# Patient Record
Sex: FEMALE | Race: BLACK OR AFRICAN AMERICAN | NOT HISPANIC OR LATINO | Employment: OTHER | RURAL
[De-identification: names, ages, dates, MRNs, and addresses within clinical notes are randomized per-mention and may not be internally consistent; named-entity substitution may affect disease eponyms.]

---

## 2017-01-04 ENCOUNTER — TELEPHONE (OUTPATIENT)
Dept: FAMILY MEDICINE CLINIC | Facility: CLINIC | Age: 62
End: 2017-01-04

## 2017-01-04 NOTE — TELEPHONE ENCOUNTER
----- Message from Stacey Stallworth LPN sent at 1/4/2017  8:20 AM CST -----      ----- Message -----     From: Danial Lemon MD     Sent: 1/3/2017   7:33 PM       To: Stacey Stallworth LPN    Blood sugars are not controlled, make sure she has meds and taking them, she should keep a record of FSBS and bring the Log in should have a visit in 2-4 wks to go over FSBS and meds.

## 2017-01-04 NOTE — TELEPHONE ENCOUNTER
Attempted to call pt. No answer.  LM for pt to return call.    Spoke with pt 1/5/17. States she does have her meds and has been taking them.  Will keep a log of her FSBS and have an appt in 2-4 weeks to go over.  Will get appt scheduled and mailed to her.

## 2017-01-30 ENCOUNTER — OFFICE VISIT (OUTPATIENT)
Dept: FAMILY MEDICINE CLINIC | Facility: CLINIC | Age: 62
End: 2017-01-30

## 2017-01-30 VITALS
TEMPERATURE: 98.4 F | HEIGHT: 62 IN | WEIGHT: 197.3 LBS | BODY MASS INDEX: 36.31 KG/M2 | SYSTOLIC BLOOD PRESSURE: 150 MMHG | HEART RATE: 88 BPM | DIASTOLIC BLOOD PRESSURE: 88 MMHG | OXYGEN SATURATION: 99 %

## 2017-01-30 DIAGNOSIS — Z12.31 ENCOUNTER FOR SCREENING MAMMOGRAM FOR MALIGNANT NEOPLASM OF BREAST: Primary | ICD-10-CM

## 2017-01-30 DIAGNOSIS — E66.09 OBESITY DUE TO EXCESS CALORIES, UNSPECIFIED OBESITY SEVERITY: ICD-10-CM

## 2017-01-30 DIAGNOSIS — I10 ESSENTIAL HYPERTENSION: ICD-10-CM

## 2017-01-30 DIAGNOSIS — M19.90 CHRONIC OSTEOARTHRITIS: ICD-10-CM

## 2017-01-30 DIAGNOSIS — E11.69 TYPE 2 DIABETES MELLITUS WITH OTHER SPECIFIED COMPLICATION (HCC): ICD-10-CM

## 2017-01-30 PROCEDURE — 99214 OFFICE O/P EST MOD 30 MIN: CPT | Performed by: FAMILY MEDICINE

## 2017-01-30 RX ORDER — ALOGLIPTIN 25 MG/1
1 TABLET, FILM COATED ORAL DAILY
Qty: 30 TABLET | Refills: 5 | Status: SHIPPED | OUTPATIENT
Start: 2017-01-30 | End: 2017-09-09 | Stop reason: SDUPTHER

## 2017-01-30 NOTE — MR AVS SNAPSHOT
Meeta Carlos Marmolejo   1/30/2017 8:00 AM   Office Visit    Dept Phone:  [phone]   Encounter #:  [id number]    Provider:  Danial Lemon MD   Department:  [facility]                Your Full Care Plan              Today's Medication Changes          These changes are accurate as of: 1/30/17  8:39 AM.  If you have any questions, ask your nurse or doctor.               New Medication(s)Ordered:     Alogliptin Benzoate 25 MG tablet   Commonly known as:  NESINA   Take 1 tablet by mouth Daily.   Started by:  Danial Lemon MD            Where to Get Your Medications      These medications were sent to [address] - [address] [facility] [address] - [phone]  - [phone]  [address] [facility], [place] [address]     Phone:  [phone]     Alogliptin Benzoate 25 MG tablet                  Your Updated Medication List          This list is accurate as of: 1/30/17  8:39 AM.  Always use your most recent med list.                Alogliptin Benzoate 25 MG tablet   Commonly known as:  NESINA   Take 1 tablet by mouth Daily.       glucose blood test strip   1 each by Other route 2 (Two) Times a Day. Use as instructed       Insulin Glargine 100 UNIT/ML injection pen   Commonly known as:  LANTUS SOLOSTAR   Inject 16 Units under the skin Every Night.       Insulin Pen Needle 31G X 4 MM misc   1 each Daily.       losartan 50 MG tablet   Commonly known as:  COZAAR   Take 1 tablet by mouth Daily.       metFORMIN 1000 MG tablet   Commonly known as:  GLUCOPHAGE   Take 1 tablet by mouth 2 (Two) Times a Day.       simvastatin 40 MG tablet   Commonly known as:  ZOCOR   Take 1 tablet by mouth Every Night.               Instructions     None    Patient Instructions History      Goals     Other     Diabetes controlled  Weight loss  Hypertension controlled  Cholesterol controlled        Upcoming Appointments     "Visit Type Date Time Department    OFFICE VISIT 1/30/2017  8:00 AM [facility]    OFFICE VISIT 5/3/2017  8:00 AM [facility]      MyChart Signup     Our records indicate that you have declined [facility]. If you would like to sign up for [facility], please email [email or web address] or call [phone] to obtain an activation code.             Other Info from Your Visit           Your Appointments     May 03, 2017  8:00 AM CDT   Office Visit with Danial Lemon MD   [facility] (--)    [place] [address] [place] [address]  [facility] [address] [phone]           Arrive 15 minutes prior to appointment.              Allergies     Penicillins  Rash    Rocephin [Ceftriaxone]  Rash      Reason for Visit     Diabetes     Facial Pain sinus pain, all through head    Weight Loss would like to discuss losing wt.      Vital Signs     Blood Pressure Pulse Temperature Height Weight Oxygen Saturation    150/88 (BP Location: Right arm, Patient Position: Sitting, Cuff Size: Adult) 88 98.4 °F (36.9 °C) (Oral) 62\" (157.5 cm) 197 lb 4.8 oz (89.5 kg) 99%    Body Mass Index Smoking Status                36.09 kg/m2 Former Smoker            "

## 2017-01-30 NOTE — PROGRESS NOTES
Subjective    Meeta Marmolejo is a 61 y.o. obese AA female former smoker with DMII, HTN, High cholesterol, GERD, Abd bloating, Diarrhea, Allergies, Dermatitis, Osteoarthritis of multiple sites, Nino TKA, Nino Carpal tunnel surgery, Plantar fasciitis, chronic hip pain, low back pain, and other health problems see PMH/PSH. Pt presents today for exam, to discuss current acue health problem.  ' Working on getting DM back under control after Holidays. Brings Log FSBS which have improved. Abd Bloating has improved. Elbow pain has improved. Has New Treadmill,  walking treadmill every day'.    Upper Extremity Issue   This is a chronic problem. The current episode started more than 1 month ago. The problem occurs intermittently. The problem has been gradually improving. Associated symptoms include arthralgias. Pertinent negatives include no abdominal pain, chest pain, congestion, coughing, fatigue, fever, headaches, joint swelling, nausea, rash, sore throat or weakness. The symptoms are aggravated by exertion and bending. She has tried acetaminophen, position changes, relaxation and sleep for the symptoms. The treatment provided moderate relief.   Hypertension   This is a chronic problem. The current episode started more than 1 year ago. The problem is controlled. Pertinent negatives include no chest pain, headaches, palpitations or shortness of breath. There are no associated agents to hypertension. Risk factors for coronary artery disease include diabetes mellitus, obesity, smoking/tobacco exposure, stress and post-menopausal state. Past treatments include angiotensin blockers. The current treatment provides significant improvement. There are no compliance problems.  Hypertensive end-organ damage includes left ventricular hypertrophy. Obesity, DM.   Diabetes   She presents for her follow-up diabetic visit. She has type 2 diabetes mellitus. No MedicAlert identification noted. Her disease course has been improving. There  are no hypoglycemic associated symptoms. Pertinent negatives for hypoglycemia include no dizziness or headaches. Pertinent negatives for diabetes include no chest pain, no fatigue and no weakness. There are no hypoglycemic complications. Symptoms are improving. Diabetic complications include peripheral neuropathy. Risk factors for coronary artery disease include diabetes mellitus, dyslipidemia, hypertension, obesity and post-menopausal. Current diabetic treatment includes oral agent (monotherapy) and insulin injections. She is compliant with treatment most of the time. Her weight is stable. She is following a generally healthy diet. Meal planning includes avoidance of concentrated sweets and calorie counting. She has not had a previous visit with a dietitian. She participates in exercise daily. Her overall blood glucose range is 140-180 mg/dl. An ACE inhibitor/angiotensin II receptor blocker is being taken. She sees a podiatrist.Eye exam is current.        The following portions of the patient's history were reviewed and updated as appropriate: allergies, current medications, past family history, past medical history, past social history, past surgical history and problem list.    Review of Systems   Constitutional: Negative for fatigue and fever.   HENT: Negative for congestion, ear pain, rhinorrhea, sneezing and sore throat.    Eyes: Negative.  Negative for pain and visual disturbance.   Respiratory: Negative.  Negative for cough and shortness of breath.    Cardiovascular: Negative.  Negative for chest pain and palpitations.   Gastrointestinal: Negative for abdominal pain and nausea.   Endocrine: Negative.    Genitourinary: Negative for dysuria.   Musculoskeletal: Positive for arthralgias. Negative for joint swelling.        R outer elbow pain improved   Skin: Negative for rash.   Allergic/Immunologic: Negative.    Neurological: Negative for dizziness, weakness and headaches.   Hematological: Negative for  adenopathy. Does not bruise/bleed easily.   Psychiatric/Behavioral: Negative.  Negative for sleep disturbance.       Objective   Physical Exam   Constitutional: She is oriented to person, place, and time. She appears well-developed and well-nourished.   HENT:   Head: Normocephalic and atraumatic.   Right Ear: External ear normal.   Left Ear: External ear normal.   Nose: Nose normal.   Mouth/Throat: Oropharynx is clear and moist. No oropharyngeal exudate.   Eyes: Conjunctivae and EOM are normal. Pupils are equal, round, and reactive to light. Right eye exhibits no discharge. Left eye exhibits no discharge. No scleral icterus.   Neck: Normal range of motion. No JVD present. No thyromegaly present.   Cardiovascular: Normal rate, regular rhythm, normal heart sounds and intact distal pulses.  Exam reveals no gallop and no friction rub.    No murmur heard.  Pulmonary/Chest: Effort normal and breath sounds normal. No respiratory distress. She has no wheezes. She has no rales. She exhibits no tenderness.   Abdominal: Soft. Bowel sounds are normal. She exhibits no distension and no mass. There is no tenderness. No hernia.   Musculoskeletal: She exhibits no edema, tenderness or deformity.   Neurological: She is alert and oriented to person, place, and time. She has normal reflexes. She displays normal reflexes. No cranial nerve deficit. She exhibits normal muscle tone. Coordination normal.   Skin: Skin is warm and dry. No rash noted. No erythema. No pallor.   Psychiatric: She has a normal mood and affect. Her behavior is normal. Judgment and thought content normal.   Nursing note and vitals reviewed.      Assessment/Plan      Meeta was seen today for diabetes, facial pain and weight loss.    Diagnoses and all orders for this visit:    Encounter for screening mammogram for malignant neoplasm of breast  -     Mammo Screening Bilateral With CAD; Future    Essential hypertension    Obesity due to excess calories, unspecified  obesity severity    Type 2 diabetes mellitus with other specified complication    Chronic osteoarthritis    Other orders  -     Alogliptin Benzoate (NESINA) 25 MG tablet; Take 1 tablet by mouth Daily.      Discussed current health problem list, meds, indications, tx plan, F/U plan. Discussed BMI, BEE, 3-4 small meals, 1600 archie/day ADA diet. Discussed USPSTF recommendations.

## 2017-01-31 NOTE — PATIENT INSTRUCTIONS
Preventive Care for Adults, Female  A healthy lifestyle and preventive care can promote health and wellness. Preventive health guidelines for women include the following key practices.  · A routine yearly physical is a good way to check with your health care provider about your health and preventive screening. It is a chance to share any concerns and updates on your health and to receive a thorough exam.  · Visit your dentist for a routine exam and preventive care every 6 months. Brush your teeth twice a day and floss once a day. Good oral hygiene prevents tooth decay and gum disease.  · The frequency of eye exams is based on your age, health, family medical history, use of contact lenses, and other factors. Follow your health care provider's recommendations for frequency of eye exams.  · Eat a healthy diet. Foods like vegetables, fruits, whole grains, low-fat dairy products, and lean protein foods contain the nutrients you need without too many calories. Decrease your intake of foods high in solid fats, added sugars, and salt. Eat the right amount of calories for you. Get information about a proper diet from your health care provider, if necessary.  · Regular physical exercise is one of the most important things you can do for your health. Most adults should get at least 150 minutes of moderate-intensity exercise (any activity that increases your heart rate and causes you to sweat) each week. In addition, most adults need muscle-strengthening exercises on 2 or more days a week.  · Maintain a healthy weight. The body mass index (BMI) is a screening tool to identify possible weight problems. It provides an estimate of body fat based on height and weight. Your health care provider can find your BMI and can help you achieve or maintain a healthy weight. For adults 20 years and older:    A BMI below 18.5 is considered underweight.    A BMI of 18.5 to 24.9 is normal.    A BMI of 25 to 29.9 is considered overweight.    A  BMI of 30 and above is considered obese.  · Maintain normal blood lipids and cholesterol levels by exercising and minimizing your intake of saturated fat. Eat a balanced diet with plenty of fruit and vegetables. Blood tests for lipids and cholesterol should begin at age 20 and be repeated every 5 years. If your lipid or cholesterol levels are high, you are over 50, or you are at high risk for heart disease, you may need your cholesterol levels checked more frequently. Ongoing high lipid and cholesterol levels should be treated with medicines if diet and exercise are not working.  · If you smoke, find out from your health care provider how to quit. If you do not use tobacco, do not start.  · Lung cancer screening is recommended for adults aged 55-80 years who are at high risk for developing lung cancer because of a history of smoking. A yearly low-dose CT scan of the lungs is recommended for people who have at least a 30-pack-year history of smoking and are a current smoker or have quit within the past 15 years. A pack year of smoking is smoking an average of 1 pack of cigarettes a day for 1 year (for example: 1 pack a day for 30 years or 2 packs a day for 15 years). Yearly screening should continue until the smoker has stopped smoking for at least 15 years. Yearly screening should be stopped for people who develop a health problem that would prevent them from having lung cancer treatment.  · If you are pregnant, do not drink alcohol. If you are breastfeeding, be very cautious about drinking alcohol. If you are not pregnant and choose to drink alcohol, do not have more than 1 drink per day. One drink is considered to be 12 ounces (355 mL) of beer, 5 ounces (148 mL) of wine, or 1.5 ounces (44 mL) of liquor.  · Avoid use of street drugs. Do not share needles with anyone. Ask for help if you need support or instructions about stopping the use of drugs.  · High blood pressure causes heart disease and increases the risk  "of stroke. Your blood pressure should be checked at least every 1 to 2 years. Ongoing high blood pressure should be treated with medicines if weight loss and exercise do not work.  · If you are 55-79 years old, ask your health care provider if you should take aspirin to prevent strokes.  · Diabetes screening is done by taking a blood sample to check your blood glucose level after you have not eaten for a certain period of time (fasting). If you are not overweight and you do not have risk factors for diabetes, you should be screened once every 3 years starting at age 45. If you are overweight or obese and you are 40-70 years of age, you should be screened for diabetes every year as part of your cardiovascular risk assessment.  · Breast cancer screening is essential preventive care for women. You should practice \"breast self-awareness.\" This means understanding the normal appearance and feel of your breasts and may include breast self-examination. Any changes detected, no matter how small, should be reported to a health care provider. Women in their 20s and 30s should have a clinical breast exam (CBE) by a health care provider as part of a regular health exam every 1 to 3 years. After age 40, women should have a CBE every year. Starting at age 40, women should consider having a mammogram (breast X-ray test) every year. Women who have a family history of breast cancer should talk to their health care provider about genetic screening. Women at a high risk of breast cancer should talk to their health care providers about having an MRI and a mammogram every year.  · Breast cancer gene (BRCA)-related cancer risk assessment is recommended for women who have family members with BRCA-related cancers. BRCA-related cancers include breast, ovarian, tubal, and peritoneal cancers. Having family members with these cancers may be associated with an increased risk for harmful changes (mutations) in the breast cancer genes BRCA1 and " BRCA2. Results of the assessment will determine the need for genetic counseling and BRCA1 and BRCA2 testing.  · Your health care provider may recommend that you be screened regularly for cancer of the pelvic organs (ovaries, uterus, and vagina). This screening involves a pelvic examination, including checking for microscopic changes to the surface of your cervix (Pap test). You may be encouraged to have this screening done every 3 years, beginning at age 21.    For women ages 30-65, health care providers may recommend pelvic exams and Pap testing every 3 years, or they may recommend the Pap and pelvic exam, combined with testing for human papilloma virus (HPV), every 5 years. Some types of HPV increase your risk of cervical cancer. Testing for HPV may also be done on women of any age with unclear Pap test results.    Other health care providers may not recommend any screening for nonpregnant women who are considered low risk for pelvic cancer and who do not have symptoms. Ask your health care provider if a screening pelvic exam is right for you.  · If you have had past treatment for cervical cancer or a condition that could lead to cancer, you need Pap tests and screening for cancer for at least 20 years after your treatment. If Pap tests have been discontinued, your risk factors (such as having a new sexual partner) need to be reassessed to determine if screening should resume. Some women have medical problems that increase the chance of getting cervical cancer. In these cases, your health care provider may recommend more frequent screening and Pap tests.  · Colorectal cancer can be detected and often prevented. Most routine colorectal cancer screening begins at the age of 50 years and continues through age 75 years. However, your health care provider may recommend screening at an earlier age if you have risk factors for colon cancer. On a yearly basis, your health care provider may provide home test kits to check  for hidden blood in the stool. Use of a small camera at the end of a tube, to directly examine the colon (sigmoidoscopy or colonoscopy), can detect the earliest forms of colorectal cancer. Talk to your health care provider about this at age 50, when routine screening begins.  Direct exam of the colon should be repeated every 5-10 years through age 75 years, unless early forms of precancerous polyps or small growths are found.  · People who are at an increased risk for hepatitis B should be screened for this virus. You are considered at high risk for hepatitis B if:    You were born in a country where hepatitis B occurs often. Talk with your health care provider about which countries are considered high risk.    Your parents were born in a high-risk country and you have not received a shot to protect against hepatitis B (hepatitis B vaccine).    You have HIV or AIDS.    You use needles to inject street drugs.    You live with, or have sex with, someone who has hepatitis B.    You get hemodialysis treatment.    You take certain medicines for conditions like cancer, organ transplantation, and autoimmune conditions.  · Hepatitis C blood testing is recommended for all people born from 1945 through 1965 and any individual with known risks for hepatitis C.  · Practice safe sex. Use condoms and avoid high-risk sexual practices to reduce the spread of sexually transmitted infections (STIs). STIs include gonorrhea, chlamydia, syphilis, trichomonas, herpes, HPV, and human immunodeficiency virus (HIV). Herpes, HIV, and HPV are viral illnesses that have no cure. They can result in disability, cancer, and death.  · You should be screened for sexually transmitted illnesses (STIs) including gonorrhea and chlamydia if:    You are sexually active and are younger than 24 years.    You are older than 24 years and your health care provider tells you that you are at risk for this type of infection.    Your sexual activity has changed  since you were last screened and you are at an increased risk for chlamydia or gonorrhea. Ask your health care provider if you are at risk.  · If you are at risk of being infected with HIV, it is recommended that you take a prescription medicine daily to prevent HIV infection. This is called preexposure prophylaxis (PrEP). You are considered at risk if:    You are sexually active and do not regularly use condoms or know the HIV status of your partner(s).    You take drugs by injection.    You are sexually active with a partner who has HIV.    Talk with your health care provider about whether you are at high risk of being infected with HIV. If you choose to begin PrEP, you should first be tested for HIV. You should then be tested every 3 months for as long as you are taking PrEP.  · Osteoporosis is a disease in which the bones lose minerals and strength with aging. This can result in serious bone fractures or breaks. The risk of osteoporosis can be identified using a bone density scan. Women ages 65 years and over and women at risk for fractures or osteoporosis should discuss screening with their health care providers. Ask your health care provider whether you should take a calcium supplement or vitamin D to reduce the rate of osteoporosis.  · Menopause can be associated with physical symptoms and risks. Hormone replacement therapy is available to decrease symptoms and risks. You should talk to your health care provider about whether hormone replacement therapy is right for you.  · Use sunscreen. Apply sunscreen liberally and repeatedly throughout the day. You should seek shade when your shadow is shorter than you. Protect yourself by wearing long sleeves, pants, a wide-brimmed hat, and sunglasses year round, whenever you are outdoors.  · Once a month, do a whole body skin exam, using a mirror to look at the skin on your back. Tell your health care provider of new moles, moles that have irregular borders, moles that  are larger than a pencil eraser, or moles that have changed in shape or color.  · Stay current with required vaccines (immunizations).    Influenza vaccine. All adults should be immunized every year.    Tetanus, diphtheria, and acellular pertussis (Td, Tdap) vaccine. Pregnant women should receive 1 dose of Tdap vaccine during each pregnancy. The dose should be obtained regardless of the length of time since the last dose. Immunization is preferred during the 27th-36th week of gestation. An adult who has not previously received Tdap or who does not know her vaccine status should receive 1 dose of Tdap. This initial dose should be followed by tetanus and diphtheria toxoids (Td) booster doses every 10 years. Adults with an unknown or incomplete history of completing a 3-dose immunization series with Td-containing vaccines should begin or complete a primary immunization series including a Tdap dose. Adults should receive a Td booster every 10 years.    Varicella vaccine. An adult without evidence of immunity to varicella should receive 2 doses or a second dose if she has previously received 1 dose. Pregnant females who do not have evidence of immunity should receive the first dose after pregnancy. This first dose should be obtained before leaving the health care facility. The second dose should be obtained 4-8 weeks after the first dose.    Human papillomavirus (HPV) vaccine. Females aged 13-26 years who have not received the vaccine previously should obtain the 3-dose series. The vaccine is not recommended for use in pregnant females. However, pregnancy testing is not needed before receiving a dose. If a female is found to be pregnant after receiving a dose, no treatment is needed. In that case, the remaining doses should be delayed until after the pregnancy. Immunization is recommended for any person with an immunocompromised condition through the age of 26 years if she did not get any or all doses earlier. During the  3-dose series, the second dose should be obtained 4-8 weeks after the first dose. The third dose should be obtained 24 weeks after the first dose and 16 weeks after the second dose.    Zoster vaccine. One dose is recommended for adults aged 60 years or older unless certain conditions are present.    Measles, mumps, and rubella (MMR) vaccine. Adults born before 1957 generally are considered immune to measles and mumps. Adults born in 1957 or later should have 1 or more doses of MMR vaccine unless there is a contraindication to the vaccine or there is laboratory evidence of immunity to each of the three diseases. A routine second dose of MMR vaccine should be obtained at least 28 days after the first dose for students attending postsecondary schools, health care workers, or international travelers. People who received inactivated measles vaccine or an unknown type of measles vaccine during 7621-2884 should receive 2 doses of MMR vaccine. People who received inactivated mumps vaccine or an unknown type of mumps vaccine before 1979 and are at high risk for mumps infection should consider immunization with 2 doses of MMR vaccine. For females of childbearing age, rubella immunity should be determined. If there is no evidence of immunity, females who are not pregnant should be vaccinated. If there is no evidence of immunity, females who are pregnant should delay immunization until after pregnancy. Unvaccinated health care workers born before 1957 who lack laboratory evidence of measles, mumps, or rubella immunity or laboratory confirmation of disease should consider measles and mumps immunization with 2 doses of MMR vaccine or rubella immunization with 1 dose of MMR vaccine.    Pneumococcal 13-valent conjugate (PCV13) vaccine. When indicated, a person who is uncertain of his immunization history and has no record of immunization should receive the PCV13 vaccine. All adults 65 years of age and older should receive this  vaccine. An adult aged 19 years or older who has certain medical conditions and has not been previously immunized should receive 1 dose of PCV13 vaccine. This PCV13 should be followed with a dose of pneumococcal polysaccharide (PPSV23) vaccine. Adults who are at high risk for pneumococcal disease should obtain the PPSV23 vaccine at least 8 weeks after the dose of PCV13 vaccine. Adults older than 65 years of age who have normal immune system function should obtain the PPSV23 vaccine dose at least 1 year after the dose of PCV13 vaccine.    Pneumococcal polysaccharide (PPSV23) vaccine. When PCV13 is also indicated, PCV13 should be obtained first. All adults aged 65 years and older should be immunized. An adult younger than age 65 years who has certain medical conditions should be immunized. Any person who resides in a nursing home or long-term care facility should be immunized. An adult smoker should be immunized. People with an immunocompromised condition and certain other conditions should receive both PCV13 and PPSV23 vaccines. People with human immunodeficiency virus (HIV) infection should be immunized as soon as possible after diagnosis. Immunization during chemotherapy or radiation therapy should be avoided. Routine use of PPSV23 vaccine is not recommended for American Indians, [place] Natives, or people younger than 65 years unless there are medical conditions that require PPSV23 vaccine. When indicated, people who have unknown immunization and have no record of immunization should receive PPSV23 vaccine. One-time revaccination 5 years after the first dose of PPSV23 is recommended for people aged 19-64 years who have chronic kidney failure, nephrotic syndrome, asplenia, or immunocompromised conditions. People who received 1-2 doses of PPSV23 before age 65 years should receive another dose of PPSV23 vaccine at age 65 years or later if at least 5 years have passed since the previous dose. Doses of PPSV23 are not  needed for people immunized with PPSV23 at or after age 65 years.    Meningococcal vaccine. Adults with asplenia or persistent complement component deficiencies should receive 2 doses of quadrivalent meningococcal conjugate (MenACWY-D) vaccine. The doses should be obtained at least 2 months apart. Microbiologists working with certain meningococcal bacteria,  recruits, people at risk during an outbreak, and people who travel to or live in countries with a high rate of meningitis should be immunized. A first-year college student up through age 21 years who is living in a residence ramos should receive a dose if she did not receive a dose on or after her 16th birthday. Adults who have certain high-risk conditions should receive one or more doses of vaccine.    Hepatitis A vaccine. Adults who wish to be protected from this disease, have certain high-risk conditions, work with hepatitis A-infected animals, work in hepatitis A research labs, or travel to or work in countries with a high rate of hepatitis A should be immunized. Adults who were previously unvaccinated and who anticipate close contact with an international adoptee during the first 60 days after arrival in the [place] from a country with a high rate of hepatitis A should be immunized.    Hepatitis B vaccine. Adults who wish to be protected from this disease, have certain high-risk conditions, may be exposed to blood or other infectious body fluids, are household contacts or sex partners of hepatitis B positive people, are clients or workers in certain care facilities, or travel to or work in countries with a high rate of hepatitis B should be immunized.    Haemophilus influenzae type b (Hib) vaccine. A previously unvaccinated person with asplenia or sickle cell disease or having a scheduled splenectomy should receive 1 dose of Hib vaccine. Regardless of previous immunization, a recipient of a hematopoietic stem cell transplant should receive a  3-dose series 6-12 months after her successful transplant. Hib vaccine is not recommended for adults with HIV infection.  Preventive Services / Frequency  Ages 19 to 39 years  · Blood pressure check.** / Every 3-5 years.  · Lipid and cholesterol check.** / Every 5 years beginning at age 20.  · Clinical breast exam.** / Every 3 years for women in their 20s and 30s.  · BRCA-related cancer risk assessment.** / For women who have family members with a BRCA-related cancer (breast, ovarian, tubal, or peritoneal cancers).  · Pap test.** / Every 2 years from ages 21 through 29. Every 3 years starting at age 30 through age 65 or 70 with a history of 3 consecutive normal Pap tests.  · HPV screening.** / Every 3 years from ages 30 through ages 65 to 70 with a history of 3 consecutive normal Pap tests.  · Hepatitis C blood test.** / For any individual with known risks for hepatitis C.  · Skin self-exam. / Monthly.  · Influenza vaccine. / Every year.  · Tetanus, diphtheria, and acellular pertussis (Tdap, Td) vaccine.** / Consult your health care provider. Pregnant women should receive 1 dose of Tdap vaccine during each pregnancy. 1 dose of Td every 10 years.  · Varicella vaccine.** / Consult your health care provider. Pregnant females who do not have evidence of immunity should receive the first dose after pregnancy.  · HPV vaccine. / 3 doses over 6 months, if 26 and younger. The vaccine is not recommended for use in pregnant females. However, pregnancy testing is not needed before receiving a dose.  · Measles, mumps, rubella (MMR) vaccine.** / You need at least 1 dose of MMR if you were born in 1957 or later. You may also need a 2nd dose. For females of childbearing age, rubella immunity should be determined. If there is no evidence of immunity, females who are not pregnant should be vaccinated. If there is no evidence of immunity, females who are pregnant should delay immunization until after pregnancy.  · Pneumococcal  13-valent conjugate (PCV13) vaccine.** / Consult your health care provider.  · Pneumococcal polysaccharide (PPSV23) vaccine.** / 1 to 2 doses if you smoke cigarettes or if you have certain conditions.  · Meningococcal vaccine.** / 1 dose if you are age 19 to 21 years and a first-year college student living in a residence ramos, or have one of several medical conditions, you need to get vaccinated against meningococcal disease. You may also need additional booster doses.  · Hepatitis A vaccine.** / Consult your health care provider.  · Hepatitis B vaccine.** / Consult your health care provider.  · Haemophilus influenzae type b (Hib) vaccine.** / Consult your health care provider.  Ages 40 to 64 years  · Blood pressure check.** / Every year.  · Lipid and cholesterol check.** / Every 5 years beginning at age 20 years.  · Lung cancer screening. / Every year if you are aged 55-80 years and have a 30-pack-year history of smoking and currently smoke or have quit within the past 15 years. Yearly screening is stopped once you have quit smoking for at least 15 years or develop a health problem that would prevent you from having lung cancer treatment.  · Clinical breast exam.** / Every year after age 40 years.  ·  BRCA-related cancer risk assessment.** / For women who have family members with a BRCA-related cancer (breast, ovarian, tubal, or peritoneal cancers).  · Mammogram.** / Every year beginning at age 40 years and continuing for as long as you are in good health. Consult with your health care provider.  · Pap test.** / Every 3 years starting at age 30 years through age 65 or 70 years with a history of 3 consecutive normal Pap tests.  · HPV screening.** / Every 3 years from ages 30 years through ages 65 to 70 years with a history of 3 consecutive normal Pap tests.  · Fecal occult blood test (FOBT) of stool. / Every year beginning at age 50 years and continuing until age 75 years. You may not need to do this test if you get  a colonoscopy every 10 years.  · Flexible sigmoidoscopy or colonoscopy.** / Every 5 years for a flexible sigmoidoscopy or every 10 years for a colonoscopy beginning at age 50 years and continuing until age 75 years.  · Hepatitis C blood test.** / For all people born from 1945 through 1965 and any individual with known risks for hepatitis C.  · Skin self-exam. / Monthly.  · Influenza vaccine. / Every year.  · Tetanus, diphtheria, and acellular pertussis (Tdap/Td) vaccine.** / Consult your health care provider. Pregnant women should receive 1 dose of Tdap vaccine during each pregnancy. 1 dose of Td every 10 years.  · Varicella vaccine.** / Consult your health care provider. Pregnant females who do not have evidence of immunity should receive the first dose after pregnancy.  · Zoster vaccine.** / 1 dose for adults aged 60 years or older.  · Measles, mumps, rubella (MMR) vaccine.** / You need at least 1 dose of MMR if you were born in 1957 or later. You may also need a second dose. For females of childbearing age, rubella immunity should be determined. If there is no evidence of immunity, females who are not pregnant should be vaccinated. If there is no evidence of immunity, females who are pregnant should delay immunization until after pregnancy.  · Pneumococcal 13-valent conjugate (PCV13) vaccine.** / Consult your health care provider.  · Pneumococcal polysaccharide (PPSV23) vaccine.** / 1 to 2 doses if you smoke cigarettes or if you have certain conditions.  · Meningococcal vaccine.** / Consult your health care provider.  · Hepatitis A vaccine.** / Consult your health care provider.  · Hepatitis B vaccine.** / Consult your health care provider.  · Haemophilus influenzae type b (Hib) vaccine.** / Consult your health care provider.  Ages 65 years and over  · Blood pressure check.** / Every year.  · Lipid and cholesterol check.** / Every 5 years beginning at age 20 years.  · Lung cancer screening. / Every year if you  are aged 55-80 years and have a 30-pack-year history of smoking and currently smoke or have quit within the past 15 years. Yearly screening is stopped once you have quit smoking for at least 15 years or develop a health problem that would prevent you from having lung cancer treatment.  · Clinical breast exam.** / Every year after age 40 years.  ·  BRCA-related cancer risk assessment.** / For women who have family members with a BRCA-related cancer (breast, ovarian, tubal, or peritoneal cancers).  · Mammogram.** / Every year beginning at age 40 years and continuing for as long as you are in good health. Consult with your health care provider.  · Pap test.** / Every 3 years starting at age 30 years through age 65 or 70 years with 3 consecutive normal Pap tests. Testing can be stopped between 65 and 70 years with 3 consecutive normal Pap tests and no abnormal Pap or HPV tests in the past 10 years.  · HPV screening.** / Every 3 years from ages 30 years through ages 65 or 70 years with a history of 3 consecutive normal Pap tests. Testing can be stopped between 65 and 70 years with 3 consecutive normal Pap tests and no abnormal Pap or HPV tests in the past 10 years.  · Fecal occult blood test (FOBT) of stool. / Every year beginning at age 50 years and continuing until age 75 years. You may not need to do this test if you get a colonoscopy every 10 years.  · Flexible sigmoidoscopy or colonoscopy.** / Every 5 years for a flexible sigmoidoscopy or every 10 years for a colonoscopy beginning at age 50 years and continuing until age 75 years.  · Hepatitis C blood test.** / For all people born from 1945 through 1965 and any individual with known risks for hepatitis C.  · Osteoporosis screening.** / A one-time screening for women ages 65 years and over and women at risk for fractures or osteoporosis.  · Skin self-exam. / Monthly.  · Influenza vaccine. / Every year.  · Tetanus, diphtheria, and acellular pertussis (Tdap/Td)  vaccine.** / 1 dose of Td every 10 years.  · Varicella vaccine.** / Consult your health care provider.  · Zoster vaccine.** / 1 dose for adults aged 60 years or older.  · Pneumococcal 13-valent conjugate (PCV13) vaccine.** / Consult your health care provider.  · Pneumococcal polysaccharide (PPSV23) vaccine.** / 1 dose for all adults aged 65 years and older.  · Meningococcal vaccine.** / Consult your health care provider.  · Hepatitis A vaccine.** / Consult your health care provider.  · Hepatitis B vaccine.** / Consult your health care provider.  · Haemophilus influenzae type b (Hib) vaccine.** / Consult your health care provider.  ** Family history and personal history of risk and conditions may change your health care provider's recommendations.     This information is not intended to replace advice given to you by your health care provider. Make sure you discuss any questions you have with your health care provider.     Document Released: 02/13/2003 Document Revised: 01/08/2016 Document Reviewed: 05/14/2012  [employer] Interactive Patient Education ©2016 [employer]

## 2017-03-08 ENCOUNTER — OFFICE VISIT (OUTPATIENT)
Dept: FAMILY MEDICINE CLINIC | Facility: CLINIC | Age: 62
End: 2017-03-08

## 2017-03-08 VITALS
SYSTOLIC BLOOD PRESSURE: 130 MMHG | HEART RATE: 82 BPM | TEMPERATURE: 98.6 F | WEIGHT: 194 LBS | DIASTOLIC BLOOD PRESSURE: 64 MMHG | BODY MASS INDEX: 35.7 KG/M2 | OXYGEN SATURATION: 97 % | HEIGHT: 62 IN

## 2017-03-08 DIAGNOSIS — Z12.11 ENCOUNTER FOR SCREENING COLONOSCOPY: ICD-10-CM

## 2017-03-08 DIAGNOSIS — E66.09 OBESITY DUE TO EXCESS CALORIES, UNSPECIFIED OBESITY SEVERITY: ICD-10-CM

## 2017-03-08 DIAGNOSIS — Z12.31 ENCOUNTER FOR SCREENING MAMMOGRAM FOR MALIGNANT NEOPLASM OF BREAST: ICD-10-CM
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
## 2017-03-08 DIAGNOSIS — Z23 NEED FOR PROPHYLACTIC VACCINATION WITH COMBINED DIPHTHERIA-TETANUS-PERTUSSIS (DTP) VACCINE: ICD-10-CM

## 2017-03-08 DIAGNOSIS — E11.69 TYPE 2 DIABETES MELLITUS WITH OTHER SPECIFIED COMPLICATION (HCC): Primary | ICD-10-CM

## 2017-03-08 DIAGNOSIS — I10 ESSENTIAL HYPERTENSION: ICD-10-CM

## 2017-03-08 DIAGNOSIS — S39.012D REPETITIVE STRAIN INJURY OF LOWER BACK, SUBSEQUENT ENCOUNTER: ICD-10-CM

## 2017-03-08 DIAGNOSIS — R80.9 MICROALBUMINURIA: ICD-10-CM
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
## 2017-03-08 PROBLEM — S39.012A REPETITIVE STRAIN INJURY OF LOWER BACK: Status: ACTIVE | Noted: 2017-03-08

## 2017-03-08 PROCEDURE — 90715 TDAP VACCINE 7 YRS/> IM: CPT | Performed by: FAMILY MEDICINE

## 2017-03-08 PROCEDURE — 99213 OFFICE O/P EST LOW 20 MIN: CPT | Performed by: FAMILY MEDICINE

## 2017-03-08 PROCEDURE — 90471 IMMUNIZATION ADMIN: CPT | Performed by: FAMILY MEDICINE

## 2017-03-08 RX ORDER — MELOXICAM 7.5 MG/1
7.5 TABLET ORAL DAILY
Qty: 14 TABLET | Refills: 0 | Status: SHIPPED | OUTPATIENT
Start: 2017-03-08 | End: 2018-08-22

## 2017-03-08 RX ORDER — CYCLOBENZAPRINE HCL 10 MG
10 TABLET ORAL 2 TIMES DAILY PRN
Qty: 28 TABLET | Refills: 0 | Status: SHIPPED | OUTPATIENT
Start: 2017-03-08 | End: 2018-08-22

## 2017-03-08 NOTE — PATIENT INSTRUCTIONS
Diabetes and Standards of Medical Care  Diabetes is complicated. You may find that your diabetes team includes a dietitian, nurse, diabetes educator, eye doctor, and more. To help everyone know what is going on and to help you get the care you deserve, the following schedule of care was developed to help keep you on track. Below are the tests, exams, vaccines, medicines, education, and plans you will need.  HbA1c test  This test shows how well you have controlled your glucose over the past 2-3 months. It is used to see if your diabetes management plan needs to be adjusted.   · It is performed at least 2 times a year if you are meeting treatment goals.  · It is performed 4 times a year if therapy has changed or if you are not meeting treatment goals.  Blood pressure test  · This test is performed at every routine medical visit. The goal is less than 140/90 mm Hg for most people, but 130/80 mm Hg in some cases. Ask your health care provider about your goal.  Dental exam  · Follow up with the dentist regularly.  Eye exam  · If you are diagnosed with type 1 diabetes as a child, get an exam upon reaching the age of 10 years or older and having had diabetes for 3-5 years. Yearly eye exams are recommended after that initial eye exam.  · If you are diagnosed with type 1 diabetes as an adult, get an exam within 5 years of diagnosis and then yearly.  · If you are diagnosed with type 2 diabetes, get an exam as soon as possible after the diagnosis and then yearly.  Foot care exam  · Visual foot exams are performed at every routine medical visit. The exams check for cuts, injuries, or other problems with the feet.  · You should have a complete foot exam performed every year. This exam includes an inspection of the structure and skin of your feet, a check of the pulses in your feet, and a check of the sensation in your feet.    Type 1 diabetes: The first exam is performed 5 years after diagnosis.    Type 2 diabetes: The first  exam is performed at the time of diagnosis.  · Check your feet nightly for cuts, injuries, or other problems with your feet. Tell your health care provider if anything is not healing.  Kidney function test (urine microalbumin)  · This test is performed once a year.  ¨ Type 1 diabetes: The first test is performed 5 years after diagnosis.  ¨ Type 2 diabetes: The first test is performed at the time of diagnosis.  · A serum creatinine and estimated glomerular filtration rate (eGFR) test is done once a year to assess the level of chronic kidney disease (CKD), if present.  Lipid profile (cholesterol, HDL, LDL, triglycerides)  · Performed every 5 years for most people.    The goal for LDL is less than 100 mg/dL. If you are at high risk, the goal is less than 70 mg/dL.    The goal for HDL is 40 mg/dL-50 mg/dL for men and 50 mg/dL-60 mg/dL for women. An HDL cholesterol of 60 mg/dL or higher gives some protection against heart disease.    The goal for triglycerides is less than 150 mg/dL.  Immunizations  · The flu (influenza) vaccine is recommended yearly for every person 6 months of age or older who has diabetes.  · The pneumonia (pneumococcal) vaccine is recommended for every person 2 years of age or older who has diabetes. Adults 65 years of age or older may receive the pneumonia vaccine as a series of two separate shots.  · The hepatitis B vaccine is recommended for adults shortly after they have been diagnosed with diabetes.  · The Tdap (tetanus, diphtheria, and pertussis) vaccine should be given:    According to normal childhood vaccination schedules, for children.    Every 10 years, for adults who have diabetes.  Diabetes self-management education  · Education is recommended at diagnosis and ongoing as needed.  Treatment plan  · Your treatment plan is reviewed at every medical visit.     This information is not intended to replace advice given to you by your health care provider. Make sure you discuss any questions you  have with your health care provider.     Document Released: 10/15/2010 Document Revised: 01/08/2016 Document Reviewed: 05/20/2014  [employer] Interactive Patient Education ©2016 [employer]    Back Pain, Adult  Back pain is very common in adults. The cause of back pain is rarely dangerous and the pain often gets better over time. The cause of your back pain may not be known. Some common causes of back pain include:  · Strain of the muscles or ligaments supporting the spine.  · Wear and tear (degeneration) of the spinal disks.  · Arthritis.  · Direct injury to the back.  For many people, back pain may return. Since back pain is rarely dangerous, most people can learn to manage this condition on their own.  HOME CARE INSTRUCTIONS  Watch your back pain for any changes. The following actions may help to lessen any discomfort you are feeling:  · Remain active. It is stressful on your back to sit or  one place for long periods of time. Do not sit, drive, or  one place for more than 30 minutes at a time. Take short walks on even surfaces as soon as you are able. Try to increase the length of time you walk each day.  · Exercise regularly as directed by your health care provider. Exercise helps your back heal faster. It also helps avoid future injury by keeping your muscles strong and flexible.  · Do not stay in bed. Resting more than 1-2 days can delay your recovery.  · Pay attention to your body when you bend and lift. The most comfortable positions are those that put less stress on your recovering back. Always use proper lifting techniques, including:    Bending your knees.    Keeping the load close to your body.    Avoiding twisting.  · Find a comfortable position to sleep. Use a firm mattress and lie on your side with your knees slightly bent. If you lie on your back, put a pillow under your knees.  · Avoid feeling anxious or stressed. Stress increases muscle tension and can worsen back pain. It is  important to recognize when you are anxious or stressed and learn ways to manage it, such as with exercise.  · Take medicines only as directed by your health care provider. Over-the-counter medicines to reduce pain and inflammation are often the most helpful. Your health care provider may prescribe muscle relaxant drugs. These medicines help dull your pain so you can more quickly return to your normal activities and healthy exercise.  · Apply ice to the injured area:    Put ice in a plastic bag.    Place a towel between your skin and the bag.    Leave the ice on for 20 minutes, 2-3 times a day for the first 2-3 days. After that, ice and heat may be alternated to reduce pain and spasms.  · Maintain a healthy weight. Excess weight puts extra stress on your back and makes it difficult to maintain good posture.  SEEK MEDICAL CARE IF:  · You have pain that is not relieved with rest or medicine.  · You have increasing pain going down into the legs or buttocks.  · You have pain that does not improve in one week.  · You have night pain.  · You lose weight.  · You have a fever or chills.  SEEK IMMEDIATE MEDICAL CARE IF:   · You develop new bowel or bladder control problems.  · You have unusual weakness or numbness in your arms or legs.  · You develop nausea or vomiting.  · You develop abdominal pain.  · You feel faint.     This information is not intended to replace advice given to you by your health care provider. Make sure you discuss any questions you have with your health care provider.     Document Released: 12/18/2006 Document Revised: 01/08/2016 Document Reviewed: 04/21/2015  [employer] Interactive Patient Education ©2016 [employer]

## 2017-03-08 NOTE — PROGRESS NOTES
Subjective    Meeta Marmolejo is a 61 y.o. obese AA female former smoker with DMII, HTN, High cholesterol, GERD, Abd bloating, Diarrhea, Allergies, Dermatitis, Osteoarthritis of multiple sites, Nino TKA, Nino Carpal tunnel surgery, Plantar fasciitis, chronic hip pain, low back pain, and other health problems see PMH/PSH. Pt presents today for exam, to discuss current acue health problem.  ' Has been moving, lifting boxes , low back has been sore. Tolerating [place], FSBS  A.m.  120's   2 hr PP  After bkfast, After lunch around 130-140's.  B/P has been good'.    Hypertension   This is a chronic problem. The current episode started more than 1 year ago. The problem is controlled. Pertinent negatives include no chest pain, headaches, palpitations or shortness of breath. There are no associated agents to hypertension. Risk factors for coronary artery disease include diabetes mellitus, obesity, smoking/tobacco exposure, stress and post-menopausal state. Past treatments include angiotensin blockers. The current treatment provides significant improvement. There are no compliance problems.  Hypertensive end-organ damage includes left ventricular hypertrophy. Obesity, DM.   Diabetes   She presents for her follow-up diabetic visit. She has type 2 diabetes mellitus. No MedicAlert identification noted. Her disease course has been improving. There are no hypoglycemic associated symptoms. Pertinent negatives for hypoglycemia include no dizziness or headaches. Pertinent negatives for diabetes include no chest pain, no fatigue and no weakness. There are no hypoglycemic complications. Symptoms are improving. Diabetic complications include peripheral neuropathy. Risk factors for coronary artery disease include diabetes mellitus, dyslipidemia, hypertension, obesity and post-menopausal. Current diabetic treatment includes oral agent (monotherapy) and insulin injections. She is compliant with treatment most of the time. Her weight is  stable. She is following a generally healthy diet. Meal planning includes avoidance of concentrated sweets and calorie counting. She has not had a previous visit with a dietitian. She participates in exercise daily. Her overall blood glucose range is 140-180 mg/dl. An ACE inhibitor/angiotensin II receptor blocker is being taken. She sees a podiatrist.Eye exam is current.   Back Pain   This is a recurrent problem. The current episode started 1 to 4 weeks ago. The problem occurs daily. The problem is unchanged. The pain is present in the lumbar spine. The quality of the pain is described as burning and aching. The pain is at a severity of 4/10. The pain is mild. The symptoms are aggravated by standing. Pertinent negatives include no abdominal pain, chest pain, dysuria, fever, headaches or weakness. She has tried bed rest and walking for the symptoms. The treatment provided mild relief.        The following portions of the patient's history were reviewed and updated as appropriate: allergies, current medications, past family history, past medical history, past social history, past surgical history and problem list.    Review of Systems   Constitutional: Negative for fatigue and fever.   HENT: Negative for congestion, ear pain, rhinorrhea, sneezing and sore throat.    Eyes: Negative.  Negative for pain and visual disturbance.   Respiratory: Negative.  Negative for cough and shortness of breath.    Cardiovascular: Negative.  Negative for chest pain and palpitations.   Gastrointestinal: Negative for abdominal pain and nausea.   Endocrine: Negative.    Genitourinary: Negative for dysuria.   Musculoskeletal: Positive for arthralgias and back pain. Negative for joint swelling.        R outer elbow pain improved   Skin: Negative for rash.   Allergic/Immunologic: Negative.    Neurological: Negative for dizziness, weakness and headaches.   Hematological: Negative for adenopathy. Does not bruise/bleed easily.    Psychiatric/Behavioral: Negative.  Negative for sleep disturbance.       Objective   Physical Exam   Constitutional: She is oriented to person, place, and time. She appears well-developed and well-nourished.   HENT:   Head: Normocephalic and atraumatic.   Right Ear: External ear normal.   Left Ear: External ear normal.   Nose: Nose normal.   Mouth/Throat: Oropharynx is clear and moist. No oropharyngeal exudate.   Eyes: Conjunctivae and EOM are normal. Pupils are equal, round, and reactive to light. Right eye exhibits no discharge. Left eye exhibits no discharge. No scleral icterus.   Neck: Normal range of motion. No JVD present. No thyromegaly present.   Cardiovascular: Normal rate, regular rhythm, normal heart sounds and intact distal pulses.  Exam reveals no gallop and no friction rub.    No murmur heard.  Pulmonary/Chest: Effort normal and breath sounds normal. No respiratory distress. She has no wheezes. She has no rales. She exhibits no tenderness.   Abdominal: Soft. Bowel sounds are normal. She exhibits no distension and no mass. There is no tenderness. No hernia.   Musculoskeletal: She exhibits no edema, tenderness or deformity.   Has WB 4 with ROM low back.   Neurological: She is alert and oriented to person, place, and time. She has normal reflexes. She displays normal reflexes. No cranial nerve deficit. She exhibits normal muscle tone. Coordination normal.   Skin: Skin is warm and dry. No rash noted. No erythema. No pallor.   Psychiatric: She has a normal mood and affect. Her behavior is normal. Judgment and thought content normal.   Nursing note and vitals reviewed.      Assessment/Plan      Meeta was seen today for back pain, hypertension and diabetes.    Diagnoses and all orders for this visit:    Type 2 diabetes mellitus with other specified complication  -     Hemoglobin A1c; Future    Need for prophylactic vaccination with combined diphtheria-tetanus-pertussis (DTP) vaccine  -     Tdap Vaccine  Greater Than or Equal To 6yo IM    Encounter for screening mammogram for malignant neoplasm of breast  -     Mammo Screening Bilateral With CAD    Essential hypertension    Obesity due to excess calories, unspecified obesity severity    Microalbuminuria    Repetitive strain injury of lower back, subsequent encounter  -     cyclobenzaprine (FLEXERIL) 10 MG tablet; Take 1 tablet by mouth 2 (Two) Times a Day As Needed for muscle spasms.    Other orders  -     meloxicam (MOBIC) 7.5 MG tablet; Take 1 tablet by mouth Daily.        Discussed current health problems, meds, indications, rechecking labs, goals for controlled DM. Discussed safe lifting techniques. Discussed f/U plan here.

## 2017-06-26 RX ORDER — LOSARTAN POTASSIUM 50 MG/1
TABLET ORAL
Qty: 30 TABLET | Refills: 1 | Status: SHIPPED | OUTPATIENT
Start: 2017-06-26 | End: 2018-08-22

## 2017-07-28 RX ORDER — INSULIN GLARGINE 100 [IU]/ML
INJECTION, SOLUTION SUBCUTANEOUS
Qty: 45 ML | Refills: 2 | Status: SHIPPED | OUTPATIENT
Start: 2017-07-28 | End: 2017-08-14 | Stop reason: CLARIF

## 2017-08-14 RX ORDER — INSULIN GLARGINE 100 [IU]/ML
INJECTION, SOLUTION SUBCUTANEOUS
Qty: 2 PEN | Refills: 5 | Status: SHIPPED | OUTPATIENT
Start: 2017-08-14 | End: 2019-12-31 | Stop reason: ALTCHOICE

## 2017-09-11 RX ORDER — ALOGLIPTIN 25 MG/1
TABLET, FILM COATED ORAL
Qty: 30 TABLET | Refills: 1 | Status: SHIPPED | OUTPATIENT
Start: 2017-09-11 | End: 2018-08-22

## 2017-11-06 ENCOUNTER — OFFICE VISIT (OUTPATIENT)
Dept: FAMILY MEDICINE CLINIC | Facility: CLINIC | Age: 62
End: 2017-11-06

## 2017-11-06 VITALS
OXYGEN SATURATION: 98 % | TEMPERATURE: 97.6 F | DIASTOLIC BLOOD PRESSURE: 83 MMHG | BODY MASS INDEX: 33.66 KG/M2 | HEIGHT: 63 IN | WEIGHT: 190 LBS | SYSTOLIC BLOOD PRESSURE: 152 MMHG | HEART RATE: 84 BPM

## 2017-11-06 DIAGNOSIS — H00.025 HORDEOLUM INTERNUM OF LEFT LOWER EYELID: Primary | ICD-10-CM

## 2017-11-06 PROCEDURE — 99212 OFFICE O/P EST SF 10 MIN: CPT | Performed by: NURSE PRACTITIONER

## 2017-11-06 RX ORDER — MONTELUKAST SODIUM 10 MG/1
TABLET ORAL
COMMUNITY
Start: 2017-10-30 | End: 2017-11-06

## 2017-11-06 RX ORDER — ATORVASTATIN CALCIUM 20 MG/1
TABLET, FILM COATED ORAL
COMMUNITY
Start: 2017-10-30 | End: 2018-08-22

## 2017-11-06 RX ORDER — HYDROCODONE BITARTRATE AND ACETAMINOPHEN 5; 325 MG/1; MG/1
TABLET ORAL
COMMUNITY
Start: 2017-08-30 | End: 2017-11-06

## 2017-11-06 RX ORDER — ERYTHROMYCIN 5 MG/G
OINTMENT OPHTHALMIC EVERY 6 HOURS
Qty: 3.5 G | Refills: 0 | Status: SHIPPED | OUTPATIENT
Start: 2017-11-06 | End: 2017-11-13

## 2017-11-06 RX ORDER — MELOXICAM 15 MG/1
15 TABLET ORAL DAILY
COMMUNITY
Start: 2017-10-05 | End: 2018-08-31 | Stop reason: HOSPADM

## 2017-11-06 RX ORDER — ALENDRONATE SODIUM 70 MG/1
70 TABLET ORAL
COMMUNITY
Start: 2017-10-20 | End: 2020-07-21 | Stop reason: SDUPTHER

## 2017-11-06 RX ORDER — INSULIN DETEMIR 100 [IU]/ML
INJECTION, SOLUTION SUBCUTANEOUS
COMMUNITY
Start: 2017-10-29 | End: 2018-08-22

## 2017-11-06 RX ORDER — LOSARTAN POTASSIUM 100 MG/1
TABLET ORAL
COMMUNITY
Start: 2017-10-30 | End: 2017-11-06 | Stop reason: SDUPTHER

## 2017-11-06 NOTE — PROGRESS NOTES
"Subjective   Meeta Marmolejo is a 62 y.o. female.     HPI Comments: C/O \"bumps\" to left eye lid x 24 hours.  Started after she had been doing some cleaning of air conditioning vents and thought she may have gotten some dust in her eye and rubbed her eyes.  Has been using warm compresses which have helped somewhat. Denies any significant pain or visual changes.         The following portions of the patient's history were reviewed and updated as appropriate: allergies, current medications, past medical history, past social history, past surgical history and problem list.    Review of Systems   Constitutional: Negative for activity change, appetite change, chills and fever.   HENT: Negative for congestion, ear pain, sinus pressure and sore throat.    Eyes: Negative for photophobia, discharge, redness and itching. Eye pain:  minimal on left.   Respiratory: Negative for cough.    Cardiovascular: Negative for chest pain.   Gastrointestinal: Negative for nausea and vomiting.   Musculoskeletal: Negative for myalgias, neck pain and neck stiffness.   Neurological: Negative for dizziness, weakness and headaches.   Hematological: Negative for adenopathy.       Objective    /83 (BP Location: Left arm, Patient Position: Sitting, Cuff Size: Adult)  Pulse 84  Temp 97.6 °F (36.4 °C) (Tympanic)   Ht 63\" (160 cm)  Wt 190 lb (86.2 kg)  SpO2 98%  BMI 33.66 kg/m2    Physical Exam   Constitutional: She appears well-developed and well-nourished. No distress.   Eyes: Conjunctivae and EOM are normal. Pupils are equal, round, and reactive to light. Left eye exhibits hordeolum ( one small, external stye noted to left lower lid near nasal border.  Two, large internal styes noted to mid, left lower lid).   Lymphadenopathy:     She has no cervical adenopathy.   Nursing note and vitals reviewed.      Assessment/Plan   Meeta was seen today for stye.    Diagnoses and all orders for this visit:    Hordeolum internum of left lower " eyelid  -     erythromycin (ROMYCIN) 5 MG/GM ophthalmic ointment; Administer  into the left eye Every 6 (Six) Hours for 7 days.      Continue with warm compresses as needed  Rx for Erythromycin opthalmic ointment with instructions on instillation given    See eye doctor if worsening of symptoms or immediately if visual changes or you develop any eye pain

## 2017-11-06 NOTE — PATIENT INSTRUCTIONS
Stye  A stye is a bump on your eyelid caused by a bacterial infection. A stye can form inside the eyelid (internal stye) or outside the eyelid (external stye). An internal stye may be caused by an infected oil-producing gland inside your eyelid. An external stye may be caused by an infection at the base of your eyelash (hair follicle).  Styes are very common. Anyone can get them at any age. They usually occur in just one eye, but you may have more than one in either eye.   CAUSES   The infection is almost always caused by bacteria called Staphylococcus aureus. This is a common type of bacteria that lives on your skin.  RISK FACTORS  You may be at higher risk for a stye if you have had one before. You may also be at higher risk if you have:  · Diabetes.  · Long-term illness.  · Long-term eye redness.  · A skin condition called seborrhea.  · High fat levels in your blood (lipids).  SIGNS AND SYMPTOMS   Eyelid pain is the most common symptom of a stye. Internal styes are more painful than external styes. Other signs and symptoms may include:  · Painful swelling of your eyelid.  · A scratchy feeling in your eye.  · Tearing and redness of your eye.  · Pus draining from the stye.  DIAGNOSIS   Your health care provider may be able to diagnose a stye just by examining your eye. The health care provider may also check to make sure:  · You do not have a fever or other signs of a more serious infection.  · The infection has not spread to other parts of your eye or areas around your eye.  TREATMENT   Most styes will clear up in a few days without treatment. In some cases, you may need to use antibiotic drops or ointment to prevent infection. Your health care provider may have to drain the stye surgically if your stye is:  · Large.  · Causing a lot of pain.  · Interfering with your vision.  This can be done using a thin blade or a needle.   HOME CARE INSTRUCTIONS   · Take medicines only as directed by your health care  provider.  · Apply a clean, warm compress to your eye for 10 minutes, 4 times a day.  · Do not wear contact lenses or eye makeup until your stye has healed.  · Do not try to pop or drain the stye.  SEEK MEDICAL CARE IF:  · You have chills or a fever.  · Your stye does not go away after several days.  · Your stye affects your vision.  · Your eyeball becomes swollen, red, or painful.  MAKE SURE YOU:  · Understand these instructions.  · Will watch your condition.  · Will get help right away if you are not doing well or get worse.     This information is not intended to replace advice given to you by your health care provider. Make sure you discuss any questions you have with your health care provider.     Document Released: 09/27/2006 Document Revised: 01/08/2016 Document Reviewed: 04/03/2015  [employer] Interactive Patient Education ©2017 [employer]

## 2018-06-26 DIAGNOSIS — M25.562 LEFT KNEE PAIN, UNSPECIFIED CHRONICITY: Primary | ICD-10-CM

## 2018-06-27 ENCOUNTER — OFFICE VISIT (OUTPATIENT)
Dept: ORTHOPEDIC SURGERY | Facility: CLINIC | Age: 63
End: 2018-06-27

## 2018-06-27 VITALS — BODY MASS INDEX: 34.38 KG/M2 | HEIGHT: 63 IN | WEIGHT: 194 LBS

## 2018-06-27 DIAGNOSIS — T84.84XA PAINFUL TOTAL KNEE REPLACEMENT, INITIAL ENCOUNTER (HCC): ICD-10-CM

## 2018-06-27 DIAGNOSIS — M25.551 BILATERAL HIP PAIN: Primary | ICD-10-CM

## 2018-06-27 DIAGNOSIS — M25.552 BILATERAL HIP PAIN: Primary | ICD-10-CM

## 2018-06-27 DIAGNOSIS — Z96.659 HISTORY OF TOTAL KNEE REPLACEMENT, UNSPECIFIED LATERALITY: ICD-10-CM

## 2018-06-27 DIAGNOSIS — Z96.659 PAINFUL TOTAL KNEE REPLACEMENT, INITIAL ENCOUNTER (HCC): ICD-10-CM

## 2018-06-27 PROBLEM — Z96.652 HISTORY OF KNEE REPLACEMENT PROCEDURE OF LEFT KNEE: Status: ACTIVE | Noted: 2018-06-27

## 2018-06-27 PROCEDURE — 99203 OFFICE O/P NEW LOW 30 MIN: CPT | Performed by: ORTHOPAEDIC SURGERY

## 2018-06-27 NOTE — PROGRESS NOTES
Meeta Marmolejo is a 62 y.o. female   Primary provider:  No Known Provider       Chief Complaint   Patient presents with   • Left Knee - Pain       HISTORY OF PRESENT ILLNESS: Patient is here today for left knee pain. Patient had her left knee replaced by Dr. Darby 5 years ago. That was revision neither was done for stiffness and is still stiff and so is been stiff but she's been able to walk between 2-4 miles a day chronically without significant pain.  For the past 3 weeks is been painful walking is limited activity.  It is not painful 100% time but she feels like something is shifting inside her knee when she weightbears.  Cement was fairly healthy and active has had no fever or chills in its the midportion of tibial her pain has.    History of Present Illness     CONCURRENT MEDICAL HISTORY:    Past Medical History:   Diagnosis Date   • Acute vaginitis     resolved   • Carpal tunnel syndrome    • Chronic osteoarthritis     Nino TKA   • Chronic pain    • Chronic urticaria    • Dietary counseling and surveillance    • Dysuria    • External hordeolum    • Hyperlipidemia    • Low back pain     strain   • Microalbuminuria     on ARB   • Noncompliance with treatment    • Obesity      Dieting , exercise, appetite suppressant      • Osteoarthritis of left knee    • Plantar fasciitis     resolved   • Type 2 diabetes mellitus    • Urinary tract infectious disease    • Vaginal discharge    • Vitamin deficiency        Allergies   Allergen Reactions   • Penicillins Rash   • Rocephin [Ceftriaxone] Rash         Current Outpatient Prescriptions:   •  alendronate (FOSAMAX) 70 MG tablet, , Disp: , Rfl:   •  Alogliptin Benzoate 25 MG tablet, TAKE ONE TABLET BY MOUTH DAILY, Disp: 30 tablet, Rfl: 1  •  atorvastatin (LIPITOR) 20 MG tablet, , Disp: , Rfl:   •  cyclobenzaprine (FLEXERIL) 10 MG tablet, Take 1 tablet by mouth 2 (Two) Times a Day As Needed for muscle spasms., Disp: 28 tablet, Rfl: 0  •  glucose blood test strip, 1 each  by Other route 2 (Two) Times a Day. Use as instructed, Disp: 50 each, Rfl: 11  •  Insulin Glargine (BASAGLAR KWIKPEN) 100 UNIT/ML injection pen, Inject 16 units under the skin nightly (REPLACES LANTUS DUE TO INSURANCE FORMULARY), Disp: 2 pen, Rfl: 5  •  Insulin Pen Needle 31G X 4 MM misc, 1 each Daily., Disp: 100 each, Rfl: 1  •  LEVEMIR FLEXTOUCH 100 UNIT/ML injection, , Disp: , Rfl:   •  losartan (COZAAR) 50 MG tablet, TAKE ONE TABLET BY MOUTH DAILY, Disp: 30 tablet, Rfl: 1  •  meloxicam (MOBIC) 15 MG tablet, , Disp: , Rfl:   •  meloxicam (MOBIC) 7.5 MG tablet, Take 1 tablet by mouth Daily., Disp: 14 tablet, Rfl: 0  •  metFORMIN (GLUCOPHAGE) 1000 MG tablet, TAKE ONE TABLET BY MOUTH TWICE A DAY, Disp: 60 tablet, Rfl: 1  •  simvastatin (ZOCOR) 40 MG tablet, Take 1 tablet by mouth Every Night., Disp: 30 tablet, Rfl: 5    Past Surgical History:   Procedure Laterality Date   • CARPAL TUNNEL RELEASE      Bilateral endoscopic release 01/20/2003    • ENDOSCOPY      normal 04/25/2008      • KNEE ARTHROSCOPY     • KNEE SURGERY      Removal of existing left total knee arthroplasty and revision left total knee arthroplasty. 12/15/2014       • REPLACEMENT TOTAL KNEE     • SHOULDER SURGERY     • TUBAL ABDOMINAL LIGATION         Family History   Problem Relation Age of Onset   • Diabetes Mother    • Hypertension Mother    • Colon cancer Father    • Diabetes Father    • Hypertension Father    • Breast cancer Sister    • Cancer Other    • Diabetes Other    • Hypertension Other        Social History     Social History   • Marital status:      Spouse name: N/A   • Number of children: N/A   • Years of education: N/A     Occupational History   • Not on file.     Social History Main Topics   • Smoking status: Former Smoker     Packs/day: 1.00   • Smokeless tobacco: Former User   • Alcohol use No   • Drug use: Unknown   • Sexual activity: Defer     Other Topics Concern   • Not on file     Social History Narrative   • No narrative  "on file        Review of Systems   Constitutional: Positive for appetite change. Negative for chills and fever.   HENT: Negative for facial swelling.    Respiratory: Negative for apnea and shortness of breath.    Cardiovascular: Negative for chest pain and leg swelling.   Gastrointestinal: Negative for abdominal pain, nausea and vomiting.   Genitourinary: Negative for dysuria.   Musculoskeletal: Positive for arthralgias, gait problem and joint swelling.   Skin: Negative for color change.   Neurological: Negative for seizures and syncope.   Hematological: Negative for adenopathy.   Psychiatric/Behavioral: Negative for dysphoric mood.       PHYSICAL EXAMINATION:       Ht 160 cm (63\")   Wt 88 kg (194 lb)   BMI 34.37 kg/m²     Physical Exam   Constitutional: She is oriented to person, place, and time. She appears well-developed and well-nourished.   HENT:   Head: Normocephalic and atraumatic.   Eyes: EOM are normal. Pupils are equal, round, and reactive to light.   Neck: Neck supple.   Pulmonary/Chest: Effort normal.   Musculoskeletal: She exhibits edema and tenderness.   Neurological: She is alert and oriented to person, place, and time.   Skin: Skin is warm and dry.   Psychiatric: She has a normal mood and affect. Thought content normal.   Vitals reviewed.      GAIT:     []  Normal  [x]  Antalgic    Assistive device: [x]  None  []  Walker     []  Crutches  []  Cane     []  Wheelchair  []  Stretcher    Ortho Exam   The knee is swollen with well-healed surgical wound.  There is slight warmth of left greater than right.  Motion is from about 0 to about 80.  Unable to flex away.  There is effusion here she is exquisitely tender midshaft of the tibia.  She is neurovascularly intact distally.        No results found.  Two-view x-rays show a total condylar knee with long stems of the femur and tibia with a sclerotic line around the stems distally overall no evidence of loosening appears to be in good position " otherwise.      ASSESSMENT:    Diagnoses and all orders for this visit:    Bilateral hip pain  -     NM Bone Scan 3 Phase; Future    History of total knee replacement, unspecified laterality  -     NM Bone Scan 3 Phase; Future    Painful total knee replacement, initial encounter          PLAN at this point there is concern of stress reaction or loosening.  I have no index x-rays for comparison.  I will send her for a bone scan to look for activity of the mid tibia.  She is very active female that has fairly consistent weightbearing pain and bleeding it's medically necessary at this point.  The meantime she is to back off her activities.    No Follow-up on file.    Elias Su MD

## 2018-07-05 ENCOUNTER — APPOINTMENT (OUTPATIENT)
Dept: NUCLEAR MEDICINE | Facility: HOSPITAL | Age: 63
End: 2018-07-05

## 2018-07-05 ENCOUNTER — TELEPHONE (OUTPATIENT)
Dept: GENERAL RADIOLOGY | Facility: HOSPITAL | Age: 63
End: 2018-07-05

## 2018-07-12 ENCOUNTER — HOSPITAL ENCOUNTER (OUTPATIENT)
Dept: NUCLEAR MEDICINE | Facility: HOSPITAL | Age: 63
Discharge: HOME | End: 2018-07-12

## 2018-07-12 DIAGNOSIS — M25.552 BILATERAL HIP PAIN: ICD-10-CM

## 2018-07-12 DIAGNOSIS — Z96.659 HISTORY OF TOTAL KNEE REPLACEMENT, UNSPECIFIED LATERALITY: ICD-10-CM

## 2018-07-12 DIAGNOSIS — M25.551 BILATERAL HIP PAIN: ICD-10-CM

## 2018-07-12 PROCEDURE — 0 TECHNETIUM MEDRONATE KIT: Performed by: ORTHOPAEDIC SURGERY

## 2018-07-12 PROCEDURE — A9503 TC99M MEDRONATE: HCPCS | Performed by: ORTHOPAEDIC SURGERY

## 2018-07-12 PROCEDURE — 78315 BONE IMAGING 3 PHASE: CPT

## 2018-07-12 RX ORDER — TC 99M MEDRONATE 20 MG/10ML
27.5 INJECTION, POWDER, LYOPHILIZED, FOR SOLUTION INTRAVENOUS
Status: COMPLETED | OUTPATIENT
Start: 2018-07-12 | End: 2018-07-12

## 2018-07-12 RX ADMIN — Medication 27.5 MILLICURIE: at 08:59

## 2018-07-18 ENCOUNTER — OFFICE VISIT (OUTPATIENT)
Dept: ORTHOPEDIC SURGERY | Facility: CLINIC | Age: 63
End: 2018-07-18

## 2018-07-18 ENCOUNTER — LAB (OUTPATIENT)
Dept: LAB | Facility: HOSPITAL | Age: 63
End: 2018-07-18

## 2018-07-18 VITALS — HEIGHT: 62 IN | WEIGHT: 194 LBS | BODY MASS INDEX: 35.7 KG/M2

## 2018-07-18 DIAGNOSIS — M25.551 BILATERAL HIP PAIN: ICD-10-CM

## 2018-07-18 DIAGNOSIS — Z96.652 HISTORY OF TOTAL LEFT KNEE REPLACEMENT: ICD-10-CM

## 2018-07-18 DIAGNOSIS — Z96.659 MECHANICAL LOOSENING OF PROSTHETIC KNEE, INITIAL ENCOUNTER (HCC): ICD-10-CM

## 2018-07-18 DIAGNOSIS — T84.038A MECHANICAL LOOSENING OF PROSTHETIC KNEE, INITIAL ENCOUNTER (HCC): ICD-10-CM

## 2018-07-18 DIAGNOSIS — Z96.652 HISTORY OF TOTAL LEFT KNEE REPLACEMENT: Primary | ICD-10-CM

## 2018-07-18 DIAGNOSIS — M25.552 BILATERAL HIP PAIN: ICD-10-CM

## 2018-07-18 LAB
APPEARANCE FLD: ABNORMAL
BASOPHILS # BLD AUTO: 0.04 10*3/MM3 (ref 0–0.2)
BASOPHILS NFR BLD AUTO: 0.7 % (ref 0–2)
COLOR FLD: YELLOW
CRP SERPL-MCNC: 0.7 MG/DL (ref 0–1)
CRYSTALS FLD MICRO: NORMAL
DEPRECATED RDW RBC AUTO: 44.8 FL (ref 36.4–46.3)
EOSINOPHIL # BLD AUTO: 0.15 10*3/MM3 (ref 0–0.7)
EOSINOPHIL NFR BLD AUTO: 2.5 % (ref 0–7)
ERYTHROCYTE [DISTWIDTH] IN BLOOD BY AUTOMATED COUNT: 14.6 % (ref 11.5–14.5)
ERYTHROCYTE [SEDIMENTATION RATE] IN BLOOD: 22 MM/HR (ref 0–20)
HCT VFR BLD AUTO: 39.4 % (ref 35–45)
HGB BLD-MCNC: 13 G/DL (ref 12–15.5)
IMM GRANULOCYTES # BLD: 0.02 10*3/MM3 (ref 0–0.02)
IMM GRANULOCYTES NFR BLD: 0.3 % (ref 0–0.5)
LYMPHOCYTES # BLD AUTO: 2.35 10*3/MM3 (ref 0.6–4.2)
LYMPHOCYTES NFR BLD AUTO: 38.4 % (ref 10–50)
MCH RBC QN AUTO: 27.8 PG (ref 26.5–34)
MCHC RBC AUTO-ENTMCNC: 33 G/DL (ref 31.4–36)
MCV RBC AUTO: 84.2 FL (ref 80–98)
MONOCYTES # BLD AUTO: 0.39 10*3/MM3 (ref 0–0.9)
MONOCYTES NFR BLD AUTO: 6.4 % (ref 0–12)
MONOS+MACROS NFR FLD: 38 %
NEUTROPHILS # BLD AUTO: 3.17 10*3/MM3 (ref 2–8.6)
NEUTROPHILS NFR BLD AUTO: 51.7 % (ref 37–80)
NEUTROPHILS NFR FLD MANUAL: 62 %
NRBC BLD MANUAL-RTO: 0 /100 WBC (ref 0–0)
PLATELET # BLD AUTO: 281 10*3/MM3 (ref 150–450)
PMV BLD AUTO: 11.2 FL (ref 8–12)
RBC # BLD AUTO: 4.68 10*6/MM3 (ref 3.77–5.16)
RBC # FLD AUTO: 2000 /MM3 (ref 0–0)
SPECIMEN VOL FLD: 5 ML
WBC # FLD: 1060.5 /MM3 (ref 0–5)
WBC NRBC COR # BLD: 6.12 10*3/MM3 (ref 3.2–9.8)

## 2018-07-18 PROCEDURE — 89051 BODY FLUID CELL COUNT: CPT

## 2018-07-18 PROCEDURE — 87205 SMEAR GRAM STAIN: CPT

## 2018-07-18 PROCEDURE — 99213 OFFICE O/P EST LOW 20 MIN: CPT | Performed by: ORTHOPAEDIC SURGERY

## 2018-07-18 PROCEDURE — 87070 CULTURE OTHR SPECIMN AEROBIC: CPT

## 2018-07-18 PROCEDURE — 36415 COLL VENOUS BLD VENIPUNCTURE: CPT

## 2018-07-18 PROCEDURE — 87015 SPECIMEN INFECT AGNT CONCNTJ: CPT

## 2018-07-18 PROCEDURE — 86140 C-REACTIVE PROTEIN: CPT

## 2018-07-18 PROCEDURE — 20610 DRAIN/INJ JOINT/BURSA W/O US: CPT | Performed by: ORTHOPAEDIC SURGERY

## 2018-07-18 PROCEDURE — 86038 ANTINUCLEAR ANTIBODIES: CPT

## 2018-07-18 PROCEDURE — 85651 RBC SED RATE NONAUTOMATED: CPT

## 2018-07-18 PROCEDURE — 89060 EXAM SYNOVIAL FLUID CRYSTALS: CPT | Performed by: ORTHOPAEDIC SURGERY

## 2018-07-18 PROCEDURE — 85025 COMPLETE CBC W/AUTO DIFF WBC: CPT

## 2018-07-18 NOTE — PROGRESS NOTES
Meeta Marmolejo is a 62 y.o. female returns for     Chief Complaint   Patient presents with   • Left Knee - Follow-up   • Left Hip - Follow-up   • Right Hip - Follow-up       HISTORY OF PRESENT ILLNESS: Patient is here to discuss findings of 3 phase bone scan. Patient denies any changes since her last office visit. She still having pain it hurts when she is up and better when she is down.  She did have her bone scan findings are discussed above it is concerning for loosening.  She feels like something is shifting down in her lower tibia as before.  She has not had any fevers or chills since we have seen her.       CONCURRENT MEDICAL HISTORY:    Past Medical History:   Diagnosis Date   • Acute vaginitis     resolved   • Carpal tunnel syndrome    • Chronic osteoarthritis     Nino TKA   • Chronic pain    • Chronic urticaria    • Dietary counseling and surveillance    • Dysuria    • External hordeolum    • Hyperlipidemia    • Low back pain     strain   • Microalbuminuria     on ARB   • Noncompliance with treatment    • Obesity      Dieting , exercise, appetite suppressant      • Osteoarthritis of left knee    • Plantar fasciitis     resolved   • Type 2 diabetes mellitus (CMS/HCC)    • Urinary tract infectious disease    • Vaginal discharge    • Vitamin deficiency        Allergies   Allergen Reactions   • Penicillins Rash   • Rocephin [Ceftriaxone] Rash         Current Outpatient Prescriptions:   •  alendronate (FOSAMAX) 70 MG tablet, , Disp: , Rfl:   •  Alogliptin Benzoate 25 MG tablet, TAKE ONE TABLET BY MOUTH DAILY, Disp: 30 tablet, Rfl: 1  •  atorvastatin (LIPITOR) 20 MG tablet, , Disp: , Rfl:   •  cyclobenzaprine (FLEXERIL) 10 MG tablet, Take 1 tablet by mouth 2 (Two) Times a Day As Needed for muscle spasms., Disp: 28 tablet, Rfl: 0  •  glucose blood test strip, 1 each by Other route 2 (Two) Times a Day. Use as instructed, Disp: 50 each, Rfl: 11  •  Insulin Glargine (BASAGLAR KWIKPEN) 100 UNIT/ML injection pen,  "Inject 16 units under the skin nightly (REPLACES LANTUS DUE TO INSURANCE FORMULARY), Disp: 2 pen, Rfl: 5  •  Insulin Pen Needle 31G X 4 MM misc, 1 each Daily., Disp: 100 each, Rfl: 1  •  LEVEMIR FLEXTOUCH 100 UNIT/ML injection, , Disp: , Rfl:   •  losartan (COZAAR) 50 MG tablet, TAKE ONE TABLET BY MOUTH DAILY, Disp: 30 tablet, Rfl: 1  •  meloxicam (MOBIC) 15 MG tablet, , Disp: , Rfl:   •  meloxicam (MOBIC) 7.5 MG tablet, Take 1 tablet by mouth Daily., Disp: 14 tablet, Rfl: 0  •  metFORMIN (GLUCOPHAGE) 1000 MG tablet, TAKE ONE TABLET BY MOUTH TWICE A DAY, Disp: 60 tablet, Rfl: 1  •  simvastatin (ZOCOR) 40 MG tablet, Take 1 tablet by mouth Every Night., Disp: 30 tablet, Rfl: 5    Past Surgical History:   Procedure Laterality Date   • CARPAL TUNNEL RELEASE      Bilateral endoscopic release 01/20/2003    • ENDOSCOPY      normal 04/25/2008      • KNEE ARTHROSCOPY     • KNEE SURGERY      Removal of existing left total knee arthroplasty and revision left total knee arthroplasty. 12/15/2014       • REPLACEMENT TOTAL KNEE     • SHOULDER SURGERY     • TUBAL ABDOMINAL LIGATION         ROS  No fevers or chills.  No chest pain or shortness of air.  No GI or  disturbances.    PHYSICAL EXAMINATION:       Ht 157.5 cm (62\")   Wt 88 kg (194 lb)   BMI 35.48 kg/m²     Physical Exam   Constitutional: She is oriented to person, place, and time. She appears well-developed and well-nourished.   HENT:   Head: Normocephalic and atraumatic.   Eyes: Pupils are equal, round, and reactive to light. EOM are normal.   Neck: Neck supple.   Pulmonary/Chest: Effort normal.   Musculoskeletal: She exhibits edema and tenderness.   Neurological: She is alert and oriented to person, place, and time.   Skin: Skin is warm and dry.   Psychiatric: She has a normal mood and affect. Thought content normal.   Vitals reviewed.      GAIT:     []  Normal  [x]  Antalgic    Assistive device: []  None  []  Walker     []  Crutches  [x]  Cane     []  Wheelchair  []  " Stretcher    Ortho Exam   Tender over the proximal tibia trace effusion of the knee.  Ligaments are stable.  Neurologically intact.  Motion is 85° of flexion at best.  Not tender about the femur.  Calf is negative.    Xr Knee 1 Or 2 View Left    Result Date: 6/29/2018  Narrative: Lateral view left knee Comparisons none Revision component lateral and overall good position appropriately sized.  Mild calcification seen.  Overall in good alignment. Impression: Status post knee revision lateral view without complicating features    Xr Knee Bilateral Ap Standing    Result Date: 6/29/2018  Narrative: AP standing bilateral knee Comparisons none Revision component left knee appears overall alignment and good position without obvious complicating features seen.  Significant osteoarthritis left knee with medial joint space narrowing moderate to severe. Impression: Status post revision left knee without, complicating features.  Severe DJD right knee.    Nm Bone Scan 3 Phase    Result Date: 7/13/2018  Narrative: Procedure: Triple phase nuclear medicine bone scan Reason for exam: Osteoarthritic changes of the hips right hip pain worse than left. Artificial left knee. FINDINGS: 25 mCi technetium technetium 99 MDP was administered and triple phase bone scan was performed over the region the knees with blood flow and delayed imaging also performed of the pelvis. Imaging in the region the pelvis reveals no abnormal focus of increased or decreased activity. Normal activity seen within the region of bladder and normal contamination artifact in the region of groin. Triple phase bone scan reveals abnormal increase activity involving the medial and lateral tibial plateau just beneath the tibial prostheses. There is also curvilinear abnormal increase activity involving the distal left femur medial condyle just superior to the femoral prosthesis in this region. These regions of abnormal increase activity would be suspicious for loosening in  these areas. Remainder triple phase bone scan of the knees are unremarkable.     Impression: 1.  Triple phase bone scan reveals abnormal increase activity involving the medial and lateral tibial plateau just beneath the tibial prostheses. There is also curvilinear abnormal increase activity involving the distal left femur medial condyle just superior to the femoral prosthesis in this region. These regions of abnormal increase activity would be suspicious for loosening in these areas. 2.  Otherwise unremarkable triple phase bone scan. Electronically signed by:  Charles Bustamante MD  7/13/2018 3:00 PM CDT Workstation: [id number]     Large Joint Arthrocentesis  Date/Time: 7/18/2018 10:53 AM  Consent given by: patient  Site marked: site marked  Timeout: Immediately prior to procedure a time out was called to verify the correct patient, procedure, equipment, support staff and site/side marked as required   Supporting Documentation  Indications: joint swelling, diagnostic evaluation and pain   Procedure Details  Location: knee - L knee  Needle size: 18 G  Approach: anterolateral  Aspirate amount: 10 mL  Aspirate: yellow  Analysis: fluid sample sent for laboratory analysis  Patient tolerance: patient tolerated the procedure well with no immediate complications              ASSESSMENT:    Diagnoses and all orders for this visit:    History of total left knee replacement  -     Body Fluid Culture - Body Fluid, Knee, Left; Future  -     Crystal Exam, Fluid - Synovial Fluid, Knee, Left; Future  -     Body Fluid Cell Count With Differential - Body Fluid, Knee, Left; Future  -     Reference Culture Sensitivity - Isolate, Knee, Left; Future  -     C-reactive Protein; Future  -     ELMER; Future  -     CBC & Differential; Future  -     Sedimentation Rate; Future  -     Crystal Exam, Fluid - Synovial Fluid, Knee, Left    Bilateral hip pain    Mechanical loosening of prosthetic knee, initial encounter (CMS/[facility])  -     Body Fluid Culture - Body  Fluid, Knee, Left; Future  -     Crystal Exam, Fluid - Synovial Fluid, Knee, Left; Future  -     Body Fluid Cell Count With Differential - Body Fluid, Knee, Left; Future  -     Reference Culture Sensitivity - Isolate, Knee, Left; Future  -     C-reactive Protein; Future  -     ELMER; Future  -     CBC & Differential; Future  -     Sedimentation Rate; Future  -     Crystal Exam, Fluid - Synovial Fluid, Knee, Left    Other orders  -     Large Joint Arthrocentesis          PLAN I've aspirated the knee 10 cc as above we will send for cell count, crystals, culture and sensitivity.  We'll that the culture grew over 2 weeks see her back at that point.  I am concerned about loosening of the tibial component    No Follow-up on file.    Elias Su MD

## 2018-07-19 LAB — ANA SER QL: NEGATIVE

## 2018-08-01 ENCOUNTER — OFFICE VISIT (OUTPATIENT)
Dept: ORTHOPEDIC SURGERY | Facility: CLINIC | Age: 63
End: 2018-08-01

## 2018-08-01 VITALS — WEIGHT: 194 LBS | BODY MASS INDEX: 35.7 KG/M2 | HEIGHT: 62 IN

## 2018-08-01 DIAGNOSIS — T84.84XA PAINFUL TOTAL KNEE REPLACEMENT, INITIAL ENCOUNTER (HCC): Primary | ICD-10-CM

## 2018-08-01 DIAGNOSIS — Z96.659 MECHANICAL LOOSENING OF PROSTHETIC KNEE, INITIAL ENCOUNTER (HCC): ICD-10-CM

## 2018-08-01 DIAGNOSIS — T84.038A MECHANICAL LOOSENING OF PROSTHETIC KNEE, INITIAL ENCOUNTER (HCC): ICD-10-CM

## 2018-08-01 DIAGNOSIS — Z96.659 PAINFUL TOTAL KNEE REPLACEMENT, INITIAL ENCOUNTER (HCC): Primary | ICD-10-CM

## 2018-08-01 LAB
BACTERIA FLD CULT: NORMAL
GRAM STN SPEC: NORMAL
GRAM STN SPEC: NORMAL

## 2018-08-01 PROCEDURE — 99214 OFFICE O/P EST MOD 30 MIN: CPT | Performed by: ORTHOPAEDIC SURGERY

## 2018-08-01 RX ORDER — BUPIVACAINE HCL/0.9 % NACL/PF 0.1 %
2 PLASTIC BAG, INJECTION (ML) EPIDURAL ONCE
Status: CANCELLED | OUTPATIENT
Start: 2018-08-28 | End: 2018-08-28

## 2018-08-01 NOTE — PROGRESS NOTES
Meeta Marmolejo is a 62 y.o. female returns for     Chief Complaint   Patient presents with   • Left Knee - Follow-up       HISTORY OF PRESENT ILLNESS: Patient is here today for recheck of left knee. Patient states that her pain is 9/10 and she has had increased pain with the knee since her last visit. She also states that the knee gives out on her and she has to drag her leg behind her when this happens. Her pain is actually increased since we saw her feels like it gives out hurts with weightbearing although not 100% of the time.       CONCURRENT MEDICAL HISTORY:    Past Medical History:   Diagnosis Date   • Acute vaginitis     resolved   • Carpal tunnel syndrome    • Chronic osteoarthritis     Nino TKA   • Chronic pain    • Chronic urticaria    • Dietary counseling and surveillance    • Dysuria    • External hordeolum    • Hyperlipidemia    • Low back pain     strain   • Microalbuminuria     on ARB   • Noncompliance with treatment    • Obesity      Dieting , exercise, appetite suppressant      • Osteoarthritis of left knee    • Plantar fasciitis     resolved   • Type 2 diabetes mellitus (CMS/HCC)    • Urinary tract infectious disease    • Vaginal discharge    • Vitamin deficiency        Allergies   Allergen Reactions   • Penicillins Rash   • Rocephin [Ceftriaxone] Rash         Current Outpatient Prescriptions:   •  alendronate (FOSAMAX) 70 MG tablet, , Disp: , Rfl:   •  Alogliptin Benzoate 25 MG tablet, TAKE ONE TABLET BY MOUTH DAILY, Disp: 30 tablet, Rfl: 1  •  atorvastatin (LIPITOR) 20 MG tablet, , Disp: , Rfl:   •  cyclobenzaprine (FLEXERIL) 10 MG tablet, Take 1 tablet by mouth 2 (Two) Times a Day As Needed for muscle spasms., Disp: 28 tablet, Rfl: 0  •  glucose blood test strip, 1 each by Other route 2 (Two) Times a Day. Use as instructed, Disp: 50 each, Rfl: 11  •  Insulin Glargine (BASAGLAR KWIKPEN) 100 UNIT/ML injection pen, Inject 16 units under the skin nightly (REPLACES LANTUS DUE TO INSURANCE  "FORMULARY), Disp: 2 pen, Rfl: 5  •  Insulin Pen Needle 31G X 4 MM misc, 1 each Daily., Disp: 100 each, Rfl: 1  •  LEVEMIR FLEXTOUCH 100 UNIT/ML injection, , Disp: , Rfl:   •  losartan (COZAAR) 50 MG tablet, TAKE ONE TABLET BY MOUTH DAILY, Disp: 30 tablet, Rfl: 1  •  meloxicam (MOBIC) 15 MG tablet, , Disp: , Rfl:   •  meloxicam (MOBIC) 7.5 MG tablet, Take 1 tablet by mouth Daily., Disp: 14 tablet, Rfl: 0  •  metFORMIN (GLUCOPHAGE) 1000 MG tablet, TAKE ONE TABLET BY MOUTH TWICE A DAY, Disp: 60 tablet, Rfl: 1  •  simvastatin (ZOCOR) 40 MG tablet, Take 1 tablet by mouth Every Night., Disp: 30 tablet, Rfl: 5    Past Surgical History:   Procedure Laterality Date   • CARPAL TUNNEL RELEASE      Bilateral endoscopic release 01/20/2003    • ENDOSCOPY      normal 04/25/2008      • KNEE ARTHROSCOPY     • KNEE SURGERY      Removal of existing left total knee arthroplasty and revision left total knee arthroplasty. 12/15/2014       • REPLACEMENT TOTAL KNEE     • SHOULDER SURGERY     • TUBAL ABDOMINAL LIGATION         ROS  No fevers or chills.  No chest pain or shortness of air.  No GI or  disturbances.    PHYSICAL EXAMINATION:       Ht 157.5 cm (62\")   Wt 88 kg (194 lb)   BMI 35.48 kg/m²     Physical Exam   Constitutional: She is oriented to person, place, and time. She appears well-developed and well-nourished.   HENT:   Head: Normocephalic and atraumatic.   Eyes: Pupils are equal, round, and reactive to light. EOM are normal.   Neck: Neck supple.   Pulmonary/Chest: Effort normal.   Musculoskeletal: She exhibits edema and tenderness.   Neurological: She is alert and oriented to person, place, and time.   Skin: Skin is warm and dry.   Psychiatric: She has a normal mood and affect. Thought content normal.   Vitals reviewed.      GAIT:     [x]  Normal  []  Antalgic    Assistive device: []  None  []  Walker     []  Crutches  [x]  Cane     []  Wheelchair  []  Stretcher    Ortho Exam   Wounds are well-healed.  Expected swelling " tender.  Skin is 0-90°.  The calf is negative.  She is neurologically intact distally.    Nm Bone Scan 3 Phase    Result Date: 7/13/2018  Narrative: Procedure: Triple phase nuclear medicine bone scan Reason for exam: Osteoarthritic changes of the hips right hip pain worse than left. Artificial left knee. FINDINGS: 25 mCi technetium technetium 99 MDP was administered and triple phase bone scan was performed over the region the knees with blood flow and delayed imaging also performed of the pelvis. Imaging in the region the pelvis reveals no abnormal focus of increased or decreased activity. Normal activity seen within the region of bladder and normal contamination artifact in the region of groin. Triple phase bone scan reveals abnormal increase activity involving the medial and lateral tibial plateau just beneath the tibial prostheses. There is also curvilinear abnormal increase activity involving the distal left femur medial condyle just superior to the femoral prosthesis in this region. These regions of abnormal increase activity would be suspicious for loosening in these areas. Remainder triple phase bone scan of the knees are unremarkable.     Impression: 1.  Triple phase bone scan reveals abnormal increase activity involving the medial and lateral tibial plateau just beneath the tibial prostheses. There is also curvilinear abnormal increase activity involving the distal left femur medial condyle just superior to the femoral prosthesis in this region. These regions of abnormal increase activity would be suspicious for loosening in these areas. 2.  Otherwise unremarkable triple phase bone scan. Electronically signed by:  Charles Bustamante MD  7/13/2018 3:00 PM CDT Workstation: [id number]            ASSESSMENT:    Diagnoses and all orders for this visit:    Painful total knee replacement, initial encounter (CMS/[facility])    Mechanical loosening of prosthetic knee, initial encounter (CMS/[facility])          PLAN she has an acute problem  that was doing well.  I suspect she has loosening of the tibia.  She has a noncemented long stem.  It looks like they wired the tibial tubercle were discussed make this very difficult to shea the kneecap to get into the tibia.  She is very tight.  We discussed revision of this unlikely that tibial component and inserted.  We'll need to do a little research with the representative to make sure our revision components are compatible with plan on cementing a stem into the tibia.  A little concerned about the skin as well as the patellar tendon.  Risks and benefits of skin slough, infection, bleeding, blood clot, mechanical failure, failure to resolve her pain, loss of motion, need for further surgery, medical anesthetic complications, etc. this is all been discussed as detailed informed consent is given she understands and would like to proceed as planned.    No Follow-up on file.    Elias Su MD

## 2018-08-07 ENCOUNTER — TELEPHONE (OUTPATIENT)
Dept: ORTHOPEDIC SURGERY | Facility: CLINIC | Age: 63
End: 2018-08-07

## 2018-08-07 NOTE — TELEPHONE ENCOUNTER
PATIENT CALLED STATING SHE NEEDS A WORK STATEMENT FOR HER  AND HER DAUGHTER THAT SHE WILL BE HAVING LEFT KNEE SURGERY AND HOW LONG THEY WILL NEED TO TAKE OFF FOR RECOVERY.

## 2018-08-22 ENCOUNTER — APPOINTMENT (OUTPATIENT)
Dept: PREADMISSION TESTING | Facility: HOSPITAL | Age: 63
End: 2018-08-22

## 2018-08-22 VITALS
DIASTOLIC BLOOD PRESSURE: 62 MMHG | HEIGHT: 62 IN | HEART RATE: 87 BPM | RESPIRATION RATE: 14 BRPM | SYSTOLIC BLOOD PRESSURE: 138 MMHG | OXYGEN SATURATION: 98 % | WEIGHT: 198 LBS | BODY MASS INDEX: 36.44 KG/M2

## 2018-08-22 LAB
ABO GROUP BLD: NORMAL
ANION GAP SERPL CALCULATED.3IONS-SCNC: 11 MMOL/L (ref 5–15)
BLD GP AB SCN SERPL QL: NEGATIVE
BUN BLD-MCNC: 12 MG/DL (ref 7–21)
BUN/CREAT SERPL: 23.1 (ref 7–25)
CALCIUM SPEC-SCNC: 9.7 MG/DL (ref 8.4–10.2)
CHLORIDE SERPL-SCNC: 101 MMOL/L (ref 95–110)
CO2 SERPL-SCNC: 28 MMOL/L (ref 22–31)
CREAT BLD-MCNC: 0.52 MG/DL (ref 0.5–1)
DEPRECATED RDW RBC AUTO: 43.4 FL (ref 36.4–46.3)
ERYTHROCYTE [DISTWIDTH] IN BLOOD BY AUTOMATED COUNT: 14.3 % (ref 11.5–14.5)
GFR SERPL CREATININE-BSD FRML MDRD: 145 ML/MIN/1.73 (ref 45–104)
GLUCOSE BLD-MCNC: 234 MG/DL (ref 60–100)
HCT VFR BLD AUTO: 36.6 % (ref 35–45)
HGB BLD-MCNC: 12.1 G/DL (ref 12–15.5)
Lab: NORMAL
MCH RBC QN AUTO: 27.3 PG (ref 26.5–34)
MCHC RBC AUTO-ENTMCNC: 33.1 G/DL (ref 31.4–36)
MCV RBC AUTO: 82.4 FL (ref 80–98)
MRSA DNA SPEC QL NAA+PROBE: NEGATIVE
PLATELET # BLD AUTO: 267 10*3/MM3 (ref 150–450)
PMV BLD AUTO: 11.1 FL (ref 8–12)
POTASSIUM BLD-SCNC: 4.1 MMOL/L (ref 3.5–5.1)
RBC # BLD AUTO: 4.44 10*6/MM3 (ref 3.77–5.16)
RH BLD: POSITIVE
SODIUM BLD-SCNC: 140 MMOL/L (ref 137–145)
T&S EXPIRATION DATE: NORMAL
WBC NRBC COR # BLD: 5.58 10*3/MM3 (ref 3.2–9.8)

## 2018-08-22 PROCEDURE — 87641 MR-STAPH DNA AMP PROBE: CPT | Performed by: ORTHOPAEDIC SURGERY

## 2018-08-22 PROCEDURE — 80048 BASIC METABOLIC PNL TOTAL CA: CPT | Performed by: ANESTHESIOLOGY

## 2018-08-22 PROCEDURE — 86900 BLOOD TYPING SEROLOGIC ABO: CPT | Performed by: ANESTHESIOLOGY

## 2018-08-22 PROCEDURE — 93010 ELECTROCARDIOGRAM REPORT: CPT | Performed by: INTERNAL MEDICINE

## 2018-08-22 PROCEDURE — 93005 ELECTROCARDIOGRAM TRACING: CPT

## 2018-08-22 PROCEDURE — 86901 BLOOD TYPING SEROLOGIC RH(D): CPT | Performed by: ANESTHESIOLOGY

## 2018-08-22 PROCEDURE — 85027 COMPLETE CBC AUTOMATED: CPT | Performed by: ANESTHESIOLOGY

## 2018-08-22 PROCEDURE — 36415 COLL VENOUS BLD VENIPUNCTURE: CPT

## 2018-08-22 PROCEDURE — 86850 RBC ANTIBODY SCREEN: CPT | Performed by: ANESTHESIOLOGY

## 2018-08-22 RX ORDER — SODIUM CHLORIDE 9 MG/ML
1000 INJECTION, SOLUTION INTRAVENOUS CONTINUOUS
Status: CANCELLED | OUTPATIENT
Start: 2018-08-28

## 2018-08-22 RX ORDER — CETIRIZINE HYDROCHLORIDE 10 MG/1
10 TABLET ORAL DAILY
COMMUNITY
End: 2019-09-23

## 2018-08-22 RX ORDER — ATORVASTATIN CALCIUM 40 MG/1
40 TABLET, FILM COATED ORAL DAILY
COMMUNITY
End: 2020-06-30 | Stop reason: ALTCHOICE

## 2018-08-22 RX ORDER — MONTELUKAST SODIUM 10 MG/1
10 TABLET ORAL DAILY
COMMUNITY
End: 2021-02-03 | Stop reason: SDUPTHER

## 2018-08-22 RX ORDER — LOSARTAN POTASSIUM 100 MG/1
50 TABLET ORAL DAILY
COMMUNITY
End: 2020-07-15 | Stop reason: SDUPTHER

## 2018-08-22 ASSESSMENT — KOOS JR
KOOS JR SCORE: 20.941
KOOS JR SCORE: 25

## 2018-08-27 ENCOUNTER — ANESTHESIA EVENT (OUTPATIENT)
Dept: PERIOP | Facility: HOSPITAL | Age: 63
End: 2018-08-27

## 2018-08-28 ENCOUNTER — HOSPITAL ENCOUNTER (INPATIENT)
Facility: HOSPITAL | Age: 63
LOS: 3 days | Discharge: HOME | End: 2018-08-31
Attending: ORTHOPAEDIC SURGERY | Admitting: ORTHOPAEDIC SURGERY

## 2018-08-28 ENCOUNTER — ANESTHESIA (OUTPATIENT)
Dept: PERIOP | Facility: HOSPITAL | Age: 63
End: 2018-08-28

## 2018-08-28 ENCOUNTER — APPOINTMENT (OUTPATIENT)
Dept: GENERAL RADIOLOGY | Facility: HOSPITAL | Age: 63
End: 2018-08-28

## 2018-08-28 DIAGNOSIS — Z74.09 IMPAIRED PHYSICAL MOBILITY: ICD-10-CM

## 2018-08-28 DIAGNOSIS — Z74.09 IMPAIRED MOBILITY AND ADLS: ICD-10-CM

## 2018-08-28 DIAGNOSIS — Z74.09 IMPAIRED FUNCTIONAL MOBILITY, BALANCE, GAIT, AND ENDURANCE: Primary | ICD-10-CM

## 2018-08-28 DIAGNOSIS — Z96.659 MECHANICAL LOOSENING OF PROSTHETIC KNEE, INITIAL ENCOUNTER (HCC): ICD-10-CM

## 2018-08-28 DIAGNOSIS — T84.038A MECHANICAL LOOSENING OF PROSTHETIC KNEE, INITIAL ENCOUNTER (HCC): ICD-10-CM

## 2018-08-28 DIAGNOSIS — Z78.9 IMPAIRED MOBILITY AND ADLS: ICD-10-CM

## 2018-08-28 LAB
ABO GROUP BLD: NORMAL
BLD GP AB SCN SERPL QL: NEGATIVE
GLUCOSE BLDC GLUCOMTR-MCNC: 184 MG/DL (ref 70–130)
GLUCOSE BLDC GLUCOMTR-MCNC: 198 MG/DL (ref 70–130)
GLUCOSE BLDC GLUCOMTR-MCNC: 248 MG/DL (ref 70–130)
Lab: NORMAL
RH BLD: POSITIVE
T&S EXPIRATION DATE: NORMAL

## 2018-08-28 PROCEDURE — C1776 JOINT DEVICE (IMPLANTABLE): HCPCS | Performed by: ORTHOPAEDIC SURGERY

## 2018-08-28 PROCEDURE — 25010000002 CEFAZOLIN PER 500 MG: Performed by: ORTHOPAEDIC SURGERY

## 2018-08-28 PROCEDURE — 88305 TISSUE EXAM BY PATHOLOGIST: CPT | Performed by: ORTHOPAEDIC SURGERY

## 2018-08-28 PROCEDURE — 27486 REVISE/REPLACE KNEE JOINT: CPT | Performed by: ORTHOPAEDIC SURGERY

## 2018-08-28 PROCEDURE — G8978 MOBILITY CURRENT STATUS: HCPCS

## 2018-08-28 PROCEDURE — 63710000001 INSULIN ASPART PER 5 UNITS: Performed by: ORTHOPAEDIC SURGERY

## 2018-08-28 PROCEDURE — 82962 GLUCOSE BLOOD TEST: CPT

## 2018-08-28 PROCEDURE — 0SPW0JZ REMOVAL OF SYNTHETIC SUBSTITUTE FROM LEFT KNEE JOINT, TIBIAL SURFACE, OPEN APPROACH: ICD-10-PCS | Performed by: ORTHOPAEDIC SURGERY

## 2018-08-28 PROCEDURE — 86901 BLOOD TYPING SEROLOGIC RH(D): CPT | Performed by: ANESTHESIOLOGY

## 2018-08-28 PROCEDURE — 86900 BLOOD TYPING SEROLOGIC ABO: CPT | Performed by: ANESTHESIOLOGY

## 2018-08-28 PROCEDURE — 88305 TISSUE EXAM BY PATHOLOGIST: CPT | Performed by: PATHOLOGY

## 2018-08-28 PROCEDURE — 97162 PT EVAL MOD COMPLEX 30 MIN: CPT

## 2018-08-28 PROCEDURE — 0SPD0JC REMOVAL OF SYNTHETIC SUBSTITUTE FROM LEFT KNEE JOINT, PATELLAR SURFACE, OPEN APPROACH: ICD-10-PCS | Performed by: ORTHOPAEDIC SURGERY

## 2018-08-28 PROCEDURE — 25010000002 FENTANYL CITRATE (PF) 100 MCG/2ML SOLUTION: Performed by: NURSE ANESTHETIST, CERTIFIED REGISTERED

## 2018-08-28 PROCEDURE — G8979 MOBILITY GOAL STATUS: HCPCS

## 2018-08-28 PROCEDURE — 25010000002 PROPOFOL 10 MG/ML EMULSION: Performed by: NURSE ANESTHETIST, CERTIFIED REGISTERED

## 2018-08-28 PROCEDURE — 94799 UNLISTED PULMONARY SVC/PX: CPT

## 2018-08-28 PROCEDURE — 25010000002 MIDAZOLAM PER 1 MG: Performed by: NURSE ANESTHETIST, CERTIFIED REGISTERED

## 2018-08-28 PROCEDURE — 63710000001 INSULIN DETEMIR PER 5 UNITS: Performed by: ORTHOPAEDIC SURGERY

## 2018-08-28 PROCEDURE — 25010000002 HYDROMORPHONE PER 4 MG: Performed by: ORTHOPAEDIC SURGERY

## 2018-08-28 PROCEDURE — C1713 ANCHOR/SCREW BN/BN,TIS/BN: HCPCS | Performed by: ORTHOPAEDIC SURGERY

## 2018-08-28 PROCEDURE — 94760 N-INVAS EAR/PLS OXIMETRY 1: CPT

## 2018-08-28 PROCEDURE — 25010000002 ONDANSETRON PER 1 MG: Performed by: ORTHOPAEDIC SURGERY

## 2018-08-28 PROCEDURE — 0SRW0J9 REPLACEMENT OF LEFT KNEE JOINT, TIBIAL SURFACE WITH SYNTHETIC SUBSTITUTE, CEMENTED, OPEN APPROACH: ICD-10-PCS | Performed by: ORTHOPAEDIC SURGERY

## 2018-08-28 PROCEDURE — 25010000002 HYDROMORPHONE PER 4 MG: Performed by: NURSE ANESTHETIST, CERTIFIED REGISTERED

## 2018-08-28 PROCEDURE — 87205 SMEAR GRAM STAIN: CPT | Performed by: ORTHOPAEDIC SURGERY

## 2018-08-28 PROCEDURE — 87070 CULTURE OTHR SPECIMN AEROBIC: CPT | Performed by: ORTHOPAEDIC SURGERY

## 2018-08-28 PROCEDURE — 25010000002 ONDANSETRON PER 1 MG: Performed by: NURSE ANESTHETIST, CERTIFIED REGISTERED

## 2018-08-28 PROCEDURE — 86850 RBC ANTIBODY SCREEN: CPT | Performed by: ANESTHESIOLOGY

## 2018-08-28 PROCEDURE — 88300 SURGICAL PATH GROSS: CPT | Performed by: ORTHOPAEDIC SURGERY

## 2018-08-28 PROCEDURE — 25010000002 SUCCINYLCHOLINE PER 20 MG: Performed by: NURSE ANESTHETIST, CERTIFIED REGISTERED

## 2018-08-28 PROCEDURE — 73560 X-RAY EXAM OF KNEE 1 OR 2: CPT

## 2018-08-28 DEVICE — SMARTSET HV HIGH VISCOSITY BONE CEMENT 40G
Type: IMPLANTABLE DEVICE | Status: FUNCTIONAL
Brand: SMARTSET

## 2018-08-28 DEVICE — M.B.T. REVISION METAPHYSEAL SLEEVE POROUS 29MM: Type: IMPLANTABLE DEVICE | Site: KNEE | Status: FUNCTIONAL

## 2018-08-28 DEVICE — SUT/ANCH BIOCOMP PUSH/LK 2.9X12.5MM: Type: IMPLANTABLE DEVICE | Status: FUNCTIONAL

## 2018-08-28 DEVICE — TIBIAL TRAY ROTATING PLATFORM M.B.T. REVISION THICK TRAY SIZE 2 15MM CEMENTED: Type: IMPLANTABLE DEVICE | Site: KNEE | Status: FUNCTIONAL

## 2018-08-28 DEVICE — SIGMA TIBIAL INSERT ROTATING PLATFORM TC3 SIZE 2.5 10MM GVF
Type: IMPLANTABLE DEVICE | Status: FUNCTIONAL
Brand: SIGMA

## 2018-08-28 DEVICE — P.F.C. SIGMA OVAL DOME PATELLA 3-PEG 35MM CEMENTED
Type: IMPLANTABLE DEVICE | Site: KNEE | Status: FUNCTIONAL
Brand: P.F.C. SIGMA

## 2018-08-28 DEVICE — UNIVERSAL STEM FLUTED 75MM X 12MM: Type: IMPLANTABLE DEVICE | Status: FUNCTIONAL

## 2018-08-28 RX ORDER — FAMOTIDINE 40 MG/1
40 TABLET, FILM COATED ORAL DAILY
Status: DISCONTINUED | OUTPATIENT
Start: 2018-08-28 | End: 2018-08-31 | Stop reason: HOSPADM

## 2018-08-28 RX ORDER — ONDANSETRON 2 MG/ML
4 INJECTION INTRAMUSCULAR; INTRAVENOUS ONCE AS NEEDED
Status: DISCONTINUED | OUTPATIENT
Start: 2018-08-28 | End: 2018-08-28 | Stop reason: HOSPADM

## 2018-08-28 RX ORDER — OXYCODONE HYDROCHLORIDE 5 MG/1
5 TABLET ORAL EVERY 4 HOURS PRN
Status: DISCONTINUED | OUTPATIENT
Start: 2018-08-28 | End: 2018-08-31 | Stop reason: HOSPADM

## 2018-08-28 RX ORDER — ATORVASTATIN CALCIUM 40 MG/1
40 TABLET, FILM COATED ORAL DAILY
Status: DISCONTINUED | OUTPATIENT
Start: 2018-08-28 | End: 2018-08-31 | Stop reason: HOSPADM

## 2018-08-28 RX ORDER — SODIUM CHLORIDE 0.9 % (FLUSH) 0.9 %
1-10 SYRINGE (ML) INJECTION AS NEEDED
Status: DISCONTINUED | OUTPATIENT
Start: 2018-08-28 | End: 2018-08-31 | Stop reason: HOSPADM

## 2018-08-28 RX ORDER — SODIUM CHLORIDE 9 MG/ML
1000 INJECTION, SOLUTION INTRAVENOUS CONTINUOUS
Status: ACTIVE | OUTPATIENT
Start: 2018-08-28 | End: 2018-08-29

## 2018-08-28 RX ORDER — ATORVASTATIN CALCIUM 10 MG/1
10 TABLET, FILM COATED ORAL DAILY
Status: DISCONTINUED | OUTPATIENT
Start: 2018-08-28 | End: 2018-08-28 | Stop reason: SDUPTHER

## 2018-08-28 RX ORDER — ONDANSETRON 4 MG/1
4 TABLET, FILM COATED ORAL EVERY 6 HOURS PRN
Status: DISCONTINUED | OUTPATIENT
Start: 2018-08-28 | End: 2018-08-31 | Stop reason: HOSPADM

## 2018-08-28 RX ORDER — FERROUS SULFATE TAB EC 324 MG (65 MG FE EQUIVALENT) 324 (65 FE) MG
324 TABLET DELAYED RESPONSE ORAL
Status: DISCONTINUED | OUTPATIENT
Start: 2018-08-29 | End: 2018-08-31 | Stop reason: HOSPADM

## 2018-08-28 RX ORDER — 0.9 % SODIUM CHLORIDE 0.9 %
VIAL (ML) INJECTION AS NEEDED
Status: DISCONTINUED | OUTPATIENT
Start: 2018-08-28 | End: 2018-08-31 | Stop reason: HOSPADM

## 2018-08-28 RX ORDER — NICOTINE POLACRILEX 4 MG
15 LOZENGE BUCCAL
Status: DISCONTINUED | OUTPATIENT
Start: 2018-08-28 | End: 2018-08-30 | Stop reason: SDUPTHER

## 2018-08-28 RX ORDER — SUCCINYLCHOLINE CHLORIDE 20 MG/ML
INJECTION INTRAMUSCULAR; INTRAVENOUS AS NEEDED
Status: DISCONTINUED | OUTPATIENT
Start: 2018-08-28 | End: 2018-08-28 | Stop reason: SURG

## 2018-08-28 RX ORDER — ROCURONIUM BROMIDE 10 MG/ML
INJECTION, SOLUTION INTRAVENOUS AS NEEDED
Status: DISCONTINUED | OUTPATIENT
Start: 2018-08-28 | End: 2018-08-28 | Stop reason: SURG

## 2018-08-28 RX ORDER — SODIUM CHLORIDE 9 MG/ML
75 INJECTION, SOLUTION INTRAVENOUS CONTINUOUS
Status: DISCONTINUED | OUTPATIENT
Start: 2018-08-28 | End: 2018-08-30

## 2018-08-28 RX ORDER — PROPOFOL 10 MG/ML
VIAL (ML) INTRAVENOUS AS NEEDED
Status: DISCONTINUED | OUTPATIENT
Start: 2018-08-28 | End: 2018-08-28 | Stop reason: SURG

## 2018-08-28 RX ORDER — ONDANSETRON 2 MG/ML
INJECTION INTRAMUSCULAR; INTRAVENOUS AS NEEDED
Status: DISCONTINUED | OUTPATIENT
Start: 2018-08-28 | End: 2018-08-28 | Stop reason: SURG

## 2018-08-28 RX ORDER — MIDAZOLAM HYDROCHLORIDE 1 MG/ML
INJECTION INTRAMUSCULAR; INTRAVENOUS AS NEEDED
Status: DISCONTINUED | OUTPATIENT
Start: 2018-08-28 | End: 2018-08-28 | Stop reason: SURG

## 2018-08-28 RX ORDER — BUPIVACAINE HCL/0.9 % NACL/PF 0.1 %
2 PLASTIC BAG, INJECTION (ML) EPIDURAL EVERY 8 HOURS
Status: COMPLETED | OUTPATIENT
Start: 2018-08-28 | End: 2018-08-29

## 2018-08-28 RX ORDER — FENTANYL CITRATE 50 UG/ML
INJECTION, SOLUTION INTRAMUSCULAR; INTRAVENOUS AS NEEDED
Status: DISCONTINUED | OUTPATIENT
Start: 2018-08-28 | End: 2018-08-28 | Stop reason: SURG

## 2018-08-28 RX ORDER — SODIUM CHLORIDE, SODIUM GLUCONATE, SODIUM ACETATE, POTASSIUM CHLORIDE, AND MAGNESIUM CHLORIDE 526; 502; 368; 37; 30 MG/100ML; MG/100ML; MG/100ML; MG/100ML; MG/100ML
INJECTION, SOLUTION INTRAVENOUS CONTINUOUS PRN
Status: DISCONTINUED | OUTPATIENT
Start: 2018-08-28 | End: 2018-08-28 | Stop reason: SURG

## 2018-08-28 RX ORDER — NALOXONE HCL 0.4 MG/ML
0.1 VIAL (ML) INJECTION
Status: DISCONTINUED | OUTPATIENT
Start: 2018-08-28 | End: 2018-08-31 | Stop reason: HOSPADM

## 2018-08-28 RX ORDER — BUPIVACAINE HYDROCHLORIDE AND EPINEPHRINE 2.5; 5 MG/ML; UG/ML
INJECTION, SOLUTION EPIDURAL; INFILTRATION; INTRACAUDAL; PERINEURAL AS NEEDED
Status: DISCONTINUED | OUTPATIENT
Start: 2018-08-28 | End: 2018-08-31 | Stop reason: HOSPADM

## 2018-08-28 RX ORDER — MONTELUKAST SODIUM 10 MG/1
10 TABLET ORAL DAILY
Status: DISCONTINUED | OUTPATIENT
Start: 2018-08-28 | End: 2018-08-31 | Stop reason: HOSPADM

## 2018-08-28 RX ORDER — DEXTROSE MONOHYDRATE 25 G/50ML
25 INJECTION, SOLUTION INTRAVENOUS
Status: DISCONTINUED | OUTPATIENT
Start: 2018-08-28 | End: 2018-08-30 | Stop reason: SDUPTHER

## 2018-08-28 RX ORDER — ALBUTEROL SULFATE 2.5 MG/3ML
2.5 SOLUTION RESPIRATORY (INHALATION) ONCE AS NEEDED
Status: DISCONTINUED | OUTPATIENT
Start: 2018-08-28 | End: 2018-08-28 | Stop reason: HOSPADM

## 2018-08-28 RX ORDER — ONDANSETRON 4 MG/1
4 TABLET, ORALLY DISINTEGRATING ORAL EVERY 6 HOURS PRN
Status: DISCONTINUED | OUTPATIENT
Start: 2018-08-28 | End: 2018-08-31 | Stop reason: HOSPADM

## 2018-08-28 RX ORDER — HYDROMORPHONE HCL 110MG/55ML
0.5 PATIENT CONTROLLED ANALGESIA SYRINGE INTRAVENOUS
Status: DISCONTINUED | OUTPATIENT
Start: 2018-08-28 | End: 2018-08-31 | Stop reason: HOSPADM

## 2018-08-28 RX ORDER — LOSARTAN POTASSIUM 50 MG/1
100 TABLET ORAL DAILY
Status: DISCONTINUED | OUTPATIENT
Start: 2018-08-28 | End: 2018-08-31 | Stop reason: HOSPADM

## 2018-08-28 RX ORDER — CETIRIZINE HYDROCHLORIDE 10 MG/1
10 TABLET ORAL DAILY
Status: DISCONTINUED | OUTPATIENT
Start: 2018-08-28 | End: 2018-08-31 | Stop reason: HOSPADM

## 2018-08-28 RX ORDER — EPHEDRINE SULFATE 50 MG/ML
INJECTION, SOLUTION INTRAVENOUS AS NEEDED
Status: DISCONTINUED | OUTPATIENT
Start: 2018-08-28 | End: 2018-08-28 | Stop reason: SURG

## 2018-08-28 RX ORDER — LIDOCAINE HYDROCHLORIDE 20 MG/ML
INJECTION, SOLUTION INFILTRATION; PERINEURAL AS NEEDED
Status: DISCONTINUED | OUTPATIENT
Start: 2018-08-28 | End: 2018-08-28 | Stop reason: SURG

## 2018-08-28 RX ORDER — OXYCODONE HCL 10 MG/1
10 TABLET, FILM COATED, EXTENDED RELEASE ORAL EVERY 12 HOURS SCHEDULED
Status: DISCONTINUED | OUTPATIENT
Start: 2018-08-28 | End: 2018-08-31 | Stop reason: HOSPADM

## 2018-08-28 RX ORDER — BUPIVACAINE HCL/0.9 % NACL/PF 0.1 %
2 PLASTIC BAG, INJECTION (ML) EPIDURAL ONCE
Status: COMPLETED | OUTPATIENT
Start: 2018-08-28 | End: 2018-08-28

## 2018-08-28 RX ORDER — ONDANSETRON 2 MG/ML
4 INJECTION INTRAMUSCULAR; INTRAVENOUS EVERY 6 HOURS PRN
Status: DISCONTINUED | OUTPATIENT
Start: 2018-08-28 | End: 2018-08-31 | Stop reason: HOSPADM

## 2018-08-28 RX ORDER — HYDROMORPHONE HCL 110MG/55ML
PATIENT CONTROLLED ANALGESIA SYRINGE INTRAVENOUS AS NEEDED
Status: DISCONTINUED | OUTPATIENT
Start: 2018-08-28 | End: 2018-08-28 | Stop reason: SURG

## 2018-08-28 RX ORDER — DOCUSATE SODIUM 100 MG/1
100 CAPSULE, LIQUID FILLED ORAL 2 TIMES DAILY PRN
Status: DISCONTINUED | OUTPATIENT
Start: 2018-08-28 | End: 2018-08-31 | Stop reason: HOSPADM

## 2018-08-28 RX ORDER — MEPERIDINE HYDROCHLORIDE 50 MG/ML
12.5 INJECTION INTRAMUSCULAR; INTRAVENOUS; SUBCUTANEOUS
Status: DISCONTINUED | OUTPATIENT
Start: 2018-08-28 | End: 2018-08-28 | Stop reason: HOSPADM

## 2018-08-28 RX ORDER — BACITRACIN 50000 [IU]/1
INJECTION, POWDER, FOR SOLUTION INTRAMUSCULAR AS NEEDED
Status: DISCONTINUED | OUTPATIENT
Start: 2018-08-28 | End: 2018-08-31 | Stop reason: HOSPADM

## 2018-08-28 RX ORDER — ACETAMINOPHEN 500 MG
1000 TABLET ORAL 4 TIMES DAILY
Status: DISCONTINUED | OUTPATIENT
Start: 2018-08-28 | End: 2018-08-31 | Stop reason: HOSPADM

## 2018-08-28 RX ORDER — SENNA AND DOCUSATE SODIUM 50; 8.6 MG/1; MG/1
2 TABLET, FILM COATED ORAL NIGHTLY
Status: DISCONTINUED | OUTPATIENT
Start: 2018-08-28 | End: 2018-08-31 | Stop reason: HOSPADM

## 2018-08-28 RX ADMIN — Medication 2 G: at 08:21

## 2018-08-28 RX ADMIN — CETIRIZINE HYDROCHLORIDE 10 MG: 10 TABLET, FILM COATED ORAL at 16:14

## 2018-08-28 RX ADMIN — FENTANYL CITRATE 50 MCG: 50 INJECTION, SOLUTION INTRAMUSCULAR; INTRAVENOUS at 09:56

## 2018-08-28 RX ADMIN — CEFAZOLIN SODIUM 2 G: 10 INJECTION, POWDER, FOR SOLUTION INTRAVENOUS at 17:08

## 2018-08-28 RX ADMIN — FENTANYL CITRATE 50 MCG: 50 INJECTION, SOLUTION INTRAMUSCULAR; INTRAVENOUS at 10:02

## 2018-08-28 RX ADMIN — FENTANYL CITRATE 50 MCG: 50 INJECTION, SOLUTION INTRAMUSCULAR; INTRAVENOUS at 10:19

## 2018-08-28 RX ADMIN — SUCCINYLCHOLINE CHLORIDE 140 MG: 20 INJECTION, SOLUTION INTRAMUSCULAR; INTRAVENOUS at 08:05

## 2018-08-28 RX ADMIN — OXYCODONE HYDROCHLORIDE 10 MG: 10 TABLET, FILM COATED, EXTENDED RELEASE ORAL at 15:09

## 2018-08-28 RX ADMIN — HYDROMORPHONE HYDROCHLORIDE 0.5 MG: 2 INJECTION, SOLUTION INTRAMUSCULAR; INTRAVENOUS; SUBCUTANEOUS at 15:09

## 2018-08-28 RX ADMIN — ONDANSETRON HYDROCHLORIDE 4 MG: 2 INJECTION, SOLUTION INTRAMUSCULAR; INTRAVENOUS at 16:48

## 2018-08-28 RX ADMIN — MIDAZOLAM 2 MG: 1 INJECTION INTRAMUSCULAR; INTRAVENOUS at 08:24

## 2018-08-28 RX ADMIN — SODIUM CHLORIDE, SODIUM GLUCONATE, SODIUM ACETATE, POTASSIUM CHLORIDE, AND MAGNESIUM CHLORIDE: 526; 502; 368; 37; 30 INJECTION, SOLUTION INTRAVENOUS at 10:46

## 2018-08-28 RX ADMIN — ACETAMINOPHEN 1000 MG: 500 TABLET ORAL at 20:30

## 2018-08-28 RX ADMIN — FENTANYL CITRATE 100 MCG: 50 INJECTION, SOLUTION INTRAMUSCULAR; INTRAVENOUS at 08:05

## 2018-08-28 RX ADMIN — SODIUM CHLORIDE 1000 ML: 9 INJECTION, SOLUTION INTRAVENOUS at 06:28

## 2018-08-28 RX ADMIN — SENNOSIDES AND DOCUSATE SODIUM 2 TABLET: 8.6; 5 TABLET ORAL at 20:30

## 2018-08-28 RX ADMIN — INSULIN ASPART 4 UNITS: 100 INJECTION, SOLUTION INTRAVENOUS; SUBCUTANEOUS at 20:30

## 2018-08-28 RX ADMIN — INSULIN DETEMIR 16 UNITS: 100 INJECTION, SOLUTION SUBCUTANEOUS at 20:34

## 2018-08-28 RX ADMIN — FAMOTIDINE 40 MG: 40 TABLET ORAL at 16:14

## 2018-08-28 RX ADMIN — ROCURONIUM BROMIDE 5 MG: 10 INJECTION INTRAVENOUS at 08:05

## 2018-08-28 RX ADMIN — LOSARTAN POTASSIUM 100 MG: 50 TABLET, FILM COATED ORAL at 16:13

## 2018-08-28 RX ADMIN — SODIUM CHLORIDE, SODIUM GLUCONATE, SODIUM ACETATE, POTASSIUM CHLORIDE, AND MAGNESIUM CHLORIDE: 526; 502; 368; 37; 30 INJECTION, SOLUTION INTRAVENOUS at 08:36

## 2018-08-28 RX ADMIN — ONDANSETRON 8 MG: 2 INJECTION INTRAMUSCULAR; INTRAVENOUS at 09:43

## 2018-08-28 RX ADMIN — LIDOCAINE HYDROCHLORIDE 100 MG: 20 INJECTION, SOLUTION INFILTRATION; PERINEURAL at 08:05

## 2018-08-28 RX ADMIN — PROPOFOL 150 MG: 10 INJECTION, EMULSION INTRAVENOUS at 08:05

## 2018-08-28 RX ADMIN — Medication 1 TABLET: at 16:12

## 2018-08-28 RX ADMIN — FENTANYL CITRATE 50 MCG: 50 INJECTION, SOLUTION INTRAMUSCULAR; INTRAVENOUS at 08:53

## 2018-08-28 RX ADMIN — ATORVASTATIN CALCIUM 40 MG: 40 TABLET, FILM COATED ORAL at 16:13

## 2018-08-28 RX ADMIN — SODIUM CHLORIDE 75 ML/HR: 9 INJECTION, SOLUTION INTRAVENOUS at 15:09

## 2018-08-28 RX ADMIN — FENTANYL CITRATE 50 MCG: 50 INJECTION, SOLUTION INTRAMUSCULAR; INTRAVENOUS at 09:30

## 2018-08-28 RX ADMIN — EPHEDRINE SULFATE 10 MG: 50 INJECTION INTRAVENOUS at 09:02

## 2018-08-28 RX ADMIN — OXYCODONE HYDROCHLORIDE 10 MG: 10 TABLET, FILM COATED, EXTENDED RELEASE ORAL at 22:41

## 2018-08-28 RX ADMIN — SODIUM CHLORIDE, SODIUM GLUCONATE, SODIUM ACETATE, POTASSIUM CHLORIDE, AND MAGNESIUM CHLORIDE: 526; 502; 368; 37; 30 INJECTION, SOLUTION INTRAVENOUS at 10:45

## 2018-08-28 RX ADMIN — HYDROMORPHONE HYDROCHLORIDE 0.5 MG: 2 INJECTION INTRAMUSCULAR; INTRAVENOUS; SUBCUTANEOUS at 10:22

## 2018-08-28 RX ADMIN — ROCURONIUM BROMIDE 30 MG: 10 INJECTION INTRAVENOUS at 08:14

## 2018-08-28 RX ADMIN — MONTELUKAST SODIUM 10 MG: 10 TABLET, COATED ORAL at 16:13

## 2018-08-28 NOTE — ANESTHESIA PROCEDURE NOTES
Airway    General Information and Staff    Patient location during procedure: OR    Indications and Patient Condition  Indications for airway management: airway protection    Preoxygenated: yes  MILS maintained throughout  Mask difficulty assessment: 2 - vent by mask + OA or adjuvant +/- NMBA    Final Airway Details  Final airway type: endotracheal airway      Successful airway: ETT  Cuffed: yes   Successful intubation technique: direct laryngoscopy  Endotracheal tube insertion site: oral  Blade: Gary  Blade size: 3  ETT size: 7.0 mm  Cormack-Lehane Classification: grade I - full view of glottis  Placement verified by: chest auscultation and capnometry   Measured from: lips  ETT to lips (cm): 22  Number of attempts at approach: 1

## 2018-08-28 NOTE — ANESTHESIA PROCEDURE NOTES
Peripheral Block    Patient location during procedure: OR  Start time: 8/28/2018 8:00 AM  Stop time: 8/28/2018 8:05 AM  Reason for block: at surgeon's request and post-op pain management  Performed by  CRNA: DARLIN YANG  Assisted by: WINSTON PEREZ  Preanesthetic Checklist  Completed: patient identified, surgical consent, pre-op evaluation, timeout performed, IV checked, risks and benefits discussed and monitors and equipment checked  Prep:  Pt Position: supine  Sterile barriers:cap, gloves and mask  Prep: ChloraPrep  Patient monitoring: blood pressure monitoring, continuous pulse oximetry and EKG  Procedure  Sedation:yes  Performed under: MAC  Guidance:ultrasound guided  ULTRASOUND INTERPRETATION.  Using ultrasound guidance a 21 G gauge needle was placed in close proximity to the femoral nerve, at which point, under ultrasound guidance anesthetic was injected in the area of the nerve and spread of the anesthesia was seen on ultrasound in close proximity thereto.  There were no abnormalities seen on ultrasound; a digital image was taken; and the patient tolerated the procedure with no complications. Images:still images obtained    Laterality:left  Block Type:adductor canal block  Injection Technique:single-shot  Needle Type:echogenic  Needle Gauge:21 G    Medications  Local Injected:ropivacaine 0.5% Local Amount Injected:30mL  Post Assessment  Injection Assessment: negative aspiration for heme, no paresthesia on injection and incremental injection  Patient Tolerance:comfortable throughout block  Complications:no

## 2018-08-28 NOTE — ANESTHESIA PREPROCEDURE EVALUATION
Anesthesia Evaluation     no history of anesthetic complications:  NPO Solid Status: > 8 hours  NPO Liquid Status: > 8 hours           Airway   Mallampati: II  TM distance: >3 FB  Neck ROM: full  Possible difficult intubation  Dental    (+) upper dentures and lower dentures    Pulmonary - negative pulmonary ROS and normal exam    breath sounds clear to auscultation  Cardiovascular - normal exam  Exercise tolerance: poor (<4 METS)    ECG reviewed  Rhythm: regular  Rate: normal    (+) hypertension poorly controlled less than 2 medications, hyperlipidemia,   (-) angina    ROS comment: Normal sinus rhythm  T wave abnormality, consider lateral ischemia  Abnormal ECG  When compared with ECG of 10-DEC-2014 11:18,  No significant change was found  Confirmed by VETTIANKAL     Neuro/Psych- negative ROS  GI/Hepatic/Renal/Endo    (+) obesity,   diabetes mellitus (glu 184) type 2 poorly controlled using insulin,     Musculoskeletal     (+) arthralgias,   Abdominal    Substance History - negative use     OB/GYN negative ob/gyn ROS         Other   (+) arthritis (knee)     (-) history of cancer                  Anesthesia Plan    ASA 3     spinal, regional and MAC   (Adductor canal block discussed and patient agrees to proceed)  intravenous induction   Anesthetic plan and risks discussed with patient.

## 2018-08-28 NOTE — ANESTHESIA POSTPROCEDURE EVALUATION
Patient: Meeta Marmolejo    Procedure Summary     Date:  08/28/18 Room / Location:  [facility] OR  / [facility] OR    Anesthesia Start:  0752 Anesthesia Stop:  1220    Procedure:  REVISION LEFT TOTAL KNEE (Left Knee) Diagnosis:       Mechanical loosening of prosthetic knee, initial encounter (CMS/[facility])      (Mechanical loosening of prosthetic knee, initial encounter (CMS/[facility]) [T84.038A, Z96.659])    Surgeon:  Elias Su MD Provider:  Dimas Smalls MD    Anesthesia Type:  regional, general ASA Status:  3          Anesthesia Type: regional, general  Last vitals  BP   173/75 (08/28/18 0616)   Temp   97.6 °F (36.4 °C) (08/28/18 0616)   Pulse   85 (08/28/18 0616)   Resp   18 (08/28/18 0616)     SpO2   98 % (08/28/18 0616)     Post Anesthesia Care and Evaluation    Patient location during evaluation: PACU  Patient participation: complete - patient participated  Level of consciousness: awake  Pain score: 0  Pain management: adequate  Airway patency: patent  Anesthetic complications: No anesthetic complications  PONV Status: none  Cardiovascular status: acceptable  Respiratory status: acceptable  Hydration status: acceptable

## 2018-08-29 LAB
ANION GAP SERPL CALCULATED.3IONS-SCNC: 11 MMOL/L (ref 5–15)
BUN BLD-MCNC: 17 MG/DL (ref 7–21)
BUN/CREAT SERPL: 24.6 (ref 7–25)
CALCIUM SPEC-SCNC: 7.9 MG/DL (ref 8.4–10.2)
CHLORIDE SERPL-SCNC: 102 MMOL/L (ref 95–110)
CO2 SERPL-SCNC: 23 MMOL/L (ref 22–31)
CREAT BLD-MCNC: 0.69 MG/DL (ref 0.5–1)
DEPRECATED RDW RBC AUTO: 47.3 FL (ref 36.4–46.3)
ERYTHROCYTE [DISTWIDTH] IN BLOOD BY AUTOMATED COUNT: 15.4 % (ref 11.5–14.5)
GFR SERPL CREATININE-BSD FRML MDRD: 105 ML/MIN/1.73 (ref 45–104)
GLUCOSE BLD-MCNC: 270 MG/DL (ref 60–100)
GLUCOSE BLDC GLUCOMTR-MCNC: 166 MG/DL (ref 70–130)
GLUCOSE BLDC GLUCOMTR-MCNC: 251 MG/DL (ref 70–130)
GLUCOSE BLDC GLUCOMTR-MCNC: 273 MG/DL (ref 70–130)
GLUCOSE BLDC GLUCOMTR-MCNC: 300 MG/DL (ref 70–130)
GLUCOSE BLDC GLUCOMTR-MCNC: 339 MG/DL (ref 70–130)
HCT VFR BLD AUTO: 29 % (ref 35–45)
HGB BLD-MCNC: 9.5 G/DL (ref 12–15.5)
LAB AP CASE REPORT: NORMAL
MCH RBC QN AUTO: 27.5 PG (ref 26.5–34)
MCHC RBC AUTO-ENTMCNC: 32.8 G/DL (ref 31.4–36)
MCV RBC AUTO: 83.8 FL (ref 80–98)
PATH REPORT.FINAL DX SPEC: NORMAL
PATH REPORT.GROSS SPEC: NORMAL
PLATELET # BLD AUTO: 203 10*3/MM3 (ref 150–450)
PMV BLD AUTO: 10.5 FL (ref 8–12)
POTASSIUM BLD-SCNC: 4.2 MMOL/L (ref 3.5–5.1)
RBC # BLD AUTO: 3.46 10*6/MM3 (ref 3.77–5.16)
SODIUM BLD-SCNC: 136 MMOL/L (ref 137–145)
WBC NRBC COR # BLD: 8.8 10*3/MM3 (ref 3.2–9.8)

## 2018-08-29 PROCEDURE — G8988 SELF CARE GOAL STATUS: HCPCS

## 2018-08-29 PROCEDURE — 94760 N-INVAS EAR/PLS OXIMETRY 1: CPT

## 2018-08-29 PROCEDURE — G8987 SELF CARE CURRENT STATUS: HCPCS

## 2018-08-29 PROCEDURE — 97110 THERAPEUTIC EXERCISES: CPT

## 2018-08-29 PROCEDURE — 82962 GLUCOSE BLOOD TEST: CPT

## 2018-08-29 PROCEDURE — 63710000001 INSULIN DETEMIR PER 5 UNITS: Performed by: ORTHOPAEDIC SURGERY

## 2018-08-29 PROCEDURE — 94799 UNLISTED PULMONARY SVC/PX: CPT

## 2018-08-29 PROCEDURE — 97166 OT EVAL MOD COMPLEX 45 MIN: CPT

## 2018-08-29 PROCEDURE — 99024 POSTOP FOLLOW-UP VISIT: CPT | Performed by: ORTHOPAEDIC SURGERY

## 2018-08-29 PROCEDURE — 63710000001 INSULIN ASPART PER 5 UNITS: Performed by: ORTHOPAEDIC SURGERY

## 2018-08-29 PROCEDURE — 80048 BASIC METABOLIC PNL TOTAL CA: CPT | Performed by: ORTHOPAEDIC SURGERY

## 2018-08-29 PROCEDURE — 97116 GAIT TRAINING THERAPY: CPT

## 2018-08-29 PROCEDURE — 25010000002 CEFAZOLIN PER 500 MG: Performed by: ORTHOPAEDIC SURGERY

## 2018-08-29 PROCEDURE — 97530 THERAPEUTIC ACTIVITIES: CPT

## 2018-08-29 PROCEDURE — 97535 SELF CARE MNGMENT TRAINING: CPT

## 2018-08-29 PROCEDURE — 85027 COMPLETE CBC AUTOMATED: CPT | Performed by: ORTHOPAEDIC SURGERY

## 2018-08-29 RX ADMIN — RIVAROXABAN 5 MG: 10 TABLET, FILM COATED ORAL at 08:14

## 2018-08-29 RX ADMIN — Medication 1 TABLET: at 08:15

## 2018-08-29 RX ADMIN — CEFAZOLIN SODIUM 2 G: 10 INJECTION, POWDER, FOR SOLUTION INTRAVENOUS at 00:24

## 2018-08-29 RX ADMIN — INSULIN DETEMIR 16 UNITS: 100 INJECTION, SOLUTION SUBCUTANEOUS at 21:43

## 2018-08-29 RX ADMIN — ACETAMINOPHEN 1000 MG: 500 TABLET ORAL at 12:42

## 2018-08-29 RX ADMIN — ACETAMINOPHEN 1000 MG: 500 TABLET ORAL at 17:45

## 2018-08-29 RX ADMIN — INSULIN ASPART 5 UNITS: 100 INJECTION, SOLUTION INTRAVENOUS; SUBCUTANEOUS at 12:41

## 2018-08-29 RX ADMIN — OXYCODONE HYDROCHLORIDE 10 MG: 10 TABLET, FILM COATED, EXTENDED RELEASE ORAL at 08:15

## 2018-08-29 RX ADMIN — FAMOTIDINE 40 MG: 40 TABLET ORAL at 08:15

## 2018-08-29 RX ADMIN — INSULIN ASPART 2 UNITS: 100 INJECTION, SOLUTION INTRAVENOUS; SUBCUTANEOUS at 17:45

## 2018-08-29 RX ADMIN — LOSARTAN POTASSIUM 100 MG: 50 TABLET, FILM COATED ORAL at 08:14

## 2018-08-29 RX ADMIN — CETIRIZINE HYDROCHLORIDE 10 MG: 10 TABLET, FILM COATED ORAL at 08:15

## 2018-08-29 RX ADMIN — ACETAMINOPHEN 1000 MG: 500 TABLET ORAL at 21:42

## 2018-08-29 RX ADMIN — ACETAMINOPHEN 1000 MG: 500 TABLET ORAL at 08:13

## 2018-08-29 RX ADMIN — ATORVASTATIN CALCIUM 40 MG: 40 TABLET, FILM COATED ORAL at 08:15

## 2018-08-29 RX ADMIN — METFORMIN HYDROCHLORIDE 1000 MG: 500 TABLET ORAL at 08:13

## 2018-08-29 RX ADMIN — INSULIN ASPART 5 UNITS: 100 INJECTION, SOLUTION INTRAVENOUS; SUBCUTANEOUS at 21:42

## 2018-08-29 RX ADMIN — FERROUS SULFATE TAB EC 324 MG (65 MG FE EQUIVALENT) 324 MG: 324 (65 FE) TABLET DELAYED RESPONSE at 08:15

## 2018-08-29 RX ADMIN — DOCUSATE SODIUM 100 MG: 100 CAPSULE, LIQUID FILLED ORAL at 21:42

## 2018-08-29 RX ADMIN — OXYCODONE HYDROCHLORIDE 5 MG: 5 TABLET ORAL at 04:47

## 2018-08-29 RX ADMIN — SENNOSIDES AND DOCUSATE SODIUM 2 TABLET: 8.6; 5 TABLET ORAL at 22:24

## 2018-08-29 RX ADMIN — OXYCODONE HYDROCHLORIDE 10 MG: 10 TABLET, FILM COATED, EXTENDED RELEASE ORAL at 21:42

## 2018-08-29 RX ADMIN — METFORMIN HYDROCHLORIDE 1000 MG: 500 TABLET ORAL at 17:45

## 2018-08-29 RX ADMIN — MONTELUKAST SODIUM 10 MG: 10 TABLET, COATED ORAL at 08:14

## 2018-08-29 RX ADMIN — INSULIN ASPART 4 UNITS: 100 INJECTION, SOLUTION INTRAVENOUS; SUBCUTANEOUS at 08:13

## 2018-08-30 ENCOUNTER — APPOINTMENT (OUTPATIENT)
Dept: CT IMAGING | Facility: HOSPITAL | Age: 63
End: 2018-08-30

## 2018-08-30 LAB
ALBUMIN SERPL-MCNC: 3.6 G/DL (ref 3.4–4.8)
ALBUMIN/GLOB SERPL: 1.2 G/DL (ref 1.1–1.8)
ALP SERPL-CCNC: 61 U/L (ref 38–126)
ALT SERPL W P-5'-P-CCNC: 24 U/L (ref 9–52)
ANION GAP SERPL CALCULATED.3IONS-SCNC: 13 MMOL/L (ref 5–15)
ANISOCYTOSIS BLD QL: ABNORMAL
AST SERPL-CCNC: 29 U/L (ref 14–36)
BACTERIA UR QL AUTO: ABNORMAL /HPF
BILIRUB SERPL-MCNC: 0.4 MG/DL (ref 0.2–1.3)
BILIRUB UR QL STRIP: NEGATIVE
BUN BLD-MCNC: 12 MG/DL (ref 7–21)
BUN/CREAT SERPL: 21.1 (ref 7–25)
CALCIUM SPEC-SCNC: 9.1 MG/DL (ref 8.4–10.2)
CHLORIDE SERPL-SCNC: 100 MMOL/L (ref 95–110)
CLARITY UR: CLEAR
CO2 SERPL-SCNC: 24 MMOL/L (ref 22–31)
COLOR UR: YELLOW
CREAT BLD-MCNC: 0.57 MG/DL (ref 0.5–1)
D-LACTATE SERPL-SCNC: 2.9 MMOL/L (ref 0.5–2)
D-LACTATE SERPL-SCNC: 4.5 MMOL/L (ref 0.5–2)
DEPRECATED RDW RBC AUTO: 47.3 FL (ref 36.4–46.3)
EOSINOPHIL # BLD MANUAL: 0.11 10*3/MM3 (ref 0–0.7)
EOSINOPHIL NFR BLD MANUAL: 1 % (ref 0–7)
ERYTHROCYTE [DISTWIDTH] IN BLOOD BY AUTOMATED COUNT: 15.3 % (ref 11.5–14.5)
GFR SERPL CREATININE-BSD FRML MDRD: 131 ML/MIN/1.73 (ref 45–104)
GLOBULIN UR ELPH-MCNC: 3 GM/DL (ref 2.3–3.5)
GLUCOSE BLD-MCNC: 266 MG/DL (ref 60–100)
GLUCOSE BLDC GLUCOMTR-MCNC: 220 MG/DL (ref 70–130)
GLUCOSE BLDC GLUCOMTR-MCNC: 242 MG/DL (ref 70–130)
GLUCOSE BLDC GLUCOMTR-MCNC: 262 MG/DL (ref 70–130)
GLUCOSE BLDC GLUCOMTR-MCNC: 354 MG/DL (ref 70–130)
GLUCOSE UR STRIP-MCNC: ABNORMAL MG/DL
HCT VFR BLD AUTO: 28 % (ref 35–45)
HCT VFR BLD AUTO: 28.3 % (ref 35–45)
HGB BLD-MCNC: 9.2 G/DL (ref 12–15.5)
HGB BLD-MCNC: 9.4 G/DL (ref 12–15.5)
HGB UR QL STRIP.AUTO: ABNORMAL
HOLD SPECIMEN: NORMAL
HYALINE CASTS UR QL AUTO: ABNORMAL /LPF
HYPOCHROMIA BLD QL: ABNORMAL
KETONES UR QL STRIP: NEGATIVE
LEUKOCYTE ESTERASE UR QL STRIP.AUTO: NEGATIVE
LYMPHOCYTES # BLD MANUAL: 2.35 10*3/MM3 (ref 0.6–4.2)
LYMPHOCYTES NFR BLD MANUAL: 10 % (ref 0–12)
LYMPHOCYTES NFR BLD MANUAL: 21 % (ref 10–50)
MCH RBC QN AUTO: 27.4 PG (ref 26.5–34)
MCHC RBC AUTO-ENTMCNC: 32.5 G/DL (ref 31.4–36)
MCV RBC AUTO: 84.2 FL (ref 80–98)
MONOCYTES # BLD AUTO: 1.12 10*3/MM3 (ref 0–0.9)
NEUTROPHILS # BLD AUTO: 7.6 10*3/MM3 (ref 2–8.6)
NEUTROPHILS NFR BLD MANUAL: 68 % (ref 37–80)
NITRITE UR QL STRIP: NEGATIVE
NT-PROBNP SERPL-MCNC: 206 PG/ML (ref 0–900)
PH UR STRIP.AUTO: 5.5 [PH] (ref 5–9)
PLATELET # BLD AUTO: 195 10*3/MM3 (ref 150–450)
PMV BLD AUTO: 10.5 FL (ref 8–12)
POTASSIUM BLD-SCNC: 3.8 MMOL/L (ref 3.5–5.1)
PROT SERPL-MCNC: 6.6 G/DL (ref 6.3–8.6)
PROT UR QL STRIP: NEGATIVE
RBC # BLD AUTO: 3.36 10*6/MM3 (ref 3.77–5.16)
RBC # UR: ABNORMAL /HPF
REF LAB TEST METHOD: ABNORMAL
SMALL PLATELETS BLD QL SMEAR: ADEQUATE
SODIUM BLD-SCNC: 137 MMOL/L (ref 137–145)
SP GR UR STRIP: 1.01 (ref 1–1.03)
SQUAMOUS #/AREA URNS HPF: ABNORMAL /HPF
TROPONIN I SERPL-MCNC: 0.02 NG/ML
UROBILINOGEN UR QL STRIP: ABNORMAL
WBC MORPH BLD: NORMAL
WBC NRBC COR # BLD: 11.18 10*3/MM3 (ref 3.2–9.8)
WBC UR QL AUTO: ABNORMAL /HPF

## 2018-08-30 PROCEDURE — 97530 THERAPEUTIC ACTIVITIES: CPT

## 2018-08-30 PROCEDURE — 93005 ELECTROCARDIOGRAM TRACING: CPT | Performed by: ORTHOPAEDIC SURGERY

## 2018-08-30 PROCEDURE — 63710000001 INSULIN DETEMIR PER 5 UNITS: Performed by: ORTHOPAEDIC SURGERY

## 2018-08-30 PROCEDURE — 85018 HEMOGLOBIN: CPT | Performed by: ORTHOPAEDIC SURGERY

## 2018-08-30 PROCEDURE — 87040 BLOOD CULTURE FOR BACTERIA: CPT | Performed by: FAMILY MEDICINE

## 2018-08-30 PROCEDURE — 63710000001 INSULIN ASPART PER 5 UNITS: Performed by: ORTHOPAEDIC SURGERY

## 2018-08-30 PROCEDURE — 83605 ASSAY OF LACTIC ACID: CPT | Performed by: FAMILY MEDICINE

## 2018-08-30 PROCEDURE — 81001 URINALYSIS AUTO W/SCOPE: CPT | Performed by: FAMILY MEDICINE

## 2018-08-30 PROCEDURE — 99024 POSTOP FOLLOW-UP VISIT: CPT | Performed by: ORTHOPAEDIC SURGERY

## 2018-08-30 PROCEDURE — 85014 HEMATOCRIT: CPT | Performed by: ORTHOPAEDIC SURGERY

## 2018-08-30 PROCEDURE — 93010 ELECTROCARDIOGRAM REPORT: CPT | Performed by: INTERNAL MEDICINE

## 2018-08-30 PROCEDURE — 97116 GAIT TRAINING THERAPY: CPT

## 2018-08-30 PROCEDURE — 82962 GLUCOSE BLOOD TEST: CPT

## 2018-08-30 PROCEDURE — 80053 COMPREHEN METABOLIC PANEL: CPT | Performed by: FAMILY MEDICINE

## 2018-08-30 PROCEDURE — 0 IOPAMIDOL PER 1 ML: Performed by: ORTHOPAEDIC SURGERY

## 2018-08-30 PROCEDURE — 85025 COMPLETE CBC W/AUTO DIFF WBC: CPT | Performed by: FAMILY MEDICINE

## 2018-08-30 PROCEDURE — 97110 THERAPEUTIC EXERCISES: CPT

## 2018-08-30 PROCEDURE — 85007 BL SMEAR W/DIFF WBC COUNT: CPT | Performed by: FAMILY MEDICINE

## 2018-08-30 PROCEDURE — 63710000001 INSULIN ASPART PER 5 UNITS: Performed by: FAMILY MEDICINE

## 2018-08-30 PROCEDURE — 83880 ASSAY OF NATRIURETIC PEPTIDE: CPT | Performed by: FAMILY MEDICINE

## 2018-08-30 PROCEDURE — 97535 SELF CARE MNGMENT TRAINING: CPT

## 2018-08-30 PROCEDURE — 71275 CT ANGIOGRAPHY CHEST: CPT

## 2018-08-30 PROCEDURE — 84484 ASSAY OF TROPONIN QUANT: CPT | Performed by: FAMILY MEDICINE

## 2018-08-30 RX ORDER — SODIUM CHLORIDE 9 MG/ML
100 INJECTION, SOLUTION INTRAVENOUS CONTINUOUS
Status: DISCONTINUED | OUTPATIENT
Start: 2018-08-30 | End: 2018-08-31 | Stop reason: HOSPADM

## 2018-08-30 RX ORDER — OXYCODONE HYDROCHLORIDE 5 MG/1
5 TABLET ORAL EVERY 4 HOURS PRN
Qty: 30 TABLET | Refills: 0 | Status: SHIPPED | OUTPATIENT
Start: 2018-08-30 | End: 2018-09-07

## 2018-08-30 RX ORDER — DEXTROSE MONOHYDRATE 25 G/50ML
25 INJECTION, SOLUTION INTRAVENOUS
Status: DISCONTINUED | OUTPATIENT
Start: 2018-08-30 | End: 2018-08-31 | Stop reason: HOSPADM

## 2018-08-30 RX ORDER — NICOTINE POLACRILEX 4 MG
15 LOZENGE BUCCAL
Status: DISCONTINUED | OUTPATIENT
Start: 2018-08-30 | End: 2018-08-31 | Stop reason: HOSPADM

## 2018-08-30 RX ORDER — OXYCODONE HCL 10 MG/1
10 TABLET, FILM COATED, EXTENDED RELEASE ORAL EVERY 12 HOURS SCHEDULED
Qty: 6 TABLET | Refills: 0 | Status: SHIPPED | OUTPATIENT
Start: 2018-08-30 | End: 2018-09-02

## 2018-08-30 RX ADMIN — OXYCODONE HYDROCHLORIDE 10 MG: 10 TABLET, FILM COATED, EXTENDED RELEASE ORAL at 08:07

## 2018-08-30 RX ADMIN — CETIRIZINE HYDROCHLORIDE 10 MG: 10 TABLET, FILM COATED ORAL at 08:07

## 2018-08-30 RX ADMIN — IOPAMIDOL 70 ML: 755 INJECTION, SOLUTION INTRAVENOUS at 17:00

## 2018-08-30 RX ADMIN — ATORVASTATIN CALCIUM 40 MG: 40 TABLET, FILM COATED ORAL at 08:07

## 2018-08-30 RX ADMIN — RIVAROXABAN 15 MG: 15 TABLET, FILM COATED ORAL at 21:42

## 2018-08-30 RX ADMIN — SODIUM CHLORIDE 100 ML/HR: 9 INJECTION, SOLUTION INTRAVENOUS at 14:36

## 2018-08-30 RX ADMIN — INSULIN ASPART 3 UNITS: 100 INJECTION, SOLUTION INTRAVENOUS; SUBCUTANEOUS at 18:15

## 2018-08-30 RX ADMIN — MONTELUKAST SODIUM 10 MG: 10 TABLET, COATED ORAL at 08:07

## 2018-08-30 RX ADMIN — Medication 1 TABLET: at 08:07

## 2018-08-30 RX ADMIN — ACETAMINOPHEN 1000 MG: 500 TABLET ORAL at 09:11

## 2018-08-30 RX ADMIN — ACETAMINOPHEN 1000 MG: 500 TABLET ORAL at 18:16

## 2018-08-30 RX ADMIN — FAMOTIDINE 40 MG: 40 TABLET ORAL at 08:07

## 2018-08-30 RX ADMIN — INSULIN DETEMIR 16 UNITS: 100 INJECTION, SOLUTION SUBCUTANEOUS at 21:43

## 2018-08-30 RX ADMIN — RIVAROXABAN 5 MG: 10 TABLET, FILM COATED ORAL at 08:07

## 2018-08-30 RX ADMIN — INSULIN ASPART 3 UNITS: 100 INJECTION, SOLUTION INTRAVENOUS; SUBCUTANEOUS at 11:25

## 2018-08-30 RX ADMIN — FERROUS SULFATE TAB EC 324 MG (65 MG FE EQUIVALENT) 324 MG: 324 (65 FE) TABLET DELAYED RESPONSE at 08:07

## 2018-08-30 RX ADMIN — METFORMIN HYDROCHLORIDE 1000 MG: 500 TABLET ORAL at 08:07

## 2018-08-30 RX ADMIN — SENNOSIDES AND DOCUSATE SODIUM 2 TABLET: 8.6; 5 TABLET ORAL at 21:42

## 2018-08-30 RX ADMIN — ACETAMINOPHEN 1000 MG: 500 TABLET ORAL at 13:19

## 2018-08-30 RX ADMIN — OXYCODONE HYDROCHLORIDE 5 MG: 5 TABLET ORAL at 13:19

## 2018-08-30 RX ADMIN — INSULIN ASPART 4 UNITS: 100 INJECTION, SOLUTION INTRAVENOUS; SUBCUTANEOUS at 08:08

## 2018-08-30 RX ADMIN — LOSARTAN POTASSIUM 100 MG: 50 TABLET, FILM COATED ORAL at 08:08

## 2018-08-30 RX ADMIN — SODIUM CHLORIDE 1000 ML: 9 INJECTION, SOLUTION INTRAVENOUS at 13:42

## 2018-08-30 RX ADMIN — INSULIN ASPART 6 UNITS: 100 INJECTION, SOLUTION INTRAVENOUS; SUBCUTANEOUS at 21:42

## 2018-08-30 RX ADMIN — ACETAMINOPHEN 1000 MG: 500 TABLET ORAL at 21:42

## 2018-08-31 ENCOUNTER — APPOINTMENT (OUTPATIENT)
Dept: CARDIOLOGY | Facility: HOSPITAL | Age: 63
End: 2018-08-31
Attending: FAMILY MEDICINE

## 2018-08-31 VITALS
DIASTOLIC BLOOD PRESSURE: 67 MMHG | HEART RATE: 100 BPM | WEIGHT: 197.75 LBS | HEIGHT: 62 IN | RESPIRATION RATE: 18 BRPM | BODY MASS INDEX: 36.39 KG/M2 | OXYGEN SATURATION: 95 % | TEMPERATURE: 98.2 F | SYSTOLIC BLOOD PRESSURE: 140 MMHG

## 2018-08-31 LAB
BH CV ECHO MEAS - ACS: 1.8 CM
BH CV ECHO MEAS - AO MAX PG (FULL): 6.6 MMHG
BH CV ECHO MEAS - AO MAX PG: 13.7 MMHG
BH CV ECHO MEAS - AO MEAN PG (FULL): 5 MMHG
BH CV ECHO MEAS - AO MEAN PG: 8 MMHG
BH CV ECHO MEAS - AO ROOT AREA (BSA CORRECTED): 1.3
BH CV ECHO MEAS - AO ROOT AREA: 4.5 CM^2
BH CV ECHO MEAS - AO ROOT DIAM: 2.4 CM
BH CV ECHO MEAS - AO V2 MAX: 185 CM/SEC
BH CV ECHO MEAS - AO V2 MEAN: 136 CM/SEC
BH CV ECHO MEAS - AO V2 VTI: 32.6 CM
BH CV ECHO MEAS - ASC AORTA: 3 CM
BH CV ECHO MEAS - AVA(I,A): 2.4 CM^2
BH CV ECHO MEAS - AVA(I,D): 2.4 CM^2
BH CV ECHO MEAS - AVA(V,A): 2.5 CM^2
BH CV ECHO MEAS - AVA(V,D): 2.5 CM^2
BH CV ECHO MEAS - BSA(HAYCOCK): 2 M^2
BH CV ECHO MEAS - BSA: 1.9 M^2
BH CV ECHO MEAS - BZI_BMI: 36 KILOGRAMS/M^2
BH CV ECHO MEAS - BZI_METRIC_HEIGHT: 157.5 CM
BH CV ECHO MEAS - BZI_METRIC_WEIGHT: 89.4 KG
BH CV ECHO MEAS - EDV(CUBED): 66.4 ML
BH CV ECHO MEAS - EDV(TEICH): 72.1 ML
BH CV ECHO MEAS - EF(CUBED): 69 %
BH CV ECHO MEAS - EF(TEICH): 61.1 %
BH CV ECHO MEAS - ESV(CUBED): 20.6 ML
BH CV ECHO MEAS - ESV(TEICH): 28 ML
BH CV ECHO MEAS - FS: 32.3 %
BH CV ECHO MEAS - IVS/LVPW: 1.1
BH CV ECHO MEAS - IVSD: 1.1 CM
BH CV ECHO MEAS - LA DIMENSION: 2.9 CM
BH CV ECHO MEAS - LA/AO: 1.2
BH CV ECHO MEAS - LV MASS(C)D: 135.1 GRAMS
BH CV ECHO MEAS - LV MASS(C)DI: 71.1 GRAMS/M^2
BH CV ECHO MEAS - LV MAX PG: 7.1 MMHG
BH CV ECHO MEAS - LV MEAN PG: 3 MMHG
BH CV ECHO MEAS - LV V1 MAX: 133 CM/SEC
BH CV ECHO MEAS - LV V1 MEAN: 80.1 CM/SEC
BH CV ECHO MEAS - LV V1 VTI: 22.4 CM
BH CV ECHO MEAS - LVIDD: 4.1 CM
BH CV ECHO MEAS - LVIDS: 2.7 CM
BH CV ECHO MEAS - LVOT AREA (M): 3.5 CM^2
BH CV ECHO MEAS - LVOT AREA: 3.5 CM^2
BH CV ECHO MEAS - LVOT DIAM: 2.1 CM
BH CV ECHO MEAS - LVPWD: 1 CM
BH CV ECHO MEAS - MR MAX PG: 161.3 MMHG
BH CV ECHO MEAS - MR MAX VEL: 635 CM/SEC
BH CV ECHO MEAS - MV A MAX VEL: 127 CM/SEC
BH CV ECHO MEAS - MV DEC SLOPE: 794 CM/SEC^2
BH CV ECHO MEAS - MV E MAX VEL: 112 CM/SEC
BH CV ECHO MEAS - MV E/A: 0.88
BH CV ECHO MEAS - MV P1/2T MAX VEL: 117 CM/SEC
BH CV ECHO MEAS - MV P1/2T: 43.2 MSEC
BH CV ECHO MEAS - MVA P1/2T LCG: 1.9 CM^2
BH CV ECHO MEAS - MVA(P1/2T): 5.1 CM^2
BH CV ECHO MEAS - PA MAX PG: 3.1 MMHG
BH CV ECHO MEAS - PA V2 MAX: 88.3 CM/SEC
BH CV ECHO MEAS - RAP SYSTOLE: 5 MMHG
BH CV ECHO MEAS - RVDD: 2.9 CM
BH CV ECHO MEAS - RVSP: 81.7 MMHG
BH CV ECHO MEAS - SI(AO): 77.6 ML/M^2
BH CV ECHO MEAS - SI(CUBED): 24.1 ML/M^2
BH CV ECHO MEAS - SI(LVOT): 40.8 ML/M^2
BH CV ECHO MEAS - SI(TEICH): 23.2 ML/M^2
BH CV ECHO MEAS - SV(AO): 147.5 ML
BH CV ECHO MEAS - SV(CUBED): 45.9 ML
BH CV ECHO MEAS - SV(LVOT): 77.6 ML
BH CV ECHO MEAS - SV(TEICH): 44.1 ML
BH CV ECHO MEAS - TR MAX VEL: 438 CM/SEC
GLUCOSE BLDC GLUCOMTR-MCNC: 235 MG/DL (ref 70–130)
GLUCOSE BLDC GLUCOMTR-MCNC: 305 MG/DL (ref 70–130)

## 2018-08-31 PROCEDURE — 97116 GAIT TRAINING THERAPY: CPT

## 2018-08-31 PROCEDURE — 82962 GLUCOSE BLOOD TEST: CPT

## 2018-08-31 PROCEDURE — 94799 UNLISTED PULMONARY SVC/PX: CPT

## 2018-08-31 PROCEDURE — 93306 TTE W/DOPPLER COMPLETE: CPT

## 2018-08-31 PROCEDURE — 63710000001 INSULIN ASPART PER 5 UNITS: Performed by: FAMILY MEDICINE

## 2018-08-31 PROCEDURE — 97110 THERAPEUTIC EXERCISES: CPT

## 2018-08-31 PROCEDURE — 94760 N-INVAS EAR/PLS OXIMETRY 1: CPT

## 2018-08-31 PROCEDURE — 99024 POSTOP FOLLOW-UP VISIT: CPT | Performed by: ORTHOPAEDIC SURGERY

## 2018-08-31 PROCEDURE — 97535 SELF CARE MNGMENT TRAINING: CPT

## 2018-08-31 RX ADMIN — OXYCODONE HYDROCHLORIDE 10 MG: 10 TABLET, FILM COATED, EXTENDED RELEASE ORAL at 08:52

## 2018-08-31 RX ADMIN — RIVAROXABAN 15 MG: 15 TABLET, FILM COATED ORAL at 08:52

## 2018-08-31 RX ADMIN — LOSARTAN POTASSIUM 100 MG: 50 TABLET, FILM COATED ORAL at 08:52

## 2018-08-31 RX ADMIN — Medication 1 TABLET: at 08:51

## 2018-08-31 RX ADMIN — CETIRIZINE HYDROCHLORIDE 10 MG: 10 TABLET, FILM COATED ORAL at 08:52

## 2018-08-31 RX ADMIN — INSULIN ASPART 5 UNITS: 100 INJECTION, SOLUTION INTRAVENOUS; SUBCUTANEOUS at 12:28

## 2018-08-31 RX ADMIN — ACETAMINOPHEN 1000 MG: 500 TABLET ORAL at 12:28

## 2018-08-31 RX ADMIN — ACETAMINOPHEN 1000 MG: 500 TABLET ORAL at 08:52

## 2018-08-31 RX ADMIN — FERROUS SULFATE TAB EC 324 MG (65 MG FE EQUIVALENT) 324 MG: 324 (65 FE) TABLET DELAYED RESPONSE at 08:52

## 2018-08-31 RX ADMIN — INSULIN ASPART 3 UNITS: 100 INJECTION, SOLUTION INTRAVENOUS; SUBCUTANEOUS at 08:52

## 2018-08-31 RX ADMIN — MONTELUKAST SODIUM 10 MG: 10 TABLET, COATED ORAL at 08:52

## 2018-08-31 RX ADMIN — FAMOTIDINE 40 MG: 40 TABLET ORAL at 08:52

## 2018-08-31 RX ADMIN — ATORVASTATIN CALCIUM 40 MG: 40 TABLET, FILM COATED ORAL at 08:52

## 2018-09-01 ENCOUNTER — READMISSION MANAGEMENT (OUTPATIENT)
Dept: CALL CENTER | Facility: HOSPITAL | Age: 63
End: 2018-09-01

## 2018-09-01 NOTE — THERAPY DISCHARGE NOTE
Acute Care - Physical Therapy Discharge Summary  [facility]       Patient Name: Meeta Marmolejo  : 1955  MRN: [id number]    Today's Date: 2018  Onset of Illness/Injury or Date of Surgery: 18    Date of Referral to PT: 18  Referring Physician: Dr. Su      Admit Date: 2018      PT Recommendation and Plan    Visit Dx:    ICD-10-CM ICD-9-CM   1. Impaired functional mobility, balance, gait, and endurance Z74.09 V49.89   2. Mechanical loosening of prosthetic knee, initial encounter (CMS/[facility]) T84.038A 996.41    Z96.659 V43.65   3. Impaired physical mobility Z74.09 781.99   4. Impaired mobility and ADLs Z74.09 799.89             Outcome Measures     Row Name 18 1400 18 0830 18 1337       How much help from another person do you currently need...    Turning from your back to your side while in flat bed without using bedrails?  -- 4  -TW 4  -TW    Moving from lying on back to sitting on the side of a flat bed without bedrails?  -- 4  -TW 4  -TW    Moving to and from a bed to a chair (including a wheelchair)?  -- 4  -TW 3  -TW    Standing up from a chair using your arms (e.g., wheelchair, bedside chair)?  -- 4  -TW 3  -TW    Climbing 3-5 steps with a railing?  -- 3  -TW 3  -TW    To walk in hospital room?  -- 4  -TW 3  -TW    AM-PAC 6 Clicks Score  -- 23  -TW 20  -TW       How much help from another is currently needed...    Putting on and taking off regular lower body clothing? 3  -CS  --  --    Bathing (including washing, rinsing, and drying) 3  -CS  --  --    Toileting (which includes using toilet bed pan or urinal) 3  -CS  --  --    Putting on and taking off regular upper body clothing 4  -CS  --  --    Taking care of personal grooming (such as brushing teeth) 4  -CS  --  --    Eating meals 4  -CS  --  --    Score 21  -CS  --  --       Functional Assessment    Outcome Measure Options AM-PAC 6 Clicks Daily Activity (OT)  -CS AM-PAC 6 Clicks Basic Mobility (PT)   -TW AM-PAC 6 Clicks Basic Mobility (PT)  -    Row Name 08/29/18 1000 08/29/18 0917 08/29/18 0831       How much help from another person do you currently need...    Turning from your back to your side while in flat bed without using bedrails?  -- 4  -TW  --    Moving from lying on back to sitting on the side of a flat bed without bedrails?  -- 4  -TW  --    Moving to and from a bed to a chair (including a wheelchair)?  -- 3  -TW  --    Standing up from a chair using your arms (e.g., wheelchair, bedside chair)?  -- 3  -TW  --    Climbing 3-5 steps with a railing?  -- 3  -TW  --    To walk in hospital room?  -- 3  -TW  --    AM-PAC 6 Clicks Score  -- 20  -TW  --       How much help from another is currently needed...    Putting on and taking off regular lower body clothing? 3  -LJ  -- 3  -BH    Bathing (including washing, rinsing, and drying) 3  -LJ  -- 3  -BH    Toileting (which includes using toilet bed pan or urinal) 3  -LJ  -- 3  -BH    Putting on and taking off regular upper body clothing 4  -LJ  -- 4  -BH    Taking care of personal grooming (such as brushing teeth) 4  -LJ  -- 4  -BH    Eating meals 4  -LJ  -- 4  -    Score 21  -LJ  -- 21  -       Functional Assessment    Outcome Measure Options  -- AM-PAC 6 Clicks Basic Mobility (PT)  - AM-PAC 6 Clicks Daily Activity (OT)  -      User Key  (r) = Recorded By, (t) = Taken By, (c) = Cosigned By    Initials Name Provider Type     Loreta Miles, OTR/L Occupational Therapist    TW Jaden Humphreys, QUINN Physical Therapy Assistant    Jody Malagon, SHULTZ/L Occupational Therapy Assistant    Leann Floyd COTA/L Occupational Therapy Assistant            Therapy Suggested Charges     Code   Minutes Charges    None                     PT Discharge Summary  Anticipated Discharge Disposition (PT): home with OP services  Reason for Discharge: Per MD order, Discharge from facility  Outcomes Achieved: Patient able to partially acheive established  goals  Discharge Destination: Home      Curry Dyson, PT   9/1/2018

## 2018-09-01 NOTE — OUTREACH NOTE
Prep Survey      Responses   Facility patient discharged from?  [place]   Is patient eligible?  Yes   Discharge diagnosis  Mechanical loosening of prosthetic knee    Does the patient have one of the following disease processes/diagnoses(primary or secondary)?  Total Joint Replacement   Does the patient have Home health ordered?  No   Is there a DME ordered?  Yes   What DME was ordered?  walker   Prep survey completed?  Yes          Melissa Marmolejo RN

## 2018-09-01 NOTE — THERAPY DISCHARGE NOTE
Acute Care - Occupational Therapy Discharge Summary  [facility]     Patient Name: Meeta Marmolejo  : 1955  MRN: [id number]    Today's Date: 2018  Onset of Illness/Injury or Date of Surgery: 18    Date of Referral to OT: 18  Referring Physician: Dr. Su      Admit Date: 2018        OT Recommendation and Plan    Visit Dx:    ICD-10-CM ICD-9-CM   1. Impaired functional mobility, balance, gait, and endurance Z74.09 V49.89   2. Mechanical loosening of prosthetic knee, initial encounter (CMS/[facility]) T84.038A 996.41    Z96.659 V43.65   3. Impaired physical mobility Z74.09 781.99   4. Impaired mobility and ADLs Z74.09 799.89                         Outcome Measures     Row Name 18 1400 18 0830 18 1337       How much help from another person do you currently need...    Turning from your back to your side while in flat bed without using bedrails?  -- 4  -TW 4  -TW    Moving from lying on back to sitting on the side of a flat bed without bedrails?  -- 4  -TW 4  -TW    Moving to and from a bed to a chair (including a wheelchair)?  -- 4  -TW 3  -TW    Standing up from a chair using your arms (e.g., wheelchair, bedside chair)?  -- 4  -TW 3  -TW    Climbing 3-5 steps with a railing?  -- 3  -TW 3  -TW    To walk in hospital room?  -- 4  -TW 3  -TW    AM-PAC 6 Clicks Score  -- 23  -TW 20  -TW       How much help from another is currently needed...    Putting on and taking off regular lower body clothing? 3  -CS  --  --    Bathing (including washing, rinsing, and drying) 3  -CS  --  --    Toileting (which includes using toilet bed pan or urinal) 3  -CS  --  --    Putting on and taking off regular upper body clothing 4  -CS  --  --    Taking care of personal grooming (such as brushing teeth) 4  -CS  --  --    Eating meals 4  -CS  --  --    Score 21  -CS  --  --       Functional Assessment    Outcome Measure Options AM-PAC 6 Clicks Daily Activity (OT)  -CS AM-PAC 6 Clicks  Basic Mobility (PT)  -TW AM-PAC 6 Clicks Basic Mobility (PT)  -TW      User Key  (r) = Recorded By, (t) = Taken By, (c) = Cosigned By    Initials Name Provider Type    TW Jaden Humphreys, QUINN Physical Therapy Assistant    Jody Malagon COTA/EDIS Occupational Therapy Assistant          Therapy Suggested Charges     Code   Minutes Charges    36186 (CPT®) Hc Ot Neuromusc Re Education Ea 15 Min      33581 (CPT®) Hc Ot Ther Proc Ea 15 Min      34120 (CPT®) Hc Ot Therapeutic Act Ea 15 Min      95727 (CPT®) Hc Ot Manual Therapy Ea 15 Min      92506 (CPT®) Hc Ot Iontophoresis Ea 15 Min      80150 (CPT®) Hc Ot Elec Stim Ea-Per 15 Min      31466 (CPT®) Hc Ot Ultrasound Ea 15 Min      94400 (CPT®) Hc Ot Self Care/Mgmt/Train Ea 15 Min 69 5    Total  69 5              OT Discharge Summary  Anticipated Discharge Disposition (OT): home with assist, home with OP services  Reason for Discharge: Discharge from facility, Per MD order  Outcomes Achieved: Refer to plan of care for updates on goals achieved  Discharge Destination: Home      Jenny Robertson OT  9/1/2018

## 2018-09-02 LAB
BACTERIA SPEC AEROBE CULT: NORMAL
GRAM STN SPEC: NORMAL

## 2018-09-04 LAB
BACTERIA SPEC AEROBE CULT: NORMAL
BACTERIA SPEC AEROBE CULT: NORMAL

## 2018-09-06 ENCOUNTER — READMISSION MANAGEMENT (OUTPATIENT)
Dept: CALL CENTER | Facility: HOSPITAL | Age: 63
End: 2018-09-06

## 2018-09-06 NOTE — OUTREACH NOTE
Total Joint Week 1 Survey      Responses   Facility patient discharged from?  [place]   Does the patient have one of the following disease processes/diagnoses(primary or secondary)?  Total Joint Replacement   Is there a successful TCM telephone encounter documented?  No   Joint surgery performed?  Knee [left]   Week 1 attempt successful?  No   Unsuccessful attempts  Attempt 1          Abdon Asencio RN

## 2018-09-11 ENCOUNTER — READMISSION MANAGEMENT (OUTPATIENT)
Dept: CALL CENTER | Facility: HOSPITAL | Age: 63
End: 2018-09-11

## 2018-09-11 DIAGNOSIS — Z96.659 PAINFUL TOTAL KNEE REPLACEMENT, INITIAL ENCOUNTER (HCC): Primary | ICD-10-CM

## 2018-09-11 DIAGNOSIS — T84.84XA PAINFUL TOTAL KNEE REPLACEMENT, INITIAL ENCOUNTER (HCC): Primary | ICD-10-CM

## 2018-09-11 NOTE — OUTREACH NOTE
Total Joint Week 1 Survey      Responses   Facility patient discharged from?  [place]   Does the patient have one of the following disease processes/diagnoses(primary or secondary)?  Total Joint Replacement   Is there a successful TCM telephone encounter documented?  No   Joint surgery performed?  Knee   Week 1 attempt successful?  Yes   Call start time  1212   Call end time  1221   Has the patient been back in either the hospital or Emergency Department since discharge?  No   Discharge diagnosis  Mechanical loosening of prosthetic knee    Is patient permission given to speak with other caregiver?  No   Does the patient have all medications related to this admission filled (includes all antibiotics, pain medications, etc.)  Yes   Is the patient taking all medications as directed (includes completed medication regime)?  Yes   Is the patient able to teach back alternate methods of pain control?  Ice, Knee-elevation/no pillow under knee, Reposition, Correct alignment, Short, frequent activity   Does the patient have a follow up appointment with their surgeon?  Yes   Has the patient kept scheduled appointments due by today?  Yes   What is the Home health agency?   She does not have HH. This is her 3rd knee replacement for this knee.   Has home health visited the patient within 72 hours of discharge?  N/A   What DME was ordered?  walker   Has all DME been delivered?  Yes   Psychosocial issues?  No   When is the first therapy visit scheduled (PO Day) including how many days per week   She is doing home therapy per her self, she plans to hopefully start therapy after she sees the surgeon on Wednesday.   Has the patient began therapy sessions (either in the home or as an out patient)?  Yes   If the patient has started attending therapy, what post op day did they begin to attend (either in home or as an out patient)?    Home therapy    Does the patient have a wound vac in place?  No   Has the patient fallen since  discharge?  No   If the patient has fallen, were there any injuries?  No   Did the patient receive a copy of their discharge instructions?  Yes   Nursing interventions  Educated on MyChart, Reviewed instructions with patient   What is the patient's perception of their functional status since discharge?  Improving   Is the patient able to teach back signs and symptoms of infection?  Temp >100.4 for 24h or longer, Incisional drainage, Blisters around incision, Increased swelling or redness around incision (not associated with surgical edema), Severe discomfort or pain, Changes in mobility, Shortness of breath or chest pain   Is the patient able to teach back how to prevent infection?  Check incision daily, Wash hands before and after touching incision, Keep incision covered if drainage, Keep incision covered if pets in house, Shower only as directed by surgeon, Eat well-balanced diet, No tub baths, hot tub or swimming, No lotion or creams   Is the patient able to teach back signs and symptoms of DVT?  Redness in calf, Area hot to touch, Shortness of breath or chest pain, Swelling in calf, Severe pain in calf   Is the patient able to teach back home safety measures?  Ability to shower, Accessibility to necessary areas in home, Modifications to reach items, Modifications with ADLs such as dressing, cooking, toileting   Did the patient implement home safety suggestions from pre-surgery classes if attended?  N/A   Is the patient/caregiver able to teach back the hierarchy of who to call/visit for symptoms/problems? PCP, Specialist, Home health nurse, Urgent Care, ED, 911  Yes   Week 1 call completed?  Yes          Joycelyn Garcia RN

## 2018-09-12 ENCOUNTER — OFFICE VISIT (OUTPATIENT)
Dept: ORTHOPEDIC SURGERY | Facility: CLINIC | Age: 63
End: 2018-09-12

## 2018-09-12 VITALS — WEIGHT: 197 LBS | HEIGHT: 62 IN | BODY MASS INDEX: 36.25 KG/M2

## 2018-09-12 DIAGNOSIS — Z96.652 S/P REVISION OF TOTAL KNEE, LEFT: Primary | ICD-10-CM

## 2018-09-12 PROCEDURE — 99024 POSTOP FOLLOW-UP VISIT: CPT | Performed by: ORTHOPAEDIC SURGERY

## 2018-09-12 RX ORDER — SULFAMETHOXAZOLE AND TRIMETHOPRIM 800; 160 MG/1; MG/1
1 TABLET ORAL 2 TIMES DAILY
Qty: 28 TABLET | Refills: 0 | Status: SHIPPED | OUTPATIENT
Start: 2018-09-12 | End: 2018-09-26 | Stop reason: HOSPADM

## 2018-09-12 RX ORDER — HYDROCODONE BITARTRATE AND ACETAMINOPHEN 10; 325 MG/1; MG/1
1 TABLET ORAL EVERY 6 HOURS PRN
Qty: 30 TABLET | Refills: 0 | Status: SHIPPED | OUTPATIENT
Start: 2018-09-12 | End: 2018-09-17 | Stop reason: SDUPTHER

## 2018-09-12 NOTE — PROGRESS NOTES
Meeta Marmolejo is a 62 y.o. female is s/p  Revision of Left total knee.     Chief Complaint   Patient presents with   • Post-op     Left knee       HISTORY OF PRESENT ILLNESS: Patient is here today s/p left total knee revision. Patient was sent to [facility] upon arrival. Patient states that her pain is 10/10 today.  Doing pretty well with physical therapy slightly less pain.  She's had a lot of swelling in the knee.  She had a pulmonary embolus which was felt to be subacute.  She is on high doses Xarelto at this point.  They're working this up and in fact spoke to a pulmonologist today in [place].       Allergies   Allergen Reactions   • Penicillins Rash   • Rocephin [Ceftriaxone] Rash         Current Outpatient Prescriptions:   •  alendronate (FOSAMAX) 70 MG tablet, Take 70 mg by mouth Every 7 (Seven) Days. Every Sunday morning, Disp: , Rfl:   •  atorvastatin (LIPITOR) 40 MG tablet, Take 40 mg by mouth Daily., Disp: , Rfl:   •  B Complex-C (SUPER B COMPLEX PO), Take 1 tablet by mouth Daily., Disp: , Rfl:   •  cetirizine (zyrTEC) 10 MG tablet, Take 10 mg by mouth Daily., Disp: , Rfl:   •  glucose blood test strip, 1 each by Other route 2 (Two) Times a Day. Use as instructed, Disp: 50 each, Rfl: 11  •  Insulin Glargine (BASAGLAR KWIKPEN) 100 UNIT/ML injection pen, Inject 16 units under the skin nightly (REPLACES LANTUS DUE TO INSURANCE FORMULARY) (Patient taking differently: Inject 16 Units under the skin into the appropriate area as directed Every Night. Inject 16 units under the skin nightly (REPLACES LANTUS DUE TO INSURANCE FORMULARY)), Disp: 2 pen, Rfl: 5  •  Insulin Pen Needle 31G X 4 MM misc, 1 each Daily., Disp: 100 each, Rfl: 1  •  losartan (COZAAR) 100 MG tablet, Take 100 mg by mouth Daily., Disp: , Rfl:   •  metFORMIN (GLUCOPHAGE) 1000 MG tablet, Take 1,000 mg by mouth 2 (Two) Times a Day With Meals., Disp: , Rfl:   •  montelukast (SINGULAIR) 10 MG tablet, Take 10 mg by mouth Daily., Disp: , Rfl:   •   Multiple Vitamins-Minerals (MULTIVITAMIN ADULT PO), Take 1 tablet by mouth Daily., Disp: , Rfl:   •  rivaroxaban (XARELTO) 15 MG tablet, Take 1 tablet by mouth 2 (Two) Times a Day With Meals., Disp: 42 tablet, Rfl: 3  •  simvastatin (ZOCOR) 40 MG tablet, Take 1 tablet by mouth Every Night., Disp: 30 tablet, Rfl: 5  •  HYDROcodone-acetaminophen (NORCO)  MG per tablet, Take 1 tablet by mouth Every 6 (Six) Hours As Needed for Moderate Pain ., Disp: 30 tablet, Rfl: 0  •  sulfamethoxazole-trimethoprim (BACTRIM DS) 800-160 MG per tablet, Take 1 tablet by mouth 2 (Two) Times a Day for 14 days., Disp: 28 tablet, Rfl: 0    No fevers or chills.  No nausea or vomiting.      PHYSICAL EXAMINATION:       Meeta Marmolejo is a 62 y.o. female    Patient is awake and alert, answers questions appropriately and is in no apparent distress.    GAIT:     []  Normal  []  Antalgic    Assistive device: []  None  []  Walker     []  Crutches  []  Cane     [x]  Wheelchair  []  Stretcher    Ortho Exam   Wound itself is intact she has marked swelling some blistering about the wound and the central portion.  No real drainage the calf is negative.  Neurologically intact.    Xr Knee 1 Or 2 View Left    Result Date: 8/28/2018  Narrative: PROCEDURE:  Left  Knee  4  views REASON FOR EXAM: Post-Op Knee Arthoplasty, T84.038A Mechanical loosening of other internal prosthetic joint, initial encounter Z96.659 Presence of unspecified artificial knee joint FINDINGS: . Postsurgical changes consistent with left total knee arthroplasty. The prostheses appears appropriately positioned. No evidence of loosening or fracture. Soft tissue surgical drain in place. Orthopedic wires are also seen fixating the proximal left tibia. Otherwise unremarkable left knee.     Impression: 1. Successful postsurgical changes consistent with left total knee arthroplasty with prostheses appearing appropriately positioned.. Electronically signed by:  Charles Bustamante MD  8/28/2018  1:08 PM CDT Workstation: [id number]    Ct Angiogram Chest With Contrast    Result Date: 8/30/2018  Narrative: CT chest with contrast. CTA thorax. Pulmonary embolism study. CLINICAL INDICATION: Cough, dyspnea and tachycardia.   COMPARISON: None   TECHNIQUE: Nonionic IV contrast. 70 mL Isovue 370 Helical scanning with axial and coronal reformations. Soft tissue, lung, liver, and bone windows reviewed. Computer generated 3-D images, CT angiography including MIP images are obtained. This exam was performed according to our departmental dose-optimization program, which includes automated exposure control, adjustment of the mA and/or kV according to patient size and/or use of iterative reconstruction technique. CHEST CT FINDINGS: There is a  pulmonary embolism possibly subacute in nature filling one of the branches of the right middle lobe series 3 image 51. Series 6 images 68-77. Additional most likely subacute pulmonary emboli in some of the lower lobe branches right lung series 3 image 71. Series 7 image 112. There are subtle groundglass infiltrates changes at both lung bases, a nonspecific finding. No hilar or mediastinal adenopathy. No appreciable pleural effusions.     Impression: CONCLUSION: Pulmonary emboli in right middle lobe and right lower lobe branches probably subacute in origin. These findings communicated to Dr. Rajan at 4:35 PM. Electronically signed by:  Remberto Chen MD  8/30/2018 4:36 PM CDT Workstation: [id number]          ASSESSMENT:    Diagnoses and all orders for this visit:    S/P revision of total knee, left  -     Ambulatory Referral to Physical Therapy Evaluate and treat (total knee protocol, wbat); Strengthening, ROM    Other orders  -     HYDROcodone-acetaminophen (NORCO)  MG per tablet; Take 1 tablet by mouth Every 6 (Six) Hours As Needed for Moderate Pain .  -     sulfamethoxazole-trimethoprim (BACTRIM DS) 800-160 MG per tablet; Take 1 tablet by mouth 2 (Two) Times a Day for 14  days.          PLAN staple removal.  I removed the blisters reveals some swelling around the tissue in the midportion of incision the central portion.  The progressing on this toe to cleanse it every day with antibacterial soap.  I went up her on some antibiotic.  I'll follow her up in 5 days.    No Follow-up on file.    Elias Su MD

## 2018-09-17 ENCOUNTER — OFFICE VISIT (OUTPATIENT)
Dept: ORTHOPEDIC SURGERY | Facility: CLINIC | Age: 63
End: 2018-09-17

## 2018-09-17 VITALS — BODY MASS INDEX: 36.25 KG/M2 | HEIGHT: 62 IN | WEIGHT: 197 LBS

## 2018-09-17 DIAGNOSIS — Z96.652 S/P REVISION OF TOTAL KNEE, LEFT: Primary | ICD-10-CM

## 2018-09-17 PROCEDURE — 99024 POSTOP FOLLOW-UP VISIT: CPT | Performed by: ORTHOPAEDIC SURGERY

## 2018-09-17 RX ORDER — HYDROCODONE BITARTRATE AND ACETAMINOPHEN 10; 325 MG/1; MG/1
1 TABLET ORAL EVERY 6 HOURS PRN
Qty: 30 TABLET | Refills: 0 | Status: SHIPPED | OUTPATIENT
Start: 2018-09-17 | End: 2018-09-26 | Stop reason: HOSPADM

## 2018-09-17 NOTE — PROGRESS NOTES
"Meeta Marmolejo is a 62 y.o. female returns for     Chief Complaint   Patient presents with   • Left Knee - Post-op       HISTORY OF PRESENT ILLNESS:  Patient here today for post op visit for left knee.  She states her pain is a 8 today.         CONCURRENT MEDICAL HISTORY:    The following portions of the patient's history were reviewed and updated as appropriate: allergies, current medications, past family history, past medical history, past social history, past surgical history and problem list.     ROS  No fevers or chills.  No chest pain or shortness of air.  No GI or  disturbances.    PHYSICAL EXAMINATION:       Ht 157.5 cm (62\")   Wt 89.4 kg (197 lb)   BMI 36.03 kg/m²     Physical Exam    GAIT:     []  Normal  [x]  Antalgic    Assistive device: []  None  [x]  Walker     []  Crutches  []  Cane     []  Wheelchair  []  Stretcher    Ortho Exam   The skin is less swollen.  There is some discoloration.  I think the outside area of the scan is certainly peeling off I think the skin underneath is viable but certainly there is been significant stretch the skin and it is very concerning.  She states she has sensation there.  The wound itself is more healing there is minimal drainage from it.  She has active straight leg raise.    Xr Knee 1 Or 2 View Left    Result Date: 9/13/2018  Narrative: Lateral view left knee Comparison hospital film Patient has a large effusion in the knee.  She is status post revision component of the knee without complicating features seen.  Surgical staples are in place Impression: Status post cemented revision of the tibia.  Effusion is noted.    Xr Knee 1 Or 2 View Left    Result Date: 8/28/2018  Narrative: PROCEDURE:  Left  Knee  4  views REASON FOR EXAM: Post-Op Knee Arthoplasty, T84.038A Mechanical loosening of other internal prosthetic joint, initial encounter Z96.659 Presence of unspecified artificial knee joint FINDINGS: . Postsurgical changes consistent with left total knee " arthroplasty. The prostheses appears appropriately positioned. No evidence of loosening or fracture. Soft tissue surgical drain in place. Orthopedic wires are also seen fixating the proximal left tibia. Otherwise unremarkable left knee.     Impression: 1. Successful postsurgical changes consistent with left total knee arthroplasty with prostheses appearing appropriately positioned.. Electronically signed by:  Charles Bustamante MD  8/28/2018 1:08 PM CDT Workstation: [id number]    Xr Knee Bilateral Ap Standing    Result Date: 9/13/2018  Narrative: AP standing bilateral knee Comparison hospital film Patient is status post revision of tibial component.  Overall alignment knee is intact without complicating feature or acute finding Impression: Status post revision left knee    Ct Angiogram Chest With Contrast    Result Date: 8/30/2018  Narrative: CT chest with contrast. CTA thorax. Pulmonary embolism study. CLINICAL INDICATION: Cough, dyspnea and tachycardia.   COMPARISON: None   TECHNIQUE: Nonionic IV contrast. 70 mL Isovue 370 Helical scanning with axial and coronal reformations. Soft tissue, lung, liver, and bone windows reviewed. Computer generated 3-D images, CT angiography including MIP images are obtained. This exam was performed according to our departmental dose-optimization program, which includes automated exposure control, adjustment of the mA and/or kV according to patient size and/or use of iterative reconstruction technique. CHEST CT FINDINGS: There is a  pulmonary embolism possibly subacute in nature filling one of the branches of the right middle lobe series 3 image 51. Series 6 images 68-77. Additional most likely subacute pulmonary emboli in some of the lower lobe branches right lung series 3 image 71. Series 7 image 112. There are subtle groundglass infiltrates changes at both lung bases, a nonspecific finding. No hilar or mediastinal adenopathy. No appreciable pleural effusions.     Impression: CONCLUSION:  Pulmonary emboli in right middle lobe and right lower lobe branches probably subacute in origin. These findings communicated to Dr. Rajan at 4:35 PM. Electronically signed by:  Remberto Chen MD  8/30/2018 4:36 PM CDT Workstation: [id number]            ASSESSMENT:    Diagnoses and all orders for this visit:    S/P revision of total knee, left    Other orders  -     HYDROcodone-acetaminophen (NORCO)  MG per tablet; Take 1 tablet by mouth Every 6 (Six) Hours As Needed for Moderate Pain .          PLAN: At this point I don't want her bending it too much work on active extension and therapy not bending it too much stress on the wound.  Work on quad strengthening full extension.  Clean wound with antibacterial soap on a daily basis call me with any problems any change immediately.  We'll check her back in a week.    No Follow-up on file.    Elias Su MD

## 2018-09-19 ENCOUNTER — LAB (OUTPATIENT)
Dept: LAB | Facility: HOSPITAL | Age: 63
End: 2018-09-19

## 2018-09-19 ENCOUNTER — READMISSION MANAGEMENT (OUTPATIENT)
Dept: CALL CENTER | Facility: HOSPITAL | Age: 63
End: 2018-09-19

## 2018-09-19 ENCOUNTER — OFFICE VISIT (OUTPATIENT)
Dept: ORTHOPEDIC SURGERY | Facility: CLINIC | Age: 63
End: 2018-09-19

## 2018-09-19 VITALS — HEIGHT: 62 IN | WEIGHT: 197 LBS | BODY MASS INDEX: 36.25 KG/M2

## 2018-09-19 DIAGNOSIS — Z96.652 S/P REVISION OF TOTAL KNEE, LEFT: Primary | ICD-10-CM

## 2018-09-19 PROCEDURE — 87147 CULTURE TYPE IMMUNOLOGIC: CPT

## 2018-09-19 PROCEDURE — 87205 SMEAR GRAM STAIN: CPT

## 2018-09-19 PROCEDURE — 87070 CULTURE OTHR SPECIMN AEROBIC: CPT

## 2018-09-19 PROCEDURE — 87186 SC STD MICRODIL/AGAR DIL: CPT

## 2018-09-19 PROCEDURE — 99024 POSTOP FOLLOW-UP VISIT: CPT | Performed by: ORTHOPAEDIC SURGERY

## 2018-09-19 PROCEDURE — 87077 CULTURE AEROBIC IDENTIFY: CPT

## 2018-09-19 PROCEDURE — 10140 I&D HMTMA SEROMA/FLUID COLLJ: CPT | Performed by: ORTHOPAEDIC SURGERY

## 2018-09-19 RX ORDER — LIDOCAINE HYDROCHLORIDE 20 MG/ML
2 INJECTION, SOLUTION INFILTRATION; PERINEURAL
Status: COMPLETED | OUTPATIENT
Start: 2018-09-19 | End: 2018-09-19

## 2018-09-19 RX ORDER — LIDOCAINE HYDROCHLORIDE 10 MG/ML
8 INJECTION, SOLUTION INFILTRATION; PERINEURAL
Status: COMPLETED | OUTPATIENT
Start: 2018-09-19 | End: 2018-09-19

## 2018-09-19 RX ADMIN — LIDOCAINE HYDROCHLORIDE 8 ML: 10 INJECTION, SOLUTION INFILTRATION; PERINEURAL at 12:03

## 2018-09-19 RX ADMIN — LIDOCAINE HYDROCHLORIDE 2 ML: 20 INJECTION, SOLUTION INFILTRATION; PERINEURAL at 12:03

## 2018-09-19 NOTE — OUTREACH NOTE
Total Joint Week 2 Survey      Responses   Facility patient discharged from?  [facility]   Does the patient have one of the following disease processes/diagnoses(primary or secondary)?  Total Joint Replacement   Joint surgery performed?  Knee   Week 2 attempt successful?  Yes   Call start time  1742   Call end time  1744   Has the patient been back in either the hospital or Emergency Department since discharge?  No   Does the patient have all medications related to this admission filled (includes all antibiotics, pain medications, etc.)  Yes   Is the patient taking all medications as directed (includes completed medication regime)?  Yes   Is the patient able to teach back alternate methods of pain control?  Ice, Knee-elevation/no pillow under knee, Reposition, Correct alignment, Short, frequent activity   Has the patient kept scheduled appointments due by today?  Yes   Has the patient began therapy sessions (either in the home or as an out patient)?  Yes   Has the patient fallen since discharge?  No   What is the patient's perception of their functional status since discharge?  Improving   Week 2 call completed?  Yes          Shreya Marshall RN

## 2018-09-19 NOTE — PROGRESS NOTES
Meeta Marmolejo is a 62 y.o. female is s/p       Chief Complaint   Patient presents with   • Left Knee - Pain       HISTORY OF PRESENT ILLNESS: patient here for post op on left knee blood coming out profusely, patient has gauzes applied for now and pressure applied for now, but when pressure is released the wound runs profusely.  She was worked in today after her phone call this morning.       Allergies   Allergen Reactions   • Penicillins Rash   • Rocephin [Ceftriaxone] Rash         Current Outpatient Prescriptions:   •  alendronate (FOSAMAX) 70 MG tablet, Take 70 mg by mouth Every 7 (Seven) Days. Every Sunday morning, Disp: , Rfl:   •  atorvastatin (LIPITOR) 40 MG tablet, Take 40 mg by mouth Daily., Disp: , Rfl:   •  B Complex-C (SUPER B COMPLEX PO), Take 1 tablet by mouth Daily., Disp: , Rfl:   •  cetirizine (zyrTEC) 10 MG tablet, Take 10 mg by mouth Daily., Disp: , Rfl:   •  glucose blood test strip, 1 each by Other route 2 (Two) Times a Day. Use as instructed, Disp: 50 each, Rfl: 11  •  HYDROcodone-acetaminophen (NORCO)  MG per tablet, Take 1 tablet by mouth Every 6 (Six) Hours As Needed for Moderate Pain ., Disp: 30 tablet, Rfl: 0  •  Insulin Glargine (BASAGLAR KWIKPEN) 100 UNIT/ML injection pen, Inject 16 units under the skin nightly (REPLACES LANTUS DUE TO INSURANCE FORMULARY) (Patient taking differently: Inject 16 Units under the skin into the appropriate area as directed Every Night. Inject 16 units under the skin nightly (REPLACES LANTUS DUE TO INSURANCE FORMULARY)), Disp: 2 pen, Rfl: 5  •  Insulin Pen Needle 31G X 4 MM misc, 1 each Daily., Disp: 100 each, Rfl: 1  •  losartan (COZAAR) 100 MG tablet, Take 100 mg by mouth Daily., Disp: , Rfl:   •  metFORMIN (GLUCOPHAGE) 1000 MG tablet, Take 1,000 mg by mouth 2 (Two) Times a Day With Meals., Disp: , Rfl:   •  montelukast (SINGULAIR) 10 MG tablet, Take 10 mg by mouth Daily., Disp: , Rfl:   •  Multiple Vitamins-Minerals (MULTIVITAMIN ADULT PO), Take  1 tablet by mouth Daily., Disp: , Rfl:   •  rivaroxaban (XARELTO) 15 MG tablet, Take 1 tablet by mouth 2 (Two) Times a Day With Meals., Disp: 42 tablet, Rfl: 3  •  simvastatin (ZOCOR) 40 MG tablet, Take 1 tablet by mouth Every Night., Disp: 30 tablet, Rfl: 5  •  sulfamethoxazole-trimethoprim (BACTRIM DS) 800-160 MG per tablet, Take 1 tablet by mouth 2 (Two) Times a Day for 14 days., Disp: 28 tablet, Rfl: 0    No fevers or chills.  No nausea or vomiting.      PHYSICAL EXAMINATION:       Meeta Marmolejo is a 62 y.o. female    Patient is awake and alert, answers questions appropriately and is in no apparent distress.    GAIT:     []  Normal  []  Antalgic    Assistive device: []  None  []  Walker     []  Crutches  []  Cane     [x]  Wheelchair  []  Stretcher    Ortho Exam   The central portion of wound is draining a fair amount of red blood.  It is draining out there is some serous component to this.  No obvious purulence.  The calf is negative.    Xr Knee 1 Or 2 View Left    Result Date: 9/13/2018  Narrative: Lateral view left knee Comparison hospital film Patient has a large effusion in the knee.  She is status post revision component of the knee without complicating features seen.  Surgical staples are in place Impression: Status post cemented revision of the tibia.  Effusion is noted.    Xr Knee 1 Or 2 View Left    Result Date: 8/28/2018  Narrative: PROCEDURE:  Left  Knee  4  views REASON FOR EXAM: Post-Op Knee Arthoplasty, T84.038A Mechanical loosening of other internal prosthetic joint, initial encounter Z96.659 Presence of unspecified artificial knee joint FINDINGS: . Postsurgical changes consistent with left total knee arthroplasty. The prostheses appears appropriately positioned. No evidence of loosening or fracture. Soft tissue surgical drain in place. Orthopedic wires are also seen fixating the proximal left tibia. Otherwise unremarkable left knee.     Impression: 1. Successful postsurgical changes  consistent with left total knee arthroplasty with prostheses appearing appropriately positioned.. Electronically signed by:  Charles Bustamante MD  8/28/2018 1:08 PM CDT Workstation: [id number]    Xr Knee Bilateral Ap Standing    Result Date: 9/13/2018  Narrative: AP standing bilateral knee Comparison hospital film Patient is status post revision of tibial component.  Overall alignment knee is intact without complicating feature or acute finding Impression: Status post revision left knee    Ct Angiogram Chest With Contrast    Result Date: 8/30/2018  Narrative: CT chest with contrast. CTA thorax. Pulmonary embolism study. CLINICAL INDICATION: Cough, dyspnea and tachycardia.   COMPARISON: None   TECHNIQUE: Nonionic IV contrast. 70 mL Isovue 370 Helical scanning with axial and coronal reformations. Soft tissue, lung, liver, and bone windows reviewed. Computer generated 3-D images, CT angiography including MIP images are obtained. This exam was performed according to our departmental dose-optimization program, which includes automated exposure control, adjustment of the mA and/or kV according to patient size and/or use of iterative reconstruction technique. CHEST CT FINDINGS: There is a  pulmonary embolism possibly subacute in nature filling one of the branches of the right middle lobe series 3 image 51. Series 6 images 68-77. Additional most likely subacute pulmonary emboli in some of the lower lobe branches right lung series 3 image 71. Series 7 image 112. There are subtle groundglass infiltrates changes at both lung bases, a nonspecific finding. No hilar or mediastinal adenopathy. No appreciable pleural effusions.     Impression: CONCLUSION: Pulmonary emboli in right middle lobe and right lower lobe branches probably subacute in origin. These findings communicated to Dr. Rajan at 4:35 PM. Electronically signed by:  Remberto Chen MD  8/30/2018 4:36 PM CDT Workstation: [id number]    .procd    Large Joint Arthrocentesis  Date/Time:  "9/19/2018 12:03 PM  Consent given by: patient  Timeout: Immediately prior to procedure a time out was called to verify the correct patient, procedure, equipment, support staff and site/side marked as required   Supporting Documentation  Indications: pain   Procedure Details  Location: knee - L knee  Preparation: Patient was prepped and draped in the usual sterile fashion  Needle size: 22 G  Medications administered: 8 mL lidocaine 1 %; 2 mL lidocaine 2%      Incision & Drainage  Date/Time: 9/19/2018 12:30 PM  Performed by: RAJIV CAMARILLO  Authorized by: RAJIV CAMARILLO   Consent: Verbal consent obtained.  Risks and benefits: risks, benefits and alternatives were discussed  Consent given by: patient  Patient understanding: patient states understanding of the procedure being performed  Patient consent: the patient's understanding of the procedure matches consent given  Procedure consent: procedure consent matches procedure scheduled  Relevant documents: relevant documents present and verified  Test results: test results available and properly labeled  Site marked: the operative site was marked  Imaging studies: imaging studies available  Required items: required blood products, implants, devices, and special equipment available  Patient identity confirmed: verbally with patient  Time out: Immediately prior to procedure a \"time out\" was called to verify the correct patient, procedure, equipment, support staff and site/side marked as required.  Type: hematoma  Body area: lower extremity  Location details: left leg  Anesthesia: local infiltration    Anesthesia:  Local Anesthetic: lidocaine 1% without epinephrine  Anesthetic total: 10 mL    Sedation:  Patient sedated: no  Scalpel size: 15  Incision type: single straight  Complexity: simple  Drainage: serosanguinous  Drainage amount: moderate  Packing material: none  Patient tolerance: Patient tolerated the procedure well with no immediate " complications  Comments: Cultures sent          ASSESSMENT:    Diagnoses and all orders for this visit:    S/P revision of total knee, left  -     Miscellaneous DME  -     Cancel: Reference Culture Sensitivity - Swab, Knee, Left; Future  -     Reference Culture Sensitivity - Isolate, Knee, Left; Future  -     Reference Culture Sensitivity - Isolate, Knee, Left  -     Large Joint Arthrocentesis          PLAN compressive dressing, knee immobilizer, she is still on antibiotics, will recheck her tomorrow.  I told to remove the Ace wrap this evening ice it and keep the knee immobilizer on.  Certainly this is been and continues to be a very concerning event.  I have discussed this at length with the patient and her  and our concerns including skin issues, infection, persistent bleeding, stiffness etc.    No Follow-up on file.    Elias Su MD

## 2018-09-20 ENCOUNTER — APPOINTMENT (OUTPATIENT)
Dept: GENERAL RADIOLOGY | Facility: HOSPITAL | Age: 63
End: 2018-09-20

## 2018-09-20 ENCOUNTER — HOSPITAL ENCOUNTER (INPATIENT)
Facility: HOSPITAL | Age: 63
LOS: 6 days | Discharge: HOME HEALTH | End: 2018-09-26
Attending: FAMILY MEDICINE | Admitting: ORTHOPAEDIC SURGERY

## 2018-09-20 ENCOUNTER — APPOINTMENT (OUTPATIENT)
Dept: INTERVENTIONAL RADIOLOGY/VASCULAR | Facility: HOSPITAL | Age: 63
End: 2018-09-20
Attending: ORTHOPAEDIC SURGERY

## 2018-09-20 ENCOUNTER — HOSPITAL ENCOUNTER (EMERGENCY)
Facility: HOSPITAL | Age: 63
End: 2018-09-20

## 2018-09-20 ENCOUNTER — OFFICE VISIT (OUTPATIENT)
Dept: ORTHOPEDIC SURGERY | Facility: CLINIC | Age: 63
End: 2018-09-20

## 2018-09-20 ENCOUNTER — HOSPITAL ENCOUNTER (INPATIENT)
Facility: HOSPITAL | Age: 63
End: 2018-09-20
Admitting: ORTHOPAEDIC SURGERY

## 2018-09-20 ENCOUNTER — APPOINTMENT (OUTPATIENT)
Dept: ULTRASOUND IMAGING | Facility: HOSPITAL | Age: 63
End: 2018-09-20

## 2018-09-20 ENCOUNTER — PREP FOR SURGERY (OUTPATIENT)
Dept: OTHER | Facility: HOSPITAL | Age: 63
End: 2018-09-20

## 2018-09-20 DIAGNOSIS — I26.99 OTHER PULMONARY EMBOLISM WITHOUT ACUTE COR PULMONALE, UNSPECIFIED CHRONICITY (HCC): ICD-10-CM

## 2018-09-20 DIAGNOSIS — Z96.652 S/P TKR (TOTAL KNEE REPLACEMENT), LEFT: Primary | ICD-10-CM

## 2018-09-20 DIAGNOSIS — Z96.652 S/P TKR (TOTAL KNEE REPLACEMENT), LEFT: ICD-10-CM

## 2018-09-20 DIAGNOSIS — M00.062 STAPHYLOCOCCAL ARTHRITIS OF LEFT KNEE (HCC): ICD-10-CM

## 2018-09-20 DIAGNOSIS — M00.061 STAPHYLOCOCCAL ARTHRITIS OF RIGHT KNEE (HCC): Primary | ICD-10-CM

## 2018-09-20 DIAGNOSIS — Z74.09 IMPAIRED FUNCTIONAL MOBILITY, BALANCE, GAIT, AND ENDURANCE: ICD-10-CM

## 2018-09-20 DIAGNOSIS — Z96.652 S/P REVISION OF TOTAL KNEE, LEFT: Primary | ICD-10-CM

## 2018-09-20 PROBLEM — M00.9 SEPTIC JOINT OF RIGHT KNEE JOINT (HCC): Status: ACTIVE | Noted: 2018-09-20

## 2018-09-20 LAB
ANION GAP SERPL CALCULATED.3IONS-SCNC: 12 MMOL/L (ref 5–15)
ANION GAP SERPL CALCULATED.3IONS-SCNC: 13 MMOL/L (ref 5–15)
APTT PPP: 39.8 SECONDS (ref 20–40.3)
ATMOSPHERIC PRESS: 747 MMHG
ATMOSPHERIC PRESS: 747 MMHG
BASE EXCESS BLDV CALC-SCNC: 1.3 MMOL/L (ref 0–2)
BASE EXCESS BLDV CALC-SCNC: 1.5 MMOL/L (ref 0–2)
BASOPHILS # BLD AUTO: 0.02 10*3/MM3 (ref 0–0.2)
BASOPHILS # BLD AUTO: 0.02 10*3/MM3 (ref 0–0.2)
BASOPHILS NFR BLD AUTO: 0.2 % (ref 0–2)
BASOPHILS NFR BLD AUTO: 0.2 % (ref 0–2)
BDY SITE: ABNORMAL
BDY SITE: ABNORMAL
BUN BLD-MCNC: 14 MG/DL (ref 7–21)
BUN BLD-MCNC: 14 MG/DL (ref 7–21)
BUN/CREAT SERPL: 16.1 (ref 7–25)
BUN/CREAT SERPL: 19.2 (ref 7–25)
CALCIUM SPEC-SCNC: 9.7 MG/DL (ref 8.4–10.2)
CALCIUM SPEC-SCNC: 9.8 MG/DL (ref 8.4–10.2)
CHLORIDE SERPL-SCNC: 90 MMOL/L (ref 95–110)
CHLORIDE SERPL-SCNC: 92 MMOL/L (ref 95–110)
CO2 SERPL-SCNC: 23 MMOL/L (ref 22–31)
CO2 SERPL-SCNC: 25 MMOL/L (ref 22–31)
CREAT BLD-MCNC: 0.73 MG/DL (ref 0.5–1)
CREAT BLD-MCNC: 0.87 MG/DL (ref 0.5–1)
CRP SERPL-MCNC: 16.8 MG/DL (ref 0–1)
DEPRECATED RDW RBC AUTO: 44.4 FL (ref 36.4–46.3)
DEPRECATED RDW RBC AUTO: 46.1 FL (ref 36.4–46.3)
EOSINOPHIL # BLD AUTO: 0.05 10*3/MM3 (ref 0–0.7)
EOSINOPHIL # BLD AUTO: 0.09 10*3/MM3 (ref 0–0.7)
EOSINOPHIL NFR BLD AUTO: 0.5 % (ref 0–7)
EOSINOPHIL NFR BLD AUTO: 0.8 % (ref 0–7)
ERYTHROCYTE [DISTWIDTH] IN BLOOD BY AUTOMATED COUNT: 15.4 % (ref 11.5–14.5)
ERYTHROCYTE [DISTWIDTH] IN BLOOD BY AUTOMATED COUNT: 16 % (ref 11.5–14.5)
GFR SERPL CREATININE-BSD FRML MDRD: 80 ML/MIN/1.73 (ref 45–104)
GFR SERPL CREATININE-BSD FRML MDRD: 98 ML/MIN/1.73 (ref 45–104)
GLUCOSE BLD-MCNC: 402 MG/DL (ref 60–100)
GLUCOSE BLD-MCNC: 523 MG/DL (ref 60–100)
HCO3 BLDV-SCNC: 25.4 MMOL/L (ref 18–23)
HCO3 BLDV-SCNC: 25.9 MMOL/L (ref 18–23)
HCT VFR BLD AUTO: 24.4 % (ref 35–45)
HCT VFR BLD AUTO: 24.9 % (ref 35–45)
HGB BLD-MCNC: 8.5 G/DL (ref 12–15.5)
HGB BLD-MCNC: 8.6 G/DL (ref 12–15.5)
IMM GRANULOCYTES # BLD: 0.11 10*3/MM3 (ref 0–0.02)
IMM GRANULOCYTES # BLD: 0.18 10*3/MM3 (ref 0–0.02)
IMM GRANULOCYTES NFR BLD: 1 % (ref 0–0.5)
IMM GRANULOCYTES NFR BLD: 1.7 % (ref 0–0.5)
INR PPP: 1.55 (ref 0.8–1.2)
LYMPHOCYTES # BLD AUTO: 1.55 10*3/MM3 (ref 0.6–4.2)
LYMPHOCYTES # BLD AUTO: 2.39 10*3/MM3 (ref 0.6–4.2)
LYMPHOCYTES NFR BLD AUTO: 14.3 % (ref 10–50)
LYMPHOCYTES NFR BLD AUTO: 21.3 % (ref 10–50)
MAGNESIUM SERPL-MCNC: 1.6 MG/DL (ref 1.6–2.3)
MCH RBC QN AUTO: 27.3 PG (ref 26.5–34)
MCH RBC QN AUTO: 27.4 PG (ref 26.5–34)
MCHC RBC AUTO-ENTMCNC: 34.5 G/DL (ref 31.4–36)
MCHC RBC AUTO-ENTMCNC: 34.8 G/DL (ref 31.4–36)
MCV RBC AUTO: 78.7 FL (ref 80–98)
MCV RBC AUTO: 79 FL (ref 80–98)
MODALITY: ABNORMAL
MODALITY: ABNORMAL
MONOCYTES # BLD AUTO: 0.57 10*3/MM3 (ref 0–0.9)
MONOCYTES # BLD AUTO: 0.69 10*3/MM3 (ref 0–0.9)
MONOCYTES NFR BLD AUTO: 5.3 % (ref 0–12)
MONOCYTES NFR BLD AUTO: 6.1 % (ref 0–12)
NEUTROPHILS # BLD AUTO: 7.94 10*3/MM3 (ref 2–8.6)
NEUTROPHILS # BLD AUTO: 8.46 10*3/MM3 (ref 2–8.6)
NEUTROPHILS NFR BLD AUTO: 70.6 % (ref 37–80)
NEUTROPHILS NFR BLD AUTO: 78 % (ref 37–80)
PCO2 BLDV: 36.3 MM HG (ref 41–51)
PCO2 BLDV: 40.1 MM HG (ref 41–51)
PH BLDV: 7.42 PH UNITS (ref 7.29–7.37)
PH BLDV: 7.45 PH UNITS (ref 7.29–7.37)
PHOSPHATE SERPL-MCNC: 5.1 MG/DL (ref 2.4–4.4)
PLATELET # BLD AUTO: 512 10*3/MM3 (ref 150–450)
PLATELET # BLD AUTO: 529 10*3/MM3 (ref 150–450)
PMV BLD AUTO: 8.9 FL (ref 8–12)
PMV BLD AUTO: 9.6 FL (ref 8–12)
PO2 BLDV: 34.2 MM HG (ref 27–53)
PO2 BLDV: 37.2 MM HG (ref 27–53)
POTASSIUM BLD-SCNC: 4.4 MMOL/L (ref 3.5–5.1)
POTASSIUM BLD-SCNC: 5.4 MMOL/L (ref 3.5–5.1)
POTASSIUM BLD-SCNC: 5.5 MMOL/L (ref 3.5–5.1)
PROTHROMBIN TIME: 18.1 SECONDS (ref 11.1–15.3)
RBC # BLD AUTO: 3.1 10*6/MM3 (ref 3.77–5.16)
RBC # BLD AUTO: 3.15 10*6/MM3 (ref 3.77–5.16)
SAO2 % BLDCOV: 62.6 % (ref 45–75)
SAO2 % BLDCOV: 72.2 % (ref 45–75)
SODIUM BLD-SCNC: 127 MMOL/L (ref 137–145)
SODIUM BLD-SCNC: 128 MMOL/L (ref 137–145)
VENTILATOR MODE: ABNORMAL
VENTILATOR MODE: ABNORMAL
WBC NRBC COR # BLD: 10.83 10*3/MM3 (ref 3.2–9.8)
WBC NRBC COR # BLD: 11.24 10*3/MM3 (ref 3.2–9.8)

## 2018-09-20 PROCEDURE — 02HV33Z INSERTION OF INFUSION DEVICE INTO SUPERIOR VENA CAVA, PERCUTANEOUS APPROACH: ICD-10-PCS | Performed by: ORTHOPAEDIC SURGERY

## 2018-09-20 PROCEDURE — 85025 COMPLETE CBC W/AUTO DIFF WBC: CPT | Performed by: FAMILY MEDICINE

## 2018-09-20 PROCEDURE — 83735 ASSAY OF MAGNESIUM: CPT | Performed by: FAMILY MEDICINE

## 2018-09-20 PROCEDURE — 85610 PROTHROMBIN TIME: CPT | Performed by: FAMILY MEDICINE

## 2018-09-20 PROCEDURE — 82803 BLOOD GASES ANY COMBINATION: CPT

## 2018-09-20 PROCEDURE — 84132 ASSAY OF SERUM POTASSIUM: CPT | Performed by: FAMILY MEDICINE

## 2018-09-20 PROCEDURE — 80048 BASIC METABOLIC PNL TOTAL CA: CPT | Performed by: FAMILY MEDICINE

## 2018-09-20 PROCEDURE — C1751 CATH, INF, PER/CENT/MIDLINE: HCPCS

## 2018-09-20 PROCEDURE — 25010000002 FUROSEMIDE PER 20 MG: Performed by: FAMILY MEDICINE

## 2018-09-20 PROCEDURE — 71046 X-RAY EXAM CHEST 2 VIEWS: CPT

## 2018-09-20 PROCEDURE — 85025 COMPLETE CBC W/AUTO DIFF WBC: CPT | Performed by: ORTHOPAEDIC SURGERY

## 2018-09-20 PROCEDURE — 85730 THROMBOPLASTIN TIME PARTIAL: CPT | Performed by: FAMILY MEDICINE

## 2018-09-20 PROCEDURE — B548ZZA ULTRASONOGRAPHY OF SUPERIOR VENA CAVA, GUIDANCE: ICD-10-PCS | Performed by: ORTHOPAEDIC SURGERY

## 2018-09-20 PROCEDURE — 25010000002 VANCOMYCIN PER 500 MG: Performed by: ORTHOPAEDIC SURGERY

## 2018-09-20 PROCEDURE — 99024 POSTOP FOLLOW-UP VISIT: CPT | Performed by: ORTHOPAEDIC SURGERY

## 2018-09-20 PROCEDURE — 63710000001 INSULIN REGULAR HUMAN PER 5 UNITS: Performed by: FAMILY MEDICINE

## 2018-09-20 PROCEDURE — 82962 GLUCOSE BLOOD TEST: CPT

## 2018-09-20 PROCEDURE — 87040 BLOOD CULTURE FOR BACTERIA: CPT | Performed by: ORTHOPAEDIC SURGERY

## 2018-09-20 PROCEDURE — 25010000002 MORPHINE PER 10 MG: Performed by: FAMILY MEDICINE

## 2018-09-20 PROCEDURE — 86140 C-REACTIVE PROTEIN: CPT | Performed by: ORTHOPAEDIC SURGERY

## 2018-09-20 PROCEDURE — 82803 BLOOD GASES ANY COMBINATION: CPT | Performed by: FAMILY MEDICINE

## 2018-09-20 PROCEDURE — 63710000001 INSULIN ASPART PER 5 UNITS: Performed by: FAMILY MEDICINE

## 2018-09-20 PROCEDURE — 80048 BASIC METABOLIC PNL TOTAL CA: CPT | Performed by: ORTHOPAEDIC SURGERY

## 2018-09-20 PROCEDURE — 63710000001 INSULIN DETEMIR PER 5 UNITS: Performed by: FAMILY MEDICINE

## 2018-09-20 PROCEDURE — 76937 US GUIDE VASCULAR ACCESS: CPT

## 2018-09-20 PROCEDURE — 84100 ASSAY OF PHOSPHORUS: CPT | Performed by: FAMILY MEDICINE

## 2018-09-20 RX ORDER — SODIUM CHLORIDE 0.9 % (FLUSH) 0.9 %
1-10 SYRINGE (ML) INJECTION AS NEEDED
Status: DISCONTINUED | OUTPATIENT
Start: 2018-09-20 | End: 2018-09-26 | Stop reason: HOSPADM

## 2018-09-20 RX ORDER — SODIUM CHLORIDE 450 MG/100ML
100 INJECTION, SOLUTION INTRAVENOUS CONTINUOUS
Status: DISCONTINUED | OUTPATIENT
Start: 2018-09-20 | End: 2018-09-25

## 2018-09-20 RX ORDER — HEPARIN SODIUM 10000 [USP'U]/100ML
1000 INJECTION, SOLUTION INTRAVENOUS
Status: DISCONTINUED | OUTPATIENT
Start: 2018-09-20 | End: 2018-09-20

## 2018-09-20 RX ORDER — HEPARIN SODIUM 5000 [USP'U]/ML
3000 INJECTION, SOLUTION INTRAVENOUS; SUBCUTANEOUS AS NEEDED
Status: DISCONTINUED | OUTPATIENT
Start: 2018-09-20 | End: 2018-09-20

## 2018-09-20 RX ORDER — MORPHINE SULFATE 2 MG/ML
3 INJECTION, SOLUTION INTRAMUSCULAR; INTRAVENOUS EVERY 4 HOURS PRN
Status: DISCONTINUED | OUTPATIENT
Start: 2018-09-20 | End: 2018-09-25 | Stop reason: CLARIF

## 2018-09-20 RX ORDER — MONTELUKAST SODIUM 10 MG/1
10 TABLET ORAL DAILY
Status: DISCONTINUED | OUTPATIENT
Start: 2018-09-20 | End: 2018-09-26 | Stop reason: HOSPADM

## 2018-09-20 RX ORDER — HEPARIN SODIUM 10000 [USP'U]/100ML
1300 INJECTION, SOLUTION INTRAVENOUS
Status: DISCONTINUED | OUTPATIENT
Start: 2018-09-21 | End: 2018-09-25

## 2018-09-20 RX ORDER — HEPARIN SODIUM 5000 [USP'U]/ML
3000 INJECTION, SOLUTION INTRAVENOUS; SUBCUTANEOUS ONCE
Status: COMPLETED | OUTPATIENT
Start: 2018-09-21 | End: 2018-09-21

## 2018-09-20 RX ORDER — HEPARIN SODIUM 5000 [USP'U]/ML
2000 INJECTION, SOLUTION INTRAVENOUS; SUBCUTANEOUS AS NEEDED
Status: DISCONTINUED | OUTPATIENT
Start: 2018-09-21 | End: 2018-09-25

## 2018-09-20 RX ORDER — HYDROCODONE BITARTRATE AND ACETAMINOPHEN 7.5; 325 MG/1; MG/1
1 TABLET ORAL EVERY 4 HOURS PRN
Status: CANCELLED | OUTPATIENT
Start: 2018-09-20 | End: 2018-09-30

## 2018-09-20 RX ORDER — CETIRIZINE HYDROCHLORIDE 10 MG/1
10 TABLET ORAL DAILY
Status: DISCONTINUED | OUTPATIENT
Start: 2018-09-20 | End: 2018-09-26 | Stop reason: HOSPADM

## 2018-09-20 RX ORDER — LOSARTAN POTASSIUM 50 MG/1
100 TABLET ORAL DAILY
Status: DISCONTINUED | OUTPATIENT
Start: 2018-09-21 | End: 2018-09-26 | Stop reason: HOSPADM

## 2018-09-20 RX ORDER — HEPARIN SODIUM 5000 [USP'U]/ML
3000 INJECTION, SOLUTION INTRAVENOUS; SUBCUTANEOUS AS NEEDED
Status: DISCONTINUED | OUTPATIENT
Start: 2018-09-21 | End: 2018-09-25

## 2018-09-20 RX ORDER — RIFAMPIN 300 MG/1
300 CAPSULE ORAL EVERY 12 HOURS SCHEDULED
Status: COMPLETED | OUTPATIENT
Start: 2018-09-20 | End: 2018-09-23

## 2018-09-20 RX ORDER — HYDROCODONE BITARTRATE AND ACETAMINOPHEN 7.5; 325 MG/1; MG/1
1 TABLET ORAL EVERY 4 HOURS PRN
Status: DISCONTINUED | OUTPATIENT
Start: 2018-09-20 | End: 2018-09-26 | Stop reason: HOSPADM

## 2018-09-20 RX ORDER — DEXTROSE MONOHYDRATE 25 G/50ML
25 INJECTION, SOLUTION INTRAVENOUS
Status: DISCONTINUED | OUTPATIENT
Start: 2018-09-20 | End: 2018-09-26 | Stop reason: HOSPADM

## 2018-09-20 RX ORDER — DOCUSATE SODIUM 100 MG/1
100 CAPSULE, LIQUID FILLED ORAL 2 TIMES DAILY
Status: CANCELLED | OUTPATIENT
Start: 2018-09-20

## 2018-09-20 RX ORDER — NICOTINE POLACRILEX 4 MG
15 LOZENGE BUCCAL
Status: DISCONTINUED | OUTPATIENT
Start: 2018-09-20 | End: 2018-09-26 | Stop reason: HOSPADM

## 2018-09-20 RX ORDER — HEPARIN SODIUM 5000 [USP'U]/ML
3000 INJECTION, SOLUTION INTRAVENOUS; SUBCUTANEOUS ONCE
Status: DISCONTINUED | OUTPATIENT
Start: 2018-09-20 | End: 2018-09-20

## 2018-09-20 RX ORDER — DOCUSATE SODIUM 100 MG/1
100 CAPSULE, LIQUID FILLED ORAL 2 TIMES DAILY
Status: DISCONTINUED | OUTPATIENT
Start: 2018-09-20 | End: 2018-09-26 | Stop reason: HOSPADM

## 2018-09-20 RX ORDER — SODIUM CHLORIDE 0.9 % (FLUSH) 0.9 %
1-10 SYRINGE (ML) INJECTION AS NEEDED
Status: CANCELLED | OUTPATIENT
Start: 2018-09-20

## 2018-09-20 RX ORDER — FUROSEMIDE 10 MG/ML
40 INJECTION INTRAMUSCULAR; INTRAVENOUS ONCE
Status: COMPLETED | OUTPATIENT
Start: 2018-09-20 | End: 2018-09-20

## 2018-09-20 RX ORDER — HEPARIN SODIUM 5000 [USP'U]/ML
2000 INJECTION, SOLUTION INTRAVENOUS; SUBCUTANEOUS AS NEEDED
Status: DISCONTINUED | OUTPATIENT
Start: 2018-09-20 | End: 2018-09-20

## 2018-09-20 RX ORDER — ATORVASTATIN CALCIUM 40 MG/1
40 TABLET, FILM COATED ORAL DAILY
Status: DISCONTINUED | OUTPATIENT
Start: 2018-09-20 | End: 2018-09-26 | Stop reason: HOSPADM

## 2018-09-20 RX ADMIN — VANCOMYCIN HYDROCHLORIDE 1750 MG: 5 INJECTION, POWDER, LYOPHILIZED, FOR SOLUTION INTRAVENOUS at 15:50

## 2018-09-20 RX ADMIN — RIFAMPIN 300 MG: 150 CAPSULE ORAL at 20:36

## 2018-09-20 RX ADMIN — RIFAMPIN 300 MG: 150 CAPSULE ORAL at 17:44

## 2018-09-20 RX ADMIN — MORPHINE SULFATE 3 MG: 2 INJECTION, SOLUTION INTRAMUSCULAR; INTRAVENOUS at 16:22

## 2018-09-20 RX ADMIN — FUROSEMIDE 40 MG: 10 INJECTION, SOLUTION INTRAMUSCULAR; INTRAVENOUS at 18:20

## 2018-09-20 RX ADMIN — MORPHINE SULFATE 3 MG: 2 INJECTION, SOLUTION INTRAMUSCULAR; INTRAVENOUS at 20:37

## 2018-09-20 RX ADMIN — INSULIN ASPART 20 UNITS: 100 INJECTION, SOLUTION INTRAVENOUS; SUBCUTANEOUS at 20:37

## 2018-09-20 RX ADMIN — MONTELUKAST SODIUM 10 MG: 10 TABLET, COATED ORAL at 17:44

## 2018-09-20 RX ADMIN — DOCUSATE SODIUM 100 MG: 100 CAPSULE, LIQUID FILLED ORAL at 20:36

## 2018-09-20 RX ADMIN — HYDROCODONE BITARTRATE AND ACETAMINOPHEN 1 TABLET: 7.5; 325 TABLET ORAL at 13:34

## 2018-09-20 RX ADMIN — SODIUM CHLORIDE 200 ML/HR: 4.5 INJECTION, SOLUTION INTRAVENOUS at 17:44

## 2018-09-20 RX ADMIN — ATORVASTATIN CALCIUM 40 MG: 40 TABLET, FILM COATED ORAL at 17:44

## 2018-09-20 RX ADMIN — HYDROCODONE BITARTRATE AND ACETAMINOPHEN 1 TABLET: 7.5; 325 TABLET ORAL at 18:20

## 2018-09-20 RX ADMIN — CETIRIZINE HYDROCHLORIDE 10 MG: 10 TABLET, FILM COATED ORAL at 17:44

## 2018-09-20 RX ADMIN — HUMAN INSULIN 9 UNITS: 100 INJECTION, SOLUTION SUBCUTANEOUS at 16:22

## 2018-09-20 RX ADMIN — INSULIN DETEMIR 16 UNITS: 100 INJECTION, SOLUTION SUBCUTANEOUS at 20:40

## 2018-09-20 NOTE — PROGRESS NOTES
Meeta Marmolejo is a 62 y.o. female is s/p       Chief Complaint   Patient presents with   • Left Knee - Follow-up   • Wound Check       HISTORY OF PRESENT ILLNESS: f/u left knee, I &D done on 9/19/2018       Allergies   Allergen Reactions   • Penicillins Rash   • Rocephin [Ceftriaxone] Rash         Current Outpatient Prescriptions:   •  alendronate (FOSAMAX) 70 MG tablet, Take 70 mg by mouth Every 7 (Seven) Days. Every Sunday morning, Disp: , Rfl:   •  atorvastatin (LIPITOR) 40 MG tablet, Take 40 mg by mouth Daily., Disp: , Rfl:   •  B Complex-C (SUPER B COMPLEX PO), Take 1 tablet by mouth Daily., Disp: , Rfl:   •  cetirizine (zyrTEC) 10 MG tablet, Take 10 mg by mouth Daily., Disp: , Rfl:   •  glucose blood test strip, 1 each by Other route 2 (Two) Times a Day. Use as instructed, Disp: 50 each, Rfl: 11  •  HYDROcodone-acetaminophen (NORCO)  MG per tablet, Take 1 tablet by mouth Every 6 (Six) Hours As Needed for Moderate Pain ., Disp: 30 tablet, Rfl: 0  •  Insulin Glargine (BASAGLAR KWIKPEN) 100 UNIT/ML injection pen, Inject 16 units under the skin nightly (REPLACES LANTUS DUE TO INSURANCE FORMULARY) (Patient taking differently: Inject 16 Units under the skin into the appropriate area as directed Every Night. Inject 16 units under the skin nightly (REPLACES LANTUS DUE TO INSURANCE FORMULARY)), Disp: 2 pen, Rfl: 5  •  Insulin Pen Needle 31G X 4 MM misc, 1 each Daily., Disp: 100 each, Rfl: 1  •  losartan (COZAAR) 100 MG tablet, Take 100 mg by mouth Daily., Disp: , Rfl:   •  metFORMIN (GLUCOPHAGE) 1000 MG tablet, Take 1,000 mg by mouth 2 (Two) Times a Day With Meals., Disp: , Rfl:   •  montelukast (SINGULAIR) 10 MG tablet, Take 10 mg by mouth Daily., Disp: , Rfl:   •  Multiple Vitamins-Minerals (MULTIVITAMIN ADULT PO), Take 1 tablet by mouth Daily., Disp: , Rfl:   •  rivaroxaban (XARELTO) 15 MG tablet, Take 1 tablet by mouth 2 (Two) Times a Day With Meals., Disp: 42 tablet, Rfl: 3  •  simvastatin (ZOCOR) 40  MG tablet, Take 1 tablet by mouth Every Night., Disp: 30 tablet, Rfl: 5  •  sulfamethoxazole-trimethoprim (BACTRIM DS) 800-160 MG per tablet, Take 1 tablet by mouth 2 (Two) Times a Day for 14 days., Disp: 28 tablet, Rfl: 0  No current facility-administered medications for this visit.     No fevers or chills.  No nausea or vomiting.      PHYSICAL EXAMINATION:       Meeta Marmolejo is a 62 y.o. female    Patient is awake and alert, answers questions appropriately and is in no apparent distress.    GAIT:     []  Normal  []  Antalgic    Assistive device: []  None  []  Walker     []  Crutches  []  Cane     [x]  Wheelchair  []  Stretcher    Ortho Exam  The wound actually looks better than it did yesterday.  Drainage is less swelling is less.  I did not check her motion today.  The skin still has dried and dead skin over it.            ASSESSMENT:    Diagnoses and all orders for this visit:    S/P revision of total knee, left          PLAN cultures from yesterday came back and grew a coagulase negative staph self-harm.  With that I spent 30 minutes discussing with the family today and a separate H&P as dictated.  I'm going to admit her stop her Xarelto at this point.  I think she is to go back to the operating room for thorough I&D of the components.  We'll put in PDS sutures PICC line vancomycin empirically to cultures come back.  We'll anticipate 6 weeks of antibiotics and some lab work as well.  Certainly there is concern the patient is concerned about amputation I think at this time toward that is not our option we can certainly have caught this early I think this is a function of having to be aggressive with anticoagulation light of her PE.  When she had the swelling that the drainage and related this likely infected hematoma that we need to treat aggressively.  I do understand the risks and benefits and likely (Xarelto.  Heparinized her.  We'll plan on doing that I&D likely in 2 days.  I spoke to Dr. Chamberlain  hospitalist service and get him involved.    No Follow-up on file.    Paty Beauchamp MA

## 2018-09-21 ENCOUNTER — READMISSION MANAGEMENT (OUTPATIENT)
Dept: CALL CENTER | Facility: HOSPITAL | Age: 63
End: 2018-09-21

## 2018-09-21 ENCOUNTER — APPOINTMENT (OUTPATIENT)
Dept: INTERVENTIONAL RADIOLOGY/VASCULAR | Facility: HOSPITAL | Age: 63
End: 2018-09-21
Attending: ORTHOPAEDIC SURGERY

## 2018-09-21 ENCOUNTER — APPOINTMENT (OUTPATIENT)
Dept: GENERAL RADIOLOGY | Facility: HOSPITAL | Age: 63
End: 2018-09-21

## 2018-09-21 ENCOUNTER — ANESTHESIA EVENT (OUTPATIENT)
Dept: PERIOP | Facility: HOSPITAL | Age: 63
End: 2018-09-21

## 2018-09-21 PROBLEM — D64.9 ANEMIA: Chronic | Status: ACTIVE | Noted: 2018-09-21

## 2018-09-21 PROBLEM — M00.061 STAPHYLOCOCCAL ARTHRITIS OF RIGHT KNEE: Status: ACTIVE | Noted: 2018-09-20

## 2018-09-21 LAB
ANION GAP SERPL CALCULATED.3IONS-SCNC: 10 MMOL/L (ref 5–15)
APTT PPP: 38.9 SECONDS (ref 20–40.3)
APTT PPP: 40 SECONDS (ref 20–40.3)
APTT PPP: 44.8 SECONDS (ref 20–40.3)
BACTERIA SPEC AEROBE CULT: ABNORMAL
BACTERIA UR QL AUTO: ABNORMAL /HPF
BILIRUB UR QL STRIP: ABNORMAL
BUN BLD-MCNC: 14 MG/DL (ref 7–21)
BUN/CREAT SERPL: 19.4 (ref 7–25)
CALCIUM SPEC-SCNC: 9.2 MG/DL (ref 8.4–10.2)
CHLORIDE SERPL-SCNC: 95 MMOL/L (ref 95–110)
CLARITY UR: ABNORMAL
CO2 SERPL-SCNC: 26 MMOL/L (ref 22–31)
COLOR UR: ABNORMAL
CREAT BLD-MCNC: 0.72 MG/DL (ref 0.5–1)
ERYTHROCYTE [SEDIMENTATION RATE] IN BLOOD: >150 MM/HR (ref 0–20)
FERRITIN SERPL-MCNC: 246 NG/ML (ref 11.1–264)
FOLATE SERPL-MCNC: 16 NG/ML (ref 2.76–21)
GFR SERPL CREATININE-BSD FRML MDRD: 100 ML/MIN/1.73 (ref 45–104)
GLUCOSE BLD-MCNC: 241 MG/DL (ref 60–100)
GLUCOSE BLDC GLUCOMTR-MCNC: 281 MG/DL (ref 70–130)
GLUCOSE BLDC GLUCOMTR-MCNC: 329 MG/DL (ref 70–130)
GLUCOSE BLDC GLUCOMTR-MCNC: 339 MG/DL (ref 70–130)
GLUCOSE BLDC GLUCOMTR-MCNC: 374 MG/DL (ref 70–130)
GLUCOSE BLDC GLUCOMTR-MCNC: 375 MG/DL (ref 70–130)
GLUCOSE BLDC GLUCOMTR-MCNC: 377 MG/DL (ref 70–130)
GLUCOSE BLDC GLUCOMTR-MCNC: 400 MG/DL (ref 70–130)
GLUCOSE BLDC GLUCOMTR-MCNC: 403 MG/DL (ref 70–130)
GLUCOSE UR STRIP-MCNC: ABNORMAL MG/DL
GRAM STN SPEC: ABNORMAL
GRAM STN SPEC: ABNORMAL
HCT VFR BLD AUTO: 22.8 % (ref 35–45)
HCT VFR BLD AUTO: 24.3 % (ref 35–45)
HCT VFR BLD AUTO: 24.7 % (ref 35–45)
HGB BLD-MCNC: 8.1 G/DL (ref 12–15.5)
HGB BLD-MCNC: 8.2 G/DL (ref 12–15.5)
HGB BLD-MCNC: 8.4 G/DL (ref 12–15.5)
HGB UR QL STRIP.AUTO: NEGATIVE
HOLD SPECIMEN: NORMAL
HYALINE CASTS UR QL AUTO: ABNORMAL /LPF
IRON 24H UR-MRATE: 38 MCG/DL (ref 37–170)
IRON SATN MFR SERPL: 13 % (ref 15–50)
KETONES UR QL STRIP: ABNORMAL
LEUKOCYTE ESTERASE UR QL STRIP.AUTO: ABNORMAL
NITRITE UR QL STRIP: POSITIVE
PH UR STRIP.AUTO: 6 [PH] (ref 5–9)
POTASSIUM BLD-SCNC: 4.5 MMOL/L (ref 3.5–5.1)
PROT UR QL STRIP: NEGATIVE
RBC # UR: ABNORMAL /HPF
REF LAB TEST METHOD: ABNORMAL
SODIUM BLD-SCNC: 131 MMOL/L (ref 137–145)
SP GR UR STRIP: 1.02 (ref 1–1.03)
SQUAMOUS #/AREA URNS HPF: ABNORMAL /HPF
TIBC SERPL-MCNC: 290 MCG/DL (ref 265–497)
UROBILINOGEN UR QL STRIP: ABNORMAL
VIT B12 BLD-MCNC: >1000 PG/ML (ref 239–931)
WBC UR QL AUTO: ABNORMAL /HPF
YEAST URNS QL MICRO: ABNORMAL /HPF

## 2018-09-21 PROCEDURE — 85651 RBC SED RATE NONAUTOMATED: CPT | Performed by: ORTHOPAEDIC SURGERY

## 2018-09-21 PROCEDURE — 85018 HEMOGLOBIN: CPT | Performed by: FAMILY MEDICINE

## 2018-09-21 PROCEDURE — 83550 IRON BINDING TEST: CPT | Performed by: INTERNAL MEDICINE

## 2018-09-21 PROCEDURE — 63710000001 INSULIN ASPART PER 5 UNITS: Performed by: FAMILY MEDICINE

## 2018-09-21 PROCEDURE — 25010000002 MORPHINE PER 10 MG: Performed by: FAMILY MEDICINE

## 2018-09-21 PROCEDURE — 85014 HEMATOCRIT: CPT | Performed by: FAMILY MEDICINE

## 2018-09-21 PROCEDURE — 85730 THROMBOPLASTIN TIME PARTIAL: CPT | Performed by: FAMILY MEDICINE

## 2018-09-21 PROCEDURE — 02HV33Z INSERTION OF INFUSION DEVICE INTO SUPERIOR VENA CAVA, PERCUTANEOUS APPROACH: ICD-10-PCS | Performed by: ORTHOPAEDIC SURGERY

## 2018-09-21 PROCEDURE — 82728 ASSAY OF FERRITIN: CPT | Performed by: INTERNAL MEDICINE

## 2018-09-21 PROCEDURE — 25010000002 VANCOMYCIN PER 500 MG: Performed by: FAMILY MEDICINE

## 2018-09-21 PROCEDURE — B548ZZA ULTRASONOGRAPHY OF SUPERIOR VENA CAVA, GUIDANCE: ICD-10-PCS | Performed by: ORTHOPAEDIC SURGERY

## 2018-09-21 PROCEDURE — C1751 CATH, INF, PER/CENT/MIDLINE: HCPCS

## 2018-09-21 PROCEDURE — 82607 VITAMIN B-12: CPT | Performed by: INTERNAL MEDICINE

## 2018-09-21 PROCEDURE — 99024 POSTOP FOLLOW-UP VISIT: CPT | Performed by: ORTHOPAEDIC SURGERY

## 2018-09-21 PROCEDURE — 82746 ASSAY OF FOLIC ACID SERUM: CPT | Performed by: INTERNAL MEDICINE

## 2018-09-21 PROCEDURE — 25010000002 HEPARIN (PORCINE) PER 1000 UNITS: Performed by: FAMILY MEDICINE

## 2018-09-21 PROCEDURE — 82962 GLUCOSE BLOOD TEST: CPT

## 2018-09-21 PROCEDURE — 83540 ASSAY OF IRON: CPT | Performed by: INTERNAL MEDICINE

## 2018-09-21 PROCEDURE — 81001 URINALYSIS AUTO W/SCOPE: CPT | Performed by: FAMILY MEDICINE

## 2018-09-21 PROCEDURE — 80048 BASIC METABOLIC PNL TOTAL CA: CPT | Performed by: FAMILY MEDICINE

## 2018-09-21 PROCEDURE — 99254 IP/OBS CNSLTJ NEW/EST MOD 60: CPT | Performed by: INTERNAL MEDICINE

## 2018-09-21 PROCEDURE — 63710000001 INSULIN DETEMIR PER 5 UNITS: Performed by: FAMILY MEDICINE

## 2018-09-21 RX ORDER — DOCUSATE SODIUM 100 MG/1
100 CAPSULE, LIQUID FILLED ORAL 2 TIMES DAILY
Status: DISCONTINUED | OUTPATIENT
Start: 2018-09-21 | End: 2018-09-26 | Stop reason: HOSPADM

## 2018-09-21 RX ORDER — FERROUS SULFATE TAB EC 324 MG (65 MG FE EQUIVALENT) 324 (65 FE) MG
324 TABLET DELAYED RESPONSE ORAL
Status: DISCONTINUED | OUTPATIENT
Start: 2018-09-22 | End: 2018-09-26 | Stop reason: HOSPADM

## 2018-09-21 RX ADMIN — DOCUSATE SODIUM 100 MG: 100 CAPSULE, LIQUID FILLED ORAL at 21:26

## 2018-09-21 RX ADMIN — VANCOMYCIN HYDROCHLORIDE 1000 MG: 1 INJECTION, POWDER, LYOPHILIZED, FOR SOLUTION INTRAVENOUS at 04:06

## 2018-09-21 RX ADMIN — HEPARIN SODIUM 3000 UNITS: 5000 INJECTION, SOLUTION INTRAVENOUS; SUBCUTANEOUS at 06:09

## 2018-09-21 RX ADMIN — LOSARTAN POTASSIUM 100 MG: 50 TABLET, FILM COATED ORAL at 08:34

## 2018-09-21 RX ADMIN — HYDROCODONE BITARTRATE AND ACETAMINOPHEN 1 TABLET: 7.5; 325 TABLET ORAL at 13:20

## 2018-09-21 RX ADMIN — DOCUSATE SODIUM 100 MG: 100 CAPSULE, LIQUID FILLED ORAL at 08:34

## 2018-09-21 RX ADMIN — INSULIN ASPART 16 UNITS: 100 INJECTION, SOLUTION INTRAVENOUS; SUBCUTANEOUS at 08:34

## 2018-09-21 RX ADMIN — CETIRIZINE HYDROCHLORIDE 10 MG: 10 TABLET, FILM COATED ORAL at 08:34

## 2018-09-21 RX ADMIN — VANCOMYCIN HYDROCHLORIDE 1000 MG: 1 INJECTION, POWDER, LYOPHILIZED, FOR SOLUTION INTRAVENOUS at 18:15

## 2018-09-21 RX ADMIN — MORPHINE SULFATE 3 MG: 2 INJECTION, SOLUTION INTRAMUSCULAR; INTRAVENOUS at 23:58

## 2018-09-21 RX ADMIN — RIFAMPIN 300 MG: 150 CAPSULE ORAL at 08:34

## 2018-09-21 RX ADMIN — HYDROCODONE BITARTRATE AND ACETAMINOPHEN 1 TABLET: 7.5; 325 TABLET ORAL at 09:44

## 2018-09-21 RX ADMIN — HYDROCODONE BITARTRATE AND ACETAMINOPHEN 1 TABLET: 7.5; 325 TABLET ORAL at 21:26

## 2018-09-21 RX ADMIN — INSULIN DETEMIR 16 UNITS: 100 INJECTION, SOLUTION SUBCUTANEOUS at 21:22

## 2018-09-21 RX ADMIN — MORPHINE SULFATE 3 MG: 2 INJECTION, SOLUTION INTRAMUSCULAR; INTRAVENOUS at 18:49

## 2018-09-21 RX ADMIN — SODIUM CHLORIDE 100 ML/HR: 4.5 INJECTION, SOLUTION INTRAVENOUS at 06:17

## 2018-09-21 RX ADMIN — HEPARIN SODIUM 1000 UNITS/HR: 10000 INJECTION, SOLUTION INTRAVENOUS at 06:09

## 2018-09-21 RX ADMIN — MONTELUKAST SODIUM 10 MG: 10 TABLET, COATED ORAL at 08:34

## 2018-09-21 RX ADMIN — HYDROCODONE BITARTRATE AND ACETAMINOPHEN 1 TABLET: 7.5; 325 TABLET ORAL at 16:28

## 2018-09-21 RX ADMIN — INSULIN ASPART 20 UNITS: 100 INJECTION, SOLUTION INTRAVENOUS; SUBCUTANEOUS at 21:23

## 2018-09-21 RX ADMIN — SODIUM CHLORIDE 100 ML/HR: 4.5 INJECTION, SOLUTION INTRAVENOUS at 16:29

## 2018-09-21 RX ADMIN — INSULIN ASPART 20 UNITS: 100 INJECTION, SOLUTION INTRAVENOUS; SUBCUTANEOUS at 11:47

## 2018-09-21 RX ADMIN — DOCUSATE SODIUM 100 MG: 100 CAPSULE, LIQUID FILLED ORAL at 21:27

## 2018-09-21 RX ADMIN — INSULIN ASPART 12 UNITS: 100 INJECTION, SOLUTION INTRAVENOUS; SUBCUTANEOUS at 18:18

## 2018-09-21 RX ADMIN — HYDROCODONE BITARTRATE AND ACETAMINOPHEN 1 TABLET: 7.5; 325 TABLET ORAL at 06:19

## 2018-09-21 RX ADMIN — HEPARIN SODIUM 3000 UNITS: 5000 INJECTION, SOLUTION INTRAVENOUS; SUBCUTANEOUS at 20:57

## 2018-09-21 RX ADMIN — HYDROCODONE BITARTRATE AND ACETAMINOPHEN 1 TABLET: 7.5; 325 TABLET ORAL at 02:13

## 2018-09-21 RX ADMIN — ATORVASTATIN CALCIUM 40 MG: 40 TABLET, FILM COATED ORAL at 08:34

## 2018-09-21 RX ADMIN — HEPARIN SODIUM 3000 UNITS: 5000 INJECTION, SOLUTION INTRAVENOUS; SUBCUTANEOUS at 13:04

## 2018-09-21 RX ADMIN — RIFAMPIN 300 MG: 150 CAPSULE ORAL at 21:26

## 2018-09-21 NOTE — ANESTHESIA PREPROCEDURE EVALUATION
Anesthesia Evaluation     no history of anesthetic complications:  NPO Solid Status: > 8 hours  NPO Liquid Status: > 8 hours           Airway   Mallampati: II  TM distance: >3 FB  Neck ROM: full  Possible difficult intubation  Dental    (+) upper dentures and lower dentures    Pulmonary - normal exam    breath sounds clear to auscultation  (+) pulmonary embolism,   (-) sleep apnea, not a smoker  Cardiovascular - normal exam  Exercise tolerance: poor (<4 METS)    ECG reviewed  PT is on anticoagulation therapy  Rhythm: regular  Rate: normal    (+) hypertension poorly controlled less than 2 medications, hyperlipidemia,   (-) angina    ROS comment: Normal sinus rhythm  T wave abnormality, consider lateral ischemia  Abnormal ECG  When compared with ECG of 10-DEC-2014 11:18,  No significant change was found  Confirmed by VETTIANKAL     · Mild mitral valve regurgitation is present  · Moderate tricuspid valve regurgitation is present.  · Left ventricular diastolic dysfunction (grade I) consistent with impaired relaxation.  · Left ventricular wall thickness is consistent with mild concentric hypertrophy.    Neuro/Psych- negative ROS  GI/Hepatic/Renal/Endo    (+) obesity,   diabetes mellitus (no am glucose) type 2 poorly controlled using insulin,     Musculoskeletal     (+) arthralgias,       ROS comment: Left knee abscess  Abdominal    Substance History - negative use     OB/GYN negative ob/gyn ROS         Other   (+) arthritis (knee)     (-) history of cancer  ROS/Med Hx Other: anemia                  Anesthesia Plan    ASA 3     general     intravenous induction   Anesthetic plan, all risks, benefits, and alternatives have been provided, discussed and informed consent has been obtained with: patient.

## 2018-09-21 NOTE — OUTREACH NOTE
Total Joint Month 1 Survey      Responses   Facility patient discharged from?  [facility]   Does the patient have one of the following disease processes/diagnoses(primary or secondary)?  Total Joint Replacement   Joint surgery performed?  Knee   Month 1 attempt successful?  No   Revoke  Readmitted          Fanny Costello RN

## 2018-09-22 ENCOUNTER — ANESTHESIA (OUTPATIENT)
Dept: PERIOP | Facility: HOSPITAL | Age: 63
End: 2018-09-22

## 2018-09-22 PROBLEM — M00.062 STAPHYLOCOCCAL ARTHRITIS OF LEFT KNEE (HCC): Status: ACTIVE | Noted: 2018-09-20

## 2018-09-22 LAB
ABO GROUP BLD: NORMAL
APTT PPP: 27.6 SECONDS (ref 20–40.3)
APTT PPP: 27.6 SECONDS (ref 20–40.3)
BLD GP AB SCN SERPL QL: NEGATIVE
GLUCOSE BLD-MCNC: 369 MG/DL (ref 60–100)
GLUCOSE BLDC GLUCOMTR-MCNC: 212 MG/DL (ref 70–130)
GLUCOSE BLDC GLUCOMTR-MCNC: 364 MG/DL (ref 70–130)
GLUCOSE BLDC GLUCOMTR-MCNC: 374 MG/DL (ref 70–130)
GLUCOSE BLDC GLUCOMTR-MCNC: 394 MG/DL (ref 70–130)
HCT VFR BLD AUTO: 20.7 % (ref 35–45)
HCT VFR BLD AUTO: 23.1 % (ref 35–45)
HGB BLD-MCNC: 6.9 G/DL (ref 12–15.5)
HGB BLD-MCNC: 7.7 G/DL (ref 12–15.5)
HOLD SPECIMEN: NORMAL
HOLD SPECIMEN: NORMAL
Lab: NORMAL
RH BLD: POSITIVE
T&S EXPIRATION DATE: NORMAL

## 2018-09-22 PROCEDURE — 25010000002 MORPHINE PER 10 MG: Performed by: FAMILY MEDICINE

## 2018-09-22 PROCEDURE — 87205 SMEAR GRAM STAIN: CPT | Performed by: ORTHOPAEDIC SURGERY

## 2018-09-22 PROCEDURE — 86900 BLOOD TYPING SEROLOGIC ABO: CPT | Performed by: FAMILY MEDICINE

## 2018-09-22 PROCEDURE — 87077 CULTURE AEROBIC IDENTIFY: CPT | Performed by: ORTHOPAEDIC SURGERY

## 2018-09-22 PROCEDURE — 82962 GLUCOSE BLOOD TEST: CPT

## 2018-09-22 PROCEDURE — P9016 RBC LEUKOCYTES REDUCED: HCPCS

## 2018-09-22 PROCEDURE — 85018 HEMOGLOBIN: CPT | Performed by: FAMILY MEDICINE

## 2018-09-22 PROCEDURE — 25010000002 FENTANYL CITRATE (PF) 100 MCG/2ML SOLUTION: Performed by: NURSE ANESTHETIST, CERTIFIED REGISTERED

## 2018-09-22 PROCEDURE — 25010000002 PROPOFOL 10 MG/ML EMULSION: Performed by: NURSE ANESTHETIST, CERTIFIED REGISTERED

## 2018-09-22 PROCEDURE — 25010000002 VANCOMYCIN PER 500 MG: Performed by: FAMILY MEDICINE

## 2018-09-22 PROCEDURE — 86901 BLOOD TYPING SEROLOGIC RH(D): CPT | Performed by: FAMILY MEDICINE

## 2018-09-22 PROCEDURE — 36430 TRANSFUSION BLD/BLD COMPNT: CPT

## 2018-09-22 PROCEDURE — 85014 HEMATOCRIT: CPT | Performed by: FAMILY MEDICINE

## 2018-09-22 PROCEDURE — 0S9D0ZZ DRAINAGE OF LEFT KNEE JOINT, OPEN APPROACH: ICD-10-PCS | Performed by: ORTHOPAEDIC SURGERY

## 2018-09-22 PROCEDURE — 87186 SC STD MICRODIL/AGAR DIL: CPT | Performed by: ORTHOPAEDIC SURGERY

## 2018-09-22 PROCEDURE — 63710000001 INSULIN ASPART PER 5 UNITS: Performed by: FAMILY MEDICINE

## 2018-09-22 PROCEDURE — 25010000002 HEPARIN (PORCINE) PER 1000 UNITS: Performed by: FAMILY MEDICINE

## 2018-09-22 PROCEDURE — 27301 DRAIN THIGH/KNEE LESION: CPT | Performed by: ORTHOPAEDIC SURGERY

## 2018-09-22 PROCEDURE — 82947 ASSAY GLUCOSE BLOOD QUANT: CPT | Performed by: FAMILY MEDICINE

## 2018-09-22 PROCEDURE — 99024 POSTOP FOLLOW-UP VISIT: CPT | Performed by: ORTHOPAEDIC SURGERY

## 2018-09-22 PROCEDURE — 86923 COMPATIBILITY TEST ELECTRIC: CPT

## 2018-09-22 PROCEDURE — 85730 THROMBOPLASTIN TIME PARTIAL: CPT | Performed by: FAMILY MEDICINE

## 2018-09-22 PROCEDURE — 86900 BLOOD TYPING SEROLOGIC ABO: CPT

## 2018-09-22 PROCEDURE — 99233 SBSQ HOSP IP/OBS HIGH 50: CPT | Performed by: INTERNAL MEDICINE

## 2018-09-22 PROCEDURE — 25010000002 HYDROMORPHONE PER 4 MG: Performed by: NURSE ANESTHETIST, CERTIFIED REGISTERED

## 2018-09-22 PROCEDURE — 87147 CULTURE TYPE IMMUNOLOGIC: CPT | Performed by: ORTHOPAEDIC SURGERY

## 2018-09-22 PROCEDURE — 86850 RBC ANTIBODY SCREEN: CPT | Performed by: FAMILY MEDICINE

## 2018-09-22 PROCEDURE — 87070 CULTURE OTHR SPECIMN AEROBIC: CPT | Performed by: ORTHOPAEDIC SURGERY

## 2018-09-22 PROCEDURE — 63710000001 INSULIN DETEMIR PER 5 UNITS: Performed by: FAMILY MEDICINE

## 2018-09-22 PROCEDURE — 25010000002 MIDAZOLAM PER 1 MG: Performed by: NURSE ANESTHETIST, CERTIFIED REGISTERED

## 2018-09-22 RX ORDER — BACITRACIN 50000 [IU]/1
INJECTION, POWDER, FOR SOLUTION INTRAMUSCULAR AS NEEDED
Status: DISCONTINUED | OUTPATIENT
Start: 2018-09-22 | End: 2018-09-26 | Stop reason: HOSPADM

## 2018-09-22 RX ORDER — NALOXONE HCL 0.4 MG/ML
0.2 VIAL (ML) INJECTION AS NEEDED
Status: DISCONTINUED | OUTPATIENT
Start: 2018-09-22 | End: 2018-09-22 | Stop reason: HOSPADM

## 2018-09-22 RX ORDER — MEPERIDINE HYDROCHLORIDE 50 MG/ML
12.5 INJECTION INTRAMUSCULAR; INTRAVENOUS; SUBCUTANEOUS
Status: DISCONTINUED | OUTPATIENT
Start: 2018-09-22 | End: 2018-09-22 | Stop reason: HOSPADM

## 2018-09-22 RX ORDER — LIDOCAINE HYDROCHLORIDE 20 MG/ML
INJECTION, SOLUTION INFILTRATION; PERINEURAL AS NEEDED
Status: DISCONTINUED | OUTPATIENT
Start: 2018-09-22 | End: 2018-09-22 | Stop reason: SURG

## 2018-09-22 RX ORDER — FENTANYL CITRATE 50 UG/ML
INJECTION, SOLUTION INTRAMUSCULAR; INTRAVENOUS AS NEEDED
Status: DISCONTINUED | OUTPATIENT
Start: 2018-09-22 | End: 2018-09-22 | Stop reason: SURG

## 2018-09-22 RX ORDER — DIPHENHYDRAMINE HYDROCHLORIDE 50 MG/ML
12.5 INJECTION INTRAMUSCULAR; INTRAVENOUS
Status: DISCONTINUED | OUTPATIENT
Start: 2018-09-22 | End: 2018-09-22 | Stop reason: HOSPADM

## 2018-09-22 RX ORDER — EPHEDRINE SULFATE 50 MG/ML
5 INJECTION, SOLUTION INTRAVENOUS ONCE AS NEEDED
Status: DISCONTINUED | OUTPATIENT
Start: 2018-09-22 | End: 2018-09-22 | Stop reason: HOSPADM

## 2018-09-22 RX ORDER — MIDAZOLAM HYDROCHLORIDE 1 MG/ML
INJECTION INTRAMUSCULAR; INTRAVENOUS AS NEEDED
Status: DISCONTINUED | OUTPATIENT
Start: 2018-09-22 | End: 2018-09-22 | Stop reason: SURG

## 2018-09-22 RX ORDER — 0.9 % SODIUM CHLORIDE 0.9 %
VIAL (ML) INJECTION AS NEEDED
Status: DISCONTINUED | OUTPATIENT
Start: 2018-09-22 | End: 2018-09-26 | Stop reason: HOSPADM

## 2018-09-22 RX ORDER — ONDANSETRON 2 MG/ML
4 INJECTION INTRAMUSCULAR; INTRAVENOUS ONCE AS NEEDED
Status: DISCONTINUED | OUTPATIENT
Start: 2018-09-22 | End: 2018-09-22 | Stop reason: HOSPADM

## 2018-09-22 RX ORDER — PROPOFOL 10 MG/ML
VIAL (ML) INTRAVENOUS AS NEEDED
Status: DISCONTINUED | OUTPATIENT
Start: 2018-09-22 | End: 2018-09-22 | Stop reason: SURG

## 2018-09-22 RX ORDER — FLUMAZENIL 0.1 MG/ML
0.2 INJECTION INTRAVENOUS AS NEEDED
Status: DISCONTINUED | OUTPATIENT
Start: 2018-09-22 | End: 2018-09-22 | Stop reason: HOSPADM

## 2018-09-22 RX ORDER — LABETALOL HYDROCHLORIDE 5 MG/ML
5 INJECTION, SOLUTION INTRAVENOUS
Status: DISCONTINUED | OUTPATIENT
Start: 2018-09-22 | End: 2018-09-22 | Stop reason: HOSPADM

## 2018-09-22 RX ORDER — CLINDAMYCIN PHOSPHATE 900 MG/50ML
900 INJECTION INTRAVENOUS EVERY 8 HOURS
Status: DISCONTINUED | OUTPATIENT
Start: 2018-09-22 | End: 2018-09-24

## 2018-09-22 RX ADMIN — MONTELUKAST SODIUM 10 MG: 10 TABLET, COATED ORAL at 10:21

## 2018-09-22 RX ADMIN — CLINDAMYCIN IN 5 PERCENT DEXTROSE 900 MG: 18 INJECTION, SOLUTION INTRAVENOUS at 18:10

## 2018-09-22 RX ADMIN — SODIUM CHLORIDE, PRESERVATIVE FREE 10 ML: 5 INJECTION INTRAVENOUS at 11:23

## 2018-09-22 RX ADMIN — SODIUM CHLORIDE 100 ML/HR: 4.5 INJECTION, SOLUTION INTRAVENOUS at 22:03

## 2018-09-22 RX ADMIN — HYDROCODONE BITARTRATE AND ACETAMINOPHEN 1 TABLET: 7.5; 325 TABLET ORAL at 10:24

## 2018-09-22 RX ADMIN — HYDROCODONE BITARTRATE AND ACETAMINOPHEN 1 TABLET: 7.5; 325 TABLET ORAL at 03:10

## 2018-09-22 RX ADMIN — FENTANYL CITRATE 50 MCG: 50 INJECTION, SOLUTION INTRAMUSCULAR; INTRAVENOUS at 07:41

## 2018-09-22 RX ADMIN — HYDROCODONE BITARTRATE AND ACETAMINOPHEN 1 TABLET: 7.5; 325 TABLET ORAL at 14:15

## 2018-09-22 RX ADMIN — CETIRIZINE HYDROCHLORIDE 10 MG: 10 TABLET, FILM COATED ORAL at 10:23

## 2018-09-22 RX ADMIN — FENTANYL CITRATE 50 MCG: 50 INJECTION, SOLUTION INTRAMUSCULAR; INTRAVENOUS at 08:31

## 2018-09-22 RX ADMIN — LIDOCAINE HYDROCHLORIDE 50 MG: 20 INJECTION, SOLUTION INFILTRATION; PERINEURAL at 07:20

## 2018-09-22 RX ADMIN — RIFAMPIN 300 MG: 150 CAPSULE ORAL at 10:22

## 2018-09-22 RX ADMIN — INSULIN ASPART 20 UNITS: 100 INJECTION, SOLUTION INTRAVENOUS; SUBCUTANEOUS at 21:22

## 2018-09-22 RX ADMIN — INSULIN DETEMIR 16 UNITS: 100 INJECTION, SOLUTION SUBCUTANEOUS at 20:35

## 2018-09-22 RX ADMIN — HYDROCODONE BITARTRATE AND ACETAMINOPHEN 1 TABLET: 7.5; 325 TABLET ORAL at 23:21

## 2018-09-22 RX ADMIN — MEPERIDINE HYDROCHLORIDE 12.5 MG: 50 INJECTION INTRAMUSCULAR; INTRAVENOUS; SUBCUTANEOUS at 09:17

## 2018-09-22 RX ADMIN — HYDROMORPHONE HYDROCHLORIDE 0.5 MG: 1 INJECTION, SOLUTION INTRAMUSCULAR; INTRAVENOUS; SUBCUTANEOUS at 09:03

## 2018-09-22 RX ADMIN — MORPHINE SULFATE 3 MG: 2 INJECTION, SOLUTION INTRAMUSCULAR; INTRAVENOUS at 21:34

## 2018-09-22 RX ADMIN — MEPERIDINE HYDROCHLORIDE 12.5 MG: 50 INJECTION INTRAMUSCULAR; INTRAVENOUS; SUBCUTANEOUS at 09:05

## 2018-09-22 RX ADMIN — HEPARIN SODIUM 1200 UNITS/HR: 10000 INJECTION, SOLUTION INTRAVENOUS at 20:35

## 2018-09-22 RX ADMIN — DOCUSATE SODIUM 100 MG: 100 CAPSULE, LIQUID FILLED ORAL at 21:21

## 2018-09-22 RX ADMIN — FENTANYL CITRATE 50 MCG: 50 INJECTION, SOLUTION INTRAMUSCULAR; INTRAVENOUS at 07:15

## 2018-09-22 RX ADMIN — MIDAZOLAM HYDROCHLORIDE 2 MG: 2 INJECTION, SOLUTION INTRAMUSCULAR; INTRAVENOUS at 07:07

## 2018-09-22 RX ADMIN — LOSARTAN POTASSIUM 100 MG: 50 TABLET, FILM COATED ORAL at 10:23

## 2018-09-22 RX ADMIN — PROPOFOL 150 MG: 10 INJECTION, EMULSION INTRAVENOUS at 07:20

## 2018-09-22 RX ADMIN — MEPERIDINE HYDROCHLORIDE 12.5 MG: 50 INJECTION INTRAMUSCULAR; INTRAVENOUS; SUBCUTANEOUS at 09:27

## 2018-09-22 RX ADMIN — HYDROCODONE BITARTRATE AND ACETAMINOPHEN 1 TABLET: 7.5; 325 TABLET ORAL at 18:56

## 2018-09-22 RX ADMIN — FERROUS SULFATE TAB EC 324 MG (65 MG FE EQUIVALENT) 324 MG: 324 (65 FE) TABLET DELAYED RESPONSE at 10:22

## 2018-09-22 RX ADMIN — VANCOMYCIN HYDROCHLORIDE 1000 MG: 1 INJECTION, POWDER, LYOPHILIZED, FOR SOLUTION INTRAVENOUS at 06:16

## 2018-09-22 RX ADMIN — INSULIN ASPART 20 UNITS: 100 INJECTION, SOLUTION INTRAVENOUS; SUBCUTANEOUS at 10:24

## 2018-09-22 RX ADMIN — FENTANYL CITRATE 50 MCG: 50 INJECTION, SOLUTION INTRAMUSCULAR; INTRAVENOUS at 07:12

## 2018-09-22 RX ADMIN — INSULIN ASPART 8 UNITS: 100 INJECTION, SOLUTION INTRAVENOUS; SUBCUTANEOUS at 18:10

## 2018-09-22 RX ADMIN — SODIUM CHLORIDE 100 ML/HR: 4.5 INJECTION, SOLUTION INTRAVENOUS at 11:22

## 2018-09-22 RX ADMIN — INSULIN ASPART 20 UNITS: 100 INJECTION, SOLUTION INTRAVENOUS; SUBCUTANEOUS at 12:09

## 2018-09-22 RX ADMIN — ATORVASTATIN CALCIUM 40 MG: 40 TABLET, FILM COATED ORAL at 10:18

## 2018-09-22 RX ADMIN — SODIUM CHLORIDE, PRESERVATIVE FREE 10 ML: 5 INJECTION INTRAVENOUS at 11:22

## 2018-09-22 RX ADMIN — MEPERIDINE HYDROCHLORIDE 12.5 MG: 50 INJECTION INTRAMUSCULAR; INTRAVENOUS; SUBCUTANEOUS at 09:00

## 2018-09-22 RX ADMIN — DOCUSATE SODIUM 100 MG: 100 CAPSULE, LIQUID FILLED ORAL at 10:21

## 2018-09-22 RX ADMIN — FENTANYL CITRATE 50 MCG: 50 INJECTION, SOLUTION INTRAMUSCULAR; INTRAVENOUS at 08:10

## 2018-09-22 RX ADMIN — CLINDAMYCIN IN 5 PERCENT DEXTROSE 900 MG: 18 INJECTION, SOLUTION INTRAVENOUS at 08:15

## 2018-09-22 RX ADMIN — FENTANYL CITRATE 50 MCG: 50 INJECTION, SOLUTION INTRAMUSCULAR; INTRAVENOUS at 07:29

## 2018-09-22 RX ADMIN — SODIUM CHLORIDE 100 ML/HR: 4.5 INJECTION, SOLUTION INTRAVENOUS at 04:25

## 2018-09-22 RX ADMIN — RIFAMPIN 300 MG: 150 CAPSULE ORAL at 20:34

## 2018-09-22 NOTE — ANESTHESIA POSTPROCEDURE EVALUATION
Patient: Meeta Marmolejo    Procedure Summary     Date:  09/22/18 Room / Location:  [facility] OR  / [facility] OR    Anesthesia Start:  0709 Anesthesia Stop:  0856    Procedure:  KNEE INCISION AND DRAINAGE (Right Knee) Diagnosis:       Staphylococcal arthritis of right knee (CMS/HCC)      (Staphylococcal arthritis of right knee (CMS/HCC) [M00.061])    Surgeon:  Elias Su MD Provider:  Dimas Smalls MD    Anesthesia Type:  general ASA Status:  3          Anesthesia Type: general  Last vitals  BP   136/65 (09/22/18 0300)   Temp   98.2 °F (36.8 °C) (09/22/18 0300)   Pulse   92 (09/22/18 0300)   Resp   18 (09/22/18 0300)     SpO2   96 % (09/22/18 0300)     Post Anesthesia Care and Evaluation    Patient location during evaluation: PACU  Patient participation: complete - patient participated  Level of consciousness: awake  Pain score: 0  Pain management: adequate  Airway patency: patent  Anesthetic complications: No anesthetic complications  PONV Status: none  Cardiovascular status: acceptable  Respiratory status: acceptable  Hydration status: acceptable

## 2018-09-22 NOTE — ANESTHESIA PROCEDURE NOTES
Airway  Urgency: elective    Airway not difficult    General Information and Staff    Patient location during procedure: OR  CRNA: [facility]    Indications and Patient Condition  Indications for airway management: airway protection    Preoxygenated: yes  Mask difficulty assessment: 1 - vent by mask    Final Airway Details  Final airway type: supraglottic airway      Successful airway: I-gel  Size 4    Number of attempts at approach: 1

## 2018-09-23 LAB
ANION GAP SERPL CALCULATED.3IONS-SCNC: 8 MMOL/L (ref 5–15)
APTT PPP: 48.9 SECONDS (ref 20–40.3)
APTT PPP: 68.8 SECONDS (ref 20–40.3)
APTT PPP: 73.6 SECONDS (ref 20–40.3)
BUN BLD-MCNC: 15 MG/DL (ref 7–21)
BUN/CREAT SERPL: 24.2 (ref 7–25)
CALCIUM SPEC-SCNC: 8.1 MG/DL (ref 8.4–10.2)
CHLORIDE SERPL-SCNC: 102 MMOL/L (ref 95–110)
CO2 SERPL-SCNC: 23 MMOL/L (ref 22–31)
CREAT BLD-MCNC: 0.62 MG/DL (ref 0.5–1)
DEPRECATED RDW RBC AUTO: 45.7 FL (ref 36.4–46.3)
ERYTHROCYTE [DISTWIDTH] IN BLOOD BY AUTOMATED COUNT: 15.5 % (ref 11.5–14.5)
GFR SERPL CREATININE-BSD FRML MDRD: 119 ML/MIN/1.73 (ref 45–104)
GLUCOSE BLD-MCNC: 222 MG/DL (ref 60–100)
GLUCOSE BLDC GLUCOMTR-MCNC: 274 MG/DL (ref 70–130)
GLUCOSE BLDC GLUCOMTR-MCNC: 279 MG/DL (ref 70–130)
GLUCOSE BLDC GLUCOMTR-MCNC: 319 MG/DL (ref 70–130)
GLUCOSE BLDC GLUCOMTR-MCNC: 384 MG/DL (ref 70–130)
GLUCOSE BLDC GLUCOMTR-MCNC: 406 MG/DL (ref 70–130)
GLUCOSE BLDC GLUCOMTR-MCNC: 421 MG/DL (ref 70–130)
HCT VFR BLD AUTO: 21.7 % (ref 35–45)
HCT VFR BLD AUTO: 22 % (ref 35–45)
HGB BLD-MCNC: 7.3 G/DL (ref 12–15.5)
HGB BLD-MCNC: 7.3 G/DL (ref 12–15.5)
INR PPP: 1.1 (ref 0.8–1.2)
MCH RBC QN AUTO: 27.7 PG (ref 26.5–34)
MCHC RBC AUTO-ENTMCNC: 33.6 G/DL (ref 31.4–36)
MCV RBC AUTO: 82.2 FL (ref 80–98)
PLATELET # BLD AUTO: 335 10*3/MM3 (ref 150–450)
PMV BLD AUTO: 8.6 FL (ref 8–12)
POTASSIUM BLD-SCNC: 4.5 MMOL/L (ref 3.5–5.1)
PROTHROMBIN TIME: 14 SECONDS (ref 11.1–15.3)
RBC # BLD AUTO: 2.64 10*6/MM3 (ref 3.77–5.16)
SODIUM BLD-SCNC: 133 MMOL/L (ref 137–145)
WBC NRBC COR # BLD: 10.78 10*3/MM3 (ref 3.2–9.8)

## 2018-09-23 PROCEDURE — 85018 HEMOGLOBIN: CPT | Performed by: FAMILY MEDICINE

## 2018-09-23 PROCEDURE — 82962 GLUCOSE BLOOD TEST: CPT

## 2018-09-23 PROCEDURE — 63710000001 INSULIN ASPART PER 5 UNITS: Performed by: FAMILY MEDICINE

## 2018-09-23 PROCEDURE — 25010000002 MORPHINE PER 10 MG: Performed by: FAMILY MEDICINE

## 2018-09-23 PROCEDURE — 99233 SBSQ HOSP IP/OBS HIGH 50: CPT | Performed by: INTERNAL MEDICINE

## 2018-09-23 PROCEDURE — 85014 HEMATOCRIT: CPT | Performed by: FAMILY MEDICINE

## 2018-09-23 PROCEDURE — 25010000002 HEPARIN (PORCINE) PER 1000 UNITS: Performed by: ORTHOPAEDIC SURGERY

## 2018-09-23 PROCEDURE — 80048 BASIC METABOLIC PNL TOTAL CA: CPT | Performed by: ORTHOPAEDIC SURGERY

## 2018-09-23 PROCEDURE — 85730 THROMBOPLASTIN TIME PARTIAL: CPT | Performed by: FAMILY MEDICINE

## 2018-09-23 PROCEDURE — 85027 COMPLETE CBC AUTOMATED: CPT | Performed by: ORTHOPAEDIC SURGERY

## 2018-09-23 PROCEDURE — 85610 PROTHROMBIN TIME: CPT | Performed by: ORTHOPAEDIC SURGERY

## 2018-09-23 PROCEDURE — 63710000001 INSULIN DETEMIR PER 5 UNITS: Performed by: FAMILY MEDICINE

## 2018-09-23 PROCEDURE — 63710000001 INSULIN REGULAR HUMAN PER 5 UNITS: Performed by: FAMILY MEDICINE

## 2018-09-23 RX ORDER — WARFARIN SODIUM 5 MG/1
5 TABLET ORAL
Status: DISCONTINUED | OUTPATIENT
Start: 2018-09-23 | End: 2018-09-23 | Stop reason: DRUGHIGH

## 2018-09-23 RX ADMIN — DOCUSATE SODIUM 100 MG: 100 CAPSULE, LIQUID FILLED ORAL at 20:42

## 2018-09-23 RX ADMIN — INSULIN ASPART 16 UNITS: 100 INJECTION, SOLUTION INTRAVENOUS; SUBCUTANEOUS at 17:54

## 2018-09-23 RX ADMIN — HYDROCODONE BITARTRATE AND ACETAMINOPHEN 1 TABLET: 7.5; 325 TABLET ORAL at 20:42

## 2018-09-23 RX ADMIN — MORPHINE SULFATE 3 MG: 2 INJECTION, SOLUTION INTRAMUSCULAR; INTRAVENOUS at 02:22

## 2018-09-23 RX ADMIN — HYDROCODONE BITARTRATE AND ACETAMINOPHEN 1 TABLET: 7.5; 325 TABLET ORAL at 12:57

## 2018-09-23 RX ADMIN — HUMAN INSULIN 5 UNITS: 100 INJECTION, SOLUTION SUBCUTANEOUS at 12:52

## 2018-09-23 RX ADMIN — DOCUSATE SODIUM 100 MG: 100 CAPSULE, LIQUID FILLED ORAL at 08:23

## 2018-09-23 RX ADMIN — LOSARTAN POTASSIUM 100 MG: 50 TABLET, FILM COATED ORAL at 08:23

## 2018-09-23 RX ADMIN — SODIUM CHLORIDE 100 ML/HR: 4.5 INJECTION, SOLUTION INTRAVENOUS at 10:58

## 2018-09-23 RX ADMIN — FERROUS SULFATE TAB EC 324 MG (65 MG FE EQUIVALENT) 324 MG: 324 (65 FE) TABLET DELAYED RESPONSE at 08:23

## 2018-09-23 RX ADMIN — CLINDAMYCIN IN 5 PERCENT DEXTROSE 900 MG: 18 INJECTION, SOLUTION INTRAVENOUS at 16:10

## 2018-09-23 RX ADMIN — INSULIN ASPART 12 UNITS: 100 INJECTION, SOLUTION INTRAVENOUS; SUBCUTANEOUS at 08:23

## 2018-09-23 RX ADMIN — CETIRIZINE HYDROCHLORIDE 10 MG: 10 TABLET, FILM COATED ORAL at 08:22

## 2018-09-23 RX ADMIN — SODIUM CHLORIDE 100 ML/HR: 4.5 INJECTION, SOLUTION INTRAVENOUS at 22:48

## 2018-09-23 RX ADMIN — RIFAMPIN 300 MG: 150 CAPSULE ORAL at 20:41

## 2018-09-23 RX ADMIN — Medication 10 ML: at 08:23

## 2018-09-23 RX ADMIN — HYDROCODONE BITARTRATE AND ACETAMINOPHEN 1 TABLET: 7.5; 325 TABLET ORAL at 03:54

## 2018-09-23 RX ADMIN — HEPARIN SODIUM 2000 UNITS: 5000 INJECTION, SOLUTION INTRAVENOUS; SUBCUTANEOUS at 03:53

## 2018-09-23 RX ADMIN — ATORVASTATIN CALCIUM 40 MG: 40 TABLET, FILM COATED ORAL at 08:22

## 2018-09-23 RX ADMIN — CLINDAMYCIN IN 5 PERCENT DEXTROSE 900 MG: 18 INJECTION, SOLUTION INTRAVENOUS at 00:54

## 2018-09-23 RX ADMIN — MONTELUKAST SODIUM 10 MG: 10 TABLET, COATED ORAL at 08:23

## 2018-09-23 RX ADMIN — CLINDAMYCIN IN 5 PERCENT DEXTROSE 900 MG: 18 INJECTION, SOLUTION INTRAVENOUS at 08:23

## 2018-09-23 RX ADMIN — INSULIN ASPART 24 UNITS: 100 INJECTION, SOLUTION INTRAVENOUS; SUBCUTANEOUS at 11:28

## 2018-09-23 RX ADMIN — HYDROCODONE BITARTRATE AND ACETAMINOPHEN 1 TABLET: 7.5; 325 TABLET ORAL at 16:10

## 2018-09-23 RX ADMIN — INSULIN DETEMIR 20 UNITS: 100 INJECTION, SOLUTION SUBCUTANEOUS at 21:57

## 2018-09-23 RX ADMIN — Medication 10 ML: at 08:25

## 2018-09-23 RX ADMIN — HYDROCODONE BITARTRATE AND ACETAMINOPHEN 1 TABLET: 7.5; 325 TABLET ORAL at 08:23

## 2018-09-23 RX ADMIN — INSULIN ASPART 20 UNITS: 100 INJECTION, SOLUTION INTRAVENOUS; SUBCUTANEOUS at 21:57

## 2018-09-23 RX ADMIN — RIFAMPIN 300 MG: 150 CAPSULE ORAL at 08:23

## 2018-09-23 RX ADMIN — HEPARIN SODIUM 1300 UNITS/HR: 10000 INJECTION, SOLUTION INTRAVENOUS at 15:31

## 2018-09-24 LAB
ABO + RH BLD: NORMAL
ANION GAP SERPL CALCULATED.3IONS-SCNC: 9 MMOL/L (ref 5–15)
APTT PPP: 69.2 SECONDS (ref 20–40.3)
APTT PPP: 72.4 SECONDS (ref 20–40.3)
ATMOSPHERIC PRESS: ABNORMAL MMHG
BASE EXCESS BLDV CALC-SCNC: 1.3 MMOL/L (ref 0–2)
BDY SITE: ABNORMAL
BH BB BLOOD EXPIRATION DATE: NORMAL
BH BB BLOOD TYPE BARCODE: 5100
BH BB DISPENSE STATUS: NORMAL
BH BB PRODUCT CODE: NORMAL
BH BB UNIT NUMBER: NORMAL
BUN BLD-MCNC: 9 MG/DL (ref 7–21)
BUN/CREAT SERPL: 18.8 (ref 7–25)
CALCIUM SPEC-SCNC: 9 MG/DL (ref 8.4–10.2)
CHLORIDE SERPL-SCNC: 99 MMOL/L (ref 95–110)
CO2 SERPL-SCNC: 26 MMOL/L (ref 22–31)
CREAT BLD-MCNC: 0.48 MG/DL (ref 0.5–1)
DEPRECATED RDW RBC AUTO: 46.5 FL (ref 36.4–46.3)
ERYTHROCYTE [DISTWIDTH] IN BLOOD BY AUTOMATED COUNT: 16.1 % (ref 11.5–14.5)
GFR SERPL CREATININE-BSD FRML MDRD: 159 ML/MIN/1.73 (ref 45–104)
GLUCOSE BLD-MCNC: 259 MG/DL (ref 60–100)
GLUCOSE BLDC GLUCOMTR-MCNC: 162 MG/DL (ref 70–130)
GLUCOSE BLDC GLUCOMTR-MCNC: 268 MG/DL (ref 70–130)
GLUCOSE BLDC GLUCOMTR-MCNC: 316 MG/DL (ref 70–130)
GLUCOSE BLDC GLUCOMTR-MCNC: 333 MG/DL (ref 70–130)
GLUCOSE BLDC GLUCOMTR-MCNC: 404 MG/DL (ref 70–130)
HCO3 BLDV-SCNC: 25.9 MMOL/L (ref 18–23)
HCT VFR BLD AUTO: 22.5 % (ref 35–45)
HGB BLD-MCNC: 7.4 G/DL (ref 12–15.5)
INR PPP: 1.12 (ref 0.8–1.2)
MCH RBC QN AUTO: 27 PG (ref 26.5–34)
MCHC RBC AUTO-ENTMCNC: 32.9 G/DL (ref 31.4–36)
MCV RBC AUTO: 82.1 FL (ref 80–98)
MODALITY: ABNORMAL
PCO2 BLDV: 40.1 MM HG (ref 41–51)
PH BLDV: 7.42 PH UNITS (ref 7.29–7.37)
PLATELET # BLD AUTO: 415 10*3/MM3 (ref 150–450)
PMV BLD AUTO: 8.9 FL (ref 8–12)
PO2 BLDV: 34.2 MM HG (ref 27–53)
POTASSIUM BLD-SCNC: 4.2 MMOL/L (ref 3.5–5.1)
PROTHROMBIN TIME: 14.2 SECONDS (ref 11.1–15.3)
RBC # BLD AUTO: 2.74 10*6/MM3 (ref 3.77–5.16)
SAO2 % BLDCOV: 62.6 % (ref 45–75)
SODIUM BLD-SCNC: 134 MMOL/L (ref 137–145)
UNIT  ABO: NORMAL
UNIT  RH: NORMAL
WBC NRBC COR # BLD: 12.53 10*3/MM3 (ref 3.2–9.8)

## 2018-09-24 PROCEDURE — 80048 BASIC METABOLIC PNL TOTAL CA: CPT | Performed by: FAMILY MEDICINE

## 2018-09-24 PROCEDURE — 97116 GAIT TRAINING THERAPY: CPT

## 2018-09-24 PROCEDURE — G8979 MOBILITY GOAL STATUS: HCPCS

## 2018-09-24 PROCEDURE — 97162 PT EVAL MOD COMPLEX 30 MIN: CPT

## 2018-09-24 PROCEDURE — 25010000002 MORPHINE PER 10 MG: Performed by: FAMILY MEDICINE

## 2018-09-24 PROCEDURE — 63710000001 INSULIN ASPART PER 5 UNITS: Performed by: FAMILY MEDICINE

## 2018-09-24 PROCEDURE — 82962 GLUCOSE BLOOD TEST: CPT

## 2018-09-24 PROCEDURE — 99233 SBSQ HOSP IP/OBS HIGH 50: CPT | Performed by: INTERNAL MEDICINE

## 2018-09-24 PROCEDURE — 85730 THROMBOPLASTIN TIME PARTIAL: CPT | Performed by: FAMILY MEDICINE

## 2018-09-24 PROCEDURE — 97530 THERAPEUTIC ACTIVITIES: CPT

## 2018-09-24 PROCEDURE — 25010000002 HEPARIN (PORCINE) PER 1000 UNITS: Performed by: ORTHOPAEDIC SURGERY

## 2018-09-24 PROCEDURE — 99024 POSTOP FOLLOW-UP VISIT: CPT | Performed by: ORTHOPAEDIC SURGERY

## 2018-09-24 PROCEDURE — 63710000001 INSULIN DETEMIR PER 5 UNITS: Performed by: FAMILY MEDICINE

## 2018-09-24 PROCEDURE — 85610 PROTHROMBIN TIME: CPT | Performed by: ORTHOPAEDIC SURGERY

## 2018-09-24 PROCEDURE — G8978 MOBILITY CURRENT STATUS: HCPCS

## 2018-09-24 PROCEDURE — 85027 COMPLETE CBC AUTOMATED: CPT | Performed by: FAMILY MEDICINE

## 2018-09-24 PROCEDURE — 25010000002 CEFAZOLIN PER 500 MG: Performed by: FAMILY MEDICINE

## 2018-09-24 RX ORDER — CEFAZOLIN SODIUM 1 G/3ML
2 INJECTION, POWDER, FOR SOLUTION INTRAMUSCULAR; INTRAVENOUS EVERY 8 HOURS
Status: DISCONTINUED | OUTPATIENT
Start: 2018-09-24 | End: 2018-09-24 | Stop reason: SDUPTHER

## 2018-09-24 RX ORDER — BUPIVACAINE HCL/0.9 % NACL/PF 0.1 %
2 PLASTIC BAG, INJECTION (ML) EPIDURAL EVERY 8 HOURS
Status: DISCONTINUED | OUTPATIENT
Start: 2018-09-24 | End: 2018-09-26 | Stop reason: HOSPADM

## 2018-09-24 RX ORDER — WARFARIN SODIUM 5 MG/1
5 TABLET ORAL
Status: DISCONTINUED | OUTPATIENT
Start: 2018-09-24 | End: 2018-09-26

## 2018-09-24 RX ADMIN — HYDROCODONE BITARTRATE AND ACETAMINOPHEN 1 TABLET: 7.5; 325 TABLET ORAL at 00:39

## 2018-09-24 RX ADMIN — HYDROCODONE BITARTRATE AND ACETAMINOPHEN 1 TABLET: 7.5; 325 TABLET ORAL at 04:02

## 2018-09-24 RX ADMIN — MORPHINE SULFATE 3 MG: 2 INJECTION, SOLUTION INTRAMUSCULAR; INTRAVENOUS at 08:49

## 2018-09-24 RX ADMIN — HEPARIN SODIUM 1300 UNITS/HR: 10000 INJECTION, SOLUTION INTRAVENOUS at 09:50

## 2018-09-24 RX ADMIN — WARFARIN SODIUM 5 MG: 5 TABLET ORAL at 17:26

## 2018-09-24 RX ADMIN — CETIRIZINE HYDROCHLORIDE 10 MG: 10 TABLET, FILM COATED ORAL at 08:48

## 2018-09-24 RX ADMIN — INSULIN ASPART 12 UNITS: 100 INJECTION, SOLUTION INTRAVENOUS; SUBCUTANEOUS at 08:50

## 2018-09-24 RX ADMIN — INSULIN ASPART 16 UNITS: 100 INJECTION, SOLUTION INTRAVENOUS; SUBCUTANEOUS at 20:24

## 2018-09-24 RX ADMIN — HYDROCODONE BITARTRATE AND ACETAMINOPHEN 1 TABLET: 7.5; 325 TABLET ORAL at 20:24

## 2018-09-24 RX ADMIN — LOSARTAN POTASSIUM 100 MG: 50 TABLET, FILM COATED ORAL at 08:48

## 2018-09-24 RX ADMIN — INSULIN ASPART 16 UNITS: 100 INJECTION, SOLUTION INTRAVENOUS; SUBCUTANEOUS at 11:32

## 2018-09-24 RX ADMIN — INSULIN ASPART 4 UNITS: 100 INJECTION, SOLUTION INTRAVENOUS; SUBCUTANEOUS at 17:27

## 2018-09-24 RX ADMIN — DOCUSATE SODIUM 100 MG: 100 CAPSULE, LIQUID FILLED ORAL at 08:48

## 2018-09-24 RX ADMIN — DOCUSATE SODIUM 100 MG: 100 CAPSULE, LIQUID FILLED ORAL at 20:24

## 2018-09-24 RX ADMIN — FERROUS SULFATE TAB EC 324 MG (65 MG FE EQUIVALENT) 324 MG: 324 (65 FE) TABLET DELAYED RESPONSE at 08:48

## 2018-09-24 RX ADMIN — SODIUM CHLORIDE 100 ML/HR: 4.5 INJECTION, SOLUTION INTRAVENOUS at 11:07

## 2018-09-24 RX ADMIN — ATORVASTATIN CALCIUM 40 MG: 40 TABLET, FILM COATED ORAL at 08:48

## 2018-09-24 RX ADMIN — CLINDAMYCIN IN 5 PERCENT DEXTROSE 900 MG: 18 INJECTION, SOLUTION INTRAVENOUS at 09:50

## 2018-09-24 RX ADMIN — CEFAZOLIN SODIUM 2 G: 10 INJECTION, POWDER, FOR SOLUTION INTRAVENOUS at 17:27

## 2018-09-24 RX ADMIN — SODIUM CHLORIDE 100 ML/HR: 4.5 INJECTION, SOLUTION INTRAVENOUS at 20:26

## 2018-09-24 RX ADMIN — CLINDAMYCIN IN 5 PERCENT DEXTROSE 900 MG: 18 INJECTION, SOLUTION INTRAVENOUS at 00:01

## 2018-09-24 RX ADMIN — MONTELUKAST SODIUM 10 MG: 10 TABLET, COATED ORAL at 08:48

## 2018-09-24 RX ADMIN — INSULIN DETEMIR 20 UNITS: 100 INJECTION, SOLUTION SUBCUTANEOUS at 20:23

## 2018-09-25 LAB
ANION GAP SERPL CALCULATED.3IONS-SCNC: 12 MMOL/L (ref 5–15)
APTT PPP: 38.6 SECONDS (ref 20–40.3)
APTT PPP: 61.2 SECONDS (ref 20–40.3)
BACTERIA SPEC AEROBE CULT: ABNORMAL
BACTERIA SPEC AEROBE CULT: NORMAL
BUN BLD-MCNC: 9 MG/DL (ref 7–21)
BUN/CREAT SERPL: 18 (ref 7–25)
CALCIUM SPEC-SCNC: 8.2 MG/DL (ref 8.4–10.2)
CHLORIDE SERPL-SCNC: 99 MMOL/L (ref 95–110)
CO2 SERPL-SCNC: 25 MMOL/L (ref 22–31)
CREAT BLD-MCNC: 0.5 MG/DL (ref 0.5–1)
CRP SERPL-MCNC: 1.6 MG/DL (ref 0–1)
DEPRECATED RDW RBC AUTO: 49.1 FL (ref 36.4–46.3)
ERYTHROCYTE [DISTWIDTH] IN BLOOD BY AUTOMATED COUNT: 16.7 % (ref 11.5–14.5)
ERYTHROCYTE [SEDIMENTATION RATE] IN BLOOD: 33 MM/HR (ref 0–20)
GFR SERPL CREATININE-BSD FRML MDRD: 152 ML/MIN/1.73 (ref 45–104)
GLUCOSE BLD-MCNC: 228 MG/DL (ref 60–100)
GLUCOSE BLDC GLUCOMTR-MCNC: 123 MG/DL (ref 70–130)
GLUCOSE BLDC GLUCOMTR-MCNC: 237 MG/DL (ref 70–130)
GLUCOSE BLDC GLUCOMTR-MCNC: 241 MG/DL (ref 70–130)
GLUCOSE BLDC GLUCOMTR-MCNC: 367 MG/DL (ref 70–130)
GRAM STN SPEC: ABNORMAL
GRAM STN SPEC: ABNORMAL
HCT VFR BLD AUTO: 21.7 % (ref 35–45)
HGB BLD-MCNC: 7.2 G/DL (ref 12–15.5)
INR PPP: 1.07 (ref 0.8–1.2)
MCH RBC QN AUTO: 27.8 PG (ref 26.5–34)
MCHC RBC AUTO-ENTMCNC: 33.2 G/DL (ref 31.4–36)
MCV RBC AUTO: 83.8 FL (ref 80–98)
PLATELET # BLD AUTO: 447 10*3/MM3 (ref 150–450)
PMV BLD AUTO: 8.9 FL (ref 8–12)
POTASSIUM BLD-SCNC: 4.2 MMOL/L (ref 3.5–5.1)
PROTHROMBIN TIME: 13.7 SECONDS (ref 11.1–15.3)
RBC # BLD AUTO: 2.59 10*6/MM3 (ref 3.77–5.16)
SODIUM BLD-SCNC: 136 MMOL/L (ref 137–145)
WBC NRBC COR # BLD: 10.59 10*3/MM3 (ref 3.2–9.8)

## 2018-09-25 PROCEDURE — 85027 COMPLETE CBC AUTOMATED: CPT | Performed by: FAMILY MEDICINE

## 2018-09-25 PROCEDURE — 97110 THERAPEUTIC EXERCISES: CPT

## 2018-09-25 PROCEDURE — 63710000001 INSULIN ASPART PER 5 UNITS: Performed by: FAMILY MEDICINE

## 2018-09-25 PROCEDURE — 85730 THROMBOPLASTIN TIME PARTIAL: CPT | Performed by: FAMILY MEDICINE

## 2018-09-25 PROCEDURE — 85651 RBC SED RATE NONAUTOMATED: CPT | Performed by: ORTHOPAEDIC SURGERY

## 2018-09-25 PROCEDURE — 99233 SBSQ HOSP IP/OBS HIGH 50: CPT | Performed by: INTERNAL MEDICINE

## 2018-09-25 PROCEDURE — 63710000001 INSULIN DETEMIR PER 5 UNITS: Performed by: INTERNAL MEDICINE

## 2018-09-25 PROCEDURE — 25010000002 HEPARIN (PORCINE) PER 1000 UNITS: Performed by: ORTHOPAEDIC SURGERY

## 2018-09-25 PROCEDURE — 97535 SELF CARE MNGMENT TRAINING: CPT

## 2018-09-25 PROCEDURE — 86140 C-REACTIVE PROTEIN: CPT | Performed by: ORTHOPAEDIC SURGERY

## 2018-09-25 PROCEDURE — 97530 THERAPEUTIC ACTIVITIES: CPT

## 2018-09-25 PROCEDURE — 82962 GLUCOSE BLOOD TEST: CPT

## 2018-09-25 PROCEDURE — 80048 BASIC METABOLIC PNL TOTAL CA: CPT | Performed by: FAMILY MEDICINE

## 2018-09-25 PROCEDURE — 97116 GAIT TRAINING THERAPY: CPT

## 2018-09-25 PROCEDURE — 25010000002 ENOXAPARIN PER 10 MG: Performed by: INTERNAL MEDICINE

## 2018-09-25 PROCEDURE — 85610 PROTHROMBIN TIME: CPT | Performed by: ORTHOPAEDIC SURGERY

## 2018-09-25 PROCEDURE — 25010000002 CEFAZOLIN PER 500 MG: Performed by: FAMILY MEDICINE

## 2018-09-25 PROCEDURE — 85730 THROMBOPLASTIN TIME PARTIAL: CPT | Performed by: INTERNAL MEDICINE

## 2018-09-25 RX ORDER — RIFAMPIN 300 MG/1
300 CAPSULE ORAL EVERY 12 HOURS SCHEDULED
Status: DISCONTINUED | OUTPATIENT
Start: 2018-09-25 | End: 2018-09-26 | Stop reason: HOSPADM

## 2018-09-25 RX ORDER — BUPIVACAINE HCL/0.9 % NACL/PF 0.1 %
2 PLASTIC BAG, INJECTION (ML) EPIDURAL EVERY 8 HOURS SCHEDULED
Qty: 12600 ML | Refills: 0 | Status: SHIPPED | OUTPATIENT
Start: 2018-09-25 | End: 2018-10-01

## 2018-09-25 RX ORDER — HYDROCODONE BITARTRATE AND ACETAMINOPHEN 7.5; 325 MG/1; MG/1
1 TABLET ORAL EVERY 6 HOURS PRN
Qty: 12 TABLET | Refills: 0 | Status: SHIPPED | OUTPATIENT
Start: 2018-09-25 | End: 2018-09-28

## 2018-09-25 RX ORDER — WARFARIN SODIUM 5 MG/1
5 TABLET ORAL NIGHTLY
Qty: 90 TABLET | Refills: 0 | Status: SHIPPED | OUTPATIENT
Start: 2018-09-25 | End: 2018-09-26

## 2018-09-25 RX ORDER — SODIUM CHLORIDE 0.9 % (FLUSH) 0.9 %
10 SYRINGE (ML) INJECTION AS NEEDED
Qty: 10000 ML | Refills: 0 | Status: SHIPPED | OUTPATIENT
Start: 2018-09-25 | End: 2018-10-01

## 2018-09-25 RX ADMIN — CETIRIZINE HYDROCHLORIDE 10 MG: 10 TABLET, FILM COATED ORAL at 08:44

## 2018-09-25 RX ADMIN — HYDROCODONE BITARTRATE AND ACETAMINOPHEN 1 TABLET: 7.5; 325 TABLET ORAL at 08:44

## 2018-09-25 RX ADMIN — RIFAMPIN 300 MG: 300 CAPSULE ORAL at 20:54

## 2018-09-25 RX ADMIN — DOCUSATE SODIUM 100 MG: 100 CAPSULE, LIQUID FILLED ORAL at 20:54

## 2018-09-25 RX ADMIN — INSULIN ASPART 8 UNITS: 100 INJECTION, SOLUTION INTRAVENOUS; SUBCUTANEOUS at 11:24

## 2018-09-25 RX ADMIN — CEFAZOLIN SODIUM 2 G: 10 INJECTION, POWDER, FOR SOLUTION INTRAVENOUS at 08:45

## 2018-09-25 RX ADMIN — ENOXAPARIN SODIUM 140 MG: 150 INJECTION SUBCUTANEOUS at 11:24

## 2018-09-25 RX ADMIN — HEPARIN SODIUM 1300 UNITS/HR: 10000 INJECTION, SOLUTION INTRAVENOUS at 03:25

## 2018-09-25 RX ADMIN — MONTELUKAST SODIUM 10 MG: 10 TABLET, COATED ORAL at 08:44

## 2018-09-25 RX ADMIN — HYDROCODONE BITARTRATE AND ACETAMINOPHEN 1 TABLET: 7.5; 325 TABLET ORAL at 00:37

## 2018-09-25 RX ADMIN — WARFARIN SODIUM 5 MG: 5 TABLET ORAL at 17:01

## 2018-09-25 RX ADMIN — ATORVASTATIN CALCIUM 40 MG: 40 TABLET, FILM COATED ORAL at 08:44

## 2018-09-25 RX ADMIN — HYDROCODONE BITARTRATE AND ACETAMINOPHEN 1 TABLET: 7.5; 325 TABLET ORAL at 17:06

## 2018-09-25 RX ADMIN — INSULIN ASPART 20 UNITS: 100 INJECTION, SOLUTION INTRAVENOUS; SUBCUTANEOUS at 20:54

## 2018-09-25 RX ADMIN — CEFAZOLIN SODIUM 2 G: 10 INJECTION, POWDER, FOR SOLUTION INTRAVENOUS at 00:38

## 2018-09-25 RX ADMIN — INSULIN ASPART 8 UNITS: 100 INJECTION, SOLUTION INTRAVENOUS; SUBCUTANEOUS at 08:45

## 2018-09-25 RX ADMIN — CEFAZOLIN SODIUM 2 G: 10 INJECTION, POWDER, FOR SOLUTION INTRAVENOUS at 16:58

## 2018-09-25 RX ADMIN — INSULIN DETEMIR 25 UNITS: 100 INJECTION, SOLUTION SUBCUTANEOUS at 21:38

## 2018-09-25 RX ADMIN — HYDROCODONE BITARTRATE AND ACETAMINOPHEN 1 TABLET: 7.5; 325 TABLET ORAL at 21:05

## 2018-09-25 RX ADMIN — LOSARTAN POTASSIUM 100 MG: 50 TABLET, FILM COATED ORAL at 08:44

## 2018-09-25 RX ADMIN — FERROUS SULFATE TAB EC 324 MG (65 MG FE EQUIVALENT) 324 MG: 324 (65 FE) TABLET DELAYED RESPONSE at 08:44

## 2018-09-26 ENCOUNTER — APPOINTMENT (OUTPATIENT)
Dept: PHYSICAL THERAPY | Facility: HOSPITAL | Age: 63
End: 2018-09-26
Attending: ORTHOPAEDIC SURGERY

## 2018-09-26 ENCOUNTER — TELEPHONE (OUTPATIENT)
Dept: ORTHOPEDIC SURGERY | Facility: CLINIC | Age: 63
End: 2018-09-26

## 2018-09-26 VITALS
WEIGHT: 196.8 LBS | SYSTOLIC BLOOD PRESSURE: 142 MMHG | BODY MASS INDEX: 36.22 KG/M2 | TEMPERATURE: 98.7 F | OXYGEN SATURATION: 97 % | DIASTOLIC BLOOD PRESSURE: 65 MMHG | HEIGHT: 62 IN | RESPIRATION RATE: 18 BRPM | HEART RATE: 97 BPM

## 2018-09-26 LAB
ANION GAP SERPL CALCULATED.3IONS-SCNC: 10 MMOL/L (ref 5–15)
APTT PPP: 27.9 SECONDS (ref 20–40.3)
BUN BLD-MCNC: 9 MG/DL (ref 7–21)
BUN/CREAT SERPL: 16.4 (ref 7–25)
CALCIUM SPEC-SCNC: 9.1 MG/DL (ref 8.4–10.2)
CHLORIDE SERPL-SCNC: 100 MMOL/L (ref 95–110)
CO2 SERPL-SCNC: 26 MMOL/L (ref 22–31)
CREAT BLD-MCNC: 0.55 MG/DL (ref 0.5–1)
DEPRECATED RDW RBC AUTO: 53.5 FL (ref 36.4–46.3)
ERYTHROCYTE [DISTWIDTH] IN BLOOD BY AUTOMATED COUNT: 17.8 % (ref 11.5–14.5)
GFR SERPL CREATININE-BSD FRML MDRD: 136 ML/MIN/1.73 (ref 45–104)
GLUCOSE BLD-MCNC: 241 MG/DL (ref 60–100)
GLUCOSE BLDC GLUCOMTR-MCNC: 236 MG/DL (ref 70–130)
GLUCOSE BLDC GLUCOMTR-MCNC: 260 MG/DL (ref 70–130)
HCT VFR BLD AUTO: 23.9 % (ref 35–45)
HGB BLD-MCNC: 7.8 G/DL (ref 12–15.5)
INR PPP: 1.14 (ref 0.8–1.2)
MCH RBC QN AUTO: 27.8 PG (ref 26.5–34)
MCHC RBC AUTO-ENTMCNC: 32.6 G/DL (ref 31.4–36)
MCV RBC AUTO: 85.1 FL (ref 80–98)
PLATELET # BLD AUTO: 526 10*3/MM3 (ref 150–450)
PMV BLD AUTO: 8.4 FL (ref 8–12)
POTASSIUM BLD-SCNC: 4.2 MMOL/L (ref 3.5–5.1)
PROTHROMBIN TIME: 14.3 SECONDS (ref 11.1–15.3)
RBC # BLD AUTO: 2.81 10*6/MM3 (ref 3.77–5.16)
SODIUM BLD-SCNC: 136 MMOL/L (ref 137–145)
WBC NRBC COR # BLD: 8.02 10*3/MM3 (ref 3.2–9.8)

## 2018-09-26 PROCEDURE — 97116 GAIT TRAINING THERAPY: CPT

## 2018-09-26 PROCEDURE — 63710000001 INSULIN ASPART PER 5 UNITS: Performed by: FAMILY MEDICINE

## 2018-09-26 PROCEDURE — 25010000002 ENOXAPARIN PER 10 MG: Performed by: INTERNAL MEDICINE

## 2018-09-26 PROCEDURE — 85610 PROTHROMBIN TIME: CPT | Performed by: ORTHOPAEDIC SURGERY

## 2018-09-26 PROCEDURE — 80048 BASIC METABOLIC PNL TOTAL CA: CPT | Performed by: FAMILY MEDICINE

## 2018-09-26 PROCEDURE — 85027 COMPLETE CBC AUTOMATED: CPT | Performed by: FAMILY MEDICINE

## 2018-09-26 PROCEDURE — 25010000002 CEFAZOLIN PER 500 MG: Performed by: FAMILY MEDICINE

## 2018-09-26 PROCEDURE — 82962 GLUCOSE BLOOD TEST: CPT

## 2018-09-26 PROCEDURE — 85730 THROMBOPLASTIN TIME PARTIAL: CPT | Performed by: FAMILY MEDICINE

## 2018-09-26 RX ORDER — WARFARIN SODIUM 7.5 MG/1
7.5 TABLET ORAL NIGHTLY
Qty: 90 TABLET | Refills: 0 | Status: SHIPPED | OUTPATIENT
Start: 2018-09-26 | End: 2018-10-08 | Stop reason: DRUGHIGH

## 2018-09-26 RX ORDER — WARFARIN SODIUM 7.5 MG/1
7.5 TABLET ORAL
Status: DISCONTINUED | OUTPATIENT
Start: 2018-09-26 | End: 2018-09-26 | Stop reason: HOSPADM

## 2018-09-26 RX ORDER — DOCUSATE SODIUM 100 MG/1
100 CAPSULE, LIQUID FILLED ORAL 2 TIMES DAILY PRN
Qty: 90 CAPSULE | Refills: 0 | Status: SHIPPED | OUTPATIENT
Start: 2018-09-26 | End: 2018-10-01

## 2018-09-26 RX ORDER — RIFAMPIN 300 MG/1
300 CAPSULE ORAL EVERY 12 HOURS SCHEDULED
Qty: 84 CAPSULE | Refills: 0 | Status: SHIPPED | OUTPATIENT
Start: 2018-09-26 | End: 2018-11-05

## 2018-09-26 RX ADMIN — CETIRIZINE HYDROCHLORIDE 10 MG: 10 TABLET, FILM COATED ORAL at 08:20

## 2018-09-26 RX ADMIN — LOSARTAN POTASSIUM 100 MG: 50 TABLET, FILM COATED ORAL at 08:21

## 2018-09-26 RX ADMIN — ENOXAPARIN SODIUM 130 MG: 150 INJECTION SUBCUTANEOUS at 08:22

## 2018-09-26 RX ADMIN — INSULIN ASPART 8 UNITS: 100 INJECTION, SOLUTION INTRAVENOUS; SUBCUTANEOUS at 08:19

## 2018-09-26 RX ADMIN — HYDROCODONE BITARTRATE AND ACETAMINOPHEN 1 TABLET: 7.5; 325 TABLET ORAL at 08:20

## 2018-09-26 RX ADMIN — ATORVASTATIN CALCIUM 40 MG: 40 TABLET, FILM COATED ORAL at 08:20

## 2018-09-26 RX ADMIN — MONTELUKAST SODIUM 10 MG: 10 TABLET, COATED ORAL at 08:20

## 2018-09-26 RX ADMIN — CEFAZOLIN SODIUM 2 G: 10 INJECTION, POWDER, FOR SOLUTION INTRAVENOUS at 09:59

## 2018-09-26 RX ADMIN — DOCUSATE SODIUM 100 MG: 100 CAPSULE, LIQUID FILLED ORAL at 08:20

## 2018-09-26 RX ADMIN — CEFAZOLIN SODIUM 2 G: 10 INJECTION, POWDER, FOR SOLUTION INTRAVENOUS at 01:40

## 2018-09-26 RX ADMIN — FERROUS SULFATE TAB EC 324 MG (65 MG FE EQUIVALENT) 324 MG: 324 (65 FE) TABLET DELAYED RESPONSE at 08:21

## 2018-09-26 RX ADMIN — RIFAMPIN 300 MG: 300 CAPSULE ORAL at 08:21

## 2018-09-26 RX ADMIN — INSULIN ASPART 12 UNITS: 100 INJECTION, SOLUTION INTRAVENOUS; SUBCUTANEOUS at 11:36

## 2018-09-26 NOTE — TELEPHONE ENCOUNTER
Called Raffaele from home health to address dressing changes for this patient. There was no answer. She needs dry dressing changes daily, and her knee immobilized until next appointment.

## 2018-09-27 ENCOUNTER — READMISSION MANAGEMENT (OUTPATIENT)
Dept: CALL CENTER | Facility: HOSPITAL | Age: 63
End: 2018-09-27

## 2018-09-27 PROCEDURE — 85610 PROTHROMBIN TIME: CPT | Performed by: INTERNAL MEDICINE

## 2018-09-27 PROCEDURE — 85007 BL SMEAR W/DIFF WBC COUNT: CPT | Performed by: INTERNAL MEDICINE

## 2018-09-27 PROCEDURE — 85025 COMPLETE CBC W/AUTO DIFF WBC: CPT | Performed by: INTERNAL MEDICINE

## 2018-09-27 PROCEDURE — 80048 BASIC METABOLIC PNL TOTAL CA: CPT | Performed by: INTERNAL MEDICINE

## 2018-09-27 NOTE — OUTREACH NOTE
Prep Survey      Responses   Facility patient discharged from?  [facility]   Is patient eligible?  Yes   Discharge diagnosis  Staphylococcal srthritis of left knee, type 2 DM, esssential HTN, s/p revision of left total knee, septic joint of left knee, anemia, knee incision and drainage   Does the patient have one of the following disease processes/diagnoses(primary or secondary)?  Other   Does the patient have Home health ordered?  Yes   What is the Home health agency?   [facility]   Is there a DME ordered?  Yes   What DME was ordered?  [facility]   Comments regarding appointments  See AVS   Prep survey completed?  Yes          Freya Rosales RN

## 2018-09-28 ENCOUNTER — LAB REQUISITION (OUTPATIENT)
Dept: LAB | Facility: HOSPITAL | Age: 63
End: 2018-09-28

## 2018-09-28 DIAGNOSIS — M00.062 STAPHYLOCOCCAL ARTHRITIS OF LEFT KNEE (HCC): ICD-10-CM

## 2018-09-28 DIAGNOSIS — D64.9 ANEMIA, UNSPECIFIED TYPE: Primary | Chronic | ICD-10-CM

## 2018-09-28 LAB
ANION GAP SERPL CALCULATED.3IONS-SCNC: 11 MMOL/L (ref 5–15)
ANISOCYTOSIS BLD QL: NORMAL
BASOPHILS # BLD AUTO: 0.04 10*3/MM3 (ref 0–0.2)
BASOPHILS NFR BLD AUTO: 0.4 % (ref 0–2)
BUN BLD-MCNC: 10 MG/DL (ref 7–21)
BUN/CREAT SERPL: 22.7 (ref 7–25)
CALCIUM SPEC-SCNC: 9 MG/DL (ref 8.4–10.2)
CHLORIDE SERPL-SCNC: 97 MMOL/L (ref 95–110)
CO2 SERPL-SCNC: 27 MMOL/L (ref 22–31)
CREAT BLD-MCNC: 0.44 MG/DL (ref 0.5–1)
DEPRECATED RDW RBC AUTO: 55.3 FL (ref 36.4–46.3)
EOSINOPHIL # BLD AUTO: 0.1 10*3/MM3 (ref 0–0.7)
EOSINOPHIL NFR BLD AUTO: 1.1 % (ref 0–7)
ERYTHROCYTE [DISTWIDTH] IN BLOOD BY AUTOMATED COUNT: 18 % (ref 11.5–14.5)
GFR SERPL CREATININE-BSD FRML MDRD: 176 ML/MIN/1.73 (ref 45–104)
GLUCOSE BLD-MCNC: 251 MG/DL (ref 60–100)
HCT VFR BLD AUTO: 25.2 % (ref 35–45)
HGB BLD-MCNC: 8.1 G/DL (ref 12–15.5)
IMM GRANULOCYTES # BLD: 0.05 10*3/MM3 (ref 0–0.02)
IMM GRANULOCYTES NFR BLD: 0.5 % (ref 0–0.5)
INR PPP: 1.16 (ref 0.8–1.2)
LYMPHOCYTES # BLD AUTO: 1.88 10*3/MM3 (ref 0.6–4.2)
LYMPHOCYTES NFR BLD AUTO: 19.9 % (ref 10–50)
MCH RBC QN AUTO: 27.6 PG (ref 26.5–34)
MCHC RBC AUTO-ENTMCNC: 32.1 G/DL (ref 31.4–36)
MCV RBC AUTO: 86 FL (ref 80–98)
MONOCYTES # BLD AUTO: 0.66 10*3/MM3 (ref 0–0.9)
MONOCYTES NFR BLD AUTO: 7 % (ref 0–12)
NEUTROPHILS # BLD AUTO: 6.72 10*3/MM3 (ref 2–8.6)
NEUTROPHILS NFR BLD AUTO: 71.1 % (ref 37–80)
PLATELET # BLD AUTO: 573 10*3/MM3 (ref 150–450)
PMV BLD AUTO: 9.3 FL (ref 8–12)
POLYCHROMASIA BLD QL SMEAR: NORMAL
POTASSIUM BLD-SCNC: 4.2 MMOL/L (ref 3.5–5.1)
PROTHROMBIN TIME: 14.5 SECONDS (ref 11.1–15.3)
RBC # BLD AUTO: 2.93 10*6/MM3 (ref 3.77–5.16)
SMALL PLATELETS BLD QL SMEAR: NORMAL
SODIUM BLD-SCNC: 135 MMOL/L (ref 137–145)
TARGETS BLD QL SMEAR: NORMAL
WBC MORPH BLD: NORMAL
WBC NRBC COR # BLD: 9.45 10*3/MM3 (ref 3.2–9.8)

## 2018-09-29 ENCOUNTER — READMISSION MANAGEMENT (OUTPATIENT)
Dept: CALL CENTER | Facility: HOSPITAL | Age: 63
End: 2018-09-29

## 2018-09-29 NOTE — OUTREACH NOTE
Medical Week 1 Survey      Responses   Facility patient discharged from?  [facility]   Does the patient have one of the following disease processes/diagnoses(primary or secondary)?  Other   Is there a successful TCM telephone encounter documented?  No   Week 1 attempt successful?  No   Unsuccessful attempts  Attempt 1          Zahira Story RN

## 2018-10-01 ENCOUNTER — LAB (OUTPATIENT)
Dept: LAB | Facility: HOSPITAL | Age: 63
End: 2018-10-01

## 2018-10-01 ENCOUNTER — OFFICE VISIT (OUTPATIENT)
Dept: ORTHOPEDIC SURGERY | Facility: CLINIC | Age: 63
End: 2018-10-01

## 2018-10-01 VITALS — HEIGHT: 62 IN | BODY MASS INDEX: 34.78 KG/M2 | WEIGHT: 189 LBS

## 2018-10-01 DIAGNOSIS — T84.84XA PAINFUL TOTAL KNEE REPLACEMENT, INITIAL ENCOUNTER (HCC): ICD-10-CM

## 2018-10-01 DIAGNOSIS — Z96.652 S/P REVISION OF TOTAL KNEE, LEFT: Primary | ICD-10-CM

## 2018-10-01 DIAGNOSIS — T84.038A MECHANICAL LOOSENING OF PROSTHETIC KNEE, INITIAL ENCOUNTER (HCC): ICD-10-CM

## 2018-10-01 DIAGNOSIS — M25.562 LEFT KNEE PAIN, UNSPECIFIED CHRONICITY: ICD-10-CM

## 2018-10-01 DIAGNOSIS — Z96.652 S/P REVISION OF TOTAL KNEE, LEFT: ICD-10-CM

## 2018-10-01 DIAGNOSIS — Z96.652 HISTORY OF TOTAL LEFT KNEE REPLACEMENT: ICD-10-CM

## 2018-10-01 DIAGNOSIS — Z96.659 MECHANICAL LOOSENING OF PROSTHETIC KNEE, INITIAL ENCOUNTER (HCC): ICD-10-CM

## 2018-10-01 DIAGNOSIS — Z96.659 PAINFUL TOTAL KNEE REPLACEMENT, INITIAL ENCOUNTER (HCC): ICD-10-CM

## 2018-10-01 LAB
CRP SERPL-MCNC: 1 MG/DL (ref 0–1)
ERYTHROCYTE [SEDIMENTATION RATE] IN BLOOD: 90 MM/HR (ref 0–20)

## 2018-10-01 PROCEDURE — 99024 POSTOP FOLLOW-UP VISIT: CPT | Performed by: ORTHOPAEDIC SURGERY

## 2018-10-01 PROCEDURE — 36415 COLL VENOUS BLD VENIPUNCTURE: CPT

## 2018-10-01 PROCEDURE — 85651 RBC SED RATE NONAUTOMATED: CPT

## 2018-10-01 PROCEDURE — 86140 C-REACTIVE PROTEIN: CPT

## 2018-10-01 RX ORDER — HYDROCODONE BITARTRATE AND ACETAMINOPHEN 7.5; 325 MG/1; MG/1
1 TABLET ORAL EVERY 6 HOURS PRN
Qty: 30 TABLET | Refills: 0 | Status: SHIPPED | OUTPATIENT
Start: 2018-10-01 | End: 2018-10-11 | Stop reason: SDUPTHER

## 2018-10-01 RX ORDER — HYDROCODONE BITARTRATE AND ACETAMINOPHEN 7.5; 325 MG/1; MG/1
1 TABLET ORAL EVERY 6 HOURS PRN
COMMUNITY
End: 2018-10-01 | Stop reason: SDUPTHER

## 2018-10-01 RX ORDER — ACETAMINOPHEN 500 MG
500 TABLET ORAL EVERY 6 HOURS PRN
COMMUNITY
End: 2019-09-23

## 2018-10-01 RX ORDER — DOCUSATE SODIUM 100 MG/1
100 CAPSULE, LIQUID FILLED ORAL 2 TIMES DAILY
COMMUNITY
End: 2019-03-04 | Stop reason: SDUPTHER

## 2018-10-01 NOTE — PROGRESS NOTES
Meeta Marmolejo is a 62 y.o. female is s/p       Chief Complaint   Patient presents with   • Left Knee - Post-op       HISTORY OF PRESENT ILLNESS: Post op left knee. Patient had a incision and drainage 1 week ago. Not currently having any problems.  Certainly she is doing better than she was.       Allergies   Allergen Reactions   • Penicillins Rash   • Percocet [Oxycodone-Acetaminophen] Rash   • Rocephin [Ceftriaxone] Rash         Current Outpatient Prescriptions:   •  acetaminophen (TYLENOL) 500 MG tablet, Take 500 mg by mouth Every 6 (Six) Hours As Needed for Mild Pain ., Disp: , Rfl:   •  alendronate (FOSAMAX) 70 MG tablet, Take 70 mg by mouth Every 7 (Seven) Days. Every Sunday morning, Disp: , Rfl:   •  atorvastatin (LIPITOR) 40 MG tablet, Take 40 mg by mouth Daily., Disp: , Rfl:   •  B Complex-C (SUPER B COMPLEX PO), Take 1 tablet by mouth Daily., Disp: , Rfl:   •  cetirizine (zyrTEC) 10 MG tablet, Take 10 mg by mouth Daily., Disp: , Rfl:   •  docusate sodium (COLACE) 100 MG capsule, Take 100 mg by mouth 2 (Two) Times a Day., Disp: , Rfl:   •  enoxaparin (LOVENOX) 100 MG/ML solution syringe, Inject 1.3 mL under the skin into the appropriate area as directed Daily for 7 days., Disp: 9.1 mL, Rfl: 0  •  glucose blood test strip, 1 each by Other route 2 (Two) Times a Day. Use as instructed, Disp: 50 each, Rfl: 11  •  HYDROcodone-acetaminophen (NORCO) 7.5-325 MG per tablet, Take 1 tablet by mouth Every 6 (Six) Hours As Needed for Moderate Pain ., Disp: 30 tablet, Rfl: 0  •  Insulin Glargine (BASAGLAR KWIKPEN) 100 UNIT/ML injection pen, Inject 16 units under the skin nightly (REPLACES LANTUS DUE TO INSURANCE FORMULARY) (Patient taking differently: Inject 16 Units under the skin into the appropriate area as directed Every Night. Inject 16 units under the skin nightly (REPLACES LANTUS DUE TO INSURANCE FORMULARY)), Disp: 2 pen, Rfl: 5  •  Insulin Pen Needle 31G X 4 MM misc, 1 each Daily., Disp: 100 each, Rfl: 1  •   losartan (COZAAR) 100 MG tablet, Take 100 mg by mouth Daily., Disp: , Rfl:   •  metFORMIN (GLUCOPHAGE) 1000 MG tablet, Take 1,000 mg by mouth 2 (Two) Times a Day With Meals., Disp: , Rfl:   •  montelukast (SINGULAIR) 10 MG tablet, Take 10 mg by mouth Daily., Disp: , Rfl:   •  Multiple Vitamins-Minerals (MULTIVITAMIN ADULT PO), Take 1 tablet by mouth Daily., Disp: , Rfl:   •  rifAMPin (RIFADIN) 300 MG capsule, Take 1 capsule by mouth Every 12 (Twelve) Hours for 42 days., Disp: 84 capsule, Rfl: 0  •  warfarin (COUMADIN) 7.5 MG tablet, Take 1 tablet by mouth Every Night for 90 days., Disp: 90 tablet, Rfl: 0    No fevers or chills.  No nausea or vomiting.      PHYSICAL EXAMINATION:       Meeta Marmolejo is a 62 y.o. female    Patient is awake and alert, answers questions appropriately and is in no apparent distress.    GAIT:     []  Normal  []  Antalgic    Assistive device: []  None  []  Walker     []  Crutches  []  Cane     []  Wheelchair  []  Stretcher    Ortho Exam  The wound was good there is some drainage right central area there is early granulation tissue.  There is probably a 1+ effusion the calf is nontender she is breathing comfortably.    Xr Chest 2 View    Result Date: 9/20/2018  Narrative: TWO VIEW CHEST HISTORY: Preoperative chest for knee replacement Frontal and lateral films of the chest were obtained. COMPARISON: None Left PICC line with tip in the superior vena cava. The lungs are clear of an acute process. Minimal cardiomegaly. The pulmonary vasculature is not increased. No pleural effusion. No pneumothorax. No acute osseous abnormality. Changes distal aspect of each clavicle which may be posttraumatic or possibly related to rheumatoid arthritis.     Impression: CONCLUSION: Left PICC line with tip in the superior vena cava. The lungs are clear of an acute process. Minimal cardiomegaly. [id number] Electronically signed by:  Carlos Christiansen MD  9/20/2018 3:23 PM CDT Workstation: [employer]    Xr Knee 1  Or 2 View Left    Result Date: 9/13/2018  Narrative: Lateral view left knee Comparison hospital film Patient has a large effusion in the knee.  She is status post revision component of the knee without complicating features seen.  Surgical staples are in place Impression: Status post cemented revision of the tibia.  Effusion is noted.    Xr Knee Bilateral Ap Standing    Result Date: 9/13/2018  Narrative: AP standing bilateral knee Comparison hospital film Patient is status post revision of tibial component.  Overall alignment knee is intact without complicating feature or acute finding Impression: Status post revision left knee    Us Guided Vascular Access    Result Date: 9/20/2018  Narrative: This procedure was auto-finalized with no dictation required.    Ir Replacement Picc Without Port Same Site    Result Date: 9/21/2018  Narrative: This procedure was auto-finalized with no dictation required.    Xr Chest Post Cva Port    Result Date: 9/21/2018  Narrative: PORTABLE CHEST HISTORY: PICC line placement Portable AP film of the chest was obtained at 5:02 PM. COMPARISON: September 20, 2018 Left PICC line with tip projected over the superior vena cava. The lungs are clear of an acute process. Stable minimal cardiomegaly. The pulmonary vasculature is not increased. No pleural effusion. No pneumothorax. No acute osseous abnormality. Degenerative changes are present in the thoracic spine.     Impression: CONCLUSION: Left PICC line with tip projected over the superior vena cava. The lungs are clear of an acute process. Stable minimal cardiomegaly. [id number] Electronically signed by:  Carlos Christiansen MD  9/21/2018 5:26 PM CDT Workstation: [facility]    Ir Picc Wo Fluoro Guidance    Result Date: 9/20/2018  Narrative: This procedure was auto-finalized with no dictation required.          ASSESSMENT:    Diagnoses and all orders for this visit:    S/P revision of total knee, left  -     Sedimentation Rate; Future  -      C-reactive Protein; Future    Painful total knee replacement, initial encounter (CMS/[facility])  -     Sedimentation Rate; Future  -     C-reactive Protein; Future    Mechanical loosening of prosthetic knee, initial encounter (CMS/[facility])  -     Sedimentation Rate; Future  -     C-reactive Protein; Future    History of total left knee replacement  -     Sedimentation Rate; Future  -     C-reactive Protein; Future    Left knee pain, unspecified chronicity  -     Sedimentation Rate; Future  -     C-reactive Protein; Future    Other orders  -     acetaminophen (TYLENOL) 500 MG tablet; Take 500 mg by mouth Every 6 (Six) Hours As Needed for Mild Pain .  -     Discontinue: HYDROcodone-acetaminophen (NORCO) 7.5-325 MG per tablet; Take 1 tablet by mouth Every 6 (Six) Hours As Needed for Moderate Pain .  -     docusate sodium (COLACE) 100 MG capsule; Take 100 mg by mouth 2 (Two) Times a Day.  -     HYDROcodone-acetaminophen (NORCO) 7.5-325 MG per tablet; Take 1 tablet by mouth Every 6 (Six) Hours As Needed for Moderate Pain .          PLAN I think is too early to start range of motion with the wound conditions and we have to get the wound healed before we start this.  She is to see Dr. Nagy on Wednesday.  I will also see her Wednesday and consider starting her therapy or at least planning with started at that point.  We'll go ahead and check a rate and C-reactive protein just to monitor and follow this.    No Follow-up on file.    Elias Su MD

## 2018-10-02 ENCOUNTER — READMISSION MANAGEMENT (OUTPATIENT)
Dept: CALL CENTER | Facility: HOSPITAL | Age: 63
End: 2018-10-02

## 2018-10-02 NOTE — OUTREACH NOTE
Medical Week 1 Survey      Responses   Facility patient discharged from?  [facility]   Does the patient have one of the following disease processes/diagnoses(primary or secondary)?  Other   Is there a successful TCM telephone encounter documented?  No   Week 1 attempt successful?  No   Unsuccessful attempts  Attempt 2          Kimberly Burgos RN

## 2018-10-03 ENCOUNTER — LAB (OUTPATIENT)
Dept: LAB | Facility: HOSPITAL | Age: 63
End: 2018-10-03

## 2018-10-03 ENCOUNTER — APPOINTMENT (OUTPATIENT)
Dept: ONCOLOGY | Facility: HOSPITAL | Age: 63
End: 2018-10-03

## 2018-10-03 ENCOUNTER — READMISSION MANAGEMENT (OUTPATIENT)
Dept: CALL CENTER | Facility: HOSPITAL | Age: 63
End: 2018-10-03

## 2018-10-03 DIAGNOSIS — D64.9 ANEMIA, UNSPECIFIED TYPE: Chronic | ICD-10-CM

## 2018-10-03 DIAGNOSIS — M00.062 STAPHYLOCOCCAL ARTHRITIS OF LEFT KNEE (HCC): ICD-10-CM

## 2018-10-03 DIAGNOSIS — I26.99 OTHER PULMONARY EMBOLISM WITHOUT ACUTE COR PULMONALE, UNSPECIFIED CHRONICITY (HCC): ICD-10-CM

## 2018-10-03 LAB
ALBUMIN SERPL-MCNC: 3.9 G/DL (ref 3.4–4.8)
ALBUMIN/GLOB SERPL: 1 G/DL (ref 1.1–1.8)
ALP SERPL-CCNC: 121 U/L (ref 38–126)
ALT SERPL W P-5'-P-CCNC: 40 U/L (ref 9–52)
ANION GAP SERPL CALCULATED.3IONS-SCNC: 13 MMOL/L (ref 5–15)
AST SERPL-CCNC: 23 U/L (ref 14–36)
BASOPHILS # BLD AUTO: 0.05 10*3/MM3 (ref 0–0.2)
BASOPHILS NFR BLD AUTO: 0.7 % (ref 0–2)
BILIRUB SERPL-MCNC: 0.3 MG/DL (ref 0.2–1.3)
BUN BLD-MCNC: 11 MG/DL (ref 7–21)
BUN/CREAT SERPL: 22.9 (ref 7–25)
CALCIUM SPEC-SCNC: 10 MG/DL (ref 8.4–10.2)
CHLORIDE SERPL-SCNC: 94 MMOL/L (ref 95–110)
CO2 SERPL-SCNC: 29 MMOL/L (ref 22–31)
CREAT BLD-MCNC: 0.48 MG/DL (ref 0.5–1)
DEPRECATED RDW RBC AUTO: 55.4 FL (ref 36.4–46.3)
EOSINOPHIL # BLD AUTO: 0.06 10*3/MM3 (ref 0–0.7)
EOSINOPHIL NFR BLD AUTO: 0.8 % (ref 0–7)
ERYTHROCYTE [DISTWIDTH] IN BLOOD BY AUTOMATED COUNT: 17.2 % (ref 11.5–14.5)
GFR SERPL CREATININE-BSD FRML MDRD: 159 ML/MIN/1.73 (ref 45–104)
GLOBULIN UR ELPH-MCNC: 3.9 GM/DL (ref 2.3–3.5)
GLUCOSE BLD-MCNC: 270 MG/DL (ref 60–100)
HCT VFR BLD AUTO: 30.9 % (ref 35–45)
HGB BLD-MCNC: 9.8 G/DL (ref 12–15.5)
IMM GRANULOCYTES # BLD: 0.02 10*3/MM3 (ref 0–0.02)
IMM GRANULOCYTES NFR BLD: 0.3 % (ref 0–0.5)
INR PPP: 1.58 (ref 0.8–1.2)
LYMPHOCYTES # BLD AUTO: 1.84 10*3/MM3 (ref 0.6–4.2)
LYMPHOCYTES NFR BLD AUTO: 26.1 % (ref 10–50)
MCH RBC QN AUTO: 27.7 PG (ref 26.5–34)
MCHC RBC AUTO-ENTMCNC: 31.7 G/DL (ref 31.4–36)
MCV RBC AUTO: 87.3 FL (ref 80–98)
MONOCYTES # BLD AUTO: 0.52 10*3/MM3 (ref 0–0.9)
MONOCYTES NFR BLD AUTO: 7.4 % (ref 0–12)
NEUTROPHILS # BLD AUTO: 4.57 10*3/MM3 (ref 2–8.6)
NEUTROPHILS NFR BLD AUTO: 64.7 % (ref 37–80)
NRBC BLD MANUAL-RTO: 0 /100 WBC (ref 0–0)
PLATELET # BLD AUTO: 594 10*3/MM3 (ref 150–450)
PMV BLD AUTO: 9.7 FL (ref 8–12)
POTASSIUM BLD-SCNC: 4.5 MMOL/L (ref 3.5–5.1)
PROT SERPL-MCNC: 7.8 G/DL (ref 6.3–8.6)
PROTHROMBIN TIME: 18.3 SECONDS (ref 11.1–15.3)
RBC # BLD AUTO: 3.54 10*6/MM3 (ref 3.77–5.16)
SODIUM BLD-SCNC: 136 MMOL/L (ref 137–145)
WBC NRBC COR # BLD: 7.06 10*3/MM3 (ref 3.2–9.8)

## 2018-10-03 PROCEDURE — 85610 PROTHROMBIN TIME: CPT

## 2018-10-03 PROCEDURE — 85025 COMPLETE CBC W/AUTO DIFF WBC: CPT

## 2018-10-03 PROCEDURE — 80053 COMPREHEN METABOLIC PANEL: CPT

## 2018-10-03 NOTE — OUTREACH NOTE
Medical Week 1 Survey      Responses   Facility patient discharged from?  [facility]   Does the patient have one of the following disease processes/diagnoses(primary or secondary)?  Other   Is there a successful TCM telephone encounter documented?  No   Week 1 attempt successful?  Yes   Call start time  1700   Call end time  1707   Discharge diagnosis  Staphylococcal srthritis of left knee, type 2 DM, esssential HTN, s/p revision of left total knee, septic joint of left knee, anemia, knee incision and drainage   Meds reviewed with patient/caregiver?  Yes   Is the patient having any side effects they believe may be caused by any medication additions or changes?  No   Does the patient have all medications ordered at discharge?  Yes   Is the patient taking all medications as directed (includes completed medication regime)?  Yes   Does the patient have a primary care provider?   Yes   Does the patient have an appointment with their PCP within 7 days of discharge?  Yes   Has the patient kept scheduled appointments due by today?  Yes   What is the Home health agency?   [facility]   Has home health visited the patient within 72 hours of discharge?  Yes   What DME was ordered?  [facility]   Has all DME been delivered?  Yes   Psychosocial issues?  No   Did the patient receive a copy of their discharge instructions?  Yes   Nursing interventions  Reviewed instructions with patient, Educated on MyChart   What is the patient's perception of their health status since discharge?  Improving   Is the patient/caregiver able to teach back signs and symptoms related to disease process for when to call PCP?  Yes   Is the patient/caregiver able to teach back signs and symptoms related to disease process for when to call 911?  Yes   Is the patient/caregiver able to teach back the hierarchy of who to call/visit for symptoms/problems? PCP, Specialist, Home health nurse, Urgent Care, ED, 911  Yes   Week 1 call completed?  Yes          Shreya  ALONZO Marshall, RN

## 2018-10-04 ENCOUNTER — OFFICE VISIT (OUTPATIENT)
Dept: ORTHOPEDIC SURGERY | Facility: CLINIC | Age: 63
End: 2018-10-04

## 2018-10-04 ENCOUNTER — CONSULT (OUTPATIENT)
Dept: ONCOLOGY | Facility: CLINIC | Age: 63
End: 2018-10-04

## 2018-10-04 VITALS
OXYGEN SATURATION: 98 % | WEIGHT: 193.8 LBS | RESPIRATION RATE: 18 BRPM | DIASTOLIC BLOOD PRESSURE: 50 MMHG | SYSTOLIC BLOOD PRESSURE: 147 MMHG | HEART RATE: 112 BPM | TEMPERATURE: 98.4 F | HEIGHT: 62 IN | BODY MASS INDEX: 35.66 KG/M2

## 2018-10-04 VITALS — BODY MASS INDEX: 35.51 KG/M2 | HEIGHT: 62 IN | WEIGHT: 193 LBS

## 2018-10-04 DIAGNOSIS — D64.9 ANEMIA, UNSPECIFIED TYPE: Primary | Chronic | ICD-10-CM

## 2018-10-04 DIAGNOSIS — M00.062 STAPHYLOCOCCAL ARTHRITIS OF LEFT KNEE (HCC): Primary | ICD-10-CM

## 2018-10-04 DIAGNOSIS — Z96.652 S/P REVISION OF TOTAL KNEE, LEFT: ICD-10-CM

## 2018-10-04 DIAGNOSIS — I26.99 OTHER ACUTE PULMONARY EMBOLISM WITHOUT ACUTE COR PULMONALE (HCC): ICD-10-CM

## 2018-10-04 PROCEDURE — 99024 POSTOP FOLLOW-UP VISIT: CPT | Performed by: ORTHOPAEDIC SURGERY

## 2018-10-04 PROCEDURE — 1123F ACP DISCUSS/DSCN MKR DOCD: CPT | Performed by: INTERNAL MEDICINE

## 2018-10-04 PROCEDURE — G0463 HOSPITAL OUTPT CLINIC VISIT: HCPCS | Performed by: INTERNAL MEDICINE

## 2018-10-04 PROCEDURE — G8417 CALC BMI ABV UP PARAM F/U: HCPCS | Performed by: INTERNAL MEDICINE

## 2018-10-04 PROCEDURE — 99214 OFFICE O/P EST MOD 30 MIN: CPT | Performed by: INTERNAL MEDICINE

## 2018-10-04 RX ORDER — FERROUS SULFATE 325(65) MG
325 TABLET ORAL
Qty: 30 TABLET | Refills: 3 | Status: SHIPPED | OUTPATIENT
Start: 2018-10-04 | End: 2019-03-04 | Stop reason: SDUPTHER

## 2018-10-04 RX ORDER — WARFARIN SODIUM 2.5 MG/1
2.5 TABLET ORAL NIGHTLY
Qty: 15 TABLET | Refills: 0 | Status: SHIPPED | OUTPATIENT
Start: 2018-10-04 | End: 2018-10-15 | Stop reason: SDUPTHER

## 2018-10-04 NOTE — PROGRESS NOTES
DATE OF VISIT: 10/4/2018      REASON FOR VISIT: Pulmonary embolism on Coumadin, anemia, recent revision of left knee secondary to bleeding        HISTORY OF PRESENT ILLNESS:    62-year-old female with past medical problems consisting of hypertension, dyslipidemia, poorly controlled diabetes mellitus, history of left knee replacement with revision ×3.  Most recent revision was done on August 28, 2018.  And was diagnosed with subacute pulmonary embolism on August 30, 2018 after revision of left knee and was started on xarelto.  Patient was initially seen in consultation when she was  admitted to hospital on September 21, 2018 with bleeding into her left knee.  Xarelto was discontinued and patient was started on heparin drip.  Patient had incision and drainage of left knee done on September 22, 2018 by Dr. Su.  She was also found to be anemic requiring blood transfusion during hospital stay.  Currently she is taking ferrous sulfate 1 tablet by mouth daily.  Patient is here for follow-up appointment today.  Denies any bleeding.  Denies any new lymph node enlargement. Denies any fevers.      PAST MEDICAL HISTORY:    Past Medical History:   Diagnosis Date   • Acute vaginitis     resolved   • Carpal tunnel syndrome    • Chronic osteoarthritis     Nino TKA   • Chronic pain    • Chronic urticaria    • Dietary counseling and surveillance    • Dysuria    • External hordeolum    • Hyperlipidemia    • Hypertension    • Low back pain     strain   • Microalbuminuria     on ARB   • Noncompliance with treatment    • Obesity      Dieting , exercise, appetite suppressant      • Osteoarthritis of left knee    • Plantar fasciitis     resolved   • Seasonal rhinitis    • Type 2 diabetes mellitus (CMS/HCC)    • Urinary tract infectious disease    • Vaginal discharge    • Vitamin deficiency        SOCIAL HISTORY:    Social History   Substance Use Topics   • Smoking status: Former Smoker     Packs/day: 1.00     Years: 6.00     Quit  date: 2006   • Smokeless tobacco: Never Used   • Alcohol use No       Surgical History :  Past Surgical History:   Procedure Laterality Date   • CARPAL TUNNEL RELEASE      Bilateral endoscopic release 01/20/2003    • ENDOSCOPY      normal 04/25/2008      • KNEE ARTHROSCOPY     • KNEE INCISION AND DRAINAGE Left 9/22/2018    Procedure: KNEE INCISION AND DRAINAGE;  Surgeon: Elias Su MD;  Location: [facility];  Service: Orthopedics   • KNEE SURGERY      Removal of existing left total knee arthroplasty and revision left total knee arthroplasty. 12/15/2014       • REPLACEMENT TOTAL KNEE     • SHOULDER SURGERY     • TOTAL KNEE ARTHROPLASTY REVISION Left 8/28/2018    Procedure: REVISION LEFT TOTAL KNEE;  Surgeon: Elias Su MD;  Location: [facility];  Service: Orthopedics   • TUBAL ABDOMINAL LIGATION         ALLERGIES:    Allergies   Allergen Reactions   • Penicillins Rash   • Percocet [Oxycodone-Acetaminophen] Rash   • Rocephin [Ceftriaxone] Rash         FAMILY HISTORY:  Family History   Problem Relation Age of Onset   • Diabetes Mother    • Hypertension Mother    • Colon cancer Father    • Diabetes Father    • Hypertension Father    • Breast cancer Sister    • Cancer Other    • Diabetes Other    • Hypertension Other            REVIEW OF SYSTEMS:      CONSTITUTIONAL:  Complains of fatigue. Denies any fever, chills or weight loss.      EYES: No visual disturbances. No discharge. No new lesions     ENMT:  No epistaxis, mouth sores or difficulty swallowing.     RESPIRATORY:  No new shortness of breath. No new cough or hemoptysis.     CARDIOVASCULAR:  No chest pain or palpitations.     GASTROINTESTINAL:  No abdominal pain nausea, vomiting or blood in the stool.     GENITOURINARY: No Dysuria or Hematuria.     MUSCULOSKELETAL:  Complains of chronic back pain.  Complains of pain in left knee.      LYMPHATICS:  Denies any abnormal swollen glands anywhere in the body.     NEUROLOGICAL : No tingling or  "numbness. No headache or dizziness. No seizures or balance problems.     SKIN: Dressing present on left knee.           PHYSICAL EXAMINATION:      VITAL SIGNS:  /50   Pulse 112   Temp 98.4 °F (36.9 °C) (Temporal Artery )   Resp 18   Ht 157.5 cm (62.01\")   Wt 87.9 kg (193 lb 12.8 oz)   SpO2 98%   BMI 35.44 kg/m²   1    10/04/18  0820   Weight: 87.9 kg (193 lb 12.8 oz)         ECOG performance status: 2    CONSTITUTIONAL:  Not in any distress.    EYES: Mild conjunctival Pallor. No Icterus. No Pterygium. Extraocular Movements intact.No ptosis.    ENMT:  Normocephalic, Atraumatic.No Facial Asymmetry noted.    NECK:  No adenopathy.Trachea midline. NO JVD.    RESPIRATORY:  Fair air entry bilateral. No rhonchi or wheezing.Fair respiratory effort.    CARDIOVASCULAR:  S1, S2. Regular rate and rhythm. No murmur or gallop appreciated.    ABDOMEN:  Soft, obese, nontender. Bowel sounds present in all four quadrants.  No Hepatosplenomegaly appreciated.    MUSCULOSKELETAL: Trace edema.No Calf Tenderness.Decreased range of motion.  Dressing present on left knee along with immobilizer.    NEUROLOGIC:    No  Motor  deficit appreciated. Cranial Nerves 2-12 grossly intact.    SKIN : No new skin lesion identified.     LYMPHATICS: No new enlarged lymph nodes in neck or supraclavicular area.    PSYCHIATRY: Alert, awake and oriented ×3.Normal affect.  Normal judgment.  Makes good eye contact.        DIAGNOSTIC DATA:    Glucose   Date Value Ref Range Status   10/03/2018 270 (H) 60 - 100 mg/dL Final     Sodium   Date Value Ref Range Status   10/03/2018 136 (L) 137 - 145 mmol/L Final     Potassium   Date Value Ref Range Status   10/03/2018 4.5 3.5 - 5.1 mmol/L Final     CO2   Date Value Ref Range Status   10/03/2018 29.0 22.0 - 31.0 mmol/L Final     Chloride   Date Value Ref Range Status   10/03/2018 94 (L) 95 - 110 mmol/L Final     Anion Gap   Date Value Ref Range Status   10/03/2018 13.0 5.0 - 15.0 mmol/L Final     Creatinine "   Date Value Ref Range Status   10/03/2018 0.48 (L) 0.50 - 1.00 mg/dL Final     BUN   Date Value Ref Range Status   10/03/2018 11 7 - 21 mg/dL Final     BUN/Creatinine Ratio   Date Value Ref Range Status   10/03/2018 22.9 7.0 - 25.0 Final     Calcium   Date Value Ref Range Status   10/03/2018 10.0 8.4 - 10.2 mg/dL Final     Alkaline Phosphatase   Date Value Ref Range Status   10/03/2018 121 38 - 126 U/L Final     Total Protein   Date Value Ref Range Status   10/03/2018 7.8 6.3 - 8.6 g/dL Final     ALT (SGPT)   Date Value Ref Range Status   10/03/2018 40 9 - 52 U/L Final     AST (SGOT)   Date Value Ref Range Status   10/03/2018 23 14 - 36 U/L Final     Total Bilirubin   Date Value Ref Range Status   10/03/2018 0.3 0.2 - 1.3 mg/dL Final     Albumin   Date Value Ref Range Status   10/03/2018 3.90 3.40 - 4.80 g/dL Final     Globulin   Date Value Ref Range Status   10/03/2018 3.9 (H) 2.3 - 3.5 gm/dL Final     Lab Results   Component Value Date    WBC 7.06 10/03/2018    HGB 9.8 (L) 10/03/2018    HCT 30.9 (L) 10/03/2018    MCV 87.3 10/03/2018     (H) 10/03/2018     Lab Results   Component Value Date    NEUTROABS 4.57 10/03/2018    IRON 38 09/21/2018    TIBC 290 09/21/2018    LABIRON 13 (L) 09/21/2018    FERRITIN 246.00 09/21/2018    [id number] >1,000 (H) 09/21/2018    FOLATE 16.00 09/21/2018     Lab Results   Component Value Date    REFLABREPO SEE NOTE: 05/28/2015           Radiology Data :  CT angiogram of chest with contrast done on August 30, 2018 showed:  IMPRESSION:  CONCLUSION: Pulmonary emboli in right middle lobe and right lower  lobe branches probably subacute in origin.              ASSESSMENT AND PLAN:       1.  Subacute pulmonary embolism on right side:  -Patient was diagnosed with subacute pulmonary embolism on right side after most recent revision of left knee done on August 28, 2018.  -Patient was started on xarelto 15 mg twice daily.  -She was admitted to [facility] on September 20,  2018 with bleeding into her left knee.  -Patient was started on heparin drip.  -Patient had incision and Drainage done by Dr. Su on September 22, 2018.  -Currently she is taking her Lovenox 100 mg daily along with Coumadin 10 mg by mouth daily.  INR on October 3, 2018 is subtherapeutic at 1.58.  -We will increase dose of Coumadin to 12.5 mg from tonight.  We will also get an appointment with Coumadin clinic for future adjustment of Coumadin dose.     2.  Anemia:  -Anemia workup was consistent with anemia of chronic disease.  Ferritin is 240.  Iron saturation is 13%.  -We'll start patient on ferrous sulfate 1 tablet by mouth daily.  -Patient required 1 unit of PRBC on September 22, 2018 with hemoglobin of 6.9.  -Hemoglobin is 9.8 today.    -Will ask patient to return to clinic in 6 weeks with a repeat CBC and anemia workup to be done on that day.     3.  Staphylococcal infection of left knee:  -Continue with management as per medical team.     4.  Poorly controlled diabetes mellitus     5.  Thrombocytosis: Most likely reactive.  Platelet count is normal today at 596,000       6.  Health maintenance: Patient does not smoke.    7. Advance Care Planning: For now patient remains full code and is able to make her decisions.  Patient has health care surrogate mentioned on chart.    8. BMI: Patient's Body mass index is 35.44 kg/m². BMI is higher than reference range.  Patient was notified about her elevated BMI.        Otf Nagy MD  10/4/2018  4:42 PM        EMR Dragon/Transcription disclaimer:   Much of this encounter note is an electronic transcription/translation of spoken language to printed text. The electronic translation of spoken language may permit erroneous, or at times, nonsensical words or phrases to be inadvertently transcribed; Although I have reviewed the note for such errors, some may still exist.

## 2018-10-04 NOTE — PROGRESS NOTES
Meeta Marmolejo is a 62 y.o. female is s/p  I & D of left total knee revision.      Chief Complaint   Patient presents with   • Left Knee - Follow-up       HISTORY OF PRESENT ILLNESS: Patient is here today for recheck of left knee. Patient states that her pain is 7/10.  Overall she is somewhat improved.  I talked to Dr. Nagy today who is going to upper Coumadin.       Allergies   Allergen Reactions   • Penicillins Rash   • Percocet [Oxycodone-Acetaminophen] Rash   • Rocephin [Ceftriaxone] Rash         Current Outpatient Prescriptions:   •  acetaminophen (TYLENOL) 500 MG tablet, Take 500 mg by mouth Every 6 (Six) Hours As Needed for Mild Pain ., Disp: , Rfl:   •  alendronate (FOSAMAX) 70 MG tablet, Take 70 mg by mouth Every 7 (Seven) Days. Every Sunday morning, Disp: , Rfl:   •  atorvastatin (LIPITOR) 40 MG tablet, Take 40 mg by mouth Daily., Disp: , Rfl:   •  B Complex-C (SUPER B COMPLEX PO), Take 1 tablet by mouth Daily., Disp: , Rfl:   •  cetirizine (zyrTEC) 10 MG tablet, Take 10 mg by mouth Daily., Disp: , Rfl:   •  docusate sodium (COLACE) 100 MG capsule, Take 100 mg by mouth 2 (Two) Times a Day., Disp: , Rfl:   •  ferrous sulfate 325 (65 FE) MG tablet, Take 1 tablet by mouth Daily With Breakfast., Disp: 30 tablet, Rfl: 3  •  glucose blood test strip, 1 each by Other route 2 (Two) Times a Day. Use as instructed, Disp: 50 each, Rfl: 11  •  HYDROcodone-acetaminophen (NORCO) 7.5-325 MG per tablet, Take 1 tablet by mouth Every 6 (Six) Hours As Needed for Moderate Pain ., Disp: 30 tablet, Rfl: 0  •  Insulin Glargine (BASAGLAR KWIKPEN) 100 UNIT/ML injection pen, Inject 16 units under the skin nightly (REPLACES LANTUS DUE TO INSURANCE FORMULARY) (Patient taking differently: Inject 16 Units under the skin into the appropriate area as directed Every Night. Inject 16 units under the skin nightly (REPLACES LANTUS DUE TO INSURANCE FORMULARY)), Disp: 2 pen, Rfl: 5  •  Insulin Pen Needle 31G X 4 MM misc, 1 each Daily.,  Disp: 100 each, Rfl: 1  •  losartan (COZAAR) 100 MG tablet, Take 100 mg by mouth Daily., Disp: , Rfl:   •  metFORMIN (GLUCOPHAGE) 1000 MG tablet, Take 1,000 mg by mouth 2 (Two) Times a Day With Meals., Disp: , Rfl:   •  montelukast (SINGULAIR) 10 MG tablet, Take 10 mg by mouth Daily., Disp: , Rfl:   •  Multiple Vitamins-Minerals (MULTIVITAMIN ADULT PO), Take 1 tablet by mouth Daily., Disp: , Rfl:   •  rifAMPin (RIFADIN) 300 MG capsule, Take 1 capsule by mouth Every 12 (Twelve) Hours for 42 days., Disp: 84 capsule, Rfl: 0  •  warfarin (COUMADIN) 2.5 MG tablet, Take 1 tablet by mouth Every Night., Disp: 15 tablet, Rfl: 0  •  warfarin (COUMADIN) 7.5 MG tablet, Take 1 tablet by mouth Every Night for 90 days., Disp: 90 tablet, Rfl: 0    No fevers or chills.  No nausea or vomiting.      PHYSICAL EXAMINATION:       Meeta Marmolejo is a 62 y.o. female    Patient is awake and alert, answers questions appropriately and is in no apparent distress.    GAIT:     []  Normal  []  Antalgic    Assistive device: []  None  []  Walker     []  Crutches  []  Cane     []  Wheelchair  []  Stretcher    Ortho Exam the wound is still draining some serous drainage from the central portion.  Her calf is negative.  I flexed about 40° today.  She is neurovascularly intact.  Xr Chest 2 View    Result Date: 9/20/2018  Narrative: TWO VIEW CHEST HISTORY: Preoperative chest for knee replacement Frontal and lateral films of the chest were obtained. COMPARISON: None Left PICC line with tip in the superior vena cava. The lungs are clear of an acute process. Minimal cardiomegaly. The pulmonary vasculature is not increased. No pleural effusion. No pneumothorax. No acute osseous abnormality. Changes distal aspect of each clavicle which may be posttraumatic or possibly related to rheumatoid arthritis.     Impression: CONCLUSION: Left PICC line with tip in the superior vena cava. The lungs are clear of an acute process. Minimal cardiomegaly. [id number]  Electronically signed by:  Carlos Christiansen MD  9/20/2018 3:23 PM CDT Workstation: [employer]    Xr Knee 1 Or 2 View Left    Result Date: 9/13/2018  Narrative: Lateral view left knee Comparison hospital film Patient has a large effusion in the knee.  She is status post revision component of the knee without complicating features seen.  Surgical staples are in place Impression: Status post cemented revision of the tibia.  Effusion is noted.    Xr Knee Bilateral Ap Standing    Result Date: 9/13/2018  Narrative: AP standing bilateral knee Comparison hospital film Patient is status post revision of tibial component.  Overall alignment knee is intact without complicating feature or acute finding Impression: Status post revision left knee    Us Guided Vascular Access    Result Date: 9/20/2018  Narrative: This procedure was auto-finalized with no dictation required.    Ir Replacement Picc Without Port Same Site    Result Date: 9/21/2018  Narrative: This procedure was auto-finalized with no dictation required.    Xr Chest Post Cva Port    Result Date: 9/21/2018  Narrative: PORTABLE CHEST HISTORY: PICC line placement Portable AP film of the chest was obtained at 5:02 PM. COMPARISON: September 20, 2018 Left PICC line with tip projected over the superior vena cava. The lungs are clear of an acute process. Stable minimal cardiomegaly. The pulmonary vasculature is not increased. No pleural effusion. No pneumothorax. No acute osseous abnormality. Degenerative changes are present in the thoracic spine.     Impression: CONCLUSION: Left PICC line with tip projected over the superior vena cava. The lungs are clear of an acute process. Stable minimal cardiomegaly. [id number] Electronically signed by:  Carlos Christiansen MD  9/21/2018 5:26 PM CDT Workstation: [id number]    Ir Picc Wo Fluoro Guidance    Result Date: 9/20/2018  Narrative: This procedure was auto-finalized with no dictation required.          ASSESSMENT:    Diagnoses and all  orders for this visit:    Staphylococcal arthritis of left knee (CMS/[facility])    S/P revision of total knee, left          PLAN I've cauterized granulation tissue with some silver nitrate sticks.  We'll start range of motion of her own and check her in a week.  I've taken of the most proximal and distal stitches been on Aleve the central stitches with the skin has been under tension.  We'll follow her up in a week we'll call with any problems we'll get some blood work at that point C-reactive protein sedimentation rate to check her progress.  I should note there is minimal effusion today.    No Follow-up on file.    Jaqueline Clancy MA

## 2018-10-08 ENCOUNTER — OFFICE VISIT (OUTPATIENT)
Dept: CARDIAC SURGERY | Facility: CLINIC | Age: 63
End: 2018-10-08

## 2018-10-08 VITALS
HEIGHT: 62 IN | SYSTOLIC BLOOD PRESSURE: 130 MMHG | BODY MASS INDEX: 34.78 KG/M2 | DIASTOLIC BLOOD PRESSURE: 65 MMHG | TEMPERATURE: 98.3 F | HEART RATE: 105 BPM | OXYGEN SATURATION: 100 % | WEIGHT: 189 LBS

## 2018-10-08 DIAGNOSIS — I26.99 OTHER ACUTE PULMONARY EMBOLISM WITHOUT ACUTE COR PULMONALE (HCC): Primary | ICD-10-CM

## 2018-10-08 DIAGNOSIS — Z71.89 ENCOUNTER FOR ANTICOAGULATION DISCUSSION AND COUNSELING: ICD-10-CM

## 2018-10-08 PROCEDURE — 99214 OFFICE O/P EST MOD 30 MIN: CPT | Performed by: NURSE PRACTITIONER

## 2018-10-08 RX ORDER — WARFARIN SODIUM 10 MG/1
10 TABLET ORAL NIGHTLY
COMMUNITY
End: 2018-10-15 | Stop reason: SDUPTHER

## 2018-10-08 NOTE — PATIENT INSTRUCTIONS
Coumadin dosing  Monday Wednesday 12. 5 mg  Tuesday 10 mg   Recheck Thursday                                       Unless  Notify provider if you experience excessive bleeding from the nose, cuts, gums, rectum, urinary tract, or vagina. Reddish or brown urine or stool. Vomiting of blood or hemorrhoidal bleeding. If major injury occurs present to the Emergency Department.

## 2018-10-09 ENCOUNTER — TELEPHONE (OUTPATIENT)
Dept: ORTHOPEDIC SURGERY | Facility: CLINIC | Age: 63
End: 2018-10-09

## 2018-10-10 ENCOUNTER — TELEPHONE (OUTPATIENT)
Dept: ORTHOPEDIC SURGERY | Facility: CLINIC | Age: 63
End: 2018-10-10

## 2018-10-10 NOTE — TELEPHONE ENCOUNTER
Called Andreina from [facility] and let her know that Meeta Marmolejo can weight bear as lucien. And she can bend as tolerated.

## 2018-10-11 ENCOUNTER — ANTICOAGULATION VISIT (OUTPATIENT)
Dept: CARDIAC SURGERY | Facility: CLINIC | Age: 63
End: 2018-10-11

## 2018-10-11 ENCOUNTER — OFFICE VISIT (OUTPATIENT)
Dept: ORTHOPEDIC SURGERY | Facility: CLINIC | Age: 63
End: 2018-10-11

## 2018-10-11 VITALS — OXYGEN SATURATION: 98 % | DIASTOLIC BLOOD PRESSURE: 76 MMHG | HEART RATE: 110 BPM | SYSTOLIC BLOOD PRESSURE: 127 MMHG

## 2018-10-11 VITALS — HEIGHT: 62 IN

## 2018-10-11 DIAGNOSIS — Z96.652 S/P REVISION OF TOTAL KNEE, LEFT: ICD-10-CM

## 2018-10-11 DIAGNOSIS — M00.062 STAPHYLOCOCCAL ARTHRITIS OF LEFT KNEE (HCC): Primary | ICD-10-CM

## 2018-10-11 DIAGNOSIS — I26.99 OTHER ACUTE PULMONARY EMBOLISM WITHOUT ACUTE COR PULMONALE (HCC): ICD-10-CM

## 2018-10-11 DIAGNOSIS — Z79.01 LONG TERM (CURRENT) USE OF ANTICOAGULANTS: ICD-10-CM

## 2018-10-11 LAB — INR PPP: 1.5 (ref 0.9–1.1)

## 2018-10-11 PROCEDURE — 99024 POSTOP FOLLOW-UP VISIT: CPT | Performed by: ORTHOPAEDIC SURGERY

## 2018-10-11 PROCEDURE — 85610 PROTHROMBIN TIME: CPT | Performed by: NURSE PRACTITIONER

## 2018-10-11 PROCEDURE — 99211 OFF/OP EST MAY X REQ PHY/QHP: CPT | Performed by: NURSE PRACTITIONER

## 2018-10-11 RX ORDER — HYDROCODONE BITARTRATE AND ACETAMINOPHEN 7.5; 325 MG/1; MG/1
1 TABLET ORAL EVERY 6 HOURS PRN
Qty: 30 TABLET | Refills: 0 | Status: SHIPPED | OUTPATIENT
Start: 2018-10-11 | End: 2018-10-22 | Stop reason: SDUPTHER

## 2018-10-11 NOTE — PROGRESS NOTES
Pt states she is stopping Rifampin today. Pt denies bleeding problems. I consulted with ZELDA Delatorre for dosing schedule. Pt was instructed on new dose and to have a serving of green veggies on Sunday; pt verbalized. Patient instructed regarding medication; results given and questions answered. Nutritional counseling given.  Dietary factors affecting therapy addressed.  Patient instructed to monitor for excessive bruising or bleeding.     PT  CAME BACK TO OFFICE WITH LETTER FROM DR Pinzon STATING PT WILL STOP RIFAMPIN Monday. DOSE ADJUSTED ACCORDINGLY AND  WAS ADVISED TO HAVE PT AVOID GREEN VEGGIES; HE VERBALIZED.        This document has been electronically signed by ZELDA Paul on October 11, 2018 8:23 AM

## 2018-10-11 NOTE — PROGRESS NOTES
Meeta Marmolejo is a 62 y.o. female is s/p       Chief Complaint   Patient presents with   • Left Knee - Post-op       HISTORY OF PRESENT ILLNESS: REVISION LEFT TOTAL KNEE done on 8/28/2018, KNEE INCISION AND DRAINAGE (Left) done on 9/22/2018. Patient is taking rifampin is upsetting her stomach.  Doing better at this point.  Minimal drainage is noted.  Silver nitrate seemed to help quite a bit.    Allergies   Allergen Reactions   • Penicillins Rash   • Percocet [Oxycodone-Acetaminophen] Rash   • Rocephin [Ceftriaxone] Rash         Current Outpatient Prescriptions:   •  acetaminophen (TYLENOL) 500 MG tablet, Take 500 mg by mouth Every 6 (Six) Hours As Needed for Mild Pain ., Disp: , Rfl:   •  alendronate (FOSAMAX) 70 MG tablet, Take 70 mg by mouth Every 7 (Seven) Days. Every Sunday morning, Disp: , Rfl:   •  atorvastatin (LIPITOR) 40 MG tablet, Take 40 mg by mouth Daily., Disp: , Rfl:   •  B Complex-C (SUPER B COMPLEX PO), Take 1 tablet by mouth Daily., Disp: , Rfl:   •  cetirizine (zyrTEC) 10 MG tablet, Take 10 mg by mouth Daily., Disp: , Rfl:   •  docusate sodium (COLACE) 100 MG capsule, Take 100 mg by mouth 2 (Two) Times a Day., Disp: , Rfl:   •  ferrous sulfate 325 (65 FE) MG tablet, Take 1 tablet by mouth Daily With Breakfast., Disp: 30 tablet, Rfl: 3  •  glucose blood test strip, 1 each by Other route 2 (Two) Times a Day. Use as instructed, Disp: 50 each, Rfl: 11  •  HYDROcodone-acetaminophen (NORCO) 7.5-325 MG per tablet, Take 1 tablet by mouth Every 6 (Six) Hours As Needed for Moderate Pain ., Disp: 30 tablet, Rfl: 0  •  Insulin Glargine (BASAGLAR KWIKPEN) 100 UNIT/ML injection pen, Inject 16 units under the skin nightly (REPLACES LANTUS DUE TO INSURANCE FORMULARY) (Patient taking differently: Inject 16 Units under the skin into the appropriate area as directed Every Night. Inject 16 units under the skin nightly (REPLACES LANTUS DUE TO INSURANCE FORMULARY)), Disp: 2 pen, Rfl: 5  •  Insulin Pen Needle 31G  X 4 MM misc, 1 each Daily., Disp: 100 each, Rfl: 1  •  losartan (COZAAR) 100 MG tablet, Take 100 mg by mouth Daily., Disp: , Rfl:   •  metFORMIN (GLUCOPHAGE) 1000 MG tablet, Take 1,000 mg by mouth 2 (Two) Times a Day With Meals., Disp: , Rfl:   •  montelukast (SINGULAIR) 10 MG tablet, Take 10 mg by mouth Daily., Disp: , Rfl:   •  Multiple Vitamins-Minerals (MULTIVITAMIN ADULT PO), Take 1 tablet by mouth Daily., Disp: , Rfl:   •  rifAMPin (RIFADIN) 300 MG capsule, Take 1 capsule by mouth Every 12 (Twelve) Hours for 42 days., Disp: 84 capsule, Rfl: 0  •  warfarin (COUMADIN) 10 MG tablet, Take 10 mg by mouth Every Night., Disp: , Rfl:   •  warfarin (COUMADIN) 2.5 MG tablet, Take 1 tablet by mouth Every Night., Disp: 15 tablet, Rfl: 0    No fevers or chills.  No nausea or vomiting.      PHYSICAL EXAMINATION:       Meeta Marmolejo is a 62 y.o. female    Patient is awake and alert, answers questions appropriately and is in no apparent distress.    GAIT:     []  Normal  []  Antalgic    Assistive device: []  None  [x]  Walker     []  Crutches  []  Cane     []  Wheelchair  []  Stretcher    Ortho Exam  Wound looks much better there is a punctate area of drainage centrally.  Calf is negative.  Probably flexes her knee down to 50°.            ASSESSMENT:    Diagnoses and all orders for this visit:    Staphylococcal arthritis of left knee (CMS/HCC)  -     Ambulatory Referral to Physical Therapy Evaluate and treat; ROM; (WBAT)    S/P revision of total knee, left  -     Ambulatory Referral to Physical Therapy Evaluate and treat; ROM; (WBAT)    Other orders  -     HYDROcodone-acetaminophen (NORCO) 7.5-325 MG per tablet; Take 1 tablet by mouth Every 6 (Six) Hours As Needed for Moderate Pain .          PLAN rifampin is making her sick.  That on Monday and I have given her a note to take the Coumadin clinic so they can anticipate this.  Take her stitches out Steri-Stripped rest.  Formal physical therapy.  Call if any problems  check her back in 10 days otherwise.    No Follow-up on file.    Elias Su MD

## 2018-10-12 ENCOUNTER — READMISSION MANAGEMENT (OUTPATIENT)
Dept: CALL CENTER | Facility: HOSPITAL | Age: 63
End: 2018-10-12

## 2018-10-12 NOTE — OUTREACH NOTE
Medical Week 2 Survey      Responses   Facility patient discharged from?  [place]   Does the patient have one of the following disease processes/diagnoses(primary or secondary)?  Other   Week 2 attempt successful?  Yes   Call start time  1053   Discharge diagnosis  Staphylococcal srthritis of left knee, type 2 DM, esssential HTN, s/p revision of left total knee, septic joint of left knee, anemia, knee incision and drainage   Call end time  1107   Meds reviewed with patient/caregiver?  Yes   Is the patient having any side effects they believe may be caused by any medication additions or changes?  No   Is the patient taking all medications as directed (includes completed medication regime)?  Yes   Medication comments  Md REYNOSO Rifampin at f/u appt -Increased Coumadin dose to 12.5mg per pt.   Has the patient kept scheduled appointments due by today?  Yes   What is the Home health agency?   [facility]   What DME was ordered?  [facility]   Psychosocial issues?  No   Comments  Left knee sutures removed on 10-8 at MD office, incision is healing well, small amt of drainage remains but decreased from what previously was draining., no redness, edema, heat. Appetite is slowly coming back. Staying hydrated. Using walker for ambulation. INR being checked at Coumidin clinic on Mondays. Pt denies pain at this time. Denies issues voiding. Left arm PICC in place, HH changes dressing, pt and spouse do infusion at home daily.   What is the patient's perception of their health status since discharge?  Improving   Is the patient/caregiver able to teach back signs and symptoms related to disease process for when to call PCP?  Yes   Is the patient/caregiver able to teach back signs and symptoms related to disease process for when to call 911?  Yes   Is the patient/caregiver able to teach back the hierarchy of who to call/visit for symptoms/problems? PCP, Specialist, Home health nurse, Urgent Care, ED, 911  Yes   Week 2 Call Completed?   Yes          Graciela Romano RN

## 2018-10-15 ENCOUNTER — ANTICOAGULATION VISIT (OUTPATIENT)
Dept: CARDIAC SURGERY | Facility: CLINIC | Age: 63
End: 2018-10-15

## 2018-10-15 VITALS — OXYGEN SATURATION: 96 % | DIASTOLIC BLOOD PRESSURE: 72 MMHG | SYSTOLIC BLOOD PRESSURE: 138 MMHG | HEART RATE: 110 BPM

## 2018-10-15 DIAGNOSIS — I26.99 OTHER ACUTE PULMONARY EMBOLISM WITHOUT ACUTE COR PULMONALE (HCC): ICD-10-CM

## 2018-10-15 DIAGNOSIS — Z79.01 LONG TERM (CURRENT) USE OF ANTICOAGULANTS: ICD-10-CM

## 2018-10-15 LAB — INR PPP: 2 (ref 0.9–1.1)

## 2018-10-15 PROCEDURE — 85610 PROTHROMBIN TIME: CPT | Performed by: NURSE PRACTITIONER

## 2018-10-15 PROCEDURE — 99211 OFF/OP EST MAY X REQ PHY/QHP: CPT | Performed by: NURSE PRACTITIONER

## 2018-10-15 RX ORDER — WARFARIN SODIUM 2.5 MG/1
TABLET ORAL
Qty: 30 TABLET | Refills: 0 | Status: SHIPPED | OUTPATIENT
Start: 2018-10-15 | End: 2018-11-26

## 2018-10-15 RX ORDER — WARFARIN SODIUM 10 MG/1
TABLET ORAL
Qty: 30 TABLET | Refills: 0 | Status: SHIPPED | OUTPATIENT
Start: 2018-10-15 | End: 2018-11-26

## 2018-10-15 NOTE — PROGRESS NOTES
Pt denies med changes or bleeding problems. Pt took last dose of Rifampin yesterday as previously discussed. Dose adjusted due to discontinuing Rifampin. Patient instructed regarding medication; results given and questions answered. Nutritional counseling given.  Dietary factors affecting therapy addressed.  Patient instructed to monitor for excessive bruising or bleeding. Will recheck in 4 days.         This document has been electronically signed by ZELDA Paul on October 15, 2018 7:56 AM

## 2018-10-16 NOTE — PROGRESS NOTES
Subjective   Patient ID: Meeta Marmolejo is a 62 y.o. female is being seen for consultation today at the request of Dr Nagy concerning anticoagulation  Chief Complaint   Patient presents with   • Pulmonary Embolism     new coumadin   VAS 5 L knee.  SOA and chest pain improved. No bleeding or unexplained bruising.     History of Present Illness  61 y/o lady L knee replacement with revision X3.  Developed R PE.  Started on Xarelto therapy, developed bleeding into her left knee.  Anticoagulation changed to lovenox bridge to coumadin.  Ms Marmolejo presents today for anticoagulation assessment, plan of care, and counseling  8/30/18  CTA:  PE in R[facility] and [facility] branches of [place]  10/08/18  Change coumadin dosing to 12.5 mg every evening.   INR 1.58    The following portions of the patient's history were reviewed and updated as appropriate: allergies, current medications, past family history, past medical history, past social history, past surgical history and problem list.  Past Medical History:   Diagnosis Date   • Acute vaginitis     resolved   • Carpal tunnel syndrome    • Chronic osteoarthritis     Nino TKA   • Chronic pain    • Chronic urticaria    • Dietary counseling and surveillance    • Dysuria    • External hordeolum    • Hyperlipidemia    • Hypertension    • Low back pain     strain   • Microalbuminuria     on ARB   • Noncompliance with treatment    • Obesity      Dieting , exercise, appetite suppressant      • Osteoarthritis of left knee    • Plantar fasciitis     resolved   • Seasonal rhinitis    • Type 2 diabetes mellitus (CMS/HCC)    • Urinary tract infectious disease    • Vaginal discharge    • Vitamin deficiency      Past Surgical History:   Procedure Laterality Date   • CARPAL TUNNEL RELEASE      Bilateral endoscopic release 01/20/2003    • ENDOSCOPY      normal 04/25/2008      • KNEE ARTHROSCOPY     • KNEE INCISION AND DRAINAGE Left 9/22/2018    Procedure: KNEE INCISION AND DRAINAGE;  Surgeon: Georges  Elias NAVAS MD;  Location: [facility];  Service: Orthopedics   • KNEE SURGERY      Removal of existing left total knee arthroplasty and revision left total knee arthroplasty. 12/15/2014       • REPLACEMENT TOTAL KNEE     • SHOULDER SURGERY     • TOTAL KNEE ARTHROPLASTY REVISION Left 8/28/2018    Procedure: REVISION LEFT TOTAL KNEE;  Surgeon: Elias Su MD;  Location: [facility];  Service: Orthopedics   • TUBAL ABDOMINAL LIGATION         Allergies   Allergen Reactions   • Penicillins Rash   • Percocet [Oxycodone-Acetaminophen] Rash   • Rocephin [Ceftriaxone] Rash       Current Outpatient Prescriptions:   •  acetaminophen (TYLENOL) 500 MG tablet, Take 500 mg by mouth Every 6 (Six) Hours As Needed for Mild Pain ., Disp: , Rfl:   •  alendronate (FOSAMAX) 70 MG tablet, Take 70 mg by mouth Every 7 (Seven) Days. Every Sunday morning, Disp: , Rfl:   •  atorvastatin (LIPITOR) 40 MG tablet, Take 40 mg by mouth Daily., Disp: , Rfl:   •  B Complex-C (SUPER B COMPLEX PO), Take 1 tablet by mouth Daily., Disp: , Rfl:   •  cetirizine (zyrTEC) 10 MG tablet, Take 10 mg by mouth Daily., Disp: , Rfl:   •  docusate sodium (COLACE) 100 MG capsule, Take 100 mg by mouth 2 (Two) Times a Day., Disp: , Rfl:   •  ferrous sulfate 325 (65 FE) MG tablet, Take 1 tablet by mouth Daily With Breakfast., Disp: 30 tablet, Rfl: 3  •  glucose blood test strip, 1 each by Other route 2 (Two) Times a Day. Use as instructed, Disp: 50 each, Rfl: 11  •  Insulin Glargine (BASAGLAR KWIKPEN) 100 UNIT/ML injection pen, Inject 16 units under the skin nightly (REPLACES LANTUS DUE TO INSURANCE FORMULARY) (Patient taking differently: Inject 16 Units under the skin into the appropriate area as directed Every Night. Inject 16 units under the skin nightly (REPLACES LANTUS DUE TO INSURANCE FORMULARY)), Disp: 2 pen, Rfl: 5  •  Insulin Pen Needle 31G X 4 MM misc, 1 each Daily., Disp: 100 each, Rfl: 1  •  losartan (COZAAR) 100 MG tablet, Take 100 mg by mouth Daily.,  Disp: , Rfl:   •  metFORMIN (GLUCOPHAGE) 1000 MG tablet, Take 1,000 mg by mouth 2 (Two) Times a Day With Meals., Disp: , Rfl:   •  montelukast (SINGULAIR) 10 MG tablet, Take 10 mg by mouth Daily., Disp: , Rfl:   •  Multiple Vitamins-Minerals (MULTIVITAMIN ADULT PO), Take 1 tablet by mouth Daily., Disp: , Rfl:   •  rifAMPin (RIFADIN) 300 MG capsule, Take 1 capsule by mouth Every 12 (Twelve) Hours for 42 days., Disp: 84 capsule, Rfl: 0  •  HYDROcodone-acetaminophen (NORCO) 7.5-325 MG per tablet, Take 1 tablet by mouth Every 6 (Six) Hours As Needed for Moderate Pain ., Disp: 30 tablet, Rfl: 0  •  warfarin (COUMADIN) 10 MG tablet, Take 1 tablet nightly or as directed, Disp: 30 tablet, Rfl: 0  •  warfarin (COUMADIN) 2.5 MG tablet, Take 1 tablet nightly or as directed, Disp: 30 tablet, Rfl: 0  Current outpatient and discharge medications have been reconciled for the patient.  Reviewed by: ZELDA Alcantara    Review of Systems   Constitution: Positive for weakness. Negative for chills, decreased appetite and fever.        Denies use of power tools, electric knives, chain saws, or conttact sports.  Shaves with safety razor.  Aware to use soft tooth brush and waxed floss.  No recent falls.  Denies:  GIB, GUB, or Seizures.     HENT: Negative for ear pain, hoarse voice, nosebleeds and stridor.    Eyes: Negative for visual disturbance.   Cardiovascular: Positive for chest pain (resolved ) and dyspnea on exertion. Negative for leg swelling and near-syncope.   Respiratory: Negative for cough, hemoptysis, shortness of breath and wheezing.    Hematologic/Lymphatic: Negative for bleeding problem. Does not bruise/bleed easily.   Skin: Negative for itching, nail changes, poor wound healing and rash.   Musculoskeletal: Positive for joint pain and joint swelling. Negative for falls, muscle weakness and myalgias.   Gastrointestinal: Negative for anorexia, heartburn, hematemesis, melena and nausea.   Genitourinary: Negative for  hematuria.   Neurological: Negative for brief paralysis, difficulty with concentration, disturbances in coordination, focal weakness, headaches, light-headedness, loss of balance, numbness and paresthesias.   Psychiatric/Behavioral: Negative for altered mental status.   Allergic/Immunologic: Negative for hives.        Objective   Physical Exam   Constitutional: She is oriented to person, place, and time. She appears well-nourished. No distress.   Body mass index is 34.57 kg/m².     HENT:   Head: Normocephalic.   Mouth/Throat: Oropharynx is clear and moist.   Eyes: Pupils are equal, round, and reactive to light. Conjunctivae and EOM are normal.   Neck: Neck supple. No JVD present.   Cardiovascular: Normal rate, regular rhythm, normal heart sounds and intact distal pulses.    Pulses:       Carotid pulses are 1+ on the right side, and 1+ on the left side.       Radial pulses are 2+ on the right side, and 2+ on the left side.        Dorsalis pedis pulses are 2+ on the right side, and 2+ on the left side.        Posterior tibial pulses are 2+ on the right side, and 2+ on the left side.   Pulmonary/Chest: Effort normal and breath sounds normal. No stridor.   No rub   Abdominal: Soft. Bowel sounds are normal.   Obese    Musculoskeletal: She exhibits no edema or tenderness.   Neurological: She is alert and oriented to person, place, and time. No cranial nerve deficit.   Skin: Skin is warm and dry. Capillary refill takes less than 2 seconds. No rash noted. No erythema. No pallor.   Psychiatric: Her behavior is normal. Judgment normal.   Nursing note and vitals reviewed.      Vitals:    10/08/18 1101   BP: 130/65   Pulse: 105   Temp: 98.3 °F (36.8 °C)   SpO2: 100%     Lab Results   Component Value Date    WBC 7.06 10/03/2018    HGB 9.8 (L) 10/03/2018    HCT 30.9 (L) 10/03/2018    MCV 87.3 10/03/2018     (H) 10/03/2018     '  Lab Results   Component Value Date    GLUCOSE 270 (H) 10/03/2018    BUN 11 10/03/2018     CREATININE 0.48 (L) 10/03/2018    EGFRIFAFRI 159 (H) 10/03/2018    BCR 22.9 10/03/2018    K 4.5 10/03/2018    CO2 29.0 10/03/2018    CALCIUM 10.0 10/03/2018    ALBUMIN 3.90 10/03/2018    AST 23 10/03/2018    ALT 40 10/03/2018     POC INR 1.9       Assessment/Plan   Independent Review of Radiographic Studies:    None with today's visit     1. Other acute pulmonary embolism without acute cor pulmonale (CMS/HCC)  Indication for anticoagulation.      2. Encounter for anticoagulation discussion and counseling  Six months of  anticoagulation with coumadin minimum with target INR 2-3  Continue Coumadin mg nightly.  Balance with food Vit K intake.  Coumadin dosing  Monday Wednesday 12. 5 mg  Tuesday 10 mg   Recheck Thursday                                       Unless  Notify provider if you experience excessive bleeding from the nose, cuts, gums, rectum, urinary tract, or vagina. Reddish or brown urine or stool. Vomiting of blood or hemorrhoidal bleeding. If major injury occurs present to the Emergency Department.   Fifteen minutes of education provided, including but not limited to medication, dosing, side effects, signs of bleeding and inappropriate brusing, control of risk, and nutritional education provided.  Verbal and written education.  Ms Marmolejo is aware to seek immediate medical attention should any bleeding occur or inappropriate bruising, or extreme headache with vision changes after coughing, retching or vomiting.  Patient Education:  Prescription risks and side effects discussed, Should issues or concerns arise regarding anticoagulation therapy, please contact our services regarding further instructions,, or present to an urgent/emergent care facility for further evaluation  Patient Education Materials provided to pt (anticoag booklet).  Pt. stated willingness to be compliant and understanding of life style restrictions and medication dosing.   No other safety concerns at  this time.  Pt demonstrated  understanding of teaching and counseling  After detailed discussion regarding risks, benefits, and treatment plan.  Patient understands, agrees, and wishes to proceed with plan.

## 2018-10-19 ENCOUNTER — HOSPITAL ENCOUNTER (OUTPATIENT)
Dept: PHYSICAL THERAPY | Facility: HOSPITAL | Age: 63
Setting detail: THERAPIES SERIES
Discharge: HOME | End: 2018-10-19
Attending: ORTHOPAEDIC SURGERY

## 2018-10-19 ENCOUNTER — ANTICOAGULATION VISIT (OUTPATIENT)
Dept: CARDIAC SURGERY | Facility: CLINIC | Age: 63
End: 2018-10-19

## 2018-10-19 VITALS — HEART RATE: 112 BPM | OXYGEN SATURATION: 98 % | SYSTOLIC BLOOD PRESSURE: 137 MMHG | DIASTOLIC BLOOD PRESSURE: 72 MMHG

## 2018-10-19 DIAGNOSIS — M00.062 STAPHYLOCOCCAL ARTHRITIS OF LEFT KNEE (HCC): ICD-10-CM

## 2018-10-19 DIAGNOSIS — I26.99 OTHER ACUTE PULMONARY EMBOLISM WITHOUT ACUTE COR PULMONALE (HCC): ICD-10-CM

## 2018-10-19 DIAGNOSIS — Z96.652 S/P REVISION OF TOTAL KNEE, LEFT: Primary | ICD-10-CM

## 2018-10-19 DIAGNOSIS — Z79.01 LONG TERM (CURRENT) USE OF ANTICOAGULANTS: ICD-10-CM

## 2018-10-19 LAB — INR PPP: 1.4 (ref 0.9–1.1)

## 2018-10-19 PROCEDURE — G0283 ELEC STIM OTHER THAN WOUND: HCPCS | Performed by: PHYSICAL THERAPIST

## 2018-10-19 PROCEDURE — 99211 OFF/OP EST MAY X REQ PHY/QHP: CPT | Performed by: NURSE PRACTITIONER

## 2018-10-19 PROCEDURE — G8978 MOBILITY CURRENT STATUS: HCPCS

## 2018-10-19 PROCEDURE — G8979 MOBILITY GOAL STATUS: HCPCS

## 2018-10-19 PROCEDURE — 97110 THERAPEUTIC EXERCISES: CPT | Performed by: PHYSICAL THERAPIST

## 2018-10-19 PROCEDURE — 97162 PT EVAL MOD COMPLEX 30 MIN: CPT | Performed by: PHYSICAL THERAPIST

## 2018-10-19 PROCEDURE — 85610 PROTHROMBIN TIME: CPT | Performed by: NURSE PRACTITIONER

## 2018-10-19 NOTE — PROGRESS NOTES
Pt denies med changes or bleeding problems. Pt states she accidentally missed a dose last night. Pt was instructed on dosing and to hold green veggies for now; pt verbalized. Patient instructed regarding medication; results given and questions answered. Nutritional counseling given.  Dietary factors affecting therapy addressed.  Patient instructed to monitor for excessive bruising or bleeding. Will recheck in 3 days.         This document has been electronically signed by ZELDA Paul on October 19, 2018 8:57 AM

## 2018-10-19 NOTE — THERAPY EVALUATION
Outpatient Physical Therapy Ortho Initial Evaluation  [facility]     Patient Name: Meeta Marmolejo  : 1955  MRN: [id number]  Today's Date: 10/19/2018      Visit Date: 10/19/2018  Visit   Return to MD: 10/22  Re-cert date: 18  Patient Active Problem List   Diagnosis   • Obesity   • Type 2 diabetes mellitus (CMS/HCC)   • Chronic osteoarthritis   • Microalbuminuria   • Encounter for screening mammogram for malignant neoplasm of breast   • Plantar fasciitis   • URI (upper respiratory infection)   • Lateral epicondylitis of right elbow   • Essential hypertension   • Repetitive strain injury of lower back   • History of total knee replacement   • Painful total knee replacement, initial encounter (CMS/[facility])   • Bilateral hip pain   • Mechanical loosening of prosthetic knee (CMS/[facility])   • S/P revision of total knee, left   • Septic joint of left knee joint (CMS/[facility])   • Staphylococcal arthritis of left knee (CMS/[facility])   • Anemia   • Left knee pain   • Other pulmonary embolism without acute cor pulmonale (CMS/[facility])   • Encounter for anticoagulation discussion and counseling   • Long term (current) use of anticoagulants [Z79.01]        Past Medical History:   Diagnosis Date   • Acute vaginitis     resolved   • Carpal tunnel syndrome    • Chronic osteoarthritis     Nino TKA   • Chronic pain    • Chronic urticaria    • Dietary counseling and surveillance    • Dysuria    • External hordeolum    • Hyperlipidemia    • Hypertension    • Low back pain     strain   • Microalbuminuria     on ARB   • Noncompliance with treatment    • Obesity      Dieting , exercise, appetite suppressant      • Osteoarthritis of left knee    • Plantar fasciitis     resolved   • Seasonal rhinitis    • Type 2 diabetes mellitus (CMS/HCC)    • Urinary tract infectious disease    • Vaginal discharge    • Vitamin deficiency         Past Surgical History:   Procedure Laterality Date   • CARPAL TUNNEL RELEASE       Bilateral endoscopic release 01/20/2003    • ENDOSCOPY      normal 04/25/2008      • KNEE ARTHROSCOPY     • KNEE INCISION AND DRAINAGE Left 9/22/2018    Procedure: KNEE INCISION AND DRAINAGE;  Surgeon: Elias Su MD;  Location: [facility];  Service: Orthopedics   • KNEE SURGERY      Removal of existing left total knee arthroplasty and revision left total knee arthroplasty. 12/15/2014       • REPLACEMENT TOTAL KNEE     • SHOULDER SURGERY     • TOTAL KNEE ARTHROPLASTY REVISION Left 8/28/2018    Procedure: REVISION LEFT TOTAL KNEE;  Surgeon: Elias Su MD;  Location: [facility];  Service: Orthopedics   • TUBAL ABDOMINAL LIGATION         Visit Dx:     ICD-10-CM ICD-9-CM   1. S/P revision of total knee, left Z96.652 V43.65   2. Staphylococcal arthritis of left knee (CMS/[facility]) M00.062 711.06     041.10          Medications (Admitted on 10/19/2018)     acetaminophen (TYLENOL) 500 MG tablet     alendronate (FOSAMAX) 70 MG tablet     atorvastatin (LIPITOR) 40 MG tablet     B Complex-C (SUPER B COMPLEX PO)     cetirizine (zyrTEC) 10 MG tablet     docusate sodium (COLACE) 100 MG capsule     ferrous sulfate 325 (65 FE) MG tablet     glucose blood test strip     HYDROcodone-acetaminophen (NORCO) 7.5-325 MG per tablet     Insulin Glargine (BASAGLAR KWIKPEN) 100 UNIT/ML injection pen     Insulin Pen Needle 31G X 4 MM misc     losartan (COZAAR) 100 MG tablet     metFORMIN (GLUCOPHAGE) 1000 MG tablet     montelukast (SINGULAIR) 10 MG tablet     Multiple Vitamins-Minerals (MULTIVITAMIN ADULT PO)     rifAMPin (RIFADIN) 300 MG capsule     warfarin (COUMADIN) 10 MG tablet     warfarin (COUMADIN) 2.5 MG tablet      Immunizations/Injections    Hep A / Hep B3/10/2014    Pneumococcal Polysaccharide (PPSV23)10/3/2011    Tdap3/8/2017    influenza Split11/12/2012      Allergies: Penicillins, Percocet, Rocephin          PT Ortho     Row Name 10/19/18 1100       Subjective Comments    Subjective Comments 63 yo female s/p L  total knee revision on 9/28/18, post operatively had staph infection and required irrigation and debridement on 9/12/18. Has not had formal PT over the past month.  -BS       Precautions and Contraindications    Precautions WBAT  LLE  -BS       Subjective Pain    Able to rate subjective pain? yes  -BS    Pre-Treatment Pain Level 8  -BS       Posture/Observations    Posture/Observations Comments moderate edema left knee  -BS       General ROM    GENERAL ROM COMMENTS AROM: R knee 12-57°; L knee 0-115°   -BS       MMT (Manual Muscle Testing)    General MMT Comments MMT: L LE-hip flex 4/5 knee flex 4/5 knee ext 4-/5 ankle DF 5/5; R LE-5/5  -BS       Sensation    Light Touch Partial deficits in the LLE  -BS      User Key  (r) = Recorded By, (t) = Taken By, (c) = Cosigned By    Initials Name Provider Type    BS Charles Newton, PT Physical Therapist                      Therapy Education  Given: HEP, Pain management, Symptoms/condition management  Program: New  How Provided: Verbal, Written  Provided to: Patient, Other (comment) (pt's  present)  Level of Understanding: Verbalized, Demonstrated           PT OP Goals     Row Name 10/19/18 1100          PT Short Term Goals    STG Date to Achieve 11/02/18  -BS     STG 1 Pt indep with HEP  -BS     STG 1 Progress New  -BS     STG 2 Improve left knee AROM to 5-80°  -BS     STG 2 Progress New  -BS     STG 3 Improve left knee MMT (flex/ext) to 4+/5  -BS     STG 3 Progress New  -BS     STG 4 Ambulate with RW with step through non antalgic gait (NAG)   -BS     STG 4 Progress New  -BS     STG 5 Reduce L knee pain by 25% with standing and walking  -BS     STG 5 Progress New  -BS        Long Term Goals    LTG Date to Achieve 11/16/18  -BS     LTG 1 Improve left knee AROM to 0-90°  -BS     LTG 1 Progress New  -BS     LTG 2 Improve left knee MMT (flex/ext) to 5/5  -BS     LTG 2 Progress New  -BS     LTG 3 Ambulate with or without straight cane with step through non antalgic gait  (NAG) x 1 lap in the gym   -BS     LTG 3 Progress New  -BS     LTG 4 Reduce L knee pain by 75% with standing and walking  -BS     LTG 4 Progress New  -BS        Time Calculation    PT Goal Re-Cert Due Date 11/09/18  -BS       User Key  (r) = Recorded By, (t) = Taken By, (c) = Cosigned By    Initials Name Provider Type    BS Charles Newton, PT Physical Therapist                PT Assessment/Plan     Row Name 10/19/18 1100          PT Assessment    Functional Limitations Impaired gait;Performance in work activities;Performance in sport activities;Performance in self-care ADL;Performance in leisure activities;Limitation in home management  -BS     Impairments Balance;Endurance;Gait;Edema;Impaired flexibility;Sensation;Range of motion;Posture;Pain;Muscle strength  -BS     Assessment Comments L knee pain s/p revision of L total knee on 9/28 as well as I and D for L knee due to staph infection on 9/12/18.  -BS     Please refer to paper survey for additional self-reported information Yes  -BS     Rehab Potential Fair  -BS     Patient/caregiver participated in establishment of treatment plan and goals Yes  -BS     Patient would benefit from skilled therapy intervention Yes  -BS        PT Plan    PT Frequency 3x/week  -BS     Predicted Duration of Therapy Intervention (Therapy Eval) 6-8 weeks  -BS     Planned CPT's? PT EVAL MOD COMPLELITY: 49860;PT RE-EVAL: 49230;PT THER PROC EA 15 MIN: 99156;PT THER ACT EA 15 MIN: 52417;PT MANUAL THERAPY EA 15 MIN: 43698;PT NEUROMUSC RE-EDUCATION EA 15 MIN: 32435;PT GAIT TRAINING EA 15 MIN: 82587;PT ELECTRICAL STIM UNATTEND: ;PT HOT/COLD PACK WC NONMCARE: 99897;PT THER SUPP EA 15 MIN  -BS     Physical Therapy Interventions (Optional Details) balance training;gait training;joint mobilization;home exercise program;manual therapy techniques;modalities;neuromuscular re-education;patient/family education;ROM (Range of Motion);stair training;strengthening;stretching;transfer training  -BS   "   PT Plan Comments f/u with gentle AAROM/AROM to improve L knee ROM, address quad/HS strengthening: LAQ's, sit stand, SLR  -BS       User Key  (r) = Recorded By, (t) = Taken By, (c) = Cosigned By    Initials Name Provider Type    Charles Sales, PT Physical Therapist                Modalities     Row Name 10/19/18 1100             Subjective Pain    Post-Treatment Pain Level 6  -BS         Ice    Ice Applied Yes  -BS      Location L knee  -BS      Rx Minutes 10 mins  -BS      Ice S/P Rx Yes  -BS         ELECTRICAL STIMULATION    Attended/Unattended Unattended  -BS      Stimulation Type IFC  -BS      Location/Electrode Placement/Other L knee  -BS      24657 - PT Electrical Stimulation Attended (Manual) Minutes 10  -BS        User Key  (r) = Recorded By, (t) = Taken By, (c) = Cosigned By    Initials Name Provider Type    Charles Sales, PT Physical Therapist              Exercises     Row Name 10/19/18 1100             Subjective Comments    Subjective Comments 61 yo female s/p L total knee revision on 9/28/18, post operatively had staph infection and required irrigation and debridement on 9/12/18. Has not had formal PT over the past month.  -BS         Subjective Pain    Able to rate subjective pain? yes  -BS      Pre-Treatment Pain Level 8  -BS      Post-Treatment Pain Level 6  -BS         Exercise 1    Exercise Name 1 L SLR  -BS      Sets 1 1  -BS      Reps 1 10  -BS         Exercise 2    Exercise Name 2 seated L heel slides AAROM  -BS      Sets 2 1  -BS      Reps 2 10  -BS         Exercise 3    Exercise Name 3 SAQ's  -BS      Sets 3 1  -BS      Reps 3 15  -BS         Exercise 4    Exercise Name 4 QS's w/ heel prop  -BS      Sets 4 1  -BS      Reps 4 15  -BS      Time 4 5\" hold  -BS        User Key  (r) = Recorded By, (t) = Taken By, (c) = Cosigned By    Initials Name Provider Type    Charles Sales, PT Physical Therapist                        Outcome Measure Options: Lower Extremity Functional Scale " (LEFS)  Lower Extremity Functional Index  Any of your usual work, housework or school activities: Extreme difficulty or unable to perform activity  Your usual hobbies, recreational or sporting activities: Extreme difficulty or unable to perform activity  Getting into or out of the bath: No difficulty  Walking between rooms: A little bit of difficulty  Putting on your shoes or socks: No difficulty  Squatting: Extreme difficulty or unable to perform activity  Lifting an object, like a bag of groceries from the floor: Quite a bit of difficulty  Performing light activities around your home: Quite a bit of difficulty  Performing heavy activities around your home: Extreme difficulty or unable to perform activity  Getting into or out of a car: Quite a bit of difficulty  Walking 2 blocks: Extreme difficulty or unable to perform activity  Walking a mile: Extreme difficulty or unable to perform activity  Going up or down 10 stairs (about 1 flight of stairs): Extreme difficulty or unable to perform activity  Standing for 1 hour: Extreme difficulty or unable to perform activity  Sitting for 1 hour: Quite a bit of difficulty  Running on even ground: Extreme difficulty or unable to perform activity  Running on uneven ground: Extreme difficulty or unable to perform activity  Making sharp turns while running fast: Extreme difficulty or unable to perform activity  Hopping: Extreme difficulty or unable to perform activity  Rolling over in bed: Quite a bit of difficulty  Total: 16      Time Calculation:     Therapy Suggested Charges     Code   Minutes Charges    73146 (CPT®) Hc Pt Neuromusc Re Education Ea 15 Min      32845 (CPT®) Hc Pt Ther Proc Ea 15 Min      80437 (CPT®) Hc Gait Training Ea 15 Min      58080 (CPT®) Hc Pt Therapeutic Act Ea 15 Min      62904 (CPT®) Hc Pt Manual Therapy Ea 15 Min      60779 (CPT®) Hc Pt Ther Massage- Per 15 Min      41690 (CPT®) Hc Pt Iontophoresis Ea 15 Min      75847 (CPT®) Hc Pt Elec Stim Ea-Per  15 Min 10 1    69324 (CPT®) Hc Pt Ultrasound Ea 15 Min      73213 (CPT®) Hc Pt Self Care/Mgmt/Train Ea 15 Min      10013 (CPT®) Hc Pt Prosthetic (S) Train Initial Encounter, Each 15 Min      95438 (CPT®) Hc Orthotic(S) Mgmt/Train Initial Encounter, Each 15min      26609 (CPT®) Hc Pt Aquatic Therapy Ea 15 Min      18798 (CPT®) Hc Pt Orthotic(S)/Prosthetic(S) Encounter, Each 15 Min       (CPT®) Hc Pt Electrical Stim Unattended      Total  10 1          Start Time: 1104  Stop Time: 1157  Time Calculation (min): 53 min  PT Non-Billable Time (min): 10 min  Total Timed Code Minutes- PT: 43 minute(s)     Therapy Charges for Today     Code Description Service Date Service Provider Modifiers Qty    63412025998 HC PT EVAL MOD COMPLEXITY 2 10/19/2018 Charles Newton, PT GP 1    46369091362 HC PT THER PROC EA 15 MIN 10/19/2018 Charles Newton, PT GP 1    77036593666 HC PT ELECTRICAL STIM UNATTENDED 10/19/2018 Charles Newton, PT  1          PT G-Codes  Outcome Measure Options: Lower Extremity Functional Scale (LEFS)  Total: 16         Charles Newton PT  10/19/2018

## 2018-10-20 ENCOUNTER — READMISSION MANAGEMENT (OUTPATIENT)
Dept: CALL CENTER | Facility: HOSPITAL | Age: 63
End: 2018-10-20

## 2018-10-20 NOTE — OUTREACH NOTE
Medical Week 3 Survey      Responses   Facility patient discharged from?  [facility]   Does the patient have one of the following disease processes/diagnoses(primary or secondary)?  Other   Week 3 attempt successful?  Yes   Call start time  1516   Call end time  1518   Meds reviewed with patient/caregiver?  Yes   Is the patient taking all medications as directed (includes completed medication regime)?  Yes   Comments regarding appointments  seeing  on Monday   Has the patient kept scheduled appointments due by today?  Yes   What is the patient's perception of their health status since discharge?  Improving   Additional teach back comments  has started  PT yesterday and is doing well   Week 3 Call Completed?  Yes          Zahira Story RN

## 2018-10-22 ENCOUNTER — ANTICOAGULATION VISIT (OUTPATIENT)
Dept: CARDIAC SURGERY | Facility: CLINIC | Age: 63
End: 2018-10-22

## 2018-10-22 ENCOUNTER — LAB (OUTPATIENT)
Dept: LAB | Facility: HOSPITAL | Age: 63
End: 2018-10-22

## 2018-10-22 ENCOUNTER — OFFICE VISIT (OUTPATIENT)
Dept: ORTHOPEDIC SURGERY | Facility: CLINIC | Age: 63
End: 2018-10-22

## 2018-10-22 VITALS — SYSTOLIC BLOOD PRESSURE: 148 MMHG | OXYGEN SATURATION: 97 % | HEART RATE: 94 BPM | DIASTOLIC BLOOD PRESSURE: 76 MMHG

## 2018-10-22 VITALS — BODY MASS INDEX: 34.78 KG/M2 | HEIGHT: 62 IN | WEIGHT: 189 LBS

## 2018-10-22 DIAGNOSIS — Z96.652 S/P REVISION OF TOTAL KNEE, LEFT: Primary | ICD-10-CM

## 2018-10-22 DIAGNOSIS — I26.99 OTHER ACUTE PULMONARY EMBOLISM WITHOUT ACUTE COR PULMONALE (HCC): ICD-10-CM

## 2018-10-22 DIAGNOSIS — Z79.01 LONG TERM (CURRENT) USE OF ANTICOAGULANTS: ICD-10-CM

## 2018-10-22 DIAGNOSIS — Z96.652 S/P REVISION OF TOTAL KNEE, LEFT: ICD-10-CM

## 2018-10-22 LAB
CRP SERPL-MCNC: 2.2 MG/DL (ref 0–1)
ERYTHROCYTE [SEDIMENTATION RATE] IN BLOOD: 73 MM/HR (ref 0–20)
INR PPP: 1.8 (ref 0.9–1.1)

## 2018-10-22 PROCEDURE — 86140 C-REACTIVE PROTEIN: CPT

## 2018-10-22 PROCEDURE — 36415 COLL VENOUS BLD VENIPUNCTURE: CPT

## 2018-10-22 PROCEDURE — 85610 PROTHROMBIN TIME: CPT | Performed by: NURSE PRACTITIONER

## 2018-10-22 PROCEDURE — 99211 OFF/OP EST MAY X REQ PHY/QHP: CPT | Performed by: NURSE PRACTITIONER

## 2018-10-22 PROCEDURE — 99024 POSTOP FOLLOW-UP VISIT: CPT | Performed by: ORTHOPAEDIC SURGERY

## 2018-10-22 PROCEDURE — 85651 RBC SED RATE NONAUTOMATED: CPT

## 2018-10-22 RX ORDER — HYDROCODONE BITARTRATE AND ACETAMINOPHEN 7.5; 325 MG/1; MG/1
1 TABLET ORAL EVERY 6 HOURS PRN
Qty: 30 TABLET | Refills: 0 | Status: SHIPPED | OUTPATIENT
Start: 2018-10-22 | End: 2018-11-12 | Stop reason: SDUPTHER

## 2018-10-22 NOTE — PROGRESS NOTES
Meeta Marmolejo is a 63 y.o. female is s/p  Left total knee revision.     Chief Complaint   Patient presents with   • Left Knee - Follow-up       HISTORY OF PRESENT ILLNESS: Patient is here today for recheck of left knee. Patient states that she has started her physical therapy on 10/19/2018. Patient states that her knee pain is 6/10.  Doing very well at this point please with her progress.       Allergies   Allergen Reactions   • Penicillins Rash   • Percocet [Oxycodone-Acetaminophen] Rash   • Rocephin [Ceftriaxone] Rash         Current Outpatient Prescriptions:   •  acetaminophen (TYLENOL) 500 MG tablet, Take 500 mg by mouth Every 6 (Six) Hours As Needed for Mild Pain ., Disp: , Rfl:   •  alendronate (FOSAMAX) 70 MG tablet, Take 70 mg by mouth Every 7 (Seven) Days. Every Sunday morning, Disp: , Rfl:   •  atorvastatin (LIPITOR) 40 MG tablet, Take 40 mg by mouth Daily., Disp: , Rfl:   •  B Complex-C (SUPER B COMPLEX PO), Take 1 tablet by mouth Daily., Disp: , Rfl:   •  cetirizine (zyrTEC) 10 MG tablet, Take 10 mg by mouth Daily., Disp: , Rfl:   •  docusate sodium (COLACE) 100 MG capsule, Take 100 mg by mouth 2 (Two) Times a Day., Disp: , Rfl:   •  ferrous sulfate 325 (65 FE) MG tablet, Take 1 tablet by mouth Daily With Breakfast., Disp: 30 tablet, Rfl: 3  •  glucose blood test strip, 1 each by Other route 2 (Two) Times a Day. Use as instructed, Disp: 50 each, Rfl: 11  •  HYDROcodone-acetaminophen (NORCO) 7.5-325 MG per tablet, Take 1 tablet by mouth Every 6 (Six) Hours As Needed for Moderate Pain ., Disp: 30 tablet, Rfl: 0  •  Insulin Glargine (BASAGLAR KWIKPEN) 100 UNIT/ML injection pen, Inject 16 units under the skin nightly (REPLACES LANTUS DUE TO INSURANCE FORMULARY) (Patient taking differently: Inject 16 Units under the skin into the appropriate area as directed Every Night. Inject 16 units under the skin nightly (REPLACES LANTUS DUE TO INSURANCE FORMULARY)), Disp: 2 pen, Rfl: 5  •  Insulin Pen Needle 31G X  4 MM misc, 1 each Daily., Disp: 100 each, Rfl: 1  •  losartan (COZAAR) 100 MG tablet, Take 100 mg by mouth Daily., Disp: , Rfl:   •  metFORMIN (GLUCOPHAGE) 1000 MG tablet, Take 1,000 mg by mouth 2 (Two) Times a Day With Meals., Disp: , Rfl:   •  montelukast (SINGULAIR) 10 MG tablet, Take 10 mg by mouth Daily., Disp: , Rfl:   •  Multiple Vitamins-Minerals (MULTIVITAMIN ADULT PO), Take 1 tablet by mouth Daily., Disp: , Rfl:   •  rifAMPin (RIFADIN) 300 MG capsule, Take 1 capsule by mouth Every 12 (Twelve) Hours for 42 days., Disp: 84 capsule, Rfl: 0  •  warfarin (COUMADIN) 10 MG tablet, Take 1 tablet nightly or as directed, Disp: 30 tablet, Rfl: 0  •  warfarin (COUMADIN) 2.5 MG tablet, Take 1 tablet nightly or as directed, Disp: 30 tablet, Rfl: 0    No fevers or chills.  No nausea or vomiting.      PHYSICAL EXAMINATION:       Meeta Marmolejo is a 63 y.o. female    Patient is awake and alert, answers questions appropriately and is in no apparent distress.    GAIT:     []  Normal  []  Antalgic    Assistive device: []  None  []  Walker     []  Crutches  []  Cane     []  Wheelchair  []  Stretcher    Ortho Exam  Wound is not draining.  There is a small Vicryl stitch coming out.  Range of motion is from about 10° to 60°.    No results found.        ASSESSMENT:    Diagnoses and all orders for this visit:    S/P revision of total knee, left  -     C-reactive Protein; Future  -     Sedimentation Rate; Future  -     HYDROcodone-acetaminophen (NORCO) 7.5-325 MG per tablet; Take 1 tablet by mouth Every 6 (Six) Hours As Needed for Moderate Pain .          PLAN I removed the stitches today.  Aggressive physical therapy.  I will repeat a CRP and sedimentation rate.  Follow up on November 5 which be time to get her PICC line and stop her antibiotics.    No Follow-up on file.    Elias Su MD

## 2018-10-22 NOTE — PROGRESS NOTES
PT states she took Coumadin as directed. Denies any med changes or bleeding issues. Denies any s/s of blood clot. Adjusted pt's dose and instructed to hold green vegs until Wednesday. Patient instructed regarding medication; results given and questions answered. Nutritional counseling given.  Dietary factors affecting therapy addressed.  Patient instructed to monitor for excessive bruising or bleeding. PT verbalizes understanding and will be seen on Thursday due to therapy appt.        This document has been electronically signed by AAMIR Ma @ on October 22, 2018 9:27 AM

## 2018-10-23 ENCOUNTER — HOSPITAL ENCOUNTER (OUTPATIENT)
Dept: PHYSICAL THERAPY | Facility: HOSPITAL | Age: 63
Setting detail: THERAPIES SERIES
Discharge: HOME | End: 2018-10-23
Attending: ORTHOPAEDIC SURGERY

## 2018-10-23 ENCOUNTER — APPOINTMENT (OUTPATIENT)
Dept: PHYSICAL THERAPY | Facility: HOSPITAL | Age: 63
End: 2018-10-23
Attending: ORTHOPAEDIC SURGERY

## 2018-10-23 DIAGNOSIS — M00.062 STAPHYLOCOCCAL ARTHRITIS OF LEFT KNEE (HCC): ICD-10-CM

## 2018-10-23 DIAGNOSIS — Z96.652 S/P REVISION OF TOTAL KNEE, LEFT: Primary | ICD-10-CM

## 2018-10-23 PROCEDURE — 97032 APPL MODALITY 1+ESTIM EA 15: CPT | Performed by: PHYSICAL THERAPIST

## 2018-10-23 PROCEDURE — 97110 THERAPEUTIC EXERCISES: CPT | Performed by: PHYSICAL THERAPIST

## 2018-10-23 NOTE — THERAPY TREATMENT NOTE
"    Outpatient Physical Therapy Ortho Treatment Note  [facility]  Radha Grossman, PT, DPT, CSCS       Patient Name: Meeta Marmolejo  : 1955  MRN: [id number]  Today's Date: 10/23/2018      Visit Date: 10/23/2018     Pt reports 7/10 pain pre treatment, 2/10 pain post treatment  Reports \"a little\"% of improvement.  Attended 2/2 visits.  Insurance available: medicare guidelines  Next MD appt: 2018.  Recertification: 2018.      Visit Dx:    ICD-10-CM ICD-9-CM   1. S/P revision of total knee, left Z96.652 V43.65   2. Staphylococcal arthritis of left knee (CMS/[facility]) M00.062 711.06     041.10       Patient Active Problem List   Diagnosis   • Obesity   • Type 2 diabetes mellitus (CMS/[facility])   • Chronic osteoarthritis   • Microalbuminuria   • Encounter for screening mammogram for malignant neoplasm of breast   • Plantar fasciitis   • URI (upper respiratory infection)   • Lateral epicondylitis of right elbow   • Essential hypertension   • Repetitive strain injury of lower back   • History of total knee replacement   • Painful total knee replacement, initial encounter (CMS/[facility])   • Bilateral hip pain   • Mechanical loosening of prosthetic knee (CMS/[facility])   • S/P revision of total knee, left   • Septic joint of left knee joint (CMS/[facility])   • Staphylococcal arthritis of left knee (CMS/[facility])   • Anemia   • Left knee pain   • Other pulmonary embolism without acute cor pulmonale (CMS/[facility])   • Encounter for anticoagulation discussion and counseling   • Long term (current) use of anticoagulants [Z79.01]        Past Medical History:   Diagnosis Date   • Acute vaginitis     resolved   • Carpal tunnel syndrome    • Chronic osteoarthritis     Nino TKA   • Chronic pain    • Chronic urticaria    • Dietary counseling and surveillance    • Dysuria    • External hordeolum    • Hyperlipidemia    • Hypertension    • Low back pain     strain   • Microalbuminuria     on ARB   • Noncompliance with " treatment    • Obesity      Dieting , exercise, appetite suppressant      • Osteoarthritis of left knee    • Plantar fasciitis     resolved   • Seasonal rhinitis    • Type 2 diabetes mellitus (CMS/[facility])    • Urinary tract infectious disease    • Vaginal discharge    • Vitamin deficiency         Past Surgical History:   Procedure Laterality Date   • CARPAL TUNNEL RELEASE      Bilateral endoscopic release 01/20/2003    • ENDOSCOPY      normal 04/25/2008      • KNEE ARTHROSCOPY     • KNEE INCISION AND DRAINAGE Left 9/22/2018    Procedure: KNEE INCISION AND DRAINAGE;  Surgeon: Elias Su MD;  Location: [facility];  Service: Orthopedics   • KNEE SURGERY      Removal of existing left total knee arthroplasty and revision left total knee arthroplasty. 12/15/2014       • REPLACEMENT TOTAL KNEE     • SHOULDER SURGERY     • TOTAL KNEE ARTHROPLASTY REVISION Left 8/28/2018    Procedure: REVISION LEFT TOTAL KNEE;  Surgeon: Elias Su MD;  Location: [facility];  Service: Orthopedics   • TUBAL ABDOMINAL LIGATION               PT Ortho     Row Name 10/23/18 0800       Subjective Comments    Subjective Comments Patient brings in note from MD for aggressive ROM per mD.  -       Subjective Pain    Able to rate subjective pain? yes  -    Pre-Treatment Pain Level 7  -    Post-Treatment Pain Level 2  -       General ROM    GENERAL ROM COMMENTS AROm R knee 6°-67°  -      User Key  (r) = Recorded By, (t) = Taken By, (c) = Cosigned By    Initials Name Provider Type    Radha Angeles, PT Physical Therapist                            PT Assessment/Plan     Row Name 10/23/18 0900          PT Assessment    Assessment Comments Improved ROm today after increasing intensite of therapy.  -        PT Plan    PT Frequency 3x/week  -     PT Plan Comments Aggressive ROM per MD. Add patellar mobs, review HEP.  -       User Key  (r) = Recorded By, (t) = Taken By, (c) = Cosigned By    Initials Name Provider Type  "   Radha Angeles, ROGER Physical Therapist                Modalities     Row Name 10/23/18 0800             Ice    Ice Applied Yes  -AJ      Location L knee with elevation in pillowcase  -AJ      Rx Minutes 15 mins  -AJ      Ice S/P Rx Yes  -AJ         ELECTRICAL STIMULATION    Attended/Unattended Unattended  -AJ      Stimulation Type IFC  -AJ      Location/Electrode Placement/Other L knee  -AJ      68007 - PT Electrical Stimulation Attended (Manual) Minutes --  -AJ       PT Electrical Stimulation Unattended (Manual) Minutes 15  -AJ        User Key  (r) = Recorded By, (t) = Taken By, (c) = Cosigned By    Initials Name Provider Type    Radha Angeles, ROGER Physical Therapist                Exercises     Row Name 10/23/18 0800             Subjective Comments    Subjective Comments Patient brings in note from MD for aggressive ROM per mD.  -AJ         Subjective Pain    Able to rate subjective pain? yes  -AJ      Pre-Treatment Pain Level 7  -AJ      Post-Treatment Pain Level 2  -AJ         Exercise 1    Exercise Name 1 Pro II LR- Rocking  -AJ      Time 1 10 minutes  -AJ      Additional Comments L 1.0  -AJ         Exercise 2    Exercise Name 2 L St. Lunge S  -AJ      Reps 2 10  -AJ      Time 2 10\" hold  -AJ         Exercise 3    Exercise Name 3 Wall slides  -AJ      Time 3 5\" holds for 5 minutes  -AJ         Exercise 4    Exercise Name 4 Shuttle:2L Press  -AJ      Time 4 5 minutes  -AJ      Additional Comments 6 cords  -AJ         Exercise 5    Exercise Name 5 Heel slides with strap  -AJ      Time 5 5\" hold for 5 minutes  -AJ         Exercise 6    Exercise Name 6 QS with heel prop  -AJ      Reps 6 20  -AJ      Time 6 5\" holds  -AJ        User Key  (r) = Recorded By, (t) = Taken By, (c) = Cosigned By    Initials Name Provider Type    Radha Angeles, ROGER Physical Therapist                             Therapy Education  Education Details: Add lunge S, wall slides, and heel slides with " strap  Given: HEP  Program: Progressed  How Provided: Verbal, Demonstration, Written  Provided to: Patient  Level of Understanding: Verbalized, Demonstrated              Time Calculation:   Start Time: 0802  Stop Time: 0913  Time Calculation (min): 71 min  PT Non-Billable Time (min): 15 min  Total Timed Code Minutes- PT: 56 minute(s)    Therapy Charges for Today     Code Description Service Date Service Provider Modifiers Qty    97347030714 HC PT THER PROC EA 15 MIN 10/23/2018 Radha Grossman, PT GP 4    63939238737 HC PT ELEC STIM EA-PER 15 MIN 10/23/2018 Radha Grossman, PT GP 1    [id number]  PT THER SUPP EA 15 MIN 10/23/2018 Radha Grossman, PT GP 1                    Radha Grossman PT, DPT, CSCS  10/23/2018

## 2018-10-24 ENCOUNTER — HOSPITAL ENCOUNTER (OUTPATIENT)
Dept: PHYSICAL THERAPY | Facility: HOSPITAL | Age: 63
Setting detail: THERAPIES SERIES
Discharge: HOME | End: 2018-10-24
Attending: ORTHOPAEDIC SURGERY

## 2018-10-24 DIAGNOSIS — M00.062 STAPHYLOCOCCAL ARTHRITIS OF LEFT KNEE (HCC): ICD-10-CM

## 2018-10-24 DIAGNOSIS — Z96.652 S/P REVISION OF TOTAL KNEE, LEFT: Primary | ICD-10-CM

## 2018-10-24 PROCEDURE — 97110 THERAPEUTIC EXERCISES: CPT

## 2018-10-24 PROCEDURE — G0283 ELEC STIM OTHER THAN WOUND: HCPCS

## 2018-10-25 ENCOUNTER — ANTICOAGULATION VISIT (OUTPATIENT)
Dept: CARDIAC SURGERY | Facility: CLINIC | Age: 63
End: 2018-10-25

## 2018-10-25 VITALS — HEART RATE: 88 BPM

## 2018-10-25 DIAGNOSIS — I26.99 OTHER ACUTE PULMONARY EMBOLISM WITHOUT ACUTE COR PULMONALE (HCC): ICD-10-CM

## 2018-10-25 DIAGNOSIS — Z79.01 LONG TERM (CURRENT) USE OF ANTICOAGULANTS: ICD-10-CM

## 2018-10-25 LAB — INR PPP: 3.4 (ref 0.9–1.1)

## 2018-10-25 PROCEDURE — 99211 OFF/OP EST MAY X REQ PHY/QHP: CPT | Performed by: NURSE PRACTITIONER

## 2018-10-25 PROCEDURE — 85610 PROTHROMBIN TIME: CPT | Performed by: NURSE PRACTITIONER

## 2018-10-25 NOTE — PROGRESS NOTES
PT has been on Ancef IV since she was in the hospital but CC was not aware. Ancef can have moderate interaction. PT denies any med changes or bleeding issues. Denies any further s/s of blood clot. Adjusted pt's dose and instructed to increase vit k today and Sunday. PT will be seen in [facility] on Tuesday when she can return. Patient instructed regarding medication; results given and questions answered. Nutritional counseling given.  Dietary factors affecting therapy addressed.  Patient instructed to monitor for excessive bruising or bleeding. PT verbalizes understanding.         This document has been electronically signed by AAMIR Ma @ on October 25, 2018 8:32 AM

## 2018-10-26 ENCOUNTER — HOSPITAL ENCOUNTER (OUTPATIENT)
Dept: PHYSICAL THERAPY | Facility: HOSPITAL | Age: 63
Setting detail: THERAPIES SERIES
Discharge: HOME | End: 2018-10-26
Attending: ORTHOPAEDIC SURGERY

## 2018-10-26 DIAGNOSIS — M00.062 STAPHYLOCOCCAL ARTHRITIS OF LEFT KNEE (HCC): ICD-10-CM

## 2018-10-26 DIAGNOSIS — Z96.652 S/P REVISION OF TOTAL KNEE, LEFT: Primary | ICD-10-CM

## 2018-10-26 PROCEDURE — 97110 THERAPEUTIC EXERCISES: CPT

## 2018-10-26 PROCEDURE — G0283 ELEC STIM OTHER THAN WOUND: HCPCS

## 2018-10-26 NOTE — THERAPY TREATMENT NOTE
"    Outpatient Physical Therapy Ortho Treatment Note  [facility]  Jane Torres PTA       Patient Name: Meeta Marmolejo  : 1955  MRN: [id number]  Today's Date: 10/26/2018      Visit Date: 10/26/2018     Visits: 4/4  Insurance Visits Approved: based on medicare guidelines; 60 visits primary  Recert Due: 2018  MD Appt: 2018  Pain: pretreatment 7/10; post treatment \"Im good\"/10  Improvement: pt is subjectively reporting 0% improvement since initial evaluation    Visit Dx:    ICD-10-CM ICD-9-CM   1. S/P revision of total knee, left Z96.652 V43.65   2. Staphylococcal arthritis of left knee (CMS/[facility]) M00.062 711.06     041.10       Patient Active Problem List   Diagnosis   • Obesity   • Type 2 diabetes mellitus (CMS/[facility])   • Chronic osteoarthritis   • Microalbuminuria   • Encounter for screening mammogram for malignant neoplasm of breast   • Plantar fasciitis   • URI (upper respiratory infection)   • Lateral epicondylitis of right elbow   • Essential hypertension   • Repetitive strain injury of lower back   • History of total knee replacement   • Painful total knee replacement, initial encounter (CMS/[facility])   • Bilateral hip pain   • Mechanical loosening of prosthetic knee (CMS/[facility])   • S/P revision of total knee, left   • Septic joint of left knee joint (CMS/[facility])   • Staphylococcal arthritis of left knee (CMS/[facility])   • Anemia   • Left knee pain   • Other pulmonary embolism without acute cor pulmonale (CMS/[facility])   • Encounter for anticoagulation discussion and counseling   • Long term (current) use of anticoagulants [Z79.01]        Past Medical History:   Diagnosis Date   • Acute vaginitis     resolved   • Carpal tunnel syndrome    • Chronic osteoarthritis     Nino TKA   • Chronic pain    • Chronic urticaria    • Dietary counseling and surveillance    • Dysuria    • External hordeolum    • Hyperlipidemia    • Hypertension    • Low back pain     strain   • Microalbuminuria     on " "ARB   • Noncompliance with treatment    • Obesity      Dieting , exercise, appetite suppressant      • Osteoarthritis of left knee    • Plantar fasciitis     resolved   • Seasonal rhinitis    • Type 2 diabetes mellitus (CMS/[facility])    • Urinary tract infectious disease    • Vaginal discharge    • Vitamin deficiency         Past Surgical History:   Procedure Laterality Date   • CARPAL TUNNEL RELEASE      Bilateral endoscopic release 01/20/2003    • ENDOSCOPY      normal 04/25/2008      • KNEE ARTHROSCOPY     • KNEE INCISION AND DRAINAGE Left 9/22/2018    Procedure: KNEE INCISION AND DRAINAGE;  Surgeon: Elias Su MD;  Location: [facility];  Service: Orthopedics   • KNEE SURGERY      Removal of existing left total knee arthroplasty and revision left total knee arthroplasty. 12/15/2014       • REPLACEMENT TOTAL KNEE     • SHOULDER SURGERY     • TOTAL KNEE ARTHROPLASTY REVISION Left 8/28/2018    Procedure: REVISION LEFT TOTAL KNEE;  Surgeon: Elias Su MD;  Location: [facility];  Service: Orthopedics   • TUBAL ABDOMINAL LIGATION               PT Ortho     Row Name 10/26/18 0800       Subjective Comments    Subjective Comments states that she isn't going to push her knee as much as she did last visit. states that shes not going to make it hurt, she is already hurting. almost cancelled treatment secondary to pain and stiffness. post treatment states that it helped her pain and stiffness to come in today.   -       Subjective Pain    Able to rate subjective pain? yes  -    Pre-Treatment Pain Level 7  -    Post-Treatment Pain Level --   I'm good  -       General ROM    GENERAL ROM COMMENTS AROM L Knee 5-69°  -    Row Name 10/24/18 0900       Subjective Comments    Subjective Comments states that she understands that therapy is supposed to be aggressive with ROM of her knee but then states \"but we aren't going to be too agressive\"  -       Subjective Pain    Able to rate subjective pain? yes  - "      User Key  (r) = Recorded By, (t) = Taken By, (c) = Cosigned By    Initials Name Provider Type     Jane Torres PTA Physical Therapy Assistant                            PT Assessment/Plan     Row Name 10/26/18 0800          PT Assessment    Assessment Comments patient has no significant change in AROM of left knee this treatment. during ROM exercises really stressed to patien the importance of pushing ROM to improve. by end of treatment patient verbalizes understanding and seems to see the importance.   -        PT Plan    PT Frequency 3x/week  -     PT Plan Comments continue pushing AROM. possible Prone hang next visit  -       User Key  (r) = Recorded By, (t) = Taken By, (c) = Cosigned By    Initials Name Provider Type     Jane Torres PTA Physical Therapy Assistant                Modalities     Row Name 10/26/18 0800             Ice    Ice Applied Yes  -      Location L knee with elevation in pillowcase  -      Rx Minutes Other:   20 mins  -      Ice S/P Rx Yes  -         ELECTRICAL STIMULATION    Attended/Unattended Unattended  -      Stimulation Type IFC  -      Location/Electrode Placement/Other L knee  -       PT Electrical Stimulation Unattended (Manual) Minutes 20  -        User Key  (r) = Recorded By, (t) = Taken By, (c) = Cosigned By    Initials Name Provider Type     Jane Torres PTA Physical Therapy Assistant                Exercises     Row Name 10/26/18 0800             Subjective Comments    Subjective Comments states that she isn't going to push her knee as much as she did last visit. states that shes not going to make it hurt, she is already hurting. almost cancelled treatment secondary to pain and stiffness. post treatment states that it helped her pain and stiffness to come in today.   -         Subjective Pain    Able to rate subjective pain? yes  -      Pre-Treatment Pain Level 7  -      Post-Treatment Pain Level --   I'm good  -          Exercise 1    Exercise Name 1 Pro II LE's full revolution  -      Time 1 10 minutes  -      Additional Comments L 1.0 seat 7  -         Exercise 2    Exercise Name 2 B St. HS S  -MH      Reps 2 2  -MH      Time 2 30 sec hold  -         Exercise 3    Exercise Name 3 L St. Lunge S  -MH      Reps 3 10  -MH      Time 3 10 sec hold  -         Exercise 4    Exercise Name 4 L St. Step Tap   -      Reps 4 20  -MH      Additional Comments floor to 6 inch step to encourage knee movement vs pt keeping leg stiff continuously  -         Exercise 5    Exercise Name 5 Wall Slides  -      Time 5 5 minutes with a 5 sec hold in flexion  -         Exercise 6    Exercise Name 6 Shuttle 2L press  -      Time 6 5 minutes with 5 sec hold in flexion  -MH      Additional Comments 7 cords; feet at bottom of plate  -         Exercise 7    Exercise Name 7 Heel Slides with Strap  -      Time 7 5 minutes with 5 sec hold each repetition  -         Exercise 8    Exercise Name 8 Heel Prop  -      Time 8 5 minutes  -         Exercise 9    Exercise Name 9 SLR Fwd Flexion  -      Reps 9 20  -MH        User Key  (r) = Recorded By, (t) = Taken By, (c) = Cosigned By    Initials Name Provider Type    Jane Romero, PTA Physical Therapy Assistant                               PT OP Goals     Row Name 10/26/18 0800          PT Short Term Goals    STG Date to Achieve 11/02/18  -     STG 1 Pt indep with HEP  -     STG 1 Progress Progressing  -     STG 2 Improve left knee AROM to 5-80°  -     STG 2 Progress Progressing  -     STG 3 Improve left knee MMT (flex/ext) to 4+/5  -     STG 3 Progress New  -     STG 4 Ambulate with RW with step through non antalgic gait (NAG)   -     STG 4 Progress Progressing  -     STG 5 Reduce L knee pain by 25% with standing and walking  -     STG 5 Progress Progressing  -        Long Term Goals    LTG Date to Achieve 11/16/18  -     LTG 1 Improve left knee AROM to 0-90°   -     LTG 1 Progress New  -     LTG 2 Improve left knee MMT (flex/ext) to 5/5  -     LTG 2 Progress New  -     LTG 3 Ambulate with or without straight cane with step through non antalgic gait (NAG) x 1 lap in the gym   -     LTG 3 Progress New  -     LTG 4 Reduce L knee pain by 75% with standing and walking  -     LTG 4 Progress New  -        Time Calculation    PT Goal Re-Cert Due Date 11/09/18  -       User Key  (r) = Recorded By, (t) = Taken By, (c) = Cosigned By    Initials Name Provider Type     Jane Torres PTA Physical Therapy Assistant          Therapy Education  Given: HEP, Symptoms/condition management, Pain management  Program: Reinforced  How Provided: Verbal, Demonstration  Provided to: Patient  Level of Understanding: Verbalized, Demonstrated              Time Calculation:   Start Time: 0800  Stop Time: 0935  Time Calculation (min): 95 min  Total Timed Code Minutes- PT: 75 minute(s)    Therapy Charges for Today     Code Description Service Date Service Provider Modifiers Qty    97364532112 HC PT ELECTRICAL STIM UNATTENDED 10/26/2018 Jane Torres PTA  1    45275362918 HC PT THER PROC EA 15 MIN 10/26/2018 Jane Torres PTA GP 5    60485679624 HC PT THER SUPP EA 15 MIN 10/26/2018 Jane Torres PTA GP 1                    Jane Torres PTA  10/26/2018

## 2018-10-29 ENCOUNTER — HOSPITAL ENCOUNTER (OUTPATIENT)
Dept: PHYSICAL THERAPY | Facility: HOSPITAL | Age: 63
Setting detail: THERAPIES SERIES
Discharge: HOME | End: 2018-10-29
Attending: ORTHOPAEDIC SURGERY

## 2018-10-29 DIAGNOSIS — M00.062 STAPHYLOCOCCAL ARTHRITIS OF LEFT KNEE (HCC): ICD-10-CM

## 2018-10-29 DIAGNOSIS — Z96.652 S/P REVISION OF TOTAL KNEE, LEFT: Primary | ICD-10-CM

## 2018-10-29 PROCEDURE — G0283 ELEC STIM OTHER THAN WOUND: HCPCS

## 2018-10-29 PROCEDURE — 97110 THERAPEUTIC EXERCISES: CPT

## 2018-10-29 NOTE — THERAPY TREATMENT NOTE
"    Outpatient Physical Therapy Ortho Treatment Note  [facility]  Jane Torres PTA       Patient Name: Meeta Marmolejo  : 1955  MRN: [id number]  Today's Date: 10/29/2018      Visit Date: 10/29/2018     Visits: 5/5  Insurance Visits Approved: based on medicare guidelines; 60 visits primary  Recert Due: 2018  MD Appt: 2018  Pain: pretreatment 6/10; post treatment \"feels good\"/10  Improvement: pt is subjectively reporting 0% improvement since initial evaluation    Visit Dx:    ICD-10-CM ICD-9-CM   1. S/P revision of total knee, left Z96.652 V43.65   2. Staphylococcal arthritis of left knee (CMS/[facility]) M00.062 711.06     041.10       Patient Active Problem List   Diagnosis   • Obesity   • Type 2 diabetes mellitus (CMS/[facility])   • Chronic osteoarthritis   • Microalbuminuria   • Encounter for screening mammogram for malignant neoplasm of breast   • Plantar fasciitis   • URI (upper respiratory infection)   • Lateral epicondylitis of right elbow   • Essential hypertension   • Repetitive strain injury of lower back   • History of total knee replacement   • Painful total knee replacement, initial encounter (CMS/[facility])   • Bilateral hip pain   • Mechanical loosening of prosthetic knee (CMS/[facility])   • S/P revision of total knee, left   • Septic joint of left knee joint (CMS/[facility])   • Staphylococcal arthritis of left knee (CMS/[facility])   • Anemia   • Left knee pain   • Other pulmonary embolism without acute cor pulmonale (CMS/[facility])   • Encounter for anticoagulation discussion and counseling   • Long term (current) use of anticoagulants [Z79.01]        Past Medical History:   Diagnosis Date   • Acute vaginitis     resolved   • Carpal tunnel syndrome    • Chronic osteoarthritis     Nino TKA   • Chronic pain    • Chronic urticaria    • Dietary counseling and surveillance    • Dysuria    • External hordeolum    • Hyperlipidemia    • Hypertension    • Low back pain     strain   • Microalbuminuria     on " ARB   • Noncompliance with treatment    • Obesity      Dieting , exercise, appetite suppressant      • Osteoarthritis of left knee    • Plantar fasciitis     resolved   • Seasonal rhinitis    • Type 2 diabetes mellitus (CMS/H[facility])    • Urinary tract infectious disease    • Vaginal discharge    • Vitamin deficiency         Past Surgical History:   Procedure Laterality Date   • CARPAL TUNNEL RELEASE      Bilateral endoscopic release 01/20/2003    • ENDOSCOPY      normal 04/25/2008      • KNEE ARTHROSCOPY     • KNEE INCISION AND DRAINAGE Left 9/22/2018    Procedure: KNEE INCISION AND DRAINAGE;  Surgeon: Elias Su MD;  Location: [facility];  Service: Orthopedics   • KNEE SURGERY      Removal of existing left total knee arthroplasty and revision left total knee arthroplasty. 12/15/2014       • REPLACEMENT TOTAL KNEE     • SHOULDER SURGERY     • TOTAL KNEE ARTHROPLASTY REVISION Left 8/28/2018    Procedure: REVISION LEFT TOTAL KNEE;  Surgeon: Elias Su MD;  Location: [facility];  Service: Orthopedics   • TUBAL ABDOMINAL LIGATION               PT Ortho     Row Name 10/29/18 0900       Subjective Comments    Subjective Comments states that she was glad she came last visit instead of canceling. reports having some muscle cramping up in the hip.   -       Subjective Pain    Able to rate subjective pain? yes  -    Pre-Treatment Pain Level 6  -    Post-Treatment Pain Level --   feels good  -       General ROM    GENERAL ROM COMMENTS AROM L Knee -65°  -      User Key  (r) = Recorded By, (t) = Taken By, (c) = Cosigned By    Initials Name Provider Type     Jane Torres PTA Physical Therapy Assistant                            PT Assessment/Plan     Row Name 10/29/18 0900          PT Assessment    Assessment Comments continues with limited knee flexion and knee extension. hip cramping is relieved with isometric hamcurls sets this treatment  -        PT Plan    PT Frequency 3x/week  -     PT  Plan Comments continue to push ROM, next add prone hang and step ups  -       User Key  (r) = Recorded By, (t) = Taken By, (c) = Cosigned By    Initials Name Provider Type     Jane Torres PTA Physical Therapy Assistant                Modalities     Row Name 10/29/18 0900             Ice    Ice Applied Yes  -      Location L knee with elevation in pillowcase  -      Rx Minutes Other:   20 mins  -      Ice S/P Rx Yes  -         ELECTRICAL STIMULATION    Attended/Unattended Unattended  -      Stimulation Type IFC  -      Location/Electrode Placement/Other L knee  -       PT Electrical Stimulation Unattended (Manual) Minutes 20  -MH        User Key  (r) = Recorded By, (t) = Taken By, (c) = Cosigned By    Initials Name Provider Type     Jane Torres PTA Physical Therapy Assistant                Exercises     Row Name 10/29/18 0900             Subjective Comments    Subjective Comments states that she was glad she came last visit instead of canceling. reports having some muscle cramping up in the hip.   -         Subjective Pain    Able to rate subjective pain? yes  -      Pre-Treatment Pain Level 6  -MH      Post-Treatment Pain Level --   feels good  -         Exercise 1    Exercise Name 1 Pro II LE's Full Revolution  -      Time 1 10 minutes  -      Additional Comments L 4.0 seat 7  -MH         Exercise 2    Exercise Name 2 B St. HS S  -      Reps 2 2  -      Time 2 30 sec hold  -         Exercise 3    Exercise Name 3 L St. Lunge S  -MH      Reps 3 10  -MH      Time 3 10 sec hold  -         Exercise 4    Exercise Name 4 Shuttle 2L for knee flexion  -      Time 4 5 minutes; hold in flexion for 5 sec  -      Additional Comments 7 cords; feet at bottom of plate, takes R LE off to allow for more flexion of knee  -         Exercise 5    Exercise Name 5 Isometric Hamcurls  -      Reps 5 20  -MH      Time 5 5 sec hold  -         Exercise 6    Exercise Name 6 Heel  SLides wtih strap  -      Time 6 5 minutes with 5 sec hold  -         Exercise 7    Exercise Name 7 Heel Prop with quad set  -      Time 7 5 minutes with 5 sec hold  -         Exercise 8    Exercise Name 8 SLR fwd flexion  -      Reps 8 20  -         Exercise 9    Exercise Name 9 Sit to/from stand with foucs on equal WB  -      Reps 9 20  -        User Key  (r) = Recorded By, (t) = Taken By, (c) = Cosigned By    Initials Name Provider Type     Jane Torres PTA Physical Therapy Assistant                               PT OP Goals     Row Name 10/29/18 0900          PT Short Term Goals    STG Date to Achieve 11/02/18  -     STG 1 Pt indep with HEP  -     STG 1 Progress Progressing  -     STG 2 Improve left knee AROM to 5-80°  -     STG 2 Progress Progressing  -     STG 3 Improve left knee MMT (flex/ext) to 4+/5  -     STG 3 Progress New  -     STG 4 Ambulate with RW with step through non antalgic gait (NAG)   -     STG 4 Progress Progressing  -     STG 5 Reduce L knee pain by 25% with standing and walking  -     STG 5 Progress Progressing  -        Long Term Goals    LTG Date to Achieve 11/16/18  -     LTG 1 Improve left knee AROM to 0-90°  -     LTG 1 Progress New  -     LTG 2 Improve left knee MMT (flex/ext) to 5/5  -     LTG 2 Progress New  -     LTG 3 Ambulate with or without straight cane with step through non antalgic gait (NAG) x 1 lap in the gym   -     LTG 3 Progress New  -     LTG 4 Reduce L knee pain by 75% with standing and walking  -     LTG 4 Progress New  [facility]        Time Calculation    PT Goal Re-Cert Due Date 11/09/18  -       User Key  (r) = Recorded By, (t) = Taken By, (c) = Cosigned By    Initials Name Provider Type    Jane Romero PTA Physical Therapy Assistant          Therapy Education  Given: HEP, Symptoms/condition management, Pain management  Program: Reinforced  How Provided: Verbal, Demonstration  Provided to: Patient  Level  of Understanding: Verbalized, Demonstrated              Time Calculation:   Start Time: 0850  Stop Time: 1002  Time Calculation (min): 72 min  Total Timed Code Minutes- PT: 52 minute(s)  Therapy Suggested Charges     Code   Minutes Charges    49073 (CPT®) Hc Pt Neuromusc Re Education Ea 15 Min      91201 (CPT®) Hc Pt Ther Proc Ea 15 Min      29501 (CPT®) Hc Gait Training Ea 15 Min      63896 (CPT®) Hc Pt Therapeutic Act Ea 15 Min      12803 (CPT®) Hc Pt Manual Therapy Ea 15 Min      61898 (CPT®) Hc Pt Ther Massage- Per 15 Min      10836 (CPT®) Hc Pt Iontophoresis Ea 15 Min      77262 (CPT®) Hc Pt Elec Stim Ea-Per 15 Min      73963 (CPT®) Hc Pt Ultrasound Ea 15 Min      66820 (CPT®) Hc Pt Self Care/Mgmt/Train Ea 15 Min      82032 (CPT®) Hc Pt Prosthetic (S) Train Initial Encounter, Each 15 Min      96000 (CPT®) Hc Orthotic(S) Mgmt/Train Initial Encounter, Each 15min      84926 (CPT®) Hc Pt Aquatic Therapy Ea 15 Min      23429 (CPT®) Hc Pt Orthotic(S)/Prosthetic(S) Encounter, Each 15 Min       (CPT®) Hc Pt Electrical Stim Unattended 20 1    Total  20 1        Therapy Charges for Today     Code Description Service Date Service Provider Modifiers Qty    53736205554 HC PT ELECTRICAL STIM UNATTENDED 10/29/2018 Jane Torres, PTA  1    52930894828 HC PT THER PROC EA 15 MIN 10/29/2018 Jane Torres, QUINN GP 3    50778988993 HC PT THER SUPP EA 15 MIN 10/29/2018 Jane Torres, PTA GP 1                    Jane Torres, QUINN  10/29/2018

## 2018-10-30 ENCOUNTER — ANTICOAGULATION VISIT (OUTPATIENT)
Dept: CARDIAC SURGERY | Facility: CLINIC | Age: 63
End: 2018-10-30

## 2018-10-30 VITALS — DIASTOLIC BLOOD PRESSURE: 66 MMHG | OXYGEN SATURATION: 99 % | SYSTOLIC BLOOD PRESSURE: 134 MMHG | HEART RATE: 98 BPM

## 2018-10-30 DIAGNOSIS — I26.99 OTHER ACUTE PULMONARY EMBOLISM WITHOUT ACUTE COR PULMONALE (HCC): ICD-10-CM

## 2018-10-30 DIAGNOSIS — Z79.01 LONG TERM (CURRENT) USE OF ANTICOAGULANTS: ICD-10-CM

## 2018-10-30 LAB — INR PPP: 7.5 (ref 0.9–1.1)

## 2018-10-30 PROCEDURE — 99211 OFF/OP EST MAY X REQ PHY/QHP: CPT | Performed by: NURSE PRACTITIONER

## 2018-10-30 PROCEDURE — 85610 PROTHROMBIN TIME: CPT | Performed by: NURSE PRACTITIONER

## 2018-10-30 NOTE — PROGRESS NOTES
Pt denies med changes or bleeding problems. Pt does not wish to go to lab for venipuncture. Pt states for her green veggie days she ate green beans and green pepper. Pt was advised these do not have enough vitamin K. Pt was educated regarding coumadin friendly diet and instructions were provided; pt verbalized an understanding. Pt was instructed to hold coumadin tonight and eat 1.5 cups of cooked spinach; pt verbalized. Patient instructed regarding medication; results given and questions answered. Nutritional counseling given.  Dietary factors affecting therapy addressed.  Patient instructed to monitor for excessive bruising or bleeding. Notify provider if you experience excessive bleeding from the nose, cuts, gums, rectum, urinary tract, or vagina. Reddish or brown urine or stool. Vomiting of blood or hemorrhoidal bleeding. If major injury occurs present to the Emergency Department. Will recheck tomorrow.         This document has been electronically signed by Lexii Otto, AAMIR @ on October 30, 2018 8:51 AM

## 2018-10-31 ENCOUNTER — ANTICOAGULATION VISIT (OUTPATIENT)
Dept: CARDIAC SURGERY | Facility: CLINIC | Age: 63
End: 2018-10-31

## 2018-10-31 ENCOUNTER — APPOINTMENT (OUTPATIENT)
Dept: PHYSICAL THERAPY | Facility: HOSPITAL | Age: 63
End: 2018-10-31
Attending: ORTHOPAEDIC SURGERY

## 2018-10-31 VITALS — DIASTOLIC BLOOD PRESSURE: 79 MMHG | OXYGEN SATURATION: 98 % | HEART RATE: 108 BPM | SYSTOLIC BLOOD PRESSURE: 135 MMHG

## 2018-10-31 DIAGNOSIS — Z79.01 LONG TERM (CURRENT) USE OF ANTICOAGULANTS: ICD-10-CM

## 2018-10-31 DIAGNOSIS — I26.99 OTHER ACUTE PULMONARY EMBOLISM WITHOUT ACUTE COR PULMONALE (HCC): ICD-10-CM

## 2018-10-31 LAB — INR PPP: 4.6 (ref 0.9–1.1)

## 2018-10-31 PROCEDURE — 85610 PROTHROMBIN TIME: CPT | Performed by: NURSE PRACTITIONER

## 2018-10-31 PROCEDURE — 99211 OFF/OP EST MAY X REQ PHY/QHP: CPT | Performed by: NURSE PRACTITIONER

## 2018-10-31 NOTE — PROGRESS NOTES
Pt states she held coumadin and ate 1.5 cups of spinach as instructed. Pt denies med changes or bleeding problems. Pt is currently taking Ancef tid and she will finish on Monday. Dose adjusted and pt instructed to have 1/2 cup of spinach for lunch and another serving for dinner; pt verbalized. Patient instructed regarding medication; results given and questions answered. Nutritional counseling given.  Dietary factors affecting therapy addressed.  Patient instructed to monitor for excessive bruising or bleeding. Will recheck in 2 days.         This document has been electronically signed by Lexii Otto, AAMIR @ on October 31, 2018 8:28 AM

## 2018-11-01 ENCOUNTER — HOSPITAL ENCOUNTER (OUTPATIENT)
Dept: PHYSICAL THERAPY | Facility: HOSPITAL | Age: 63
Setting detail: THERAPIES SERIES
Discharge: HOME | End: 2018-11-01
Attending: ORTHOPAEDIC SURGERY

## 2018-11-01 DIAGNOSIS — Z96.652 S/P REVISION OF TOTAL KNEE, LEFT: Primary | ICD-10-CM

## 2018-11-01 DIAGNOSIS — M00.062 STAPHYLOCOCCAL ARTHRITIS OF LEFT KNEE (HCC): ICD-10-CM

## 2018-11-01 PROCEDURE — 97110 THERAPEUTIC EXERCISES: CPT

## 2018-11-01 NOTE — THERAPY TREATMENT NOTE
Outpatient Physical Therapy Ortho Treatment Note  [facility]  Jane Torres PTA       Patient Name: Meeta Marmolejo  : 1955  MRN: [id number]  Today's Date: 2018      Visit Date: 2018     Visits: 6/6  Insurance Visits Approved: 60 based on medicare guidelines  Recert Due: 2018  MD Appt: 2018  Pain: pretreatment 5/10; post treatment 4/10  Improvement: pt is subjectively reporting 75-80% improvement since initial evaluation    Visit Dx:    ICD-10-CM ICD-9-CM   1. S/P revision of total knee, left Z96.652 V43.65   2. Staphylococcal arthritis of left knee (CMS/[facility]) M00.062 711.06     041.10       Patient Active Problem List   Diagnosis   • Obesity   • Type 2 diabetes mellitus (CMS/[facility])   • Chronic osteoarthritis   • Microalbuminuria   • Encounter for screening mammogram for malignant neoplasm of breast   • Plantar fasciitis   • URI (upper respiratory infection)   • Lateral epicondylitis of right elbow   • Essential hypertension   • Repetitive strain injury of lower back   • History of total knee replacement   • Painful total knee replacement, initial encounter (CMS/[facility])   • Bilateral hip pain   • Mechanical loosening of prosthetic knee (CMS/[facility])   • S/P revision of total knee, left   • Septic joint of left knee joint (CMS/[facility])   • Staphylococcal arthritis of left knee (CMS/[facility])   • Anemia   • Left knee pain   • Other pulmonary embolism without acute cor pulmonale (CMS/[facility])   • Encounter for anticoagulation discussion and counseling   • Long term (current) use of anticoagulants [Z79.01]        Past Medical History:   Diagnosis Date   • Acute vaginitis     resolved   • Carpal tunnel syndrome    • Chronic osteoarthritis     Nino TKA   • Chronic pain    • Chronic urticaria    • Dietary counseling and surveillance    • Dysuria    • External hordeolum    • Hyperlipidemia    • Hypertension    • Low back pain     strain   • Microalbuminuria     on ARB   • Noncompliance  with treatment    • Obesity      Dieting , exercise, appetite suppressant      • Osteoarthritis of left knee    • Plantar fasciitis     resolved   • Seasonal rhinitis    • Type 2 diabetes mellitus (CMS/HCC)    • Urinary tract infectious disease    • Vaginal discharge    • Vitamin deficiency         Past Surgical History:   Procedure Laterality Date   • CARPAL TUNNEL RELEASE      Bilateral endoscopic release 01/20/2003    • ENDOSCOPY      normal 04/25/2008      • KNEE ARTHROSCOPY     • KNEE INCISION AND DRAINAGE Left 9/22/2018    Procedure: KNEE INCISION AND DRAINAGE;  Surgeon: Elias Su MD;  Location: [facility];  Service: Orthopedics   • KNEE SURGERY      Removal of existing left total knee arthroplasty and revision left total knee arthroplasty. 12/15/2014       • REPLACEMENT TOTAL KNEE     • SHOULDER SURGERY     • TOTAL KNEE ARTHROPLASTY REVISION Left 8/28/2018    Procedure: REVISION LEFT TOTAL KNEE;  Surgeon: Elias Su MD;  Location: [facility];  Service: Orthopedics   • TUBAL ABDOMINAL LIGATION               PT Ortho     Row Name 11/01/18 0800       Subjective Pain    Able to rate subjective pain? yes  -    Pre-Treatment Pain Level 5  -    Post-Treatment Pain Level 4  -       General ROM    GENERAL ROM COMMENTS AROM L knee 5-70°  -      User Key  (r) = Recorded By, (t) = Taken By, (c) = Cosigned By    Initials Name Provider Type     Jane Torres PTA Physical Therapy Assistant                            PT Assessment/Plan     Row Name 11/01/18 0900          PT Assessment    Assessment Comments patient has overall improved AROM for flexion and extension since initial evaluation but has had no significant increase over the last few visits. patient is limited by pain with ROM and tightness of knee.   -        PT Plan    PT Frequency 3x/week  -     PT Plan Comments continue with focus on ROM; do step ups and step downs   -       User Key  (r) = Recorded By, (t) = Taken By, (c)  = Cosigned By    Initials Name Provider Type     SaegiacomoJane PTA Physical Therapy Assistant                Modalities     Row Name 11/01/18 0800             Ice    Ice Applied No   pt defers secondary to going to see another doc today.   -      Patient denies application of Ice Yes  -      Patient reports will apply ice at home to involved area Yes  -        User Key  (r) = Recorded By, (t) = Taken By, (c) = Cosigned By    Initials Name Provider Type     SaegiacomoJane PTA Physical Therapy Assistant                Exercises     Row Name 11/01/18 0800             Subjective Comments    Subjective Comments states that she had to cancel visit yesterday secondary to her coumadin being too high  -         Subjective Pain    Able to rate subjective pain? yes  -      Pre-Treatment Pain Level 5  -      Post-Treatment Pain Level 4  -         Exercise 1    Exercise Name 1 Pro II LE's Full Revolution  -      Time 1 10 minutes  -      Additional Comments L 4.0 Seat 7  -         Exercise 2    Exercise Name 2 B St. HS S  -      Reps 2 2  -      Time 2 30 sec hold  -         Exercise 3    Exercise Name 3 L St. Lunge S   -      Reps 3 10  -      Time 3 10 sec hold  -         Exercise 4    Exercise Name 4 Wall Heel Slides  -      Time 4 5 minutes; hold 5 sec each rep  -         Exercise 5    Exercise Name 5 Prone Hang  -      Time 5 5 minutes  -         Exercise 6    Exercise Name 6 Shuttle 2L for Knee flexion  -      Time 6 5 minutes with 5 sec hold each rep  -      Additional Comments 7 cords feet at bottom of plate   -         Exercise 7    Exercise Name 7 Stool Scoots  -      Reps 7 2 laps  -         Exercise 8    Exercise Name 8 Sit to/from stand   -      Reps 8 20  -MH      Additional Comments clinician assist to keep feet planted vs kicking L LE out; working on increased knee flexion  -         Exercise 9    Exercise Name 9 Seated LE flutters  -      Time 9 3-5  minutes  -         Exercise 10    Exercise Name 10 Contract/Relax with clinician OP for knee flexion  -      Time 10 5 minutes  -         Exercise 11    Exercise Name 11 Heel Prop with Weight  -      Time 11 5 minutes  -      Additional Comments 4# cuff weight on knee  -        User Key  (r) = Recorded By, (t) = Taken By, (c) = Cosigned By    Initials Name Provider Type     Jane Torres PTA Physical Therapy Assistant                               PT OP Goals     Row Name 11/01/18 0800          PT Short Term Goals    STG Date to Achieve 11/02/18  -     STG 1 Pt indep with HEP  -     STG 1 Progress Progressing  -     STG 2 Improve left knee AROM to 5-80°  -     STG 2 Progress Partially Met  -     STG 3 Improve left knee MMT (flex/ext) to 4+/5  -     STG 3 Progress New  -     STG 4 Ambulate with RW with step through non antalgic gait (NAG)   -     STG 4 Progress Progressing  -     STG 5 Reduce L knee pain by 25% with standing and walking  -     STG 5 Progress Progressing  -        Long Term Goals    LTG Date to Achieve 11/16/18  -     LTG 1 Improve left knee AROM to 0-90°  -     LTG 1 Progress New  -     LTG 2 Improve left knee MMT (flex/ext) to 5/5  -     LTG 2 Progress New  -     LTG 3 Ambulate with or without straight cane with step through non antalgic gait (NAG) x 1 lap in the gym   -     LTG 3 Progress New  -     LTG 4 Reduce L knee pain by 75% with standing and walking  -     LTG 4 Progress New  [facility]        Time Calculation    PT Goal Re-Cert Due Date 11/09/18  -       User Key  (r) = Recorded By, (t) = Taken By, (c) = Cosigned By    Initials Name Provider Type     Jane Torres PTA Physical Therapy Assistant          Therapy Education  Education Details: prone hang, reinforced not kicking leg out as she sits, pt to do flutters off side of bed.   Given: HEP, Symptoms/condition management, Pain management  Program: Reinforced  How Provided: Verbal,  Demonstration  Provided to: Patient  Level of Understanding: Verbalized, Demonstrated              Time Calculation:   Start Time: 0846  Stop Time: 1013  Time Calculation (min): 87 min  Total Timed Code Minutes- PT: 87 minute(s)  Therapy Suggested Charges     Code   Minutes Charges    None           Therapy Charges for Today     Code Description Service Date Service Provider Modifiers Qty    82475015573 HC PT THER PROC EA 15 MIN 11/1/2018 Jane Torres PTA GP 6    78571038634 HC PT THER SUPP EA 15 MIN 11/1/2018 Jane Torres PTA GP 1                    Jane Torres PTA  11/1/2018

## 2018-11-02 ENCOUNTER — ANTICOAGULATION VISIT (OUTPATIENT)
Dept: CARDIAC SURGERY | Facility: CLINIC | Age: 63
End: 2018-11-02

## 2018-11-02 VITALS — HEART RATE: 84 BPM

## 2018-11-02 DIAGNOSIS — I26.99 OTHER PULMONARY EMBOLISM WITHOUT ACUTE COR PULMONALE, UNSPECIFIED CHRONICITY (HCC): Primary | ICD-10-CM

## 2018-11-02 DIAGNOSIS — Z79.01 LONG TERM (CURRENT) USE OF ANTICOAGULANTS: ICD-10-CM

## 2018-11-02 DIAGNOSIS — I26.99 OTHER ACUTE PULMONARY EMBOLISM WITHOUT ACUTE COR PULMONALE (HCC): ICD-10-CM

## 2018-11-02 LAB — INR PPP: 2.4 (ref 0.9–1.1)

## 2018-11-02 PROCEDURE — 85610 PROTHROMBIN TIME: CPT | Performed by: NURSE PRACTITIONER

## 2018-11-02 PROCEDURE — 99211 OFF/OP EST MAY X REQ PHY/QHP: CPT | Performed by: NURSE PRACTITIONER

## 2018-11-02 NOTE — PROGRESS NOTES
PT states she ate more green and took Coumadin as directed. Denies any med changes or bleeding issues. Adjusted pt's dose due to recent elevation and instructed to continue Coumadin friendly diet. PT will be seen on Monday after ortho appt. Patient instructed regarding medication; results given and questions answered. Nutritional counseling given.  Dietary factors affecting therapy addressed.  Patient instructed to monitor for excessive bruising or bleeding.  PT verbalizes understanding.         This document has been electronically signed by AAMIR Ma @ on November 2, 2018 8:30 AM

## 2018-11-05 ENCOUNTER — APPOINTMENT (OUTPATIENT)
Dept: LAB | Facility: HOSPITAL | Age: 63
End: 2018-11-05

## 2018-11-05 ENCOUNTER — ANTICOAGULATION VISIT (OUTPATIENT)
Dept: CARDIAC SURGERY | Facility: CLINIC | Age: 63
End: 2018-11-05

## 2018-11-05 ENCOUNTER — OFFICE VISIT (OUTPATIENT)
Dept: ORTHOPEDIC SURGERY | Facility: CLINIC | Age: 63
End: 2018-11-05

## 2018-11-05 VITALS — WEIGHT: 191 LBS | HEIGHT: 62 IN | BODY MASS INDEX: 35.15 KG/M2

## 2018-11-05 DIAGNOSIS — Z96.652 S/P REVISION OF TOTAL KNEE, LEFT: Primary | ICD-10-CM

## 2018-11-05 DIAGNOSIS — I26.99 OTHER ACUTE PULMONARY EMBOLISM WITHOUT ACUTE COR PULMONALE (HCC): ICD-10-CM

## 2018-11-05 DIAGNOSIS — Z79.01 LONG TERM (CURRENT) USE OF ANTICOAGULANTS: ICD-10-CM

## 2018-11-05 LAB
INR PPP: 2.41 (ref 0.8–1.2)
PROTHROMBIN TIME: 25.2 SECONDS (ref 11.1–15.3)

## 2018-11-05 PROCEDURE — 36415 COLL VENOUS BLD VENIPUNCTURE: CPT | Performed by: NURSE PRACTITIONER

## 2018-11-05 PROCEDURE — 85610 PROTHROMBIN TIME: CPT | Performed by: NURSE PRACTITIONER

## 2018-11-05 PROCEDURE — 99024 POSTOP FOLLOW-UP VISIT: CPT | Performed by: ORTHOPAEDIC SURGERY

## 2018-11-05 RX ORDER — DOXYCYCLINE HYCLATE 100 MG/1
100 CAPSULE ORAL 2 TIMES DAILY
Qty: 60 CAPSULE | Refills: 2 | Status: SHIPPED | OUTPATIENT
Start: 2018-11-05 | End: 2018-11-26

## 2018-11-05 NOTE — PROGRESS NOTES
"Meeta Marmolejo is a 63 y.o. female returns for     Chief Complaint   Patient presents with   • Left Knee - Follow-up       HISTORY OF PRESENT ILLNESS: f/u left knee. REVISION LEFT TOTAL KNEE done on 8/28/2018[facility] Patient has a open spot that she is concerned about.      CONCURRENT MEDICAL HISTORY:    The following portions of the patient's history were reviewed and updated as appropriate: allergies, current medications, past family history, past medical history, past social history, past surgical history and problem list.     ROS  No fevers or chills.  No chest pain or shortness of air.  No GI or  disturbances.    PHYSICAL EXAMINATION:       Ht 157.5 cm (62\")   Wt 86.6 kg (191 lb)   BMI 34.93 kg/m²     Physical Exam    GAIT:     []  Normal  []  Antalgic    Assistive device: []  None  [x]  Walker     []  Crutches  []  Cane     []  Wheelchair  []  Stretcher    Ortho Exam   PICC line site looks good.  Excluded there is a small punctate area of granulation tissue probably to 2 mm around.  Knee range of motion is 5-70°.  Calf is negative.    No results found.          ASSESSMENT:    Diagnoses and all orders for this visit:    S/P revision of total knee, left  -     doxycycline (VIBRAMYCIN) 100 MG capsule; Take 1 capsule by mouth 2 (Two) Times a Day.          PLAN I've used silver nitrate again on the small area of granulation tissue.  I went to put her on suppressive doxycycline.  I'll check her blood work again in a month.  C-reactive protein went from 1 to 2.2.  Were repeated in a month when I see her back.  I will also remove her PICC line today without complication.    No Follow-up on file.    Elias Su MD  "

## 2018-11-05 NOTE — PROGRESS NOTES
Received above INR from [facility]. Spoke with pt who denies any med changes or bleeding issues. Denies any further s/s of blood clot. Instructed pt on dosing and to eat something green every 3rd day. Patient instructed regarding medication; results given and questions answered. Nutritional counseling given.  Dietary factors affecting therapy addressed.  Patient instructed to monitor for excessive bruising or bleeding. PT verbalizes understanding.         This document has been electronically signed by ZELDA Paul on November 5, 2018 10:57 AM

## 2018-11-06 ENCOUNTER — TELEPHONE (OUTPATIENT)
Dept: PHYSICAL THERAPY | Facility: HOSPITAL | Age: 63
End: 2018-11-06

## 2018-11-06 ENCOUNTER — HOSPITAL ENCOUNTER (OUTPATIENT)
Dept: PHYSICAL THERAPY | Facility: HOSPITAL | Age: 63
Setting detail: THERAPIES SERIES
Discharge: HOME | End: 2018-11-06

## 2018-11-06 DIAGNOSIS — Z96.652 S/P REVISION OF TOTAL KNEE, LEFT: Primary | ICD-10-CM

## 2018-11-06 DIAGNOSIS — M00.062 STAPHYLOCOCCAL ARTHRITIS OF LEFT KNEE (HCC): ICD-10-CM

## 2018-11-06 PROCEDURE — 97110 THERAPEUTIC EXERCISES: CPT

## 2018-11-06 NOTE — THERAPY TREATMENT NOTE
Outpatient Physical Therapy Ortho Treatment Note  [facility]     Patient Name: Meeta Marmolejo  : 1955  MRN: [id number]  Today's Date: 2018      Visit Date: 2018  Pt reports 7/10 pain pre treatment, 5/10 pain post treatment  Reports 60% of improvement.  Attended 7/7 visits.  Insurance available: 60 visits based on medicare guidelines  Next MD appt: Dec 2018.  Recertification: 2018.  Visit Dx:    ICD-10-CM ICD-9-CM   1. S/P revision of total knee, left Z96.652 V43.65   2. Staphylococcal arthritis of left knee (CMS/[facility]) M00.062 711.06     041.10       Patient Active Problem List   Diagnosis   • Obesity   • Type 2 diabetes mellitus (CMS/[facility])   • Chronic osteoarthritis   • Microalbuminuria   • Encounter for screening mammogram for malignant neoplasm of breast   • Plantar fasciitis   • URI (upper respiratory infection)   • Lateral epicondylitis of right elbow   • Essential hypertension   • Repetitive strain injury of lower back   • History of total knee replacement   • Painful total knee replacement, initial encounter (CMS/[facility])   • Bilateral hip pain   • Mechanical loosening of prosthetic knee (CMS/[facility])   • S/P revision of total knee, left   • Septic joint of left knee joint (CMS/[facility])   • Staphylococcal arthritis of left knee (CMS/[facility])   • Anemia   • Left knee pain   • Other pulmonary embolism without acute cor pulmonale (CMS/[facility])   • Encounter for anticoagulation discussion and counseling   • Long term (current) use of anticoagulants [Z79.01]        Past Medical History:   Diagnosis Date   • Acute vaginitis     resolved   • Carpal tunnel syndrome    • Chronic osteoarthritis     Nino TKA   • Chronic pain    • Chronic urticaria    • Dietary counseling and surveillance    • Dysuria    • External hordeolum    • Hyperlipidemia    • Hypertension    • Low back pain     strain   • Microalbuminuria     on ARB   • Noncompliance with treatment    • Obesity      Dieting ,  exercise, appetite suppressant      • Osteoarthritis of left knee    • Plantar fasciitis     resolved   • Seasonal rhinitis    • Type 2 diabetes mellitus (CMS/HCC)    • Urinary tract infectious disease    • Vaginal discharge    • Vitamin deficiency         Past Surgical History:   Procedure Laterality Date   • CARPAL TUNNEL RELEASE      Bilateral endoscopic release 01/20/2003    • ENDOSCOPY      normal 04/25/2008      • KNEE ARTHROSCOPY     • KNEE INCISION AND DRAINAGE Left 9/22/2018    Procedure: KNEE INCISION AND DRAINAGE;  Surgeon: Elias Su MD;  Location: [facility];  Service: Orthopedics   • KNEE SURGERY      Removal of existing left total knee arthroplasty and revision left total knee arthroplasty. 12/15/2014       • REPLACEMENT TOTAL KNEE     • SHOULDER SURGERY     • TOTAL KNEE ARTHROPLASTY REVISION Left 8/28/2018    Procedure: REVISION LEFT TOTAL KNEE;  Surgeon: Elias Su MD;  Location: [facility];  Service: Orthopedics   • TUBAL ABDOMINAL LIGATION               PT Ortho     Row Name 11/06/18 0900       Subjective Pain    Able to rate subjective pain? yes  -TL    Pre-Treatment Pain Level 7  -TL      User Key  (r) = Recorded By, (t) = Taken By, (c) = Cosigned By    Initials Name Provider Type    Estephania Villagomez PTA Physical Therapy Assistant                            PT Assessment/Plan     Row Name 11/06/18 0900          PT Assessment    Assessment Comments PTA add 2 pound weight to prone hang this date. Pt still not able to advance to seat 6 on bike with full revolutions and had to be set back to 7. Pt working toward goals. No new goals met. Pt missed 0800 appt secondary to voting.  -TL        PT Plan    PT Frequency 3x/week  -TL     PT Plan Comments continue to push ROM  -TL       User Key  (r) = Recorded By, (t) = Taken By, (c) = Cosigned By    Initials Name Provider Type    Estephania Villagomez PTA Physical Therapy Assistant                Modalities     Row Name 11/06/18 0900              Subjective Pain    Post-Treatment Pain Level 5  -TL         Ice    Patient denies application of Ice Yes  -TL      Patient reports will apply ice at home to involved area Yes   ice to go  -TL        User Key  (r) = Recorded By, (t) = Taken By, (c) = Cosigned By    Initials Name Provider Type    TL Estephania Guerrero PTA Physical Therapy Assistant                Exercises     Row Name 11/06/18 0900             Subjective Pain    Able to rate subjective pain? yes  -TL      Pre-Treatment Pain Level 7  -TL      Post-Treatment Pain Level 5  -TL         Exercise 1    Exercise Name 1 Pro II LE's Full Revolution  -TL      Time 1 10 mins  -TL      Additional Comments level 4, seat 6  -TL         Exercise 2    Exercise Name 2 Long sitting HS S  -TL      Reps 2 2  -TL      Time 2 30 sec  -TL         Exercise 3    Exercise Name 3 L St. Lunge S   -TL      Reps 3 10  -TL      Time 3 10 sec hold  -TL         Exercise 4    Exercise Name 4 Wall Heel Slides  -TL      Time 4 5 minutes; hold 5 sec each rep  -TL         Exercise 5    Exercise Name 5 Prone Hang  -TL      Time 5 5 mins  -TL      Additional Comments #2  -TL         Exercise 6    Exercise Name 6  step up fwd  -TL      Sets 6 1  -TL      Reps 6 10  -TL        User Key  (r) = Recorded By, (t) = Taken By, (c) = Cosigned By    Initials Name Provider Type    TL Estephania Guerrero PTA Physical Therapy Assistant                               PT OP Goals     Row Name 11/06/18 0900          PT Short Term Goals    STG Date to Achieve 11/02/18  -TL     STG 1 Pt indep with HEP  -TL     STG 1 Progress Progressing  -TL     STG 2 Improve left knee AROM to 5-80°  -TL     STG 2 Progress Partially Met  -TL     STG 3 Improve left knee MMT (flex/ext) to 4+/5  -TL     STG 3 Progress New  -TL     STG 4 Ambulate with RW with step through non antalgic gait (NAG)   -TL     STG 4 Progress Progressing  -TL     STG 5 Reduce L knee pain by 25% with standing and walking  -TL     STG 5 Progress  Progressing  -TL        Long Term Goals    LTG Date to Achieve 11/16/18  -TL     LTG 1 Improve left knee AROM to 0-90°  -TL     LTG 1 Progress New  -TL     LTG 2 Improve left knee MMT (flex/ext) to 5/5  -TL     LTG 2 Progress New  -TL     LTG 3 Ambulate with or without straight cane with step through non antalgic gait (NAG) x 1 lap in the gym   -TL     LTG 3 Progress New  -TL     LTG 4 Reduce L knee pain by 75% with standing and walking  -TL     LTG 4 Progress New  -TL        Time Calculation    PT Goal Re-Cert Due Date 11/09/18  -TL       User Key  (r) = Recorded By, (t) = Taken By, (c) = Cosigned By    Initials Name Provider Type    TL Estephania Guerrero PTA Physical Therapy Assistant          Therapy Education  Given: HEP, Symptoms/condition management, Pain management  Program: Reinforced  How Provided: Verbal  Provided to: Patient  Level of Understanding: Verbalized, Demonstrated              Time Calculation:   Start Time: 0855  Stop Time: 0939  Time Calculation (min): 44 min  Total Timed Code Minutes- PT: 44 minute(s)  Therapy Suggested Charges     Code   Minutes Charges    None           Therapy Charges for Today     Code Description Service Date Service Provider Modifiers Qty    88284918550 HC PT THER PROC EA 15 MIN 11/6/2018 Estephania Guerrero PTA GP 3                    Estephania Guerrero PTA  11/6/2018

## 2018-11-07 ENCOUNTER — HOSPITAL ENCOUNTER (OUTPATIENT)
Dept: PHYSICAL THERAPY | Facility: HOSPITAL | Age: 63
Setting detail: THERAPIES SERIES
Discharge: HOME | End: 2018-11-07

## 2018-11-07 DIAGNOSIS — M00.062 STAPHYLOCOCCAL ARTHRITIS OF LEFT KNEE (HCC): ICD-10-CM

## 2018-11-07 DIAGNOSIS — Z96.652 S/P REVISION OF TOTAL KNEE, LEFT: Primary | ICD-10-CM

## 2018-11-07 PROCEDURE — G8979 MOBILITY GOAL STATUS: HCPCS | Performed by: PHYSICAL THERAPIST

## 2018-11-07 PROCEDURE — G0283 ELEC STIM OTHER THAN WOUND: HCPCS | Performed by: PHYSICAL THERAPIST

## 2018-11-07 PROCEDURE — G8978 MOBILITY CURRENT STATUS: HCPCS | Performed by: PHYSICAL THERAPIST

## 2018-11-07 PROCEDURE — 97110 THERAPEUTIC EXERCISES: CPT | Performed by: PHYSICAL THERAPIST

## 2018-11-07 NOTE — THERAPY PROGRESS REPORT/RE-CERT
Outpatient Physical Therapy Ortho Progress Note  [facility]     Patient Name: Meeta Marmolejo  : 1955  MRN: [id number]  Today's Date: 2018      Visit Date: 2018  Pt reports 8/10 pain pre treatment, 5/10 pain post treatment  Reports 60% of improvement.  Attended 8/8 visits.  Insurance available: 60 visits based on medicare guidelines  Next MD appt: Dec 2018.  Recertification: 2018.  Patient Active Problem List   Diagnosis   • Obesity   • Type 2 diabetes mellitus (CMS/HCC)   • Chronic osteoarthritis   • Microalbuminuria   • Encounter for screening mammogram for malignant neoplasm of breast   • Plantar fasciitis   • URI (upper respiratory infection)   • Lateral epicondylitis of right elbow   • Essential hypertension   • Repetitive strain injury of lower back   • History of total knee replacement   • Painful total knee replacement, initial encounter (CMS/[facility])   • Bilateral hip pain   • Mechanical loosening of prosthetic knee (CMS/HCC)   • S/P revision of total knee, left   • Septic joint of left knee joint (CMS/[facility])   • Staphylococcal arthritis of left knee (CMS/[facility])   • Anemia   • Left knee pain   • Other pulmonary embolism without acute cor pulmonale (CMS/HCC)   • Encounter for anticoagulation discussion and counseling   • Long term (current) use of anticoagulants [Z79.01]        Past Medical History:   Diagnosis Date   • Acute vaginitis     resolved   • Carpal tunnel syndrome    • Chronic osteoarthritis     Nino TKA   • Chronic pain    • Chronic urticaria    • Dietary counseling and surveillance    • Dysuria    • External hordeolum    • Hyperlipidemia    • Hypertension    • Low back pain     strain   • Microalbuminuria     on ARB   • Noncompliance with treatment    • Obesity      Dieting , exercise, appetite suppressant      • Osteoarthritis of left knee    • Plantar fasciitis     resolved   • Seasonal rhinitis    • Type 2 diabetes mellitus (CMS/HCC)    • Urinary  tract infectious disease    • Vaginal discharge    • Vitamin deficiency         Past Surgical History:   Procedure Laterality Date   • CARPAL TUNNEL RELEASE      Bilateral endoscopic release 01/20/2003    • ENDOSCOPY      normal 04/25/2008      • KNEE ARTHROSCOPY     • KNEE INCISION AND DRAINAGE Left 9/22/2018    Procedure: KNEE INCISION AND DRAINAGE;  Surgeon: Elias Su MD;  Location: [facility];  Service: Orthopedics   • KNEE SURGERY      Removal of existing left total knee arthroplasty and revision left total knee arthroplasty. 12/15/2014       • REPLACEMENT TOTAL KNEE     • SHOULDER SURGERY     • TOTAL KNEE ARTHROPLASTY REVISION Left 8/28/2018    Procedure: REVISION LEFT TOTAL KNEE;  Surgeon: Elias Su MD;  Location: [facility];  Service: Orthopedics   • TUBAL ABDOMINAL LIGATION         Visit Dx:     ICD-10-CM ICD-9-CM   1. S/P revision of total knee, left Z96.652 V43.65   2. Staphylococcal arthritis of left knee (CMS/[facility]) M00.062 711.06     041.10                 PT Ortho     Row Name 11/07/18 0800       Subjective Comments    Subjective Comments pt reports stretched her left knee too much last night and it hurts a bit more than normal this morning.   -BS       Precautions and Contraindications    Precautions WBAT  LLE  -BS       Subjective Pain    Able to rate subjective pain? yes  -BS    Pre-Treatment Pain Level 8  -BS    Post-Treatment Pain Level 5  -BS       General ROM    GENERAL ROM COMMENTS AROM: 10-68°  -BS       MMT (Manual Muscle Testing)    General MMT Comments MMT: L knee flex/ext 4+/5 (both)  -BS    Row Name 11/06/18 0900       Subjective Pain    Able to rate subjective pain? yes  -TL    Pre-Treatment Pain Level 7  -TL      User Key  (r) = Recorded By, (t) = Taken By, (c) = Cosigned By    Initials Name Provider Type    BS Charles Newton, PT Physical Therapist    TL Estephania Guerrero, PTA Physical Therapy Assistant                      Therapy Education  Given: HEP,  Symptoms/condition management, Pain management  Program: Reinforced  How Provided: Verbal  Provided to: Patient  Level of Understanding: Verbalized, Demonstrated           PT OP Goals     Row Name 11/07/18 0800          PT Short Term Goals    STG Date to Achieve 11/02/18  -BS     STG 1 Pt indep with HEP  -BS     STG 1 Progress Progressing  -BS     STG 2 Improve left knee AROM to 5-80°  -BS     STG 2 Progress Partially Met  -BS     STG 3 Improve left knee MMT (flex/ext) to 4+/5  -BS     STG 3 Progress Met  -BS     STG 4 Ambulate with RW with step through non antalgic gait (NAG)   -BS     STG 4 Progress Partially Met  -BS     STG 5 Reduce L knee pain by 25% with standing and walking  -BS     STG 5 Progress Progressing  -BS        Long Term Goals    LTG Date to Achieve 11/16/18  -BS     LTG 1 Improve left knee AROM to 0-90°  -BS     LTG 1 Progress Ongoing  -BS     LTG 2 Improve left knee MMT (flex/ext) to 5/5  -BS     LTG 2 Progress Ongoing  -BS     LTG 3 Ambulate with or without straight cane with step through non antalgic gait (NAG) x 1 lap in the gym   -BS     LTG 3 Progress Ongoing  -BS     LTG 4 Reduce L knee pain by 75% with standing and walking  -BS     LTG 4 Progress Ongoing  -BS        Time Calculation    PT Goal Re-Cert Due Date 11/28/18  -       User Key  (r) = Recorded By, (t) = Taken By, (c) = Cosigned By    Initials Name Provider Type    Charles Sales, PT Physical Therapist                PT Assessment/Plan     Row Name 11/07/18 0900          PT Assessment    Functional Limitations Impaired gait;Performance in work activities;Performance in sport activities;Performance in self-care ADL;Performance in leisure activities;Limitation in home management  -BS     Impairments Balance;Endurance;Gait;Edema;Impaired flexibility;Sensation;Range of motion;Posture;Pain;Muscle strength  -BS     Assessment Comments improved L knee MMT and ROM overall, slow progress with knee flex ROM due to h/o complicated L knee  (4 surgeries).   -BS     Please refer to paper survey for additional self-reported information Yes  -BS     Rehab Potential Fair  -BS     Patient/caregiver participated in establishment of treatment plan and goals Yes  -BS     Patient would benefit from skilled therapy intervention Yes  -BS        PT Plan    PT Frequency 3x/week  -BS     Predicted Duration of Therapy Intervention (Therapy Eval) 6-8 weeks  -BS     Planned CPT's? PT RE-EVAL: 21038;PT THER PROC EA 15 MIN: 42464;PT THER ACT EA 15 MIN: 04078;PT NEUROMUSC RE-EDUCATION EA 15 MIN: 33623;PT MANUAL THERAPY EA 15 MIN: 42543;PT GAIT TRAINING EA 15 MIN: 54685;PT ELECTRICAL STIM UNATTEND: ;PT ELECTRICAL STIM ATTD EA 15 MIN: 08632;PT HOT/COLD PACK WC NONMCARE: 19437;PT THER SUPP EA 15 MIN  -BS     Physical Therapy Interventions (Optional Details) balance training;gait training;home exercise program;joint mobilization;manual therapy techniques;modalities;neuromuscular re-education;patient/family education;postural re-education;ROM (Range of Motion);stair training;stretching;strengthening;transfer training  -BS     PT Plan Comments add gentle manual (AP's or PA's to tib-fem joint). Add static knee flex weighted (propped on foam roll with ankle wt). Progress ROM as able.  -BS       User Key  (r) = Recorded By, (t) = Taken By, (c) = Cosigned By    Initials Name Provider Type    Charles Sales, PT Physical Therapist                Modalities     Row Name 11/07/18 0800             Ice    Ice Applied Yes  -BS      Location L knee with elevation in pillowcase  -BS      Rx Minutes 15 mins  -BS      Ice S/P Rx Yes  -BS         ELECTRICAL STIMULATION    Attended/Unattended Unattended  -BS      Stimulation Type IFC  -BS      Location/Electrode Placement/Other L knee  -BS      00379 - PT Electrical Stimulation Attended (Manual) Minutes 15  -BS        User Key  (r) = Recorded By, (t) = Taken By, (c) = Cosigned By    Initials Name Provider Type    Charles Sales, PT  Physical Therapist              Exercises     Row Name 11/07/18 0800             Subjective Comments    Subjective Comments pt reports stretched her left knee too much last night and it hurts a bit more than normal this morning.   -BS         Subjective Pain    Able to rate subjective pain? yes  -BS      Pre-Treatment Pain Level 8  -BS      Post-Treatment Pain Level 5  -BS         Exercise 1    Exercise Name 1 Pro II LE's Full Revolution  -BS      Time 1 10 mins  -BS      Additional Comments L 3.5, seat 7  -BS         Exercise 2    Exercise Name 2 Long sitting gastroc S w/ towel  -BS      Reps 2 2  -BS      Time 2 30 sec  -BS         Exercise 3    Exercise Name 3 L St. Lunge S   -BS      Reps 3 10  -BS      Time 3 10 sec hold  -BS         Exercise 4    Exercise Name 4 Wall Heel Slides  -BS      Reps 4 10  -BS      Time 4 70° flex-best, L knee  -BS         Exercise 5    Exercise Name 5 QS w/ heel prop  -BS      Reps 5 10  -BS        User Key  (r) = Recorded By, (t) = Taken By, (c) = Cosigned By    Initials Name Provider Type    Charles Sales, PT Physical Therapist                        Outcome Measure Options: Lower Extremity Functional Scale (LEFS)  Lower Extremity Functional Index  Any of your usual work, housework or school activities: Moderate difficulty  Your usual hobbies, recreational or sporting activities: Extreme difficulty or unable to perform activity  Getting into or out of the bath: A little bit of difficulty  Walking between rooms: A little bit of difficulty  Putting on your shoes or socks: A little bit of difficulty  Squatting: Moderate difficulty  Lifting an object, like a bag of groceries from the floor: A little bit of difficulty  Performing light activities around your home: A little bit of difficulty  Performing heavy activities around your home: Quite a bit of difficulty  Getting into or out of a car: A little bit of difficulty  Walking 2 blocks: Extreme difficulty or unable to perform  activity  Walking a mile: Extreme difficulty or unable to perform activity  Going up or down 10 stairs (about 1 flight of stairs): Quite a bit of difficulty  Standing for 1 hour: Quite a bit of difficulty  Sitting for 1 hour: A little bit of difficulty  Running on even ground: Extreme difficulty or unable to perform activity  Running on uneven ground: Extreme difficulty or unable to perform activity  Making sharp turns while running fast: Extreme difficulty or unable to perform activity  Hopping: Extreme difficulty or unable to perform activity  Rolling over in bed: A little bit of difficulty  Total: 31      Time Calculation:     Therapy Suggested Charges     Code   Minutes Charges    06951 (CPT®) Hc Pt Neuromusc Re Education Ea 15 Min      58851 (CPT®) Hc Pt Ther Proc Ea 15 Min      35196 (CPT®) Hc Gait Training Ea 15 Min      21613 (CPT®) Hc Pt Therapeutic Act Ea 15 Min      67980 (CPT®) Hc Pt Manual Therapy Ea 15 Min      72297 (CPT®) Hc Pt Ther Massage- Per 15 Min      71175 (CPT®) Hc Pt Iontophoresis Ea 15 Min      64090 (CPT®) Hc Pt Elec Stim Ea-Per 15 Min 15 1    33922 (CPT®) Hc Pt Ultrasound Ea 15 Min      39004 (CPT®) Hc Pt Self Care/Mgmt/Train Ea 15 Min      82269 (CPT®) Hc Pt Prosthetic (S) Train Initial Encounter, Each 15 Min      98825 (CPT®) Hc Orthotic(S) Mgmt/Train Initial Encounter, Each 15min      60957 (CPT®) Hc Pt Aquatic Therapy Ea 15 Min      77876 (CPT®) Hc Pt Orthotic(S)/Prosthetic(S) Encounter, Each 15 Min       (CPT®) Hc Pt Electrical Stim Unattended      Total  15 1          Start Time: 0803  Stop Time: 0910  Time Calculation (min): 67 min  PT Non-Billable Time (min): 15 min  Total Timed Code Minutes- PT: 52 minute(s)     Therapy Charges for Today     Code Description Service Date Service Provider Modifiers Qty    58685919965 HC PT MOBILITY CURRENT 11/7/2018 Charles Newton, PT GP, CL 1    76318085185 HC PT MOBILITY PROJECTED 11/7/2018 Charles Newton, PT GP, CK 1    36723560525 HC PT  THER PROC EA 15 MIN 11/7/2018 Charles Newton, PT GP 3    65708170106 HC PT ELECTRICAL STIM UNATTENDED 11/7/2018 Charles Newton, PT  1          PT G-Codes  Outcome Measure Options: Lower Extremity Functional Scale (LEFS)  Total: 31  Functional Limitation: Mobility: Walking and moving around  Mobility: Walking and Moving Around Current Status (): At least 60 percent but less than 80 percent impaired, limited or restricted  Mobility: Walking and Moving Around Goal Status (): At least 40 percent but less than 60 percent impaired, limited or restricted         Charles Newton, PT  11/7/2018

## 2018-11-09 ENCOUNTER — HOSPITAL ENCOUNTER (OUTPATIENT)
Dept: PHYSICAL THERAPY | Facility: HOSPITAL | Age: 63
Setting detail: THERAPIES SERIES
Discharge: HOME | End: 2018-11-09

## 2018-11-09 DIAGNOSIS — Z96.652 S/P REVISION OF TOTAL KNEE, LEFT: Primary | ICD-10-CM

## 2018-11-09 DIAGNOSIS — M00.062 STAPHYLOCOCCAL ARTHRITIS OF LEFT KNEE (HCC): ICD-10-CM

## 2018-11-09 PROCEDURE — 97110 THERAPEUTIC EXERCISES: CPT

## 2018-11-09 NOTE — THERAPY TREATMENT NOTE
Outpatient Physical Therapy Ortho Treatment Note  [facility]  Jane Torres PTA       Patient Name: Meeta Marmolejo  : 1955  MRN: [id number]  Today's Date: 2018      Visit Date: 2018     Visits:   Insurance Visits Approved: 60 visits based on medicare guidelines  Recert Due: 2018  MD Appt: Dec 2018  Pain: pretreatment 0/10; post treatment 0/10  Improvement: pt is subjectively reporting 60% improvement since initial evaluation    Visit Dx:    ICD-10-CM ICD-9-CM   1. S/P revision of total knee, left Z96.652 V43.65   2. Staphylococcal arthritis of left knee (CMS/HCC) M00.062 711.06     041.10       Patient Active Problem List   Diagnosis   • Obesity   • Type 2 diabetes mellitus (CMS/[facility])   • Chronic osteoarthritis   • Microalbuminuria   • Encounter for screening mammogram for malignant neoplasm of breast   • Plantar fasciitis   • URI (upper respiratory infection)   • Lateral epicondylitis of right elbow   • Essential hypertension   • Repetitive strain injury of lower back   • History of total knee replacement   • Painful total knee replacement, initial encounter (CMS/[facility])   • Bilateral hip pain   • Mechanical loosening of prosthetic knee (CMS/[facility])   • S/P revision of total knee, left   • Septic joint of left knee joint (CMS/[facility])   • Staphylococcal arthritis of left knee (CMS/[facility])   • Anemia   • Left knee pain   • Other pulmonary embolism without acute cor pulmonale (CMS/[facility])   • Encounter for anticoagulation discussion and counseling   • Long term (current) use of anticoagulants [Z79.01]        Past Medical History:   Diagnosis Date   • Acute vaginitis     resolved   • Carpal tunnel syndrome    • Chronic osteoarthritis     Nino TKA   • Chronic pain    • Chronic urticaria    • Dietary counseling and surveillance    • Dysuria    • External hordeolum    • Hyperlipidemia    • Hypertension    • Low back pain     strain   • Microalbuminuria     on ARB   • Noncompliance  with treatment    • Obesity      Dieting , exercise, appetite suppressant      • Osteoarthritis of left knee    • Plantar fasciitis     resolved   • Seasonal rhinitis    • Type 2 diabetes mellitus (CMS/HCC)    • Urinary tract infectious disease    • Vaginal discharge    • Vitamin deficiency         Past Surgical History:   Procedure Laterality Date   • CARPAL TUNNEL RELEASE      Bilateral endoscopic release 01/20/2003    • ENDOSCOPY      normal 04/25/2008      • KNEE ARTHROSCOPY     • KNEE INCISION AND DRAINAGE Left 9/22/2018    Procedure: KNEE INCISION AND DRAINAGE;  Surgeon: Elias Su MD;  Location: [facility];  Service: Orthopedics   • KNEE SURGERY      Removal of existing left total knee arthroplasty and revision left total knee arthroplasty. 12/15/2014       • REPLACEMENT TOTAL KNEE     • SHOULDER SURGERY     • TOTAL KNEE ARTHROPLASTY REVISION Left 8/28/2018    Procedure: REVISION LEFT TOTAL KNEE;  Surgeon: Elias Su MD;  Location: [facility];  Service: Orthopedics   • TUBAL ABDOMINAL LIGATION               PT Ortho     Row Name 11/09/18 0800       Subjective Comments    Subjective Comments back of knee and medial aspect of knee hurting.   -       Subjective Pain    Able to rate subjective pain? yes  -    Post-Treatment Pain Level 0  -       General ROM    GENERAL ROM COMMENTS 8-70°  -    Row Name 11/07/18 0800       Subjective Comments    Subjective Comments pt reports stretched her left knee too much last night and it hurts a bit more than normal this morning.   -BS       Precautions and Contraindications    Precautions WBAT  LLE  -BS       Subjective Pain    Able to rate subjective pain? yes  -BS    Pre-Treatment Pain Level 8  -BS    Post-Treatment Pain Level 5  -BS       General ROM    GENERAL ROM COMMENTS AROM: 10-68°  -BS       MMT (Manual Muscle Testing)    General MMT Comments MMT: L knee flex/ext 4+/5 (both)  -BS      User Key  (r) = Recorded By, (t) = Taken By, (c) =  Cosigned By    Initials Name Provider Type     Jane Torres PTA Physical Therapy Assistant    BS Charles Newton, PT Physical Therapist                            PT Assessment/Plan     Row Name 11/09/18 0800          PT Assessment    Assessment Comments patient is lacking knee extension. encouragement to push herself with ROM  -        PT Plan    PT Frequency 3x/week  -     PT Plan Comments next visit supine SKTC S with strap behind knee  -       User Key  (r) = Recorded By, (t) = Taken By, (c) = Cosigned By    Initials Name Provider Type     Jane Torres PTA Physical Therapy Assistant                Modalities     Row Name 11/09/18 0800             Ice    Ice Applied Yes   to go  -        User Key  (r) = Recorded By, (t) = Taken By, (c) = Cosigned By    Initials Name Provider Type     Jane Torres PTA Physical Therapy Assistant                Exercises     Row Name 11/09/18 0800             Subjective Comments    Subjective Comments back of knee and medial aspect of knee hurting.   -         Subjective Pain    Able to rate subjective pain? yes  -      Pre-Treatment Pain Level 7  -      Post-Treatment Pain Level 0  -         Exercise 1    Exercise Name 1 Pro II LE's full revolution  -      Time 1 10 minutes  -      Additional Comments L 4.0 seat 7  -         Exercise 2    Exercise Name 2 Walking with a cane  -      Additional Comments 300 feet  -         Exercise 3    Exercise Name 3 Heel Walking in ars  -      Time 3 2-3 minutes  -         Exercise 4    Exercise Name 4 Giant Step Walking in ars Fwd  -      Time 4 2-3 minutes  -         Exercise 5    Exercise Name 5 Giant Step Walking in ars Retro  -      Time 5 2-3 minutes  -         Exercise 6    Exercise Name 6 Shuttle 2L for knee flexion  -      Additional Comments 7 cords; feet at bottom of plate  -         Exercise 7    Exercise Name 7 Wall Heel Slides  -      Time 7 5 minutes  -          Exercise 8    Exercise Name 8 Prone Hang   -      Time 8 5 minutes  -      Additional Comments 4# cuff weight; MHP on upper thigh  -         Exercise 9    Exercise Name 9 Heel Slides with strap  -      Time 9 5 minutes  -        User Key  (r) = Recorded By, (t) = Taken By, (c) = Cosigned By    Initials Name Provider Type     Jane Torres, PTA Physical Therapy Assistant                               PT OP Goals     Row Name 11/09/18 0800          PT Short Term Goals    STG Date to Achieve 11/02/18  -     STG 1 Pt indep with HEP  -     STG 1 Progress Progressing  -     STG 2 Improve left knee AROM to 5-80°  -     STG 2 Progress Partially Met  -     STG 3 Improve left knee MMT (flex/ext) to 4+/5  -     STG 3 Progress Met  -     STG 4 Ambulate with RW with step through non antalgic gait (NAG)   -     STG 4 Progress Partially Met  -     STG 5 Reduce L knee pain by 25% with standing and walking  -     STG 5 Progress Progressing  -        Long Term Goals    LTG Date to Achieve 11/16/18  -     LTG 1 Improve left knee AROM to 0-90°  -     LTG 1 Progress Ongoing  -     LTG 2 Improve left knee MMT (flex/ext) to 5/5  -     LTG 2 Progress Ongoing  -     LTG 3 Ambulate with or without straight cane with step through non antalgic gait (NAG) x 1 lap in the gym   -     LTG 3 Progress Ongoing  -     LTG 4 Reduce L knee pain by 75% with standing and walking  -     LTG 4 Progress Ongoing  -        Time Calculation    PT Goal Re-Cert Due Date 11/28/18  -       User Key  (r) = Recorded By, (t) = Taken By, (c) = Cosigned By    Initials Name Provider Type     Jane Torres, PTA Physical Therapy Assistant          Therapy Education  Given: HEP, Symptoms/condition management, Pain management  Program: Reinforced  How Provided: Verbal  Provided to: Patient  Level of Understanding: Verbalized, Demonstrated              Time Calculation:   Start Time: 0800  Stop Time: 0855  Time  Calculation (min): 55 min  Total Timed Code Minutes- PT: 55 minute(s)  Therapy Suggested Charges     Code   Minutes Charges    None           Therapy Charges for Today     Code Description Service Date Service Provider Modifiers Qty    33503013791 HC PT THER PROC EA 15 MIN 11/9/2018 Jane Torres PTA GP 4    72372963528 HC PT THER SUPP EA 15 MIN 11/9/2018 Jane Torres PTA GP 1                    Jane Torres PTA  11/9/2018

## 2018-11-12 ENCOUNTER — TELEPHONE (OUTPATIENT)
Dept: ORTHOPEDIC SURGERY | Facility: CLINIC | Age: 63
End: 2018-11-12

## 2018-11-12 ENCOUNTER — ANTICOAGULATION VISIT (OUTPATIENT)
Dept: CARDIAC SURGERY | Facility: CLINIC | Age: 63
End: 2018-11-12

## 2018-11-12 VITALS — OXYGEN SATURATION: 98 % | HEART RATE: 80 BPM

## 2018-11-12 DIAGNOSIS — I26.99 OTHER ACUTE PULMONARY EMBOLISM WITHOUT ACUTE COR PULMONALE (HCC): ICD-10-CM

## 2018-11-12 DIAGNOSIS — Z96.652 S/P REVISION OF TOTAL KNEE, LEFT: ICD-10-CM

## 2018-11-12 DIAGNOSIS — Z79.01 LONG TERM (CURRENT) USE OF ANTICOAGULANTS: ICD-10-CM

## 2018-11-12 LAB — INR PPP: 4.6 (ref 0.9–1.1)

## 2018-11-12 PROCEDURE — 99211 OFF/OP EST MAY X REQ PHY/QHP: CPT | Performed by: NURSE PRACTITIONER

## 2018-11-12 PROCEDURE — 85610 PROTHROMBIN TIME: CPT | Performed by: NURSE PRACTITIONER

## 2018-11-12 RX ORDER — HYDROCODONE BITARTRATE AND ACETAMINOPHEN 7.5; 325 MG/1; MG/1
1 TABLET ORAL EVERY 6 HOURS PRN
Qty: 30 TABLET | Refills: 0 | Status: SHIPPED | OUTPATIENT
Start: 2018-11-12 | End: 2018-12-17

## 2018-11-12 NOTE — PROGRESS NOTES
Unable to determine cause of elevation. Denies any med changes or bleeding issues. PT did consume green. Does not wish to go to lab. Adjusted pt's dose and instructed to increase vit k with larger serving of vitamin k today. Patient instructed regarding medication; results given and questions answered. Nutritional counseling given.  Dietary factors affecting therapy addressed.  Patient instructed to monitor for excessive bruising or bleeding. PT instructed to watch for any s/s of bleeding and report bleeding or blunt trauma to ER. PT will be seen on Thursday when she can return due to Dr Yakov da silva.         This document has been electronically signed by Lexii Otto, AAMIR @ on November 12, 2018 8:36 AM

## 2018-11-13 ENCOUNTER — APPOINTMENT (OUTPATIENT)
Dept: PHYSICAL THERAPY | Facility: HOSPITAL | Age: 63
End: 2018-11-13

## 2018-11-14 ENCOUNTER — HOSPITAL ENCOUNTER (OUTPATIENT)
Dept: PHYSICAL THERAPY | Facility: HOSPITAL | Age: 63
Setting detail: THERAPIES SERIES
Discharge: HOME | End: 2018-11-14

## 2018-11-14 DIAGNOSIS — Z96.652 S/P REVISION OF TOTAL KNEE, LEFT: Primary | ICD-10-CM

## 2018-11-14 PROCEDURE — G0283 ELEC STIM OTHER THAN WOUND: HCPCS

## 2018-11-14 PROCEDURE — 97110 THERAPEUTIC EXERCISES: CPT

## 2018-11-14 NOTE — THERAPY TREATMENT NOTE
Outpatient Physical Therapy Ortho Treatment Note  [facility]  Jane Torres PTA       Patient Name: Meeta Marmolejo  : 1955  MRN: [id number]  Today's Date: 2018      Visit Date: 2018     Visits: 10/11  Insurance Visits Approved: 60 visits based on medicare guidelines  Recert Due: 2018  MD Appt: Dec 2018  Pain: pretreatment 5/10; post treatment 0/10  Improvement: pt is subjectively reporting 60% improvement since initial evaluation    Visit Dx:    ICD-10-CM ICD-9-CM   1. S/P revision of total knee, left Z96.652 V43.65       Patient Active Problem List   Diagnosis   • Obesity   • Type 2 diabetes mellitus (CMS/HCC)   • Chronic osteoarthritis   • Microalbuminuria   • Encounter for screening mammogram for malignant neoplasm of breast   • Plantar fasciitis   • URI (upper respiratory infection)   • Lateral epicondylitis of right elbow   • Essential hypertension   • Repetitive strain injury of lower back   • History of total knee replacement   • Painful total knee replacement, initial encounter (CMS/[facility])   • Bilateral hip pain   • Mechanical loosening of prosthetic knee (CMS/[facility])   • S/P revision of total knee, left   • Septic joint of left knee joint (CMS/[facility])   • Staphylococcal arthritis of left knee (CMS/[facility])   • Anemia   • Left knee pain   • Other pulmonary embolism without acute cor pulmonale (CMS/[facility])   • Encounter for anticoagulation discussion and counseling   • Long term (current) use of anticoagulants [Z79.01]        Past Medical History:   Diagnosis Date   • Acute vaginitis     resolved   • Carpal tunnel syndrome    • Chronic osteoarthritis     Nino TKA   • Chronic pain    • Chronic urticaria    • Dietary counseling and surveillance    • Dysuria    • External hordeolum    • Hyperlipidemia    • Hypertension    • Low back pain     strain   • Microalbuminuria     on ARB   • Noncompliance with treatment    • Obesity      Dieting , exercise, appetite suppressant       • Osteoarthritis of left knee    • Plantar fasciitis     resolved   • Seasonal rhinitis    • Type 2 diabetes mellitus (CMS/HCC)    • Urinary tract infectious disease    • Vaginal discharge    • Vitamin deficiency         Past Surgical History:   Procedure Laterality Date   • CARPAL TUNNEL RELEASE      Bilateral endoscopic release 01/20/2003    • ENDOSCOPY      normal 04/25/2008      • KNEE ARTHROSCOPY     • KNEE SURGERY      Removal of existing left total knee arthroplasty and revision left total knee arthroplasty. 12/15/2014       • REPLACEMENT TOTAL KNEE     • SHOULDER SURGERY     • TUBAL ABDOMINAL LIGATION         PT Ortho     Row Name 11/14/18 0900       Subjective Pain    Able to rate subjective pain?  yes  -    Pre-Treatment Pain Level  5  -    Post-Treatment Pain Level  0  -       General ROM    GENERAL ROM COMMENTS  AROM L Knee 16°-69°  -      User Key  (r) = Recorded By, (t) = Taken By, (c) = Cosigned By    Initials Name Provider Type     Jane Torres PTA Physical Therapy Assistant                      PT Assessment/Plan     Row Name 11/14/18 0900          PT Assessment    Assessment Comments  patient has significant decrease in AROM this treatment. patient is not receptive to knee ROM being pushed and is adament that she will not push into any pain anymore. patient verbalizes that she is doing exercises at home.   -        PT Plan    PT Frequency  3x/week  -     PT Plan Comments  continue to progress motion as patient allows.   -       User Key  (r) = Recorded By, (t) = Taken By, (c) = Cosigned By    Initials Name Provider Type     Jane Torres PTA Physical Therapy Assistant          Modalities     Row Name 11/14/18 0900             Ice    Ice Applied  Yes  -      Location  L knee with elevation in pillowcase  -      Rx Minutes  15 mins  -      Ice S/P Rx  Yes  -         ELECTRICAL STIMULATION    Attended/Unattended  Unattended  -      Stimulation Type  IFC  -       Location/Electrode Placement/Other  L knee  -       PT Electrical Stimulation Unattended (Manual) Minutes  15  -MH        User Key  (r) = Recorded By, (t) = Taken By, (c) = Cosigned By    Initials Name Provider Type    Jane Romero PTA Physical Therapy Assistant          Exercises     Row Name 11/14/18 0900             Subjective Comments    Subjective Comments  patient reports that she is not going to do anything that makes her knee hurt in any way. states that she will NOT do the prone hang at all because it makes her hurt. states that shes not pushing her knee and making it hurt. she will move it in a range that is pain free for her. patient states that she just now got pain medication for her knee and she had already decided that she will not do any therapy unless she has pain medication.   -         Subjective Pain    Able to rate subjective pain?  yes  -      Pre-Treatment Pain Level  5  -      Post-Treatment Pain Level  0  -         Exercise 1    Exercise Name 1  Pro II LE's rocking  -      Time 1  10 minutes  -      Additional Comments  L 4.0 seat 8  -         Exercise 2    Exercise Name 2  L St. Lunge S  -      Reps 2  10  -      Time 2  10 sec hold  -         Exercise 3    Exercise Name 3  Sit to/from stand  -      Time 3  5 minutes  -      Additional Comments  7 cords  -         Exercise 4    Exercise Name 4  Shuttle 1L   -      Time 4  5 minutes  -      Additional Comments  7 cords to encourage knee flexion  -         Exercise 5    Exercise Name 5  Stool Scoots  -      Reps 5  2 laps  -         Exercise 6    Exercise Name 6  Step Ups Fwd L  -      Reps 6  20  -      Time 6  --  -        User Key  (r) = Recorded By, (t) = Taken By, (c) = Cosigned By    Initials Name Provider Type     Jane Torres PTA Physical Therapy Assistant                         PT OP Goals     Row Name 11/14/18 0900          PT Short Term Goals    STG Date to Achieve   11/02/18  -     STG 1  Pt indep with HEP  -     STG 1 Progress  Progressing  -     STG 2  Improve left knee AROM to 5-80°  -     STG 2 Progress  Not Met  -     STG 2 Progress Comments  previously partially met  -     STG 3  Improve left knee MMT (flex/ext) to 4+/5  -     STG 3 Progress  Met  -     STG 4  Ambulate with RW with step through non antalgic gait (NAG)   -     STG 4 Progress  Partially Met  -     STG 5  Reduce L knee pain by 25% with standing and walking  -     STG 5 Progress  Progressing  -        Long Term Goals    LTG Date to Achieve  11/16/18  -     LTG 1  Improve left knee AROM to 0-90°  -     LTG 1 Progress  Ongoing  -     LTG 2  Improve left knee MMT (flex/ext) to 5/5  -     LTG 2 Progress  Ongoing  [facility]     LTG 3  Ambulate with or without straight cane with step through non antalgic gait (NAG) x 1 lap in the gym   -     LTG 3 Progress  Ongoing  -     LTG 4  Reduce L knee pain by 75% with standing and walking  -     LTG 4 Progress  Ongoing  -        Time Calculation    PT Goal Re-Cert Due Date  11/28/18  -       User Key  (r) = Recorded By, (t) = Taken By, (c) = Cosigned By    Initials Name Provider Type    Jane Romero PTA Physical Therapy Assistant          Therapy Education  Given: HEP, Symptoms/condition management, Pain management  Program: Reinforced  How Provided: Verbal, Demonstration  Provided to: Patient  Level of Understanding: Verbalized, Demonstrated              Time Calculation:   Start Time: 0845  Stop Time: 0952  Time Calculation (min): 67 min  Total Timed Code Minutes- PT: 42 minute(s)  Therapy Suggested Charges     Code   Minutes Charges    33188 (CPT®) Hc Pt Neuromusc Re Education Ea 15 Min      72354 (CPT®) Hc Pt Ther Proc Ea 15 Min      65366 (CPT®) Hc Gait Training Ea 15 Min      13331 (CPT®) Hc Pt Therapeutic Act Ea 15 Min      10585 (CPT®) Hc Pt Manual Therapy Ea 15 Min      07597 (CPT®) Hc Pt Ther Massage- Per 15 Min      37428  (CPT®) Hc Pt Iontophoresis Ea 15 Min      28911 (CPT®) Hc Pt Elec Stim Ea-Per 15 Min      36051 (CPT®) Hc Pt Ultrasound Ea 15 Min      88655 (CPT®) Hc Pt Self Care/Mgmt/Train Ea 15 Min      20445 (CPT®) Hc Pt Prosthetic (S) Train Initial Encounter, Each 15 Min      23656 (CPT®) Hc Orthotic(S) Mgmt/Train Initial Encounter, Each 15min      17491 (CPT®) Hc Pt Aquatic Therapy Ea 15 Min      50433 (CPT®) Hc Pt Orthotic(S)/Prosthetic(S) Encounter, Each 15 Min       (CPT®) Hc Pt Electrical Stim Unattended 15 1    Total  15 1        Therapy Charges for Today     Code Description Service Date Service Provider Modifiers Qty    98855615953 HC PT ELECTRICAL STIM UNATTENDED 11/14/2018 Jane Torres, PTA  1    05254498158 HC PT THER PROC EA 15 MIN 11/14/2018 Jane Torres, PTA GP 3    29451122579 HC PT THER SUPP EA 15 MIN 11/14/2018 Jane Torres PTA GP 1                    Jane Torres, QUINN  11/14/2018

## 2018-11-15 ENCOUNTER — TELEPHONE (OUTPATIENT)
Dept: ONCOLOGY | Facility: HOSPITAL | Age: 63
End: 2018-11-15

## 2018-11-15 ENCOUNTER — LAB (OUTPATIENT)
Dept: ONCOLOGY | Facility: HOSPITAL | Age: 63
End: 2018-11-15

## 2018-11-15 ENCOUNTER — ANTICOAGULATION VISIT (OUTPATIENT)
Dept: CARDIAC SURGERY | Facility: CLINIC | Age: 63
End: 2018-11-15

## 2018-11-15 ENCOUNTER — OFFICE VISIT (OUTPATIENT)
Dept: ONCOLOGY | Facility: CLINIC | Age: 63
End: 2018-11-15

## 2018-11-15 VITALS — DIASTOLIC BLOOD PRESSURE: 75 MMHG | HEART RATE: 106 BPM | OXYGEN SATURATION: 96 % | SYSTOLIC BLOOD PRESSURE: 141 MMHG

## 2018-11-15 VITALS
BODY MASS INDEX: 35.59 KG/M2 | HEART RATE: 96 BPM | TEMPERATURE: 98.8 F | DIASTOLIC BLOOD PRESSURE: 65 MMHG | HEIGHT: 62 IN | OXYGEN SATURATION: 98 % | RESPIRATION RATE: 18 BRPM | SYSTOLIC BLOOD PRESSURE: 157 MMHG | WEIGHT: 193.4 LBS

## 2018-11-15 DIAGNOSIS — D64.9 ANEMIA, UNSPECIFIED TYPE: Chronic | ICD-10-CM

## 2018-11-15 DIAGNOSIS — I26.99 OTHER ACUTE PULMONARY EMBOLISM WITHOUT ACUTE COR PULMONALE (HCC): ICD-10-CM

## 2018-11-15 DIAGNOSIS — Z79.01 LONG TERM (CURRENT) USE OF ANTICOAGULANTS: ICD-10-CM

## 2018-11-15 DIAGNOSIS — D64.9 ANEMIA, UNSPECIFIED TYPE: ICD-10-CM

## 2018-11-15 DIAGNOSIS — I26.99 OTHER ACUTE PULMONARY EMBOLISM WITHOUT ACUTE COR PULMONALE (HCC): Primary | ICD-10-CM

## 2018-11-15 LAB
BASOPHILS # BLD AUTO: 0.02 10*3/MM3 (ref 0–0.2)
BASOPHILS NFR BLD AUTO: 0.4 % (ref 0–2)
DEPRECATED RDW RBC AUTO: 42.6 FL (ref 36.4–46.3)
EOSINOPHIL # BLD AUTO: 0.09 10*3/MM3 (ref 0–0.7)
EOSINOPHIL NFR BLD AUTO: 1.6 % (ref 0–7)
ERYTHROCYTE [DISTWIDTH] IN BLOOD BY AUTOMATED COUNT: 14.6 % (ref 11.5–14.5)
FERRITIN SERPL-MCNC: 43.9 NG/ML (ref 11.1–264)
FOLATE SERPL-MCNC: >20 NG/ML (ref 2.76–21)
HCT VFR BLD AUTO: 38.2 % (ref 35–45)
HGB BLD-MCNC: 12.6 G/DL (ref 12–15.5)
IMM GRANULOCYTES # BLD: 0.01 10*3/MM3 (ref 0–0.02)
IMM GRANULOCYTES NFR BLD: 0.2 % (ref 0–0.5)
INR PPP: 3.4 (ref 0.9–1.1)
IRON 24H UR-MRATE: 48 MCG/DL (ref 37–170)
IRON SATN MFR SERPL: 13 % (ref 15–50)
LYMPHOCYTES # BLD AUTO: 2.43 10*3/MM3 (ref 0.6–4.2)
LYMPHOCYTES NFR BLD AUTO: 43 % (ref 10–50)
MCH RBC QN AUTO: 26.3 PG (ref 26.5–34)
MCHC RBC AUTO-ENTMCNC: 33 G/DL (ref 31.4–36)
MCV RBC AUTO: 79.6 FL (ref 80–98)
MONOCYTES # BLD AUTO: 0.42 10*3/MM3 (ref 0–0.9)
MONOCYTES NFR BLD AUTO: 7.4 % (ref 0–12)
NEUTROPHILS # BLD AUTO: 2.68 10*3/MM3 (ref 2–8.6)
NEUTROPHILS NFR BLD AUTO: 47.4 % (ref 37–80)
PLATELET # BLD AUTO: 289 10*3/MM3 (ref 150–450)
PMV BLD AUTO: 10.5 FL (ref 8–12)
RBC # BLD AUTO: 4.8 10*6/MM3 (ref 3.77–5.16)
TIBC SERPL-MCNC: 382 MCG/DL (ref 265–497)
VIT B12 BLD-MCNC: 618 PG/ML (ref 239–931)
WBC NRBC COR # BLD: 5.65 10*3/MM3 (ref 3.2–9.8)

## 2018-11-15 PROCEDURE — 82728 ASSAY OF FERRITIN: CPT | Performed by: INTERNAL MEDICINE

## 2018-11-15 PROCEDURE — 83540 ASSAY OF IRON: CPT | Performed by: INTERNAL MEDICINE

## 2018-11-15 PROCEDURE — G8417 CALC BMI ABV UP PARAM F/U: HCPCS | Performed by: INTERNAL MEDICINE

## 2018-11-15 PROCEDURE — 83550 IRON BINDING TEST: CPT | Performed by: INTERNAL MEDICINE

## 2018-11-15 PROCEDURE — 82746 ASSAY OF FOLIC ACID SERUM: CPT | Performed by: INTERNAL MEDICINE

## 2018-11-15 PROCEDURE — 85025 COMPLETE CBC W/AUTO DIFF WBC: CPT | Performed by: INTERNAL MEDICINE

## 2018-11-15 PROCEDURE — 82607 VITAMIN B-12: CPT | Performed by: INTERNAL MEDICINE

## 2018-11-15 PROCEDURE — 99211 OFF/OP EST MAY X REQ PHY/QHP: CPT | Performed by: NURSE PRACTITIONER

## 2018-11-15 PROCEDURE — 1123F ACP DISCUSS/DSCN MKR DOCD: CPT | Performed by: INTERNAL MEDICINE

## 2018-11-15 PROCEDURE — 85610 PROTHROMBIN TIME: CPT | Performed by: NURSE PRACTITIONER

## 2018-11-15 PROCEDURE — G0463 HOSPITAL OUTPT CLINIC VISIT: HCPCS | Performed by: INTERNAL MEDICINE

## 2018-11-15 PROCEDURE — 99214 OFFICE O/P EST MOD 30 MIN: CPT | Performed by: INTERNAL MEDICINE

## 2018-11-15 NOTE — TELEPHONE ENCOUNTER
----- Message from Otf Nagy MD sent at 11/15/2018 12:28 PM CST -----  Please let patient know, she needs to continue taking ferrous sulfate 1 tablet by mouth daily for now.

## 2018-11-15 NOTE — PATIENT INSTRUCTIONS
CT Angiogram  A CT angiogram is a procedure to look at the blood vessels in various areas of the body. For this procedure, a large X-ray machine, called a CT scanner, takes detailed pictures of blood vessels that have been injected with a dye (contrast material).  A CT angiogram allows your health care provider to see how well blood is flowing to the area of your body that is being checked. Your health care provider will be able to see if there are any problems, such as a blockage.  Tell a health care provider about:  · Any allergies you have.  · All medicines you are taking, including vitamins, herbs, eye drops, creams, and over-the-counter medicines.  · Any problems you or family members have had with anesthetic medicines.  · Any blood disorders you have.  · Any surgeries you have had.  · Any medical conditions you have.  · Whether you are pregnant or may be pregnant.  · Whether you are breastfeeding.  · Any anxiety disorders, chronic pain, or other conditions you have that may increase your stress or prevent you from lying still.  What are the risks?  Generally, this is a safe procedure. However, problems may occur, including:  · Infection.  · Bleeding.  · Allergic reactions to medicines or dyes.  · Damage to other structures or organs.  · Kidney damage from the dye or contrast that is used.  · Increased risk of cancer from radiation exposure. This risk is low. Talk with your health care provider about:  ? The risks and benefits of testing.  ? How you can receive the lowest dose of radiation.    What happens before the procedure?  · Wear comfortable clothing and remove any jewelry.  · Follow instructions from your health care provider about eating and drinking. For most people, instructions may include these actions:  ? For 12 hours before the test, avoid caffeine. This includes tea, coffee, soda, and energy drinks or pills.  ? For 3-4 hours before the test, stop eating or drinking anything but water.  ? Stay  well hydrated by continuing to drink water before the exam. This will help to clear the contrast dye from your body after the test.  · Ask your health care provider about changing or stopping your regular medicines. This is especially important if you are taking diabetes medicines or blood thinners.  What happens during the procedure?  · An IV tube will be inserted into one of your veins.  · You will be asked to lie on an exam table. This table will slide in and out of the CT machine during the procedure.  · Contrast dye will be injected into the IV tube. You might feel warm, or you may get a metallic taste in your mouth.  · The table that you are lying on will move into the CT machine tunnel for the scan.  · The person running the machine will give you instructions while the scans are being done. You may be asked to:  ? Keep your arms above your head.  ? Hold your breath.  ? Stay very still, even if the table is moving.  · When the scanning is complete, you will be moved out of the machine.  · The IV tube will be removed.  The procedure may vary among health care providers and hospitals.  What happens after the procedure?  · You might feel warm, or you may get a metallic taste in your mouth.  · You may be asked to drink water or other fluids to wash (flush) the contrast material out of your body.  · It is up to you to get the results of your procedure. Ask your health care provider, or the department that is doing the procedure, when your results will be ready.  Summary  · A CT angiogram is a procedure to look at the blood vessels in various areas of the body.  · You will need to stay very still during the exam.  · You may be asked to drink water or other fluids to wash (flush) the contrast material out of your body after your scan.  This information is not intended to replace advice given to you by your health care provider. Make sure you discuss any questions you have with your health care provider.  Document  Released: 08/17/2017 Document Revised: 08/17/2017 Document Reviewed: 08/17/2017  [employer] Interactive Patient Education © 2018 [employer]

## 2018-11-15 NOTE — PROGRESS NOTES
DATE OF VISIT: 11/15/2018      REASON FOR VISIT: Pulmonary embolism on Coumadin, anemia, Thrombocytosis        HISTORY OF PRESENT ILLNESS:    63-year-old female with past medical problems consisting of hypertension, dyslipidemia, poorly controlled diabetes mellitus, history of left knee replacement with revision ×3.  Most recent revision was done on August 28, 2018.  And was diagnosed with subacute pulmonary embolism on August 30, 2018 after revision of left knee and was started on xarelto.  Patient was initially seen in consultation when she was  admitted to hospital on September 21, 2018 with bleeding into her left knee.  Xarelto was discontinued and patient was started on heparin drip.  Patient had incision and drainage of left knee done on September 22, 2018 by Dr. Su.  She was also found to be anemic, requiring blood transfusion during hospital stay.  Currently she is taking ferrous sulfate 1 tablet by mouth daily.  Patient is here for follow-up appointment today.  Denies any bleeding.  Denies any new lymph node enlargement. Denies any fevers.      PAST MEDICAL HISTORY:    Past Medical History:   Diagnosis Date   • Acute vaginitis     resolved   • Carpal tunnel syndrome    • Chronic osteoarthritis     Nino TKA   • Chronic pain    • Chronic urticaria    • Dietary counseling and surveillance    • Dysuria    • External hordeolum    • Hyperlipidemia    • Hypertension    • Low back pain     strain   • Microalbuminuria     on ARB   • Noncompliance with treatment    • Obesity      Dieting , exercise, appetite suppressant      • Osteoarthritis of left knee    • Plantar fasciitis     resolved   • Seasonal rhinitis    • Type 2 diabetes mellitus (CMS/HCC)    • Urinary tract infectious disease    • Vaginal discharge    • Vitamin deficiency        SOCIAL HISTORY:    Social History     Tobacco Use   • Smoking status: Former Smoker     Packs/day: 1.00     Years: 6.00     Pack years: 6.00     Last attempt to quit: 2006  "    Years since quittin.8   • Smokeless tobacco: Never Used   Substance Use Topics   • Alcohol use: No   • Drug use: No       Surgical History :  Past Surgical History:   Procedure Laterality Date   • CARPAL TUNNEL RELEASE      Bilateral endoscopic release 2003    • ENDOSCOPY      normal 2008      • KNEE ARTHROSCOPY     • KNEE SURGERY      Removal of existing left total knee arthroplasty and revision left total knee arthroplasty. 12/15/2014       • REPLACEMENT TOTAL KNEE     • SHOULDER SURGERY     • TUBAL ABDOMINAL LIGATION         ALLERGIES:    Allergies   Allergen Reactions   • Penicillins Rash   • Percocet [Oxycodone-Acetaminophen] Rash   • Rocephin [Ceftriaxone] Rash         FAMILY HISTORY:  Family History   Problem Relation Age of Onset   • Diabetes Mother    • Hypertension Mother    • Colon cancer Father    • Diabetes Father    • Hypertension Father    • Breast cancer Sister    • Cancer Other    • Diabetes Other    • Hypertension Other            REVIEW OF SYSTEMS:      CONSTITUTIONAL:  Complains of fatigue. Denies any fever, chills or weight loss.      EYES: No visual disturbances. No discharge. No new lesions     ENMT:  No epistaxis, mouth sores or difficulty swallowing.     RESPIRATORY:  No new shortness of breath. No new cough or hemoptysis.     CARDIOVASCULAR:  No chest pain or palpitations.     GASTROINTESTINAL:  No abdominal pain nausea, vomiting or blood in the stool.     GENITOURINARY: No Dysuria or Hematuria.     MUSCULOSKELETAL:  Complains of chronic back pain.  States pain in left knee is improving.     LYMPHATICS:  Denies any abnormal swollen glands anywhere in the body.     NEUROLOGICAL : No tingling or numbness. No headache or dizziness. No seizures or balance problems.     SKIN: Dressing present on left knee.           PHYSICAL EXAMINATION:      VITAL SIGNS:  /65   Pulse 96   Temp 98.8 °F (37.1 °C) (Temporal)   Resp 18   Ht 157.5 cm (62.01\")   Wt 87.7 kg (193 lb 6.4 " oz)   SpO2 98%   BMI 35.36 kg/m²       11/15/18  0756   Weight: 87.7 kg (193 lb 6.4 oz)         ECOG performance status: 2    CONSTITUTIONAL:  Not in any distress.    EYES: Mild conjunctival Pallor. No Icterus. No Pterygium. Extraocular Movements intact.No ptosis.    ENMT:  Normocephalic, Atraumatic.No Facial Asymmetry noted.    NECK:  No adenopathy.Trachea midline. NO JVD.    RESPIRATORY:  Fair air entry bilateral. No rhonchi or wheezing.Fair respiratory effort.    CARDIOVASCULAR:  S1, S2. Regular rate and rhythm. No murmur or gallop appreciated.    ABDOMEN:  Soft, obese, nontender. Bowel sounds present in all four quadrants.  No Hepatosplenomegaly appreciated.    MUSCULOSKELETAL: Trace edema.No Calf Tenderness.Decreased range of motion.  Surgical scar present on left knee    NEUROLOGIC:    No  Motor  deficit appreciated. Cranial Nerves 2-12 grossly intact.    SKIN : No new skin lesion identified.     LYMPHATICS: No new enlarged lymph nodes in neck or supraclavicular area.    PSYCHIATRY: Alert, awake and oriented ×3.Normal affect.  Normal judgment.  Makes good eye contact.        DIAGNOSTIC DATA:    Glucose   Date Value Ref Range Status   10/03/2018 270 (H) 60 - 100 mg/dL Final     Sodium   Date Value Ref Range Status   10/03/2018 136 (L) 137 - 145 mmol/L Final     Potassium   Date Value Ref Range Status   10/03/2018 4.5 3.5 - 5.1 mmol/L Final     CO2   Date Value Ref Range Status   10/03/2018 29.0 22.0 - 31.0 mmol/L Final     Chloride   Date Value Ref Range Status   10/03/2018 94 (L) 95 - 110 mmol/L Final     Anion Gap   Date Value Ref Range Status   10/03/2018 13.0 5.0 - 15.0 mmol/L Final     Creatinine   Date Value Ref Range Status   10/03/2018 0.48 (L) 0.50 - 1.00 mg/dL Final     BUN   Date Value Ref Range Status   10/03/2018 11 7 - 21 mg/dL Final     BUN/Creatinine Ratio   Date Value Ref Range Status   10/03/2018 22.9 7.0 - 25.0 Final     Calcium   Date Value Ref Range Status   10/03/2018 10.0 8.4 - 10.2  mg/dL Final     Alkaline Phosphatase   Date Value Ref Range Status   10/03/2018 121 38 - 126 U/L Final     Total Protein   Date Value Ref Range Status   10/03/2018 7.8 6.3 - 8.6 g/dL Final     ALT (SGPT)   Date Value Ref Range Status   10/03/2018 40 9 - 52 U/L Final     AST (SGOT)   Date Value Ref Range Status   10/03/2018 23 14 - 36 U/L Final     Total Bilirubin   Date Value Ref Range Status   10/03/2018 0.3 0.2 - 1.3 mg/dL Final     Albumin   Date Value Ref Range Status   10/03/2018 3.90 3.40 - 4.80 g/dL Final     Globulin   Date Value Ref Range Status   10/03/2018 3.9 (H) 2.3 - 3.5 gm/dL Final     Lab Results   Component Value Date    WBC 5.65 11/15/2018    HGB 12.6 11/15/2018    HCT 38.2 11/15/2018    MCV 79.6 (L) 11/15/2018     11/15/2018     Lab Results   Component Value Date    NEUTROABS 2.68 11/15/2018    IRON 38 09/21/2018    TIBC 290 09/21/2018    LABIRON 13 (L) 09/21/2018    FERRITIN 246.00 09/21/2018    YBJQGYQC92 >1,000 (H) 09/21/2018    FOLATE 16.00 09/21/2018     Lab Results   Component Value Date    REFLABREPO SEE NOTE: 05/28/2015           Radiology Data :  CT angiogram of chest with contrast done on August 30, 2018 showed:  IMPRESSION:  CONCLUSION: Pulmonary emboli in right middle lobe and right lower  lobe branches probably subacute in origin.              ASSESSMENT AND PLAN:       1.  Subacute pulmonary embolism on right side:  -Patient was diagnosed with subacute pulmonary embolism on right side after most recent revision of left knee done on August 28, 2018.  -Patient was started on xarelto 15 mg twice daily.  -She was admitted to [facility] on September 20, 2018 with bleeding into her left knee.  -Patient was started on heparin drip.  -Patient had incision and Drainage done by Dr. Su on September 22, 2018.  -Currently patient is on coumadin being followed by coumadin clinic.  -Will get Ct angiogram chest with contrast and hypercoagulable work up prior to next  clinic visit in 3 months.  - If ct chest and hypercoagulable work up is negative. We can safely discontinue coumadin which was discussed with patient.   2.  Anemia:  -Patient required 1 unit of PRBC on September 22, 2018 with hemoglobin of 6.9.  -Hemoglobin is 12.6 today.    -Will ask patient to return to clinic in 3 months with a repeat CBC and anemia workup to be done on that day.  -Since iron saturation is still 13% will recommend continuing with ferrous sulfate 1 tablet po daily.      3.  Poorly controlled diabetes mellitus     4.  Thrombocytosis: Most likely reactive. Resolved. Platelet count is normal today at 289,000     5.  Health maintenance: Patient does not smoke.    6. Advance Care Planning: For now patient remains full code and is able to make her decisions.  Patient has health care surrogate mentioned on chart.    7. BMI: Patient's Body mass index is 35.36 kg/m². BMI is higher than reference range.  Patient was notified about her elevated BMI.        Otf Nagy MD  11/15/2018  8:07 AM        EMR Dragon/Transcription disclaimer:   Much of this encounter note is an electronic transcription/translation of spoken language to printed text. The electronic translation of spoken language may permit erroneous, or at times, nonsensical words or phrases to be inadvertently transcribed; Although I have reviewed the note for such errors, some may still exist.

## 2018-11-15 NOTE — PROGRESS NOTES
Pt denies med changes or bleeding problems. Dose adjusted today and pt instructed to have 1/2 cup serving of green veggies; pt verbalized. Patient instructed regarding medication; results given and questions answered. Nutritional counseling given.  Dietary factors affecting therapy addressed.  Patient instructed to monitor for excessive bruising or bleeding. Will recheck in 4 days.         This document has been electronically signed by Lexii Otto, AAMIR @ on November 15, 2018 8:51 AM

## 2018-11-19 ENCOUNTER — ANTICOAGULATION VISIT (OUTPATIENT)
Dept: CARDIAC SURGERY | Facility: CLINIC | Age: 63
End: 2018-11-19

## 2018-11-19 VITALS — OXYGEN SATURATION: 95 % | HEART RATE: 110 BPM

## 2018-11-19 DIAGNOSIS — I26.99 OTHER ACUTE PULMONARY EMBOLISM WITHOUT ACUTE COR PULMONALE (HCC): ICD-10-CM

## 2018-11-19 DIAGNOSIS — Z79.01 LONG TERM (CURRENT) USE OF ANTICOAGULANTS: ICD-10-CM

## 2018-11-19 LAB — INR PPP: 6.1 (ref 0.9–1.1)

## 2018-11-19 PROCEDURE — 99211 OFF/OP EST MAY X REQ PHY/QHP: CPT | Performed by: NURSE PRACTITIONER

## 2018-11-19 PROCEDURE — 85610 PROTHROMBIN TIME: CPT | Performed by: NURSE PRACTITIONER

## 2018-11-19 NOTE — PROGRESS NOTES
Unable to determine cause of elevation. Denies any new medications or bleeding issues. Denies any s/s of blood clot. PT denies any OTC meds, vomiting/diarrhea, alcohol, pain meds. Instructed to hold Coumadin today and tomorrow. Increase vitamin k with 3/4 cup of spinach. PT instructed to notify provider if you experience excessive bleeding from the nose, cuts, gums, rectum, urinary tract, or vagina. Reddish or brown urine or stool. Vomiting of blood or hemorrhoidal bleeding. If major injury occurs present to the Emergency Department. PT verbalizes understanding and will be seen on Wednesday.         This document has been electronically signed by ZELDA Paul on November 19, 2018 8:28 AM

## 2018-11-21 ENCOUNTER — APPOINTMENT (OUTPATIENT)
Dept: PHYSICAL THERAPY | Facility: HOSPITAL | Age: 63
End: 2018-11-21

## 2018-11-21 ENCOUNTER — ANTICOAGULATION VISIT (OUTPATIENT)
Dept: CARDIAC SURGERY | Facility: CLINIC | Age: 63
End: 2018-11-21

## 2018-11-21 VITALS — HEART RATE: 88 BPM | DIASTOLIC BLOOD PRESSURE: 64 MMHG | SYSTOLIC BLOOD PRESSURE: 138 MMHG

## 2018-11-21 DIAGNOSIS — I26.99 OTHER ACUTE PULMONARY EMBOLISM WITHOUT ACUTE COR PULMONALE (HCC): ICD-10-CM

## 2018-11-21 DIAGNOSIS — Z79.01 LONG TERM (CURRENT) USE OF ANTICOAGULANTS: ICD-10-CM

## 2018-11-21 LAB — INR PPP: 4.8 (ref 0.9–1.1)

## 2018-11-21 PROCEDURE — 99211 OFF/OP EST MAY X REQ PHY/QHP: CPT | Performed by: NURSE PRACTITIONER

## 2018-11-21 PROCEDURE — 85610 PROTHROMBIN TIME: CPT | Performed by: NURSE PRACTITIONER

## 2018-11-21 NOTE — PROGRESS NOTES
Pt here today for recheck of elevated INR.  Pt denies bleeding issues.  Adjusted coumadin dose and instructed pt to increase Vit K intake today with a cup serving of spinach. Recheck INR next Monday as [facility] is closed tomorrow and Friday for the holiday.  Pt is seeing ZELDA Delatorre on Monday as well for evaluation to change over to a different blood thinner due to inconsistent levels and apparent intolerance to warfarin therapy.  Pt verbalizes.  Patient instructed regarding medication; results given and questions answered. Nutritional counseling given.  Dietary factors affecting therapy addressed.  Patient instructed to monitor for excessive bruising or bleeding.        This document has been electronically signed by ZELDA Paul on November 21, 2018 8:21 AM

## 2018-11-26 ENCOUNTER — OFFICE VISIT (OUTPATIENT)
Dept: CARDIAC SURGERY | Facility: CLINIC | Age: 63
End: 2018-11-26

## 2018-11-26 VITALS
BODY MASS INDEX: 36.07 KG/M2 | OXYGEN SATURATION: 99 % | TEMPERATURE: 98.3 F | HEIGHT: 62 IN | SYSTOLIC BLOOD PRESSURE: 130 MMHG | WEIGHT: 196 LBS | HEART RATE: 95 BPM | DIASTOLIC BLOOD PRESSURE: 75 MMHG

## 2018-11-26 DIAGNOSIS — Z79.01 LONG TERM (CURRENT) USE OF ANTICOAGULANTS: Primary | ICD-10-CM

## 2018-11-26 DIAGNOSIS — I26.99 OTHER ACUTE PULMONARY EMBOLISM WITHOUT ACUTE COR PULMONALE (HCC): ICD-10-CM

## 2018-11-26 DIAGNOSIS — Z71.89 ENCOUNTER FOR ANTICOAGULATION DISCUSSION AND COUNSELING: ICD-10-CM

## 2018-11-26 PROCEDURE — 99213 OFFICE O/P EST LOW 20 MIN: CPT | Performed by: NURSE PRACTITIONER

## 2018-11-26 RX ORDER — GLIPIZIDE 10 MG/1
10 TABLET ORAL DAILY
COMMUNITY
End: 2020-07-21 | Stop reason: SDUPTHER

## 2018-11-26 NOTE — PATIENT INSTRUCTIONS
We are changing you from coumadin to Xarelto.  Please take Xarelto with food, we will send in prior authorization for PA.  Start Xarelto Tuesday night.  Do not take coumadin tonight.  We will follow up with you in 2-4 weeks to make sure you are tolerating Xarelto well. Notify provider if you experience excessive bleeding from the nose, cuts, gums, rectum, urinary tract, or vagina. Reddish or brown urine or stool. Vomiting of blood or hemorrhoidal bleeding. If major injury occurs present to the Emergency Department.

## 2018-11-26 NOTE — PROGRESS NOTES
Subjective   Patient ID: Meeta Marmolejo is a 63 y.o. female is being seen for conversion from Coumadin to NOAC, as therapeutic levels have not been consistently obtainable.    Chief Complaint   Patient presents with   • Pulmonary Embolism     anticoag.   VAS resolved.  . No chest pain,  No bleeding or unexplained bruising.     History of Present Illness     64 y/o lady L knee replacement with revision X3.  Developed R PE.  Started on Xarelto therapy, developed bleeding into her left knee.  Anticoagulation changed to lovenox bridge to coumadin.  Ms Marmolejo presents today for anticoagulation assessment as she has failed coumadin with inability to control INR with plan to switch to NOAC today.  See multiple anticoagulation.      8/30/18  CTA:  PE in RML and R[facility] branches of PA      The following portions of the patient's history were reviewed and updated as appropriate: allergies, current medications, past family history, past medical history, past social history, past surgical history and problem list.  Past Medical History:   Diagnosis Date   • Acute vaginitis     resolved   • Carpal tunnel syndrome    • Chronic osteoarthritis     Nino TKA   • Chronic pain    • Chronic urticaria    • Dietary counseling and surveillance    • Dysuria    • External hordeolum    • Hyperlipidemia    • Hypertension    • Low back pain     strain   • Microalbuminuria     on ARB   • Noncompliance with treatment    • Obesity      Dieting , exercise, appetite suppressant      • Osteoarthritis of left knee    • Plantar fasciitis     resolved   • Seasonal rhinitis    • Type 2 diabetes mellitus (CMS/HCC)    • Urinary tract infectious disease    • Vaginal discharge    • Vitamin deficiency      Past Surgical History:   Procedure Laterality Date   • CARPAL TUNNEL RELEASE      Bilateral endoscopic release 01/20/2003    • ENDOSCOPY      normal 04/25/2008      • KNEE ARTHROSCOPY     • KNEE SURGERY      Removal of existing left total knee arthroplasty  and revision left total knee arthroplasty. 12/15/2014       • REPLACEMENT TOTAL KNEE     • SHOULDER SURGERY     • TUBAL ABDOMINAL LIGATION         Allergies   Allergen Reactions   • Penicillins Rash   • Percocet [Oxycodone-Acetaminophen] Rash   • Rocephin [Ceftriaxone] Rash       Current Outpatient Medications:   •  acetaminophen (TYLENOL) 500 MG tablet, Take 500 mg by mouth Every 6 (Six) Hours As Needed for Mild Pain ., Disp: , Rfl:   •  alendronate (FOSAMAX) 70 MG tablet, Take 70 mg by mouth Every 7 (Seven) Days. Every Sunday morning, Disp: , Rfl:   •  atorvastatin (LIPITOR) 40 MG tablet, Take 40 mg by mouth Daily., Disp: , Rfl:   •  cetirizine (zyrTEC) 10 MG tablet, Take 10 mg by mouth Daily., Disp: , Rfl:   •  docusate sodium (COLACE) 100 MG capsule, Take 100 mg by mouth 2 (Two) Times a Day., Disp: , Rfl:   •  ferrous sulfate 325 (65 FE) MG tablet, Take 1 tablet by mouth Daily With Breakfast., Disp: 30 tablet, Rfl: 3  •  glipiZIDE (GLUCOTROL) 10 MG tablet, Take 10 mg by mouth 2 (Two) Times a Day Before Meals., Disp: , Rfl:   •  glucose blood test strip, 1 each by Other route 2 (Two) Times a Day. Use as instructed, Disp: 50 each, Rfl: 11  •  HYDROcodone-acetaminophen (NORCO) 7.5-325 MG per tablet, Take 1 tablet by mouth Every 6 (Six) Hours As Needed for Moderate Pain ., Disp: 30 tablet, Rfl: 0  •  Insulin Glargine (BASAGLAR KWIKPEN) 100 UNIT/ML injection pen, Inject 16 units under the skin nightly (REPLACES LANTUS DUE TO INSURANCE FORMULARY) (Patient taking differently: Inject 16 Units under the skin into the appropriate area as directed Every Night. Inject 16 units under the skin nightly (REPLACES LANTUS DUE TO INSURANCE FORMULARY)), Disp: 2 pen, Rfl: 5  •  Insulin Pen Needle 31G X 4 MM misc, 1 each Daily., Disp: 100 each, Rfl: 1  •  losartan (COZAAR) 100 MG tablet, Take 100 mg by mouth Daily., Disp: , Rfl:   •  metFORMIN (GLUCOPHAGE) 1000 MG tablet, Take 1,000 mg by mouth 2 (Two) Times a Day With Meals., Disp:  , Rfl:   •  montelukast (SINGULAIR) 10 MG tablet, Take 10 mg by mouth Daily., Disp: , Rfl:   •  rivaroxaban (XARELTO) 20 MG tablet, Take 1 tablet by mouth Daily., Disp: 30 tablet, Rfl: 5    Review of Systems   Constitution: Positive for weakness. Negative for chills, decreased appetite and fever.        Denies use of power tools, electric knives, chain saws, or conttact sports.  Shaves with safety razor.  Aware to use soft tooth brush and waxed floss.  No recent falls.  Denies:  GIB, GUB, or Seizures.     HENT: Negative for ear pain, hoarse voice, nosebleeds and stridor.    Eyes: Negative for visual disturbance.   Cardiovascular: Negative for chest pain, dyspnea on exertion, leg swelling and near-syncope.   Respiratory: Negative for cough, hemoptysis, shortness of breath and wheezing.    Hematologic/Lymphatic: Negative for bleeding problem. Does not bruise/bleed easily.        INR too high or too low.    Skin: Negative for itching, nail changes, poor wound healing and rash.   Musculoskeletal: Positive for joint pain. Negative for falls, joint swelling, muscle weakness and myalgias.   Gastrointestinal: Negative for anorexia, heartburn, hematemesis, melena and nausea.   Genitourinary: Negative for hematuria.   Neurological: Negative for brief paralysis, difficulty with concentration, disturbances in coordination, focal weakness, headaches, light-headedness, loss of balance, numbness and paresthesias.   Psychiatric/Behavioral: Negative for altered mental status.   Allergic/Immunologic: Negative for hives.        Objective   Physical Exam   Constitutional: She is oriented to person, place, and time. She appears well-nourished. No distress.   Body mass index is 35.9 kg/m².       HENT:   Head: Normocephalic.   Mouth/Throat: Oropharynx is clear and moist.   Eyes: Conjunctivae and EOM are normal. Pupils are equal, round, and reactive to light.   Neck: Neck supple. No JVD present.   Cardiovascular: Normal rate, regular  rhythm, normal heart sounds and intact distal pulses.   Pulses:       Carotid pulses are 1+ on the right side, and 1+ on the left side.       Radial pulses are 2+ on the right side, and 2+ on the left side.        Dorsalis pedis pulses are 2+ on the right side, and 2+ on the left side.        Posterior tibial pulses are 2+ on the right side, and 2+ on the left side.   Pulmonary/Chest: Effort normal and breath sounds normal. No stridor.   No rub   Abdominal: Soft. Bowel sounds are normal.   Obese    Musculoskeletal: She exhibits no edema or tenderness.   Walks with assist device    Neurological: She is alert and oriented to person, place, and time. No cranial nerve deficit.   Skin: Skin is warm and dry. Capillary refill takes less than 2 seconds. No rash noted. No erythema. No pallor.   Psychiatric: Her behavior is normal. Judgment normal.   Nursing note and vitals reviewed.      Vitals:    11/26/18 1001   BP: 130/75   Pulse: 95   Temp: 98.3 °F (36.8 °C)   SpO2: 99%     Lab Results   Component Value Date    WBC 5.65 11/15/2018    HGB 12.6 11/15/2018    HCT 38.2 11/15/2018    MCV 79.6 (L) 11/15/2018     11/15/2018     '  Lab Results   Component Value Date    GLUCOSE 270 (H) 10/03/2018    BUN 11 10/03/2018    CREATININE 0.48 (L) 10/03/2018    EGFRIFAFRI 159 (H) 10/03/2018    BCR 22.9 10/03/2018    K 4.5 10/03/2018    CO2 29.0 10/03/2018    CALCIUM 10.0 10/03/2018    ALBUMIN 3.90 10/03/2018    AST 23 10/03/2018    ALT 40 10/03/2018     POC INR 2.4      Assessment/Plan   Independent Review of Radiographic Studies:    None with today's visit     1. Other acute pulmonary embolism without acute cor pulmonale (CMS/HCC)  Indication for anticoagulation.  Will reevaluate need for anticoagulation again after 6 months of uninterrupted anticoagulation with repeat CTA or echocardiogram.    2. Encounter for anticoagulation discussion and counseling  Six months of anticoagulation required, unable to consistently keep INR  therapeutic with vitamin K antagonist.  Plans to send in prior authorization for NOAC, Xarelto 20mg nightly.  INR 2.4 today, instructed patient to stop coumadin tonight and take Xarelto nightly starting on 11/27.  Time spent education concerning Xarelto of 20min.  Reviewed indication for therapy and signs and symptoms of adverse bleeding event including excessive bleeding from the nose, cuts, gums, rectum, urinary tract, or vagina. Reddish or brown urine or stool. Vomiting of blood or hemorrhoidal bleeding. In the event of major trauma patient was instructed to present to the local emergency department.  Pt aware no dietary restrictions, to avoid all NSAIDs.  Teaching material provided verbal and written, as well as dsoing schedule.  PA successfully completed.

## 2018-11-27 ENCOUNTER — HOSPITAL ENCOUNTER (OUTPATIENT)
Dept: PHYSICAL THERAPY | Facility: HOSPITAL | Age: 63
Setting detail: THERAPIES SERIES
Discharge: HOME | End: 2018-11-27

## 2018-11-27 DIAGNOSIS — Z96.652 S/P REVISION OF TOTAL KNEE, LEFT: Primary | ICD-10-CM

## 2018-11-27 PROCEDURE — 97110 THERAPEUTIC EXERCISES: CPT | Performed by: PHYSICAL THERAPIST

## 2018-11-27 PROCEDURE — G8979 MOBILITY GOAL STATUS: HCPCS | Performed by: PHYSICAL THERAPIST

## 2018-11-27 PROCEDURE — G0283 ELEC STIM OTHER THAN WOUND: HCPCS | Performed by: PHYSICAL THERAPIST

## 2018-11-27 PROCEDURE — G8978 MOBILITY CURRENT STATUS: HCPCS | Performed by: PHYSICAL THERAPIST

## 2018-11-27 NOTE — THERAPY PROGRESS REPORT/RE-CERT
Outpatient Physical Therapy Ortho Progress Note  [facility]     Patient Name: Meeta Marmolejo  : 1955  MRN: [id number]  Today's Date: 2018      Visit Date: 2018  Visits:   Insurance Visits Approved: 60 visits based on medicare guidelines  Recert Due: 2018  MD Appt: Dec 2018  Pain: pretreatment 5/10; post treatment 5/10  Improvement: pt is subjectively reporting 75% improvement since initial evaluation      Patient Active Problem List   Diagnosis   • Obesity   • Type 2 diabetes mellitus (CMS/HCC)   • Chronic osteoarthritis   • Microalbuminuria   • Encounter for screening mammogram for malignant neoplasm of breast   • Plantar fasciitis   • URI (upper respiratory infection)   • Lateral epicondylitis of right elbow   • Essential hypertension   • Repetitive strain injury of lower back   • History of total knee replacement   • Painful total knee replacement, initial encounter (CMS/[facility])   • Bilateral hip pain   • Mechanical loosening of prosthetic knee (CMS/[facility])   • S/P revision of total knee, left   • Septic joint of left knee joint (CMS/[facility])   • Staphylococcal arthritis of left knee (CMS/[facility])   • Anemia   • Left knee pain   • Other pulmonary embolism without acute cor pulmonale (CMS/[facility])   • Encounter for anticoagulation discussion and counseling   • Long term (current) use of anticoagulants [Z79.01]        Past Medical History:   Diagnosis Date   • Acute vaginitis     resolved   • Carpal tunnel syndrome    • Chronic osteoarthritis     Nino TKA   • Chronic pain    • Chronic urticaria    • Dietary counseling and surveillance    • Dysuria    • External hordeolum    • Hyperlipidemia    • Hypertension    • Low back pain     strain   • Microalbuminuria     on ARB   • Noncompliance with treatment    • Obesity      Dieting , exercise, appetite suppressant      • Osteoarthritis of left knee    • Plantar fasciitis     resolved   • Seasonal rhinitis    • Type 2 diabetes  mellitus (CMS/HCC)    • Urinary tract infectious disease    • Vaginal discharge    • Vitamin deficiency         Past Surgical History:   Procedure Laterality Date   • CARPAL TUNNEL RELEASE      Bilateral endoscopic release 01/20/2003    • ENDOSCOPY      normal 04/25/2008      • KNEE ARTHROSCOPY     • KNEE SURGERY      Removal of existing left total knee arthroplasty and revision left total knee arthroplasty. 12/15/2014       • REPLACEMENT TOTAL KNEE     • SHOULDER SURGERY     • TUBAL ABDOMINAL LIGATION         Visit Dx:     ICD-10-CM ICD-9-CM   1. S/P revision of total knee, left Z96.652 V43.65           PT Ortho     Row Name 11/27/18 0800       Subjective Comments    Subjective Comments  pt reports 75% improvement overall, states has not been to PT for the past 2 weeks due to severe back pain.   -BS       Precautions and Contraindications    Precautions  WBAT  LLE  -BS       Subjective Pain    Able to rate subjective pain?  yes  -BS    Pre-Treatment Pain Level  5  -BS    Post-Treatment Pain Level  5  -BS       General ROM    GENERAL ROM COMMENTS  AROM: L knee 15-60 deg, AAROM L knee flex 65 deg  -BS       MMT (Manual Muscle Testing)    General MMT Comments  MMT: L knee flex 5/5 L knee ext 4+/5  -BS       Gait/Stairs Assessment/Training    Comment (Gait/Stairs)  ambulates with antalgic gait pattern with RW  -BS      User Key  (r) = Recorded By, (t) = Taken By, (c) = Cosigned By    Initials Name Provider Type    Charles Sales, PT Physical Therapist                      Therapy Education  Given: HEP, Symptoms/condition management, Pain management  Program: Reinforced  How Provided: Verbal, Demonstration  Provided to: Patient  Level of Understanding: Verbalized, Demonstrated     PT OP Goals     Row Name 11/27/18 0800          PT Short Term Goals    STG Date to Achieve  12/11/18  -BS     STG 1  Pt indep with HEP  -BS     STG 1 Progress  Progressing  -BS     STG 2  Improve left knee AROM to 5-80°  -BS     STG 2  Progress  Not Met  -BS     STG 3  Improve left knee MMT (flex/ext) to 4+/5  -BS     STG 3 Progress  Met  -BS     STG 4  Ambulate with RW with step through non antalgic gait (NAG)   -BS     STG 4 Progress  Partially Met  -BS     STG 5  Reduce L knee pain by 25% with standing and walking  -BS     STG 5 Progress  Met  -BS        Long Term Goals    LTG Date to Achieve  12/18/18  -BS     LTG 1  Improve left knee AROM to 0-90°  -BS     LTG 1 Progress  Ongoing  -BS     LTG 2  Improve left knee MMT (flex/ext) to 5/5  -BS     LTG 2 Progress  Partially Met  -BS     LTG 3  Ambulate with or without straight cane with step through non antalgic gait (NAG) x 1 lap in the gym   -BS     LTG 3 Progress  Ongoing  -BS     LTG 4  Reduce L knee pain by 75% with standing and walking  -BS     LTG 4 Progress  Met  -BS        Time Calculation    PT Goal Re-Cert Due Date  12/18/18  -BS       User Key  (r) = Recorded By, (t) = Taken By, (c) = Cosigned By    Initials Name Provider Type    BS Charles Newton, PT Physical Therapist          PT Assessment/Plan     Row Name 11/27/18 1400          PT Assessment    Functional Limitations  Impaired gait;Performance in work activities;Performance in sport activities;Performance in self-care ADL;Performance in leisure activities;Limitation in home management  -BS     Impairments  Balance;Endurance;Gait;Edema;Impaired flexibility;Sensation;Range of motion;Posture;Pain;Muscle strength  -BS     Assessment Comments  minimal change overall with L knee ROM since PT consult, partly due to non-compliance with HEP, as well as pain managment. Will contact pt's surgeon to ascertain if he wants pt to continue or d/c at this time as pt has voiced adamantly against aggressively stretching the left knee and that is exactly what is required at this time.   -BS     Please refer to paper survey for additional self-reported information  Yes  -BS     Rehab Potential  Fair  -BS     Patient/caregiver participated in  establishment of treatment plan and goals  Yes  -BS     Patient would benefit from skilled therapy intervention  Yes  -BS        PT Plan    PT Frequency  3x/week  -BS     Predicted Duration of Therapy Intervention (Therapy Eval)  6-9 weeks  -BS     Planned CPT's?  PT RE-EVAL: 28385;PT THER PROC EA 15 MIN: 16148;PT THER ACT EA 15 MIN: 99656;PT MANUAL THERAPY EA 15 MIN: 34473;PT NEUROMUSC RE-EDUCATION EA 15 MIN: 62849;PT ELECTRICAL STIM UNATTEND: ;PT ULTRASOUND EA 15 MIN: 40889;PT HOT/COLD PACK WC NONMCARE: 70921;PT THER SUPP EA 15 MIN  -BS     Physical Therapy Interventions (Optional Details)  balance training;home exercise program;joint mobilization;manual therapy techniques;modalities;neuromuscular re-education;patient/family education;ROM (Range of Motion);stair training;strengthening;stretching;transfer training  -BS     PT Plan Comments  progress L knee flex per pt tolerance. Attempt ambulating with straight cane x 1 lap on the track in the gym.  -BS       User Key  (r) = Recorded By, (t) = Taken By, (c) = Cosigned By    Initials Name Provider Type    BS Charles Newton, PT Physical Therapist          Modalities     Row Name 11/27/18 0800             Ice    Ice Applied  Yes  -BS      Location  L knee with elevation  -BS      Rx Minutes  15 mins  -BS      Ice S/P Rx  Yes  -BS         ELECTRICAL STIMULATION    Attended/Unattended  Unattended  -BS      Stimulation Type  IFC  -BS      Location/Electrode Placement/Other  L knee  -BS      90620 - PT E-Stim Attended (Manual) Minutes  15  -BS        User Key  (r) = Recorded By, (t) = Taken By, (c) = Cosigned By    Initials Name Provider Type    Charles Sales, PT Physical Therapist        Exercises     Row Name 11/27/18 0800             Subjective Comments    Subjective Comments  pt reports 75% improvement overall, states has not been to PT for the past 2 weeks due to severe back pain.   -BS         Subjective Pain    Able to rate subjective pain?  yes  -BS       "Pre-Treatment Pain Level  5  -BS      Post-Treatment Pain Level  5  -BS         Exercise 1    Exercise Name 1  Pro II LE's full revolutions  -BS      Time 1  10 minutes  -BS      Additional Comments  L 4.0  -BS         Exercise 2    Exercise Name 2  L St. Lunge S  -BS      Reps 2  10  -BS      Time 2  10 sec hold  -BS         Exercise 3    Exercise Name 3  Sit to/from stand  -BS      Reps 3  10  -BS         Exercise 4    Exercise Name 4  Shuttle 1L   -BS      Time 4  4 minutes  -BS      Additional Comments  7 cords  -BS         Exercise 5    Exercise Name 5  seated L heel slides AAROM dangling LE's off table.  -BS      Sets 5  1  -BS      Reps 5  10  -BS         Exercise 6    Exercise Name 6  L HS S at steps  -BS      Sets 6  1  -BS      Reps 6  2  -BS      Time 6  30\" hold  -BS         Exercise 7    Exercise Name 7  supine: L TKE w/ heel prop (4 lb wt on knee)  -BS      Reps 7  4 minutes total  -BS        User Key  (r) = Recorded By, (t) = Taken By, (c) = Cosigned By    Initials Name Provider Type    Charles Sales, PT Physical Therapist                        Outcome Measure Options: Lower Extremity Functional Scale (LEFS)  Lower Extremity Functional Index  Any of your usual work, housework or school activities: A little bit of difficulty  Your usual hobbies, recreational or sporting activities: A little bit of difficulty  Getting into or out of the bath: No difficulty  Walking between rooms: A little bit of difficulty  Putting on your shoes or socks: A little bit of difficulty  Squatting: Quite a bit of difficulty  Lifting an object, like a bag of groceries from the floor: A little bit of difficulty  Performing light activities around your home: A little bit of difficulty  Performing heavy activities around your home: Quite a bit of difficulty  Getting into or out of a car: Quite a bit of difficulty  Walking 2 blocks: Quite a bit of difficulty  Walking a mile: Quite a bit of difficulty  Going up or down 10 " stairs (about 1 flight of stairs): Quite a bit of difficulty  Standing for 1 hour: Quite a bit of difficulty  Sitting for 1 hour: Quite a bit of difficulty  Running on even ground: Extreme difficulty or unable to perform activity  Running on uneven ground: Extreme difficulty or unable to perform activity  Making sharp turns while running fast: Extreme difficulty or unable to perform activity  Hopping: Extreme difficulty or unable to perform activity  Rolling over in bed: A little bit of difficulty  Total: 33      Time Calculation:     Therapy Suggested Charges     Code   Minutes Charges    91182 (CPT®) Hc Pt Neuromusc Re Education Ea 15 Min      12803 (CPT®) Hc Pt Ther Proc Ea 15 Min      62226 (CPT®) Hc Gait Training Ea 15 Min      95103 (CPT®) Hc Pt Therapeutic Act Ea 15 Min      20304 (CPT®) Hc Pt Manual Therapy Ea 15 Min      20121 (CPT®) Hc Pt Ther Massage- Per 15 Min      48522 (CPT®) Hc Pt Iontophoresis Ea 15 Min      35044 (CPT®) Hc Pt Elec Stim Ea-Per 15 Min 15 1    00351 (CPT®) Hc Pt Ultrasound Ea 15 Min      45004 (CPT®) Hc Pt Self Care/Mgmt/Train Ea 15 Min      24223 (CPT®) Hc Pt Prosthetic (S) Train Initial Encounter, Each 15 Min      42120 (CPT®) Hc Orthotic(S) Mgmt/Train Initial Encounter, Each 15min      22364 (CPT®) Hc Pt Aquatic Therapy Ea 15 Min      33442 (CPT®) Hc Pt Orthotic(S)/Prosthetic(S) Encounter, Each 15 Min       (CPT®) Hc Pt Electrical Stim Unattended      Total  15 1          Start Time: 0803  Stop Time: 0900  Time Calculation (min): 57 min  Total Timed Code Minutes- PT: 42 minute(s)     Therapy Charges for Today     Code Description Service Date Service Provider Modifiers Qty    44693916392 HC PT MOBILITY CURRENT 11/27/2018 Charles Newton, PT GP, CK 1    06263739422 HC PT MOBILITY PROJECTED 11/27/2018 Charles Newton, PT GP, CK 1    40633883870 HC PT THER PROC EA 15 MIN 11/27/2018 Charles Newton, PT GP 3    72240019280 HC PT ELECTRICAL STIM UNATTENDED 11/27/2018 Charles Newton, PT   1          PT G-Codes  Outcome Measure Options: Lower Extremity Functional Scale (LEFS)  Total: 33  Functional Limitation: Mobility: Walking and moving around  Mobility: Walking and Moving Around Current Status (): At least 40 percent but less than 60 percent impaired, limited or restricted  Mobility: Walking and Moving Around Goal Status (): At least 40 percent but less than 60 percent impaired, limited or restricted         Charles Newton, PT  11/27/2018

## 2018-11-28 ENCOUNTER — APPOINTMENT (OUTPATIENT)
Dept: PHYSICAL THERAPY | Facility: HOSPITAL | Age: 63
End: 2018-11-28

## 2018-11-30 ENCOUNTER — HOSPITAL ENCOUNTER (OUTPATIENT)
Dept: PHYSICAL THERAPY | Facility: HOSPITAL | Age: 63
Setting detail: THERAPIES SERIES
Discharge: HOME | End: 2018-11-30

## 2018-11-30 DIAGNOSIS — M00.062 STAPHYLOCOCCAL ARTHRITIS OF LEFT KNEE (HCC): ICD-10-CM

## 2018-11-30 DIAGNOSIS — Z96.652 S/P REVISION OF TOTAL KNEE, LEFT: Primary | ICD-10-CM

## 2018-11-30 PROCEDURE — 97110 THERAPEUTIC EXERCISES: CPT

## 2018-12-03 ENCOUNTER — OFFICE VISIT (OUTPATIENT)
Dept: ORTHOPEDIC SURGERY | Facility: CLINIC | Age: 63
End: 2018-12-03

## 2018-12-03 VITALS — WEIGHT: 196 LBS | HEIGHT: 62 IN | BODY MASS INDEX: 36.07 KG/M2

## 2018-12-03 DIAGNOSIS — Z96.652 S/P REVISION OF TOTAL KNEE, LEFT: Primary | ICD-10-CM

## 2018-12-03 PROCEDURE — 99024 POSTOP FOLLOW-UP VISIT: CPT | Performed by: ORTHOPAEDIC SURGERY

## 2018-12-03 RX ORDER — TRAMADOL HYDROCHLORIDE 50 MG/1
50 TABLET ORAL EVERY 6 HOURS PRN
Qty: 30 TABLET | Refills: 0 | Status: SHIPPED | OUTPATIENT
Start: 2018-12-03 | End: 2018-12-17

## 2018-12-03 NOTE — PROGRESS NOTES
Meeta Marmolejo is a 63 y.o. female is s/p       Chief Complaint   Patient presents with   • Left Knee - Follow-up       HISTORY OF PRESENT ILLNESS: Patient is here today for recheck of left knee revision on 8/28/2018 & Incision and drainage of left knee on 9/22/2018. Patient states that her knee pain is 8/10 today.        Allergies   Allergen Reactions   • Penicillins Rash   • Percocet [Oxycodone-Acetaminophen] Rash   • Rocephin [Ceftriaxone] Rash         Current Outpatient Medications:   •  acetaminophen (TYLENOL) 500 MG tablet, Take 500 mg by mouth Every 6 (Six) Hours As Needed for Mild Pain ., Disp: , Rfl:   •  alendronate (FOSAMAX) 70 MG tablet, Take 70 mg by mouth Every 7 (Seven) Days. Every Sunday morning, Disp: , Rfl:   •  atorvastatin (LIPITOR) 40 MG tablet, Take 40 mg by mouth Daily., Disp: , Rfl:   •  cetirizine (zyrTEC) 10 MG tablet, Take 10 mg by mouth Daily., Disp: , Rfl:   •  docusate sodium (COLACE) 100 MG capsule, Take 100 mg by mouth 2 (Two) Times a Day., Disp: , Rfl:   •  ferrous sulfate 325 (65 FE) MG tablet, Take 1 tablet by mouth Daily With Breakfast., Disp: 30 tablet, Rfl: 3  •  glipiZIDE (GLUCOTROL) 10 MG tablet, Take 10 mg by mouth 2 (Two) Times a Day Before Meals., Disp: , Rfl:   •  glucose blood test strip, 1 each by Other route 2 (Two) Times a Day. Use as instructed, Disp: 50 each, Rfl: 11  •  HYDROcodone-acetaminophen (NORCO) 7.5-325 MG per tablet, Take 1 tablet by mouth Every 6 (Six) Hours As Needed for Moderate Pain ., Disp: 30 tablet, Rfl: 0  •  Insulin Glargine (BASAGLAR KWIKPEN) 100 UNIT/ML injection pen, Inject 16 units under the skin nightly (REPLACES LANTUS DUE TO INSURANCE FORMULARY) (Patient taking differently: Inject 16 Units under the skin into the appropriate area as directed Every Night. Inject 16 units under the skin nightly (REPLACES LANTUS DUE TO INSURANCE FORMULARY)), Disp: 2 pen, Rfl: 5  •  Insulin Pen Needle 31G X 4 MM misc, 1 each Daily., Disp: 100 each, Rfl:  1  •  losartan (COZAAR) 100 MG tablet, Take 100 mg by mouth Daily., Disp: , Rfl:   •  metFORMIN (GLUCOPHAGE) 1000 MG tablet, Take 1,000 mg by mouth 2 (Two) Times a Day With Meals., Disp: , Rfl:   •  montelukast (SINGULAIR) 10 MG tablet, Take 10 mg by mouth Daily., Disp: , Rfl:   •  rivaroxaban (XARELTO) 20 MG tablet, Take 1 tablet by mouth Daily., Disp: 30 tablet, Rfl: 5    No fevers or chills.  No nausea or vomiting.      PHYSICAL EXAMINATION:       Meeta Marmolejo is a 63 y.o. female    Patient is awake and alert, answers questions appropriately and is in no apparent distress.    GAIT:     [x]  Normal  []  Antalgic    Assistive device: [x]  None  []  Walker     []  Crutches  []  Cane     []  Wheelchair  []  Stretcher    Ortho Exam   Motion is probably 10-15° of extension to about perhaps 70.  No drainage from the wound clinic thickened scarring here.  The joint is warm but minimal effusion.    No results found.        ASSESSMENT:    Diagnoses and all orders for this visit:    S/P revision of total knee, left          PLAN continue her therapy and again counseled about this really pleasurably much better finger.  Extension work on her flexion.  We'll continue to follow along from an infectious standpoint if she has swelling when a repeat aspiration at some point at this point she's doing very well, sooner with any problems.    No Follow-up on file.    Jaqueline Clancy MA

## 2018-12-04 ENCOUNTER — ANTICOAGULATION VISIT (OUTPATIENT)
Dept: CARDIAC SURGERY | Facility: CLINIC | Age: 63
End: 2018-12-04

## 2018-12-04 ENCOUNTER — HOSPITAL ENCOUNTER (OUTPATIENT)
Dept: PHYSICAL THERAPY | Facility: HOSPITAL | Age: 63
Setting detail: THERAPIES SERIES
Discharge: HOME | End: 2018-12-04

## 2018-12-04 DIAGNOSIS — Z96.652 S/P REVISION OF TOTAL KNEE, LEFT: Primary | ICD-10-CM

## 2018-12-04 DIAGNOSIS — M00.062 STAPHYLOCOCCAL ARTHRITIS OF LEFT KNEE (HCC): ICD-10-CM

## 2018-12-04 DIAGNOSIS — I26.99 OTHER ACUTE PULMONARY EMBOLISM WITHOUT ACUTE COR PULMONALE (HCC): ICD-10-CM

## 2018-12-04 DIAGNOSIS — Z79.01 LONG TERM (CURRENT) USE OF ANTICOAGULANTS: ICD-10-CM

## 2018-12-04 PROCEDURE — 97110 THERAPEUTIC EXERCISES: CPT | Performed by: PHYSICAL THERAPIST

## 2018-12-05 NOTE — THERAPY DISCHARGE NOTE
Outpatient Physical Therapy Ortho Discharge  [facility]     Patient Name: Meeta Marmolejo  : 1955  MRN: [id number]  Today's Date: 2018      Visit Date: 2018    Patient Active Problem List   Diagnosis   • Obesity   • Type 2 diabetes mellitus (CMS/HCC)   • Chronic osteoarthritis   • Microalbuminuria   • Encounter for screening mammogram for malignant neoplasm of breast   • Plantar fasciitis   • URI (upper respiratory infection)   • Lateral epicondylitis of right elbow   • Essential hypertension   • Repetitive strain injury of lower back   • History of total knee replacement   • Painful total knee replacement, initial encounter (CMS/[facility])   • Bilateral hip pain   • Mechanical loosening of prosthetic knee (CMS/[facility])   • S/P revision of total knee, left   • Septic joint of left knee joint (CMS/HCC)   • Staphylococcal arthritis of left knee (CMS/[facility])   • Anemia   • Left knee pain   • Encounter for anticoagulation discussion and counseling        Past Medical History:   Diagnosis Date   • Acute vaginitis     resolved   • Carpal tunnel syndrome    • Chronic osteoarthritis     Nino TKA   • Chronic pain    • Chronic urticaria    • Dietary counseling and surveillance    • Dysuria    • External hordeolum    • Hyperlipidemia    • Hypertension    • Low back pain     strain   • Microalbuminuria     on ARB   • Noncompliance with treatment    • Obesity      Dieting , exercise, appetite suppressant      • Osteoarthritis of left knee    • Plantar fasciitis     resolved   • Seasonal rhinitis    • Type 2 diabetes mellitus (CMS/HCC)    • Urinary tract infectious disease    • Vaginal discharge    • Vitamin deficiency         Past Surgical History:   Procedure Laterality Date   • CARPAL TUNNEL RELEASE      Bilateral endoscopic release 2003    • ENDOSCOPY      normal 2008      • KNEE ARTHROSCOPY     • KNEE SURGERY      Removal of existing left total knee arthroplasty and revision left  total knee arthroplasty. 12/15/2014       • REPLACEMENT TOTAL KNEE     • SHOULDER SURGERY     • TUBAL ABDOMINAL LIGATION           Visit Dx:     ICD-10-CM ICD-9-CM   1. S/P revision of total knee, left Z96.652 V43.65   2. Staphylococcal arthritis of left knee (CMS/[facility]) M00.062 711.06     041.10           PT Ortho     Row Name 12/04/18 0800       Precautions and Contraindications    Precautions  WBAT  LLE  -BS       General ROM    GENERAL ROM COMMENTS  AROM: L knee -12-61 deg  -BS       MMT (Manual Muscle Testing)    General MMT Comments  MMT: L knee 5/5 flex/ext  -BS      User Key  (r) = Recorded By, (t) = Taken By, (c) = Cosigned By    Initials Name Provider Type    Charles Sales, PT Physical Therapist                       Therapy Education  Given: HEP, Symptoms/condition management, Pain management  Program: Reinforced  How Provided: Verbal, Demonstration  Provided to: Patient  Level of Understanding: Verbalized, Demonstrated                                       Time Calculation:   Start Time: 0803  Stop Time: 0855  Time Calculation (min): 52 min  PT Non-Billable Time (min): 10 min  Total Timed Code Minutes- PT: 42 minute(s)  Therapy Suggested Charges     Code   Minutes Charges    None           Therapy Charges for Today     Code Description Service Date Service Provider Modifiers Qty    05572224328 HC PT THER PROC EA 15 MIN 12/4/2018 Charles Newton, PT GP 3    22597688259 HC PT THER SUPP EA 15 MIN 12/4/2018 Charles Newton, PT GP 1                OP PT Discharge Summary  Date of Discharge: 12/04/18  Reason for Discharge: Maximum functional potential achieved, Lack of progress, Non-compliant  Outcomes Achieved: Patient able to partially acheive established goals  Discharge Destination: Home with home program  Discharge Instructions/Additional Comments: pt not compliant with pushing self with HEP, actually regressing in terms of ROM of L knee over the past few sessions.        Charles Newton  PT  12/5/2018

## 2018-12-06 ENCOUNTER — APPOINTMENT (OUTPATIENT)
Dept: PHYSICAL THERAPY | Facility: HOSPITAL | Age: 63
End: 2018-12-06

## 2018-12-17 ENCOUNTER — OFFICE VISIT (OUTPATIENT)
Dept: CARDIAC SURGERY | Facility: CLINIC | Age: 63
End: 2018-12-17

## 2018-12-17 VITALS
DIASTOLIC BLOOD PRESSURE: 62 MMHG | HEART RATE: 100 BPM | SYSTOLIC BLOOD PRESSURE: 144 MMHG | WEIGHT: 189 LBS | OXYGEN SATURATION: 98 % | HEIGHT: 62 IN | TEMPERATURE: 98.6 F | BODY MASS INDEX: 34.78 KG/M2

## 2018-12-17 DIAGNOSIS — Z71.89 ENCOUNTER FOR ANTICOAGULATION DISCUSSION AND COUNSELING: Primary | ICD-10-CM

## 2018-12-17 PROCEDURE — 99213 OFFICE O/P EST LOW 20 MIN: CPT | Performed by: NURSE PRACTITIONER

## 2018-12-17 NOTE — PROGRESS NOTES
Subjective   Patient ID: Meeta Marmolejo is a 63 y.o. female is being seen for follow up after conversion from coumadin to Xarelto.   Chief Complaint   Patient presents with   • Pulmonary Embolism     2-4 week recheck, switched from Coumadin to Xarelto   • Anticoagulation   VAS resolved.  . No chest pain,  No bleeding or unexplained bruising.     Anticoagulation   Associated symptoms include weakness. Pertinent negatives include no anorexia, chest pain, chills, coughing, fever, headaches, joint swelling, myalgias, nausea, numbness or rash.        64 y/o lady L knee replacement with revision X3.  Developed R PE.  Started on Xarelto therapy, developed bleeding into her left knee.  Anticoagulation changed to lovenox bridge to coumadin.  Ms Marmolejo presents today for anticoagulation assessment as she has failed coumadin with inability to control INR with plan to switch to NOAC today.  See multiple anticoagulation.      8/30/18  CTA:  PE in [facility] [place]      The following portions of the patient's history were reviewed and updated as appropriate: allergies, current medications, past family history, past medical history, past social history, past surgical history and problem list.  Past Medical History:   Diagnosis Date   • Acute vaginitis     resolved   • Carpal tunnel syndrome    • Chronic osteoarthritis     Nino TKA   • Chronic pain    • Chronic urticaria    • Dietary counseling and surveillance    • Dysuria    • External hordeolum    • Hyperlipidemia    • Hypertension    • Low back pain     strain   • Microalbuminuria     on ARB   • Noncompliance with treatment    • Obesity      Dieting , exercise, appetite suppressant      • Osteoarthritis of left knee    • Plantar fasciitis     resolved   • Seasonal rhinitis    • Type 2 diabetes mellitus (CMS/HCC)    • Urinary tract infectious disease    • Vaginal discharge    • Vitamin deficiency      Past Surgical History:   Procedure Laterality Date   • CARPAL  TUNNEL RELEASE      Bilateral endoscopic release 01/20/2003    • ENDOSCOPY      normal 04/25/2008      • KNEE ARTHROSCOPY     • KNEE SURGERY      Removal of existing left total knee arthroplasty and revision left total knee arthroplasty. 12/15/2014       • REPLACEMENT TOTAL KNEE     • SHOULDER SURGERY     • TUBAL ABDOMINAL LIGATION         Allergies   Allergen Reactions   • Penicillins Rash   • Percocet [Oxycodone-Acetaminophen] Rash   • Rocephin [Ceftriaxone] Rash       Current Outpatient Medications:   •  acetaminophen (TYLENOL) 500 MG tablet, Take 500 mg by mouth Every 6 (Six) Hours As Needed for Mild Pain ., Disp: , Rfl:   •  alendronate (FOSAMAX) 70 MG tablet, Take 70 mg by mouth Every 7 (Seven) Days. Every Sunday morning, Disp: , Rfl:   •  atorvastatin (LIPITOR) 40 MG tablet, Take 40 mg by mouth Daily., Disp: , Rfl:   •  cetirizine (zyrTEC) 10 MG tablet, Take 10 mg by mouth Daily., Disp: , Rfl:   •  docusate sodium (COLACE) 100 MG capsule, Take 100 mg by mouth 2 (Two) Times a Day., Disp: , Rfl:   •  ferrous sulfate 325 (65 FE) MG tablet, Take 1 tablet by mouth Daily With Breakfast., Disp: 30 tablet, Rfl: 3  •  glipiZIDE (GLUCOTROL) 10 MG tablet, Take 10 mg by mouth 2 (Two) Times a Day Before Meals., Disp: , Rfl:   •  glucose blood test strip, 1 each by Other route 2 (Two) Times a Day. Use as instructed, Disp: 50 each, Rfl: 11  •  Insulin Glargine (BASAGLAR KWIKPEN) 100 UNIT/ML injection pen, Inject 16 units under the skin nightly (REPLACES LANTUS DUE TO INSURANCE FORMULARY) (Patient taking differently: Inject 16 Units under the skin into the appropriate area as directed Every Night. Inject 16 units under the skin nightly (REPLACES LANTUS DUE TO INSURANCE FORMULARY)), Disp: 2 pen, Rfl: 5  •  Insulin Pen Needle 31G X 4 MM misc, 1 each Daily., Disp: 100 each, Rfl: 1  •  losartan (COZAAR) 100 MG tablet, Take 100 mg by mouth Daily., Disp: , Rfl:   •  metFORMIN (GLUCOPHAGE) 1000 MG tablet, Take 1,000 mg by mouth 2  (Two) Times a Day With Meals., Disp: , Rfl:   •  montelukast (SINGULAIR) 10 MG tablet, Take 10 mg by mouth Daily., Disp: , Rfl:   •  rivaroxaban (XARELTO) 20 MG tablet, Take 1 tablet by mouth Daily., Disp: 30 tablet, Rfl: 5    Review of Systems   Constitution: Positive for weakness. Negative for chills, decreased appetite and fever.        Denies use of power tools, electric knives, chain saws, or conttact sports.  Shaves with safety razor.  Aware to use soft tooth brush and waxed floss.  No recent falls.  Denies:  GIB, GUB, or Seizures.     HENT: Negative for ear pain, hoarse voice, nosebleeds and stridor.    Eyes: Negative for visual disturbance.   Cardiovascular: Negative for chest pain, dyspnea on exertion, leg swelling and near-syncope.   Respiratory: Negative for cough, hemoptysis, shortness of breath and wheezing.    Hematologic/Lymphatic: Negative for bleeding problem. Does not bruise/bleed easily.        INR too high or too low.    Skin: Negative for itching, nail changes, poor wound healing and rash.   Musculoskeletal: Positive for joint pain. Negative for falls, joint swelling, muscle weakness and myalgias.   Gastrointestinal: Negative for anorexia, heartburn, hematemesis, melena and nausea.   Genitourinary: Negative for hematuria.   Neurological: Negative for brief paralysis, difficulty with concentration, disturbances in coordination, focal weakness, headaches, light-headedness, loss of balance, numbness and paresthesias.   Psychiatric/Behavioral: Negative for altered mental status.   Allergic/Immunologic: Negative for hives.        Objective   Physical Exam   Constitutional: She is oriented to person, place, and time. She appears well-nourished. No distress.   Body mass index is 34.57 kg/m².         HENT:   Head: Normocephalic.   Mouth/Throat: Oropharynx is clear and moist.   Eyes: Conjunctivae and EOM are normal. Pupils are equal, round, and reactive to light.   Neck: Neck supple. No JVD present.    Cardiovascular: Normal rate, regular rhythm, normal heart sounds and intact distal pulses.   Pulses:       Carotid pulses are 1+ on the right side, and 1+ on the left side.       Radial pulses are 2+ on the right side, and 2+ on the left side.        Dorsalis pedis pulses are 2+ on the right side, and 2+ on the left side.        Posterior tibial pulses are 2+ on the right side, and 2+ on the left side.   Pulmonary/Chest: Effort normal and breath sounds normal. No stridor.   No rub   Abdominal: Soft. Bowel sounds are normal.   Obese    Musculoskeletal: She exhibits no edema or tenderness.   Walks with assist device    Neurological: She is alert and oriented to person, place, and time. No cranial nerve deficit.   Skin: Skin is warm and dry. Capillary refill takes less than 2 seconds. No rash noted. No erythema. No pallor.   Psychiatric: Her behavior is normal. Judgment normal.   Nursing note and vitals reviewed.      Vitals:    12/17/18 0905   BP: 144/62   Pulse: 100   Temp: 98.6 °F (37 °C)   SpO2: 98%     Lab Results   Component Value Date    WBC 5.65 11/15/2018    HGB 12.6 11/15/2018    HCT 38.2 11/15/2018    MCV 79.6 (L) 11/15/2018     11/15/2018     '  Lab Results   Component Value Date    GLUCOSE 270 (H) 10/03/2018    BUN 11 10/03/2018    CREATININE 0.48 (L) 10/03/2018    EGFRIFAFRI 159 (H) 10/03/2018    BCR 22.9 10/03/2018    K 4.5 10/03/2018    CO2 29.0 10/03/2018    CALCIUM 10.0 10/03/2018    ALBUMIN 3.90 10/03/2018    AST 23 10/03/2018    ALT 40 10/03/2018     POC INR 2.4      Assessment/Plan   Independent Review of Radiographic Studies:    None with today's visit     1. Other acute pulmonary embolism without acute cor pulmonale (CMS/HCC)  Indication for anticoagulation.  Will reevaluate need for anticoagulation again after 6 months of uninterrupted anticoagulation with repeat CTA end of February per Dr. Nagy.  CBC with hypercoaguable work up in to have drawn prior to follow up March appointment.      2. Encounter for anticoagulation discussion and counseling  Ms. Marmolejo educated regarding the risks of bleeding with NOAC therapy.  Encounter for education regarding the signs and symptoms of adverse bleeding events held.  NOAC approved under her insurance and is affordable.

## 2018-12-17 NOTE — PATIENT INSTRUCTIONS
Notify provider if you experience excessive bleeding from the nose, cuts, gums, rectum, urinary tract, or vagina. Reddish or brown urine or stool. Vomiting of blood or hemorrhoidal bleeding. If major injury occurs present to the Emergency Department.

## 2019-01-14 ENCOUNTER — OFFICE VISIT (OUTPATIENT)
Dept: ORTHOPEDIC SURGERY | Facility: CLINIC | Age: 64
End: 2019-01-14

## 2019-01-14 VITALS — HEIGHT: 62 IN | BODY MASS INDEX: 36.25 KG/M2 | WEIGHT: 197 LBS

## 2019-01-14 DIAGNOSIS — M25.562 LEFT KNEE PAIN, UNSPECIFIED CHRONICITY: ICD-10-CM

## 2019-01-14 DIAGNOSIS — M00.062 STAPHYLOCOCCAL ARTHRITIS OF LEFT KNEE (HCC): ICD-10-CM

## 2019-01-14 DIAGNOSIS — Z96.652 S/P REVISION OF TOTAL KNEE, LEFT: Primary | ICD-10-CM

## 2019-01-14 PROCEDURE — 96372 THER/PROPH/DIAG INJ SC/IM: CPT | Performed by: ORTHOPAEDIC SURGERY

## 2019-01-14 PROCEDURE — 99213 OFFICE O/P EST LOW 20 MIN: CPT | Performed by: ORTHOPAEDIC SURGERY

## 2019-01-14 RX ORDER — WARFARIN SODIUM 10 MG/1
TABLET ORAL
COMMUNITY
End: 2019-06-25

## 2019-01-14 RX ORDER — METHYLPREDNISOLONE ACETATE 40 MG/ML
80 INJECTION, SUSPENSION INTRA-ARTICULAR; INTRALESIONAL; INTRAMUSCULAR; SOFT TISSUE ONCE
Status: COMPLETED | OUTPATIENT
Start: 2019-01-14 | End: 2019-01-14

## 2019-01-14 RX ADMIN — METHYLPREDNISOLONE ACETATE 80 MG: 40 INJECTION, SUSPENSION INTRA-ARTICULAR; INTRALESIONAL; INTRAMUSCULAR; SOFT TISSUE at 08:51

## 2019-01-14 NOTE — PROGRESS NOTES
Meeta Marmolejo is a 63 y.o. female returns for     Chief Complaint   Patient presents with   • Left Knee - Follow-up       HISTORY OF PRESENT ILLNESS: Patient is here today for recheck of left knee. Patient had left total knee revision on 8/28/2018 and I & D of left knee on 9/22/2018. Patient states that her pain is 9/10 today.        CONCURRENT MEDICAL HISTORY:    Past Medical History:   Diagnosis Date   • Acute vaginitis     resolved   • Carpal tunnel syndrome    • Chronic osteoarthritis     Nino TKA   • Chronic pain    • Chronic urticaria    • Dietary counseling and surveillance    • Dysuria    • External hordeolum    • Hyperlipidemia    • Hypertension    • Low back pain     strain   • Microalbuminuria     on ARB   • Noncompliance with treatment    • Obesity      Dieting , exercise, appetite suppressant      • Osteoarthritis of left knee    • Plantar fasciitis     resolved   • Seasonal rhinitis    • Type 2 diabetes mellitus (CMS/HCC)    • Urinary tract infectious disease    • Vaginal discharge    • Vitamin deficiency        Allergies   Allergen Reactions   • Penicillins Rash   • Percocet [Oxycodone-Acetaminophen] Rash   • Rocephin [Ceftriaxone] Rash         Current Outpatient Medications:   •  acetaminophen (TYLENOL) 500 MG tablet, Take 500 mg by mouth Every 6 (Six) Hours As Needed for Mild Pain ., Disp: , Rfl:   •  alendronate (FOSAMAX) 70 MG tablet, Take 70 mg by mouth Every 7 (Seven) Days. Every Sunday morning, Disp: , Rfl:   •  atorvastatin (LIPITOR) 40 MG tablet, Take 40 mg by mouth Daily., Disp: , Rfl:   •  cetirizine (zyrTEC) 10 MG tablet, Take 10 mg by mouth Daily., Disp: , Rfl:   •  docusate sodium (COLACE) 100 MG capsule, Take 100 mg by mouth 2 (Two) Times a Day., Disp: , Rfl:   •  ferrous sulfate 325 (65 FE) MG tablet, Take 1 tablet by mouth Daily With Breakfast., Disp: 30 tablet, Rfl: 3  •  glipiZIDE (GLUCOTROL) 10 MG tablet, Take 10 mg by mouth 2 (Two) Times a Day Before Meals., Disp: , Rfl:   •   glucose blood test strip, 1 each by Other route 2 (Two) Times a Day. Use as instructed, Disp: 50 each, Rfl: 11  •  Insulin Glargine (BASAGLAR KWIKPEN) 100 UNIT/ML injection pen, Inject 16 units under the skin nightly (REPLACES LANTUS DUE TO INSURANCE FORMULARY) (Patient taking differently: Inject 16 Units under the skin into the appropriate area as directed Every Night. Inject 16 units under the skin nightly (REPLACES LANTUS DUE TO INSURANCE FORMULARY)), Disp: 2 pen, Rfl: 5  •  Insulin Pen Needle 31G X 4 MM misc, 1 each Daily., Disp: 100 each, Rfl: 1  •  losartan (COZAAR) 100 MG tablet, Take 100 mg by mouth Daily., Disp: , Rfl:   •  metFORMIN (GLUCOPHAGE) 1000 MG tablet, Take 1,000 mg by mouth 2 (Two) Times a Day With Meals., Disp: , Rfl:   •  montelukast (SINGULAIR) 10 MG tablet, Take 10 mg by mouth Daily., Disp: , Rfl:   •  warfarin (COUMADIN) 10 MG tablet, warfarin 10 mg tablet  Take 1 tablet every day by oral route for 30 days., Disp: , Rfl:   No current facility-administered medications for this visit.     Past Surgical History:   Procedure Laterality Date   • CARPAL TUNNEL RELEASE      Bilateral endoscopic release 01/20/2003    • ENDOSCOPY      normal 04/25/2008      • KNEE ARTHROSCOPY     • KNEE INCISION AND DRAINAGE Left 9/22/2018    Procedure: KNEE INCISION AND DRAINAGE;  Surgeon: Elias Su MD;  Location: [facility];  Service: Orthopedics   • KNEE SURGERY      Removal of existing left total knee arthroplasty and revision left total knee arthroplasty. 12/15/2014       • REPLACEMENT TOTAL KNEE     • SHOULDER SURGERY     • TOTAL KNEE ARTHROPLASTY REVISION Left 8/28/2018    Procedure: REVISION LEFT TOTAL KNEE;  Surgeon: Elias Su MD;  Location: [facility] OR;  Service: Orthopedics   • TUBAL ABDOMINAL LIGATION             Review of Systems   Constitutional: Negative for chills and fever.   HENT: Negative for facial swelling.    Respiratory: Negative for apnea and shortness of breath.   "  Cardiovascular: Negative for chest pain and leg swelling.   Gastrointestinal: Negative for abdominal pain, nausea and vomiting.   Genitourinary: Negative for dysuria.   Musculoskeletal: Positive for arthralgias, back pain and gait problem.   Skin: Negative for color change.   Neurological: Negative for seizures and syncope.   Hematological: Negative for adenopathy.   Psychiatric/Behavioral: Negative for dysphoric mood.       PHYSICAL EXAMINATION:       Ht 157.5 cm (62\")   Wt 89.4 kg (197 lb)   BMI 36.03 kg/m²     Physical Exam   Constitutional: She is oriented to person, place, and time. She appears well-developed.   HENT:   Head: Normocephalic and atraumatic.   Eyes: EOM are normal. Pupils are equal, round, and reactive to light.   Neck: Neck supple.   Pulmonary/Chest: Effort normal.   Musculoskeletal: Normal range of motion. She exhibits tenderness.   Neurological: She is alert and oriented to person, place, and time.   Skin: Skin is warm and dry.   Psychiatric: She has a normal mood and affect.       GAIT:     []  Normal  [x]  Antalgic    Assistive device: []  None  []  Walker     []  Crutches  []  Cane     []  Wheelchair  []  Stretcher    Ortho Exam  Knee is warm her motion is decreased probably 15-20° to maybe 7065-70°.  Neurologically intact the calf is negative.    No results found.          ASSESSMENT:    Diagnoses and all orders for this visit:    S/P revision of total knee, left  -     methylPREDNISolone acetate (DEPO-medrol) injection 80 mg; Inject 2 mL into the appropriate muscle as directed by prescriber 1 (One) Time.    Staphylococcal arthritis of left knee (CMS/HCC)  -     methylPREDNISolone acetate (DEPO-medrol) injection 80 mg; Inject 2 mL into the appropriate muscle as directed by prescriber 1 (One) Time.    Left knee pain, unspecified chronicity  -     methylPREDNISolone acetate (DEPO-medrol) injection 80 mg; Inject 2 mL into the appropriate muscle as directed by prescriber 1 (One) " Time.    Other orders  -     warfarin (COUMADIN) 10 MG tablet; warfarin 10 mg tablet   Take 1 tablet every day by oral route for 30 days.          PLAN at this point we'll try a little Depo-Medrol IM to jumpstart her.  She mentions that when she was in [place] visiting her family she she got around very well and she attributed to the weather.  Work on her motion.,  Any problems we'll see her back as needed    No Follow-up on file.    Elias Su MD

## 2019-02-20 ENCOUNTER — APPOINTMENT (OUTPATIENT)
Dept: CT IMAGING | Facility: HOSPITAL | Age: 64
End: 2019-02-20

## 2019-02-20 ENCOUNTER — LAB (OUTPATIENT)
Dept: LAB | Facility: HOSPITAL | Age: 64
End: 2019-02-20

## 2019-02-20 ENCOUNTER — HOSPITAL ENCOUNTER (OUTPATIENT)
Dept: CT IMAGING | Facility: HOSPITAL | Age: 64
Discharge: HOME | End: 2019-02-20
Admitting: INTERNAL MEDICINE

## 2019-02-20 DIAGNOSIS — D64.9 ANEMIA, UNSPECIFIED TYPE: ICD-10-CM

## 2019-02-20 DIAGNOSIS — I26.99 OTHER ACUTE PULMONARY EMBOLISM WITHOUT ACUTE COR PULMONALE (HCC): ICD-10-CM

## 2019-02-20 DIAGNOSIS — D64.9 ANEMIA, UNSPECIFIED TYPE: Chronic | ICD-10-CM

## 2019-02-20 LAB
ALBUMIN SERPL-MCNC: 4.5 G/DL (ref 3.4–4.8)
ALBUMIN/GLOB SERPL: 1.3 G/DL (ref 1.1–1.8)
ALP SERPL-CCNC: 97 U/L (ref 38–126)
ALT SERPL W P-5'-P-CCNC: 11 U/L (ref 9–52)
ANION GAP SERPL CALCULATED.3IONS-SCNC: 10 MMOL/L (ref 5–15)
AST SERPL-CCNC: 15 U/L (ref 14–36)
BASOPHILS # BLD AUTO: 0.02 10*3/MM3 (ref 0–0.2)
BASOPHILS NFR BLD AUTO: 0.3 % (ref 0–1.5)
BILIRUB SERPL-MCNC: 0.5 MG/DL (ref 0.2–1.3)
BUN BLD-MCNC: 24 MG/DL (ref 7–21)
BUN/CREAT SERPL: 35.8 (ref 7–25)
CALCIUM SPEC-SCNC: 10.1 MG/DL (ref 8.4–10.2)
CHLORIDE SERPL-SCNC: 95 MMOL/L (ref 95–110)
CO2 SERPL-SCNC: 30 MMOL/L (ref 22–31)
CREAT BLD-MCNC: 0.67 MG/DL (ref 0.5–1)
DEPRECATED RDW RBC AUTO: 54.2 FL (ref 37–54)
EOSINOPHIL # BLD AUTO: 0.05 10*3/MM3 (ref 0–0.4)
EOSINOPHIL NFR BLD AUTO: 0.8 % (ref 0.3–6.2)
ERYTHROCYTE [DISTWIDTH] IN BLOOD BY AUTOMATED COUNT: 18.1 % (ref 12.3–15.4)
FERRITIN SERPL-MCNC: 97.8 NG/ML (ref 11.1–264)
FOLATE SERPL-MCNC: 18.1 NG/ML (ref 2.76–21)
GFR SERPL CREATININE-BSD FRML MDRD: 108 ML/MIN/1.73 (ref 45–104)
GLOBULIN UR ELPH-MCNC: 3.6 GM/DL (ref 2.3–3.5)
GLUCOSE BLD-MCNC: 269 MG/DL (ref 60–100)
HCT VFR BLD AUTO: 35.6 % (ref 34–46.6)
HGB BLD-MCNC: 11.1 G/DL (ref 12–15.9)
IMM GRANULOCYTES # BLD AUTO: 0.03 10*3/MM3 (ref 0–0.05)
IMM GRANULOCYTES NFR BLD AUTO: 0.5 % (ref 0–0.5)
IRON 24H UR-MRATE: 80 MCG/DL (ref 37–170)
IRON SATN MFR SERPL: 25 % (ref 15–50)
LYMPHOCYTES # BLD AUTO: 1.68 10*3/MM3 (ref 0.7–3.1)
LYMPHOCYTES NFR BLD AUTO: 26.5 % (ref 19.6–45.3)
MCH RBC QN AUTO: 25.8 PG (ref 26.6–33)
MCHC RBC AUTO-ENTMCNC: 31.2 G/DL (ref 31.5–35.7)
MCV RBC AUTO: 82.6 FL (ref 79–97)
MONOCYTES # BLD AUTO: 0.41 10*3/MM3 (ref 0.1–0.9)
MONOCYTES NFR BLD AUTO: 6.5 % (ref 5–12)
NEUTROPHILS # BLD AUTO: 4.14 10*3/MM3 (ref 1.4–7)
NEUTROPHILS NFR BLD AUTO: 65.4 % (ref 42.7–76)
NRBC BLD AUTO-RTO: 0 /100 WBC (ref 0–0)
PLATELET # BLD AUTO: 278 10*3/MM3 (ref 140–450)
PMV BLD AUTO: 10.8 FL (ref 6–12)
POTASSIUM BLD-SCNC: 3.9 MMOL/L (ref 3.5–5.1)
PROT SERPL-MCNC: 8.1 G/DL (ref 6.3–8.6)
RBC # BLD AUTO: 4.31 10*6/MM3 (ref 3.77–5.28)
SODIUM BLD-SCNC: 135 MMOL/L (ref 137–145)
TIBC SERPL-MCNC: 326 MCG/DL (ref 265–497)
VIT B12 BLD-MCNC: 517 PG/ML (ref 239–931)
WBC NRBC COR # BLD: 6.33 10*3/MM3 (ref 3.4–10.8)

## 2019-02-20 PROCEDURE — 85613 RUSSELL VIPER VENOM DILUTED: CPT

## 2019-02-20 PROCEDURE — 85705 THROMBOPLASTIN INHIBITION: CPT

## 2019-02-20 PROCEDURE — 85670 THROMBIN TIME PLASMA: CPT

## 2019-02-20 PROCEDURE — 85732 THROMBOPLASTIN TIME PARTIAL: CPT

## 2019-02-20 PROCEDURE — 81241 F5 GENE: CPT

## 2019-02-20 PROCEDURE — 82746 ASSAY OF FOLIC ACID SERUM: CPT

## 2019-02-20 PROCEDURE — 71275 CT ANGIOGRAPHY CHEST: CPT

## 2019-02-20 PROCEDURE — 86147 CARDIOLIPIN ANTIBODY EA IG: CPT

## 2019-02-20 PROCEDURE — 0 IOPAMIDOL PER 1 ML: Performed by: INTERNAL MEDICINE

## 2019-02-20 PROCEDURE — 82728 ASSAY OF FERRITIN: CPT

## 2019-02-20 PROCEDURE — 86146 BETA-2 GLYCOPROTEIN ANTIBODY: CPT

## 2019-02-20 PROCEDURE — 80053 COMPREHEN METABOLIC PANEL: CPT

## 2019-02-20 PROCEDURE — 85025 COMPLETE CBC W/AUTO DIFF WBC: CPT

## 2019-02-20 PROCEDURE — 83550 IRON BINDING TEST: CPT

## 2019-02-20 PROCEDURE — 81240 F2 GENE: CPT

## 2019-02-20 PROCEDURE — 83540 ASSAY OF IRON: CPT

## 2019-02-20 PROCEDURE — 82607 VITAMIN B-12: CPT

## 2019-02-20 RX ADMIN — IOPAMIDOL 68 ML: 755 INJECTION, SOLUTION INTRAVENOUS at 08:33

## 2019-02-21 LAB
CARDIOLIPIN IGA SER IA-ACNC: <9 APL U/ML (ref 0–11)
CARDIOLIPIN IGG SER IA-ACNC: <9 GPL U/ML (ref 0–14)
CARDIOLIPIN IGM SER IA-ACNC: 12 MPL U/ML (ref 0–12)

## 2019-02-22 LAB
B2 GLYCOPROT1 IGA SER-ACNC: <9 GPI IGA UNITS (ref 0–25)
B2 GLYCOPROT1 IGG SER-ACNC: <9 GPI IGG UNITS (ref 0–20)
B2 GLYCOPROT1 IGM SER-ACNC: <9 GPI IGM UNITS (ref 0–32)
DRVVT IMM 1:2 NP PPP: 66.4 SEC (ref 0–47)
LA NT DPL PPP: 47.4 SEC (ref 0–55)
LA NT DPL/LA NT HPL PPP-RTO: 1.09 RATIO (ref 0–1.4)
LA NT PLATELET PPP: 35.6 SEC (ref 0–51.9)
LUPUS ANTICOAGULANT REFLEX: ABNORMAL
SCREEN DRVVT: 1.4 RATIO (ref 0.8–1.2)
SCREEN DRVVT: 85.9 SEC (ref 0–47)
THROMBIN TIME: 19.3 SEC (ref 0–23)

## 2019-02-25 LAB
F2 GENE MUT ANL BLD/T: NORMAL
F5 GENE MUT ANL BLD/T: NORMAL

## 2019-03-04 ENCOUNTER — OFFICE VISIT (OUTPATIENT)
Dept: ONCOLOGY | Facility: CLINIC | Age: 64
End: 2019-03-04

## 2019-03-04 VITALS
RESPIRATION RATE: 18 BRPM | WEIGHT: 203 LBS | HEIGHT: 62 IN | HEART RATE: 101 BPM | SYSTOLIC BLOOD PRESSURE: 141 MMHG | OXYGEN SATURATION: 97 % | BODY MASS INDEX: 37.36 KG/M2 | DIASTOLIC BLOOD PRESSURE: 64 MMHG | TEMPERATURE: 98.1 F

## 2019-03-04 DIAGNOSIS — D64.9 ANEMIA, UNSPECIFIED TYPE: Primary | Chronic | ICD-10-CM

## 2019-03-04 DIAGNOSIS — I26.99 OTHER ACUTE PULMONARY EMBOLISM WITHOUT ACUTE COR PULMONALE (HCC): ICD-10-CM

## 2019-03-04 PROCEDURE — G0463 HOSPITAL OUTPT CLINIC VISIT: HCPCS | Performed by: INTERNAL MEDICINE

## 2019-03-04 PROCEDURE — G8417 CALC BMI ABV UP PARAM F/U: HCPCS | Performed by: INTERNAL MEDICINE

## 2019-03-04 PROCEDURE — 99214 OFFICE O/P EST MOD 30 MIN: CPT | Performed by: INTERNAL MEDICINE

## 2019-03-04 PROCEDURE — 1123F ACP DISCUSS/DSCN MKR DOCD: CPT | Performed by: INTERNAL MEDICINE

## 2019-03-04 RX ORDER — FERROUS SULFATE 325(65) MG
325 TABLET ORAL
Qty: 30 TABLET | Refills: 3 | Status: SHIPPED | OUTPATIENT
Start: 2019-03-04 | End: 2019-06-25 | Stop reason: SDUPTHER

## 2019-03-04 RX ORDER — FOLIC ACID 1 MG/1
1 TABLET ORAL DAILY
Qty: 90 TABLET | Refills: 1 | Status: SHIPPED | OUTPATIENT
Start: 2019-03-04 | End: 2019-06-25 | Stop reason: SDUPTHER

## 2019-03-04 RX ORDER — LANOLIN ALCOHOL/MO/W.PET/CERES
1000 CREAM (GRAM) TOPICAL DAILY
Qty: 90 TABLET | Refills: 1 | Status: SHIPPED | OUTPATIENT
Start: 2019-03-04 | End: 2019-06-25 | Stop reason: SDUPTHER

## 2019-03-04 RX ORDER — DOCUSATE SODIUM 100 MG/1
100 CAPSULE, LIQUID FILLED ORAL 2 TIMES DAILY PRN
Qty: 60 CAPSULE | Refills: 3 | Status: SHIPPED | OUTPATIENT
Start: 2019-03-04 | End: 2019-06-25 | Stop reason: SDUPTHER

## 2019-03-04 RX ORDER — TRAZODONE HYDROCHLORIDE 50 MG/1
TABLET ORAL
COMMUNITY
Start: 2019-02-20 | End: 2019-09-23

## 2019-03-04 NOTE — PROGRESS NOTES
DATE OF VISIT: 3/4/2019      REASON FOR VISIT: Pulmonary embolism on Xarelto, anemia, Thrombocytosis        HISTORY OF PRESENT ILLNESS:    63-year-old female with past medical problems consisting of hypertension, dyslipidemia, poorly controlled diabetes mellitus, history of left knee replacement with revision ×3.  Most recent revision was done on 2018.  And was diagnosed with subacute pulmonary embolism on 2018 after revision of left knee and was started on xarelto.  Patient was initially seen in consultation when she was  admitted to hospital on 2018 with bleeding into her left knee.  Xarelto was discontinued and patient was started on heparin drip with coumadin.  Patient has started back on Xarelto in 2018.  Patient had a recent CT angiogram done for follow-up on pulmonary embolism as well as hypercoagulable workup.  She is here to discuss the result and further recommendation.  Denies any bleeding.  Complains of pain in left knee.  Complains of fatigue.    PAST MEDICAL HISTORY:    Past Medical History:   Diagnosis Date   • Acute vaginitis     resolved   • Carpal tunnel syndrome    • Chronic osteoarthritis     Nino TKA   • Chronic pain    • Chronic urticaria    • Dietary counseling and surveillance    • Dysuria    • External hordeolum    • Hyperlipidemia    • Hypertension    • Low back pain     strain   • Microalbuminuria     on ARB   • Noncompliance with treatment    • Obesity      Dieting , exercise, appetite suppressant      • Osteoarthritis of left knee    • Plantar fasciitis     resolved   • Seasonal rhinitis    • Type 2 diabetes mellitus (CMS/HCC)    • Urinary tract infectious disease    • Vaginal discharge    • Vitamin deficiency        SOCIAL HISTORY:    Social History     Tobacco Use   • Smoking status: Former Smoker     Packs/day: 1.00     Years: 6.00     Pack years: 6.00     Last attempt to quit: 2006     Years since quittin.1   • Smokeless tobacco: Never  Used   Substance Use Topics   • Alcohol use: No   • Drug use: No       Surgical History :  Past Surgical History:   Procedure Laterality Date   • CARPAL TUNNEL RELEASE      Bilateral endoscopic release 01/20/2003    • ENDOSCOPY      normal 04/25/2008      • KNEE ARTHROSCOPY     • KNEE INCISION AND DRAINAGE Left 9/22/2018    Procedure: KNEE INCISION AND DRAINAGE;  Surgeon: Elias Su MD;  Location: [facility];  Service: Orthopedics   • KNEE SURGERY      Removal of existing left total knee arthroplasty and revision left total knee arthroplasty. 12/15/2014       • REPLACEMENT TOTAL KNEE     • SHOULDER SURGERY     • TOTAL KNEE ARTHROPLASTY REVISION Left 8/28/2018    Procedure: REVISION LEFT TOTAL KNEE;  Surgeon: Elias Su MD;  Location: [facility];  Service: Orthopedics   • TUBAL ABDOMINAL LIGATION         ALLERGIES:    Allergies   Allergen Reactions   • Penicillins Rash   • Percocet [Oxycodone-Acetaminophen] Rash   • Rocephin [Ceftriaxone] Rash         FAMILY HISTORY:  Family History   Problem Relation Age of Onset   • Diabetes Mother    • Hypertension Mother    • Colon cancer Father    • Diabetes Father    • Hypertension Father    • Breast cancer Sister    • Cancer Other    • Diabetes Other    • Hypertension Other            REVIEW OF SYSTEMS:      CONSTITUTIONAL:  Complains of fatigue.  Positive for recent weight gain.  Denies any fever, chills .      EYES: No visual disturbances. No discharge. No new lesions     ENMT:  No epistaxis, mouth sores or difficulty swallowing.     RESPIRATORY:  No new shortness of breath. No new cough or hemoptysis.     CARDIOVASCULAR:  No chest pain or palpitations.     GASTROINTESTINAL:  No abdominal pain nausea, vomiting or blood in the stool.     GENITOURINARY: No Dysuria or Hematuria.     MUSCULOSKELETAL:  Complains of chronic back pain.  Complains of pain in left knee.     LYMPHATICS:  Denies any abnormal swollen glands anywhere in the body.     NEUROLOGICAL : No  "tingling or numbness. No headache or dizziness. No seizures or balance problems.     SKIN: Dressing present on left knee.           PHYSICAL EXAMINATION:      VITAL SIGNS:  /64   Pulse 101   Temp 98.1 °F (36.7 °C) (Temporal)   Resp 18   Ht 157.5 cm (62.01\")   Wt 92.1 kg (203 lb)   SpO2 97%   BMI 37.12 kg/m²       03/04/19  0815   Weight: 92.1 kg (203 lb)         ECOG performance status: 2    CONSTITUTIONAL:  Not in any distress.    EYES: Mild conjunctival Pallor. No Icterus. No Pterygium. Extraocular Movements intact.No ptosis.    ENMT:  Normocephalic, Atraumatic.No Facial Asymmetry noted.    NECK:  No adenopathy.Trachea midline. NO JVD.    RESPIRATORY:  Fair air entry bilateral. No rhonchi or wheezing.Fair respiratory effort.    CARDIOVASCULAR:  S1, S2. Regular rate and rhythm. No murmur or gallop appreciated.    ABDOMEN:  Soft, obese, nontender. Bowel sounds present in all four quadrants.  No Hepatosplenomegaly appreciated.    MUSCULOSKELETAL: Trace edema.No Calf Tenderness.Decreased range of motion.  Surgical scar present on left knee    NEUROLOGIC:    No  Motor  deficit appreciated. Cranial Nerves 2-12 grossly intact.    SKIN : No new skin lesion identified.     LYMPHATICS: No new enlarged lymph nodes in neck or supraclavicular area.    PSYCHIATRY: Alert, awake and oriented ×3.Normal affect.  Normal judgment.  Makes good eye contact.        DIAGNOSTIC DATA:    Glucose   Date Value Ref Range Status   02/20/2019 269 (H) 60 - 100 mg/dL Final     Sodium   Date Value Ref Range Status   02/20/2019 135 (L) 137 - 145 mmol/L Final     Potassium   Date Value Ref Range Status   02/20/2019 3.9 3.5 - 5.1 mmol/L Final     CO2   Date Value Ref Range Status   02/20/2019 30.0 22.0 - 31.0 mmol/L Final     Chloride   Date Value Ref Range Status   02/20/2019 95 95 - 110 mmol/L Final     Anion Gap   Date Value Ref Range Status   02/20/2019 10.0 5.0 - 15.0 mmol/L Final     Creatinine   Date Value Ref Range Status "   02/20/2019 0.67 0.50 - 1.00 mg/dL Final     BUN   Date Value Ref Range Status   02/20/2019 24 (H) 7 - 21 mg/dL Final     BUN/Creatinine Ratio   Date Value Ref Range Status   02/20/2019 35.8 (H) 7.0 - 25.0 Final     Calcium   Date Value Ref Range Status   02/20/2019 10.1 8.4 - 10.2 mg/dL Final     Alkaline Phosphatase   Date Value Ref Range Status   02/20/2019 97 38 - 126 U/L Final     Total Protein   Date Value Ref Range Status   02/20/2019 8.1 6.3 - 8.6 g/dL Final     ALT (SGPT)   Date Value Ref Range Status   02/20/2019 11 9 - 52 U/L Final     AST (SGOT)   Date Value Ref Range Status   02/20/2019 15 14 - 36 U/L Final     Total Bilirubin   Date Value Ref Range Status   02/20/2019 0.5 0.2 - 1.3 mg/dL Final     Albumin   Date Value Ref Range Status   02/20/2019 4.50 3.40 - 4.80 g/dL Final     Globulin   Date Value Ref Range Status   02/20/2019 3.6 (H) 2.3 - 3.5 gm/dL Final     Lab Results   Component Value Date    WBC 6.33 02/20/2019    HGB 11.1 (L) 02/20/2019    HCT 35.6 02/20/2019    MCV 82.6 02/20/2019     02/20/2019     Lab Results   Component Value Date    NEUTROABS 4.14 02/20/2019    IRON 80 02/20/2019    TIBC 326 02/20/2019    LABIRON 25 02/20/2019    FERRITIN 97.80 02/20/2019    [id number] 517 02/20/2019    FOLATE 18.10 02/20/2019     Lab Results   Component Value Date    REFLABREPO SEE NOTE: 05/28/2015       Factor V Leiden, prothrombin gene mutation, anticardiolipin antibodies, beta-2 glycoprotein antibodies were negative on February 20, 2019.    Lupus anticoagulant done on February 20, 2019 was positive.          Radiology Data :  CT angiogram of chest with contrast done on February 20, 2019 was reviewed, discussed with patient, it showed:  IMPRESSION:  1. No evidence of pulmonary embolus..  2. Bilateral kidney upper pole simple renal cortical cysts..  3. Otherwise unremarkable CT chest..         CT angiogram of chest with contrast done on August 30, 2018 showed:  IMPRESSION:  CONCLUSION:  Pulmonary emboli in right middle lobe and right lower  lobe branches probably subacute in origin.              ASSESSMENT AND PLAN:       1.  Subacute pulmonary embolism on right side:  -Patient was diagnosed with subacute pulmonary embolism on right side after most recent revision of left knee done on August 28, 2018.  -Patient was started on xarelto 15 mg twice daily.  -She was admitted to [facility] on September 20, 2018 with bleeding into her left knee.  -Patient was started on heparin drip.  -Patient had incision and Drainage done by Dr. Su on September 22, 2018.  -Patient was on Coumadin.  Patient was started back on Xarelto in December 2018.  -CT angiogram of chest with contrast done on February 20, 2019 shows resolution of pulmonary embolism.  -Hypercoagulable workup done consisting of factor V Leyden, prothrombin gene mutation, anticardiolipin antibody, beta-2 glycoprotein antibody were negative on February 20, 2019.  -Lupus anticoagulant was positive on February 20, 2019.  In view of lupus anticoagulant being positive, recommend continuing with Xarelto for now.  -We will repeat lupus anticoagulant as well as anticardiolipin IgA antibody prior to next clinic visit in 3 months.  This recommendation was discussed with patient.      2.  Anemia:  -Patient required 1 unit of PRBC on September 22, 2018 with hemoglobin of 6.9.  -Anemia workup done on February 20, 2019 shows hemoglobin is 11.1.  Iron saturation is improved.  B12 and folate are intermediate.  -We will recommend continuing ferrous sulfate 1 tablet p.o. daily along with Colace.  Will also start patient on vitamin B12 thousand micrograms p.o. daily and folic acid 1 mg p.o. daily.  Prescription for ferrous sulfate, B12, folate and Colace has been sent to her pharmacy today on March 4, 2019.      3.  Poorly controlled diabetes mellitus     4.  Thrombocytosis: Most likely reactive. Resolved. Platelet count is normal at  278,000     5.  Health maintenance: Patient does not smoke.    6. Advance Care Planning: For now patient remains full code and is able to make her decisions.  Patient has health care surrogate mentioned on chart.    7. BMI: Patient's Body mass index is 37.12 kg/m². BMI is higher than reference range.  Patient was notified about her elevated BMI.        Otf Nagy MD  3/4/2019  8:31 AM        EMR Dragon/Transcription disclaimer:   Much of this encounter note is an electronic transcription/translation of spoken language to printed text. The electronic translation of spoken language may permit erroneous, or at times, nonsensical words or phrases to be inadvertently transcribed; Although I have reviewed the note for such errors, some may still exist.

## 2019-06-03 ENCOUNTER — LAB (OUTPATIENT)
Dept: LAB | Facility: HOSPITAL | Age: 64
End: 2019-06-03

## 2019-06-03 ENCOUNTER — APPOINTMENT (OUTPATIENT)
Dept: ONCOLOGY | Facility: CLINIC | Age: 64
End: 2019-06-03

## 2019-06-03 ENCOUNTER — TELEPHONE (OUTPATIENT)
Dept: ONCOLOGY | Facility: CLINIC | Age: 64
End: 2019-06-03

## 2019-06-03 DIAGNOSIS — I26.99 OTHER ACUTE PULMONARY EMBOLISM WITHOUT ACUTE COR PULMONALE (HCC): ICD-10-CM

## 2019-06-03 DIAGNOSIS — D64.9 ANEMIA, UNSPECIFIED TYPE: ICD-10-CM

## 2019-06-03 LAB
ALBUMIN SERPL-MCNC: 4.5 G/DL (ref 3.5–5.2)
ALBUMIN/GLOB SERPL: 1.3 G/DL
ALP SERPL-CCNC: 78 U/L (ref 39–117)
ALT SERPL W P-5'-P-CCNC: 18 U/L (ref 1–33)
ANION GAP SERPL CALCULATED.3IONS-SCNC: 16 MMOL/L
AST SERPL-CCNC: 11 U/L (ref 1–32)
BASOPHILS # BLD AUTO: 0.03 10*3/MM3 (ref 0–0.2)
BASOPHILS NFR BLD AUTO: 0.4 % (ref 0–1.5)
BILIRUB SERPL-MCNC: 0.2 MG/DL (ref 0.2–1.2)
BUN BLD-MCNC: 15 MG/DL (ref 8–23)
BUN/CREAT SERPL: 20.8 (ref 7–25)
CALCIUM SPEC-SCNC: 10.1 MG/DL (ref 8.6–10.5)
CHLORIDE SERPL-SCNC: 95 MMOL/L (ref 98–107)
CO2 SERPL-SCNC: 26 MMOL/L (ref 22–29)
CREAT BLD-MCNC: 0.72 MG/DL (ref 0.57–1)
DEPRECATED RDW RBC AUTO: 43.1 FL (ref 37–54)
EOSINOPHIL # BLD AUTO: 0.03 10*3/MM3 (ref 0–0.4)
EOSINOPHIL NFR BLD AUTO: 0.4 % (ref 0.3–6.2)
ERYTHROCYTE [DISTWIDTH] IN BLOOD BY AUTOMATED COUNT: 13.9 % (ref 12.3–15.4)
FERRITIN SERPL-MCNC: 108.7 NG/ML (ref 13–150)
FOLATE SERPL-MCNC: >20 NG/ML (ref 4.78–24.2)
GFR SERPL CREATININE-BSD FRML MDRD: 99 ML/MIN/1.73
GLOBULIN UR ELPH-MCNC: 3.5 GM/DL
GLUCOSE BLD-MCNC: 302 MG/DL (ref 65–99)
HCT VFR BLD AUTO: 42.1 % (ref 34–46.6)
HGB BLD-MCNC: 13.4 G/DL (ref 12–15.9)
IMM GRANULOCYTES # BLD AUTO: 0.06 10*3/MM3 (ref 0–0.05)
IMM GRANULOCYTES NFR BLD AUTO: 0.8 % (ref 0–0.5)
IRON 24H UR-MRATE: 72 MCG/DL (ref 37–145)
IRON SATN MFR SERPL: 16 % (ref 20–50)
LYMPHOCYTES # BLD AUTO: 2.14 10*3/MM3 (ref 0.7–3.1)
LYMPHOCYTES NFR BLD AUTO: 28.6 % (ref 19.6–45.3)
MCH RBC QN AUTO: 27 PG (ref 26.6–33)
MCHC RBC AUTO-ENTMCNC: 31.8 G/DL (ref 31.5–35.7)
MCV RBC AUTO: 84.9 FL (ref 79–97)
MONOCYTES # BLD AUTO: 0.46 10*3/MM3 (ref 0.1–0.9)
MONOCYTES NFR BLD AUTO: 6.1 % (ref 5–12)
NEUTROPHILS # BLD AUTO: 4.77 10*3/MM3 (ref 1.7–7)
NEUTROPHILS NFR BLD AUTO: 63.7 % (ref 42.7–76)
NRBC BLD AUTO-RTO: 0 /100 WBC (ref 0–0.2)
PLATELET # BLD AUTO: 269 10*3/MM3 (ref 140–450)
PMV BLD AUTO: 11.7 FL (ref 6–12)
POTASSIUM BLD-SCNC: 4.3 MMOL/L (ref 3.5–5.2)
PROT SERPL-MCNC: 8 G/DL (ref 6–8.5)
RBC # BLD AUTO: 4.96 10*6/MM3 (ref 3.77–5.28)
SODIUM BLD-SCNC: 137 MMOL/L (ref 136–145)
TIBC SERPL-MCNC: 438 MCG/DL (ref 298–536)
TRANSFERRIN SERPL-MCNC: 294 MG/DL (ref 200–360)
VIT B12 BLD-MCNC: 584 PG/ML (ref 211–946)
WBC NRBC COR # BLD: 7.49 10*3/MM3 (ref 3.4–10.8)

## 2019-06-03 PROCEDURE — 85613 RUSSELL VIPER VENOM DILUTED: CPT

## 2019-06-03 PROCEDURE — 85705 THROMBOPLASTIN INHIBITION: CPT

## 2019-06-03 PROCEDURE — 82746 ASSAY OF FOLIC ACID SERUM: CPT

## 2019-06-03 PROCEDURE — 83540 ASSAY OF IRON: CPT

## 2019-06-03 PROCEDURE — 82728 ASSAY OF FERRITIN: CPT

## 2019-06-03 PROCEDURE — 85025 COMPLETE CBC W/AUTO DIFF WBC: CPT

## 2019-06-03 PROCEDURE — 84466 ASSAY OF TRANSFERRIN: CPT

## 2019-06-03 PROCEDURE — 82607 VITAMIN B-12: CPT

## 2019-06-03 PROCEDURE — 80053 COMPREHEN METABOLIC PANEL: CPT

## 2019-06-03 PROCEDURE — 86147 CARDIOLIPIN ANTIBODY EA IG: CPT

## 2019-06-03 PROCEDURE — 85670 THROMBIN TIME PLASMA: CPT

## 2019-06-03 PROCEDURE — 85732 THROMBOPLASTIN TIME PARTIAL: CPT

## 2019-06-04 LAB — CARDIOLIPIN IGA SER IA-ACNC: <9 APL U/ML (ref 0–11)

## 2019-06-06 LAB
DRVVT IMM 1:2 NP PPP: 36.8 SEC (ref 0–47)
LA NT DPL PPP: 56.9 SEC (ref 0–55)
LA NT DPL/LA NT HPL PPP-RTO: 1.75 RATIO (ref 0–1.4)
LA NT PLATELET PPP: 34.2 SEC (ref 0–51.9)
LUPUS ANTICOAGULANT REFLEX: ABNORMAL
SCREEN DRVVT: 54.4 SEC (ref 0–47)
THROMBIN TIME: 27.2 SEC (ref 0–23)

## 2019-06-07 NOTE — TELEPHONE ENCOUNTER
See note. Pt states that she doesn't have a ride to get to her appt. Do you want to give her results over the phone?

## 2019-06-07 NOTE — TELEPHONE ENCOUNTER
If she cant make her appt on June 10th then she can reschedule or you can ask Dr. Nagy if he wants to give results over the phone, but I will not give results over phone    Thanks  Estephania

## 2019-06-07 NOTE — TELEPHONE ENCOUNTER
Called pt, informed pt that Dr Nagy has left at lunch and is on vacation next week. Pt states to cancel follow up appt. And to ask Dr. Nagy when he gets back from vacation.

## 2019-06-10 ENCOUNTER — APPOINTMENT (OUTPATIENT)
Dept: ONCOLOGY | Facility: CLINIC | Age: 64
End: 2019-06-10

## 2019-06-18 NOTE — TELEPHONE ENCOUNTER
Pt is wondering if we can give lab results over the phone because pt doesn't have a ride at this time

## 2019-06-19 NOTE — TELEPHONE ENCOUNTER
I cannot go over hypercoagulable work-up on the phone.  I need to see her in person whenever she can come.  For now continue with Xarelto.  Thank you

## 2019-06-20 NOTE — TELEPHONE ENCOUNTER
Called pt and she states that she doesn't want to make an appt at this time because she doesn't have a ride. Pt lives in [place].  Informed about continuing xarelto but she states that she has already stopped taking those because she can not afford them.  Please advise. Thanks

## 2019-06-21 NOTE — TELEPHONE ENCOUNTER
Pt called and informed with co pay assistance number [phone]. Pt was upset because she didn't know why he still wants her to take xarelto. Informed pt that she needed to come to see MD in office. Instructed if she wasn't able to make it to see Dr Nagy to see PCP. Pt states that she is going to talk to her PCP before starting back on Xarelto.

## 2019-06-21 NOTE — TELEPHONE ENCOUNTER
Talk to Rohini and see if there is any assistance for Xarelto.  She needs to follow-up with her primary medical provider regarding her iron level and anticoagulation.  Only other option would be Coumadin for anticoagulation.  Thank you

## 2019-06-25 ENCOUNTER — OFFICE VISIT (OUTPATIENT)
Dept: ONCOLOGY | Facility: CLINIC | Age: 64
End: 2019-06-25

## 2019-06-25 VITALS
HEIGHT: 62 IN | WEIGHT: 197.2 LBS | RESPIRATION RATE: 18 BRPM | DIASTOLIC BLOOD PRESSURE: 74 MMHG | SYSTOLIC BLOOD PRESSURE: 156 MMHG | HEART RATE: 102 BPM | TEMPERATURE: 98.7 F | BODY MASS INDEX: 36.29 KG/M2

## 2019-06-25 DIAGNOSIS — R76.0 LUPUS ANTICOAGULANT POSITIVE: ICD-10-CM

## 2019-06-25 DIAGNOSIS — D64.9 ANEMIA, UNSPECIFIED TYPE: Chronic | ICD-10-CM

## 2019-06-25 DIAGNOSIS — I26.99 OTHER ACUTE PULMONARY EMBOLISM WITHOUT ACUTE COR PULMONALE (HCC): Primary | ICD-10-CM

## 2019-06-25 PROCEDURE — 1123F ACP DISCUSS/DSCN MKR DOCD: CPT | Performed by: INTERNAL MEDICINE

## 2019-06-25 PROCEDURE — 99214 OFFICE O/P EST MOD 30 MIN: CPT | Performed by: INTERNAL MEDICINE

## 2019-06-25 PROCEDURE — G8417 CALC BMI ABV UP PARAM F/U: HCPCS | Performed by: INTERNAL MEDICINE

## 2019-06-25 PROCEDURE — G0463 HOSPITAL OUTPT CLINIC VISIT: HCPCS | Performed by: INTERNAL MEDICINE

## 2019-06-25 PROCEDURE — G9903 PT SCRN TBCO ID AS NON USER: HCPCS | Performed by: INTERNAL MEDICINE

## 2019-06-25 RX ORDER — FOLIC ACID 1 MG/1
1 TABLET ORAL DAILY
Qty: 90 TABLET | Refills: 1 | Status: SHIPPED | OUTPATIENT
Start: 2019-06-25 | End: 2019-09-23

## 2019-06-25 RX ORDER — DOCUSATE SODIUM 100 MG/1
100 CAPSULE, LIQUID FILLED ORAL 2 TIMES DAILY PRN
Qty: 60 CAPSULE | Refills: 3 | Status: SHIPPED | OUTPATIENT
Start: 2019-06-25 | End: 2020-10-28

## 2019-06-25 RX ORDER — FERROUS SULFATE 325(65) MG
325 TABLET ORAL
Qty: 30 TABLET | Refills: 3 | Status: SHIPPED | OUTPATIENT
Start: 2019-06-25 | End: 2019-11-27 | Stop reason: SDUPTHER

## 2019-06-25 RX ORDER — LANOLIN ALCOHOL/MO/W.PET/CERES
1000 CREAM (GRAM) TOPICAL DAILY
Qty: 90 TABLET | Refills: 1 | Status: SHIPPED | OUTPATIENT
Start: 2019-06-25 | End: 2019-09-23

## 2019-06-25 NOTE — PROGRESS NOTES
DATE OF VISIT: 2019      REASON FOR VISIT: Pulmonary embolism on Xarelto, anemia,         HISTORY OF PRESENT ILLNESS:    63-year-old female with past medical problems consisting of hypertension, dyslipidemia, poorly controlled diabetes mellitus, history of left knee replacement with revision ×3.  Most recent revision was done on 2018.  And was diagnosed with subacute pulmonary embolism on 2018 after revision of left knee and was started on xarelto.  Patient was initially seen in consultation when she was  admitted to hospital on 2018 with bleeding into her left knee.  Xarelto was discontinued and patient was started on heparin drip with coumadin.  Patient has started back on Xarelto in 2018.  Had a repeat blood work done earlier this month.  She is here to discuss the result and further recommendation.  Denies any bleeding.  Complains of pain in left knee.  Complains of fatigue.      PAST MEDICAL HISTORY:    Past Medical History:   Diagnosis Date   • Acute vaginitis     resolved   • Carpal tunnel syndrome    • Chronic osteoarthritis     Nino TKA   • Chronic pain    • Chronic urticaria    • Dietary counseling and surveillance    • Dysuria    • External hordeolum    • Hyperlipidemia    • Hypertension    • Low back pain     strain   • Microalbuminuria     on ARB   • Noncompliance with treatment    • Obesity      Dieting , exercise, appetite suppressant      • Osteoarthritis of left knee    • Plantar fasciitis     resolved   • Seasonal rhinitis    • Type 2 diabetes mellitus (CMS/HCC)    • Urinary tract infectious disease    • Vaginal discharge    • Vitamin deficiency        SOCIAL HISTORY:    Social History     Tobacco Use   • Smoking status: Former Smoker     Packs/day: 1.00     Years: 6.00     Pack years: 6.00     Last attempt to quit: 2006     Years since quittin.4   • Smokeless tobacco: Never Used   Substance Use Topics   • Alcohol use: No   • Drug use: No        Surgical History :  Past Surgical History:   Procedure Laterality Date   • CARPAL TUNNEL RELEASE      Bilateral endoscopic release 01/20/2003    • ENDOSCOPY      normal 04/25/2008      • KNEE ARTHROSCOPY     • KNEE INCISION AND DRAINAGE Left 9/22/2018    Procedure: KNEE INCISION AND DRAINAGE;  Surgeon: Elias Su MD;  Location: [facility];  Service: Orthopedics   • KNEE SURGERY      Removal of existing left total knee arthroplasty and revision left total knee arthroplasty. 12/15/2014       • REPLACEMENT TOTAL KNEE     • SHOULDER SURGERY     • TOTAL KNEE ARTHROPLASTY REVISION Left 8/28/2018    Procedure: REVISION LEFT TOTAL KNEE;  Surgeon: Elias Su MD;  Location: [facility];  Service: Orthopedics   • TUBAL ABDOMINAL LIGATION         ALLERGIES:    Allergies   Allergen Reactions   • Penicillins Rash   • Percocet [Oxycodone-Acetaminophen] Rash   • Rocephin [Ceftriaxone] Rash         FAMILY HISTORY:  Family History   Problem Relation Age of Onset   • Diabetes Mother    • Hypertension Mother    • Colon cancer Father    • Diabetes Father    • Hypertension Father    • Breast cancer Sister    • Cancer Other    • Diabetes Other    • Hypertension Other            REVIEW OF SYSTEMS:      CONSTITUTIONAL:  Complains of fatigue.  Positive for recent weight gain.  Denies any fever, chills .      EYES: No visual disturbances. No discharge. No new lesions     ENMT:  No epistaxis, mouth sores or difficulty swallowing.     RESPIRATORY:  No new shortness of breath. No new cough or hemoptysis.     CARDIOVASCULAR:  No chest pain or palpitations.     GASTROINTESTINAL:  No abdominal pain nausea, vomiting or blood in the stool.     GENITOURINARY: No Dysuria or Hematuria.     MUSCULOSKELETAL:  Complains of chronic back pain.  Complains of pain in left knee.     LYMPHATICS:  Denies any abnormal swollen glands anywhere in the body.     NEUROLOGICAL : No tingling or numbness. No headache or dizziness. No seizures or  "balance problems.     SKIN: Denies any new skin lesion.          PHYSICAL EXAMINATION:      VITAL SIGNS:  /74   Pulse 102   Temp 98.7 °F (37.1 °C) (Temporal)   Resp 18   Ht 157.5 cm (62.01\")   Wt 89.4 kg (197 lb 3.2 oz)   BMI 36.06 kg/m²       06/25/19  0947   Weight: 89.4 kg (197 lb 3.2 oz)         ECOG performance status: 2    CONSTITUTIONAL:  Not in any distress.    EYES: Mild conjunctival Pallor. No Icterus. No Pterygium. Extraocular Movements intact.No ptosis.    ENMT:  Normocephalic, Atraumatic.No Facial Asymmetry noted.    NECK:  No adenopathy.Trachea midline. NO JVD.    RESPIRATORY:  Fair air entry bilateral. No rhonchi or wheezing.Fair respiratory effort.    CARDIOVASCULAR:  S1, S2. Regular rate and rhythm. No murmur or gallop appreciated.    ABDOMEN:  Soft, obese, nontender. Bowel sounds present in all four quadrants.  No Hepatosplenomegaly appreciated.    MUSCULOSKELETAL: Trace edema.No Calf Tenderness.Decreased range of motion.  Surgical scar present on left knee    NEUROLOGIC:    No  Motor  deficit appreciated. Cranial Nerves 2-12 grossly intact.    SKIN : No new skin lesion identified.  Skin is warm and moist.    LYMPHATICS: No new enlarged lymph nodes in neck or supraclavicular area.    PSYCHIATRY: Alert, awake and oriented ×3.Normal affect.  Normal judgment.  Makes good eye contact.        DIAGNOSTIC DATA:    Glucose   Date Value Ref Range Status   06/03/2019 302 (H) 65 - 99 mg/dL Final     Sodium   Date Value Ref Range Status   06/03/2019 137 136 - 145 mmol/L Final     Potassium   Date Value Ref Range Status   06/03/2019 4.3 3.5 - 5.2 mmol/L Final     CO2   Date Value Ref Range Status   06/03/2019 26.0 22.0 - 29.0 mmol/L Final     Chloride   Date Value Ref Range Status   06/03/2019 95 (L) 98 - 107 mmol/L Final     Anion Gap   Date Value Ref Range Status   06/03/2019 16.0 mmol/L Final     Creatinine   Date Value Ref Range Status   06/03/2019 0.72 0.57 - 1.00 mg/dL Final     BUN   Date " Value Ref Range Status   06/03/2019 15 8 - 23 mg/dL Final     BUN/Creatinine Ratio   Date Value Ref Range Status   06/03/2019 20.8 7.0 - 25.0 Final     Calcium   Date Value Ref Range Status   06/03/2019 10.1 8.6 - 10.5 mg/dL Final     Alkaline Phosphatase   Date Value Ref Range Status   06/03/2019 78 39 - 117 U/L Final     Total Protein   Date Value Ref Range Status   06/03/2019 8.0 6.0 - 8.5 g/dL Final     ALT (SGPT)   Date Value Ref Range Status   06/03/2019 18 1 - 33 U/L Final     AST (SGOT)   Date Value Ref Range Status   06/03/2019 11 1 - 32 U/L Final     Total Bilirubin   Date Value Ref Range Status   06/03/2019 0.2 0.2 - 1.2 mg/dL Final     Albumin   Date Value Ref Range Status   06/03/2019 4.50 3.50 - 5.20 g/dL Final     Globulin   Date Value Ref Range Status   06/03/2019 3.5 gm/dL Final     Lab Results   Component Value Date    WBC 7.49 06/03/2019    HGB 13.4 06/03/2019    HCT 42.1 06/03/2019    MCV 84.9 06/03/2019     06/03/2019     Lab Results   Component Value Date    NEUTROABS 4.77 06/03/2019    IRON 72 06/03/2019    TIBC 438 06/03/2019    LABIRON 16 (L) 06/03/2019    FERRITIN 108.70 06/03/2019    [id number] 584 06/03/2019    FOLATE >20.00 06/03/2019     Lab Results   Component Value Date    REFLABREPO SEE NOTE: 05/28/2015       Factor V Leiden, prothrombin gene mutation, anticardiolipin antibodies, beta-2 glycoprotein antibodies were negative on February 20, 2019.    Lupus anticoagulant done on February 20, 2019 and Peggy 3, 2019 was positive.              Radiology Data :  CT angiogram of chest with contrast done on February 20, 2019 was reviewed, discussed with patient, it showed:  IMPRESSION:  1. No evidence of pulmonary embolus..  2. Bilateral kidney upper pole simple renal cortical cysts..  3. Otherwise unremarkable CT chest..         CT angiogram of chest with contrast done on August 30, 2018 showed:  IMPRESSION:  CONCLUSION: Pulmonary emboli in right middle lobe and right lower  lobe  branches probably subacute in origin.              ASSESSMENT AND PLAN:       1.  Subacute pulmonary embolism on right side:  -Patient was diagnosed with subacute pulmonary embolism on right side after most recent revision of left knee done on August 28, 2018.  -Patient was started on xarelto 15 mg twice daily.  -She was admitted to [facility] on September 20, 2018 with bleeding into her left knee.  -Patient was started on heparin drip.  -Patient had incision and Drainage done by Dr. Su on September 22, 2018.  -Patient was on Coumadin.  Patient was started back on Xarelto in December 2018.  -CT angiogram of chest with contrast done on February 20, 2019 shows resolution of pulmonary embolism.  -Hypercoagulable workup done consisting of factor V Leyden, prothrombin gene mutation, anticardiolipin antibody, beta-2 glycoprotein antibody were negative on February 20, 2019.  -Lupus anticoagulant was positive on February 20, 2019 as well as Peggy 3, 2019.  In view of lupus anticoagulant being positive, recommend continuing with Xarelto for now.  -Patient was instructed to come to the emergency room if she starts having any bleeding on Xarelto      2.  Anemia:  -Patient required 1 unit of PRBC on September 22, 2018 with hemoglobin of 6.9.  -Anemia work-up done on Peggy 3, 2019 shows hemoglobin is improved to 13.1.  -Recommend continuing with ferrous sulfate 1 tablet p.o. daily along with B12 and folic acid supplement.  -We will repeat anemia work-up upon next clinic visit in 3 months.    3.  Poorly controlled diabetes mellitus        4.  Health maintenance: Patient does not smoke.    5. Advance Care Planning: For now patient remains full code and is able to make her decisions.  Patient has health care surrogate mentioned on chart.    6. BMI: Patient's Body mass index is 36.06 kg/m². BMI is higher than reference range.  Patient was notified about her elevated BMI.    7.  Pain assessment:  -Patient  denies any pain today.        Otf Nagy MD  6/25/2019  10:11 AM        EMR Dragon/Transcription disclaimer:   Much of this encounter note is an electronic transcription/translation of spoken language to printed text. The electronic translation of spoken language may permit erroneous, or at times, nonsensical words or phrases to be inadvertently transcribed; Although I have reviewed the note for such errors, some may still exist.

## 2019-09-23 ENCOUNTER — OFFICE VISIT (OUTPATIENT)
Dept: ONCOLOGY | Facility: CLINIC | Age: 64
End: 2019-09-23

## 2019-09-23 ENCOUNTER — LAB (OUTPATIENT)
Dept: ONCOLOGY | Facility: HOSPITAL | Age: 64
End: 2019-09-23

## 2019-09-23 ENCOUNTER — APPOINTMENT (OUTPATIENT)
Dept: ONCOLOGY | Facility: CLINIC | Age: 64
End: 2019-09-23

## 2019-09-23 VITALS
OXYGEN SATURATION: 98 % | SYSTOLIC BLOOD PRESSURE: 146 MMHG | WEIGHT: 197.2 LBS | BODY MASS INDEX: 36.29 KG/M2 | HEIGHT: 62 IN | HEART RATE: 76 BPM | DIASTOLIC BLOOD PRESSURE: 71 MMHG | TEMPERATURE: 98 F | RESPIRATION RATE: 18 BRPM

## 2019-09-23 DIAGNOSIS — R76.0 LUPUS ANTICOAGULANT POSITIVE: ICD-10-CM

## 2019-09-23 DIAGNOSIS — I26.99 OTHER ACUTE PULMONARY EMBOLISM WITHOUT ACUTE COR PULMONALE (HCC): ICD-10-CM

## 2019-09-23 DIAGNOSIS — D64.9 ANEMIA, UNSPECIFIED TYPE: Primary | Chronic | ICD-10-CM

## 2019-09-23 DIAGNOSIS — D64.9 ANEMIA, UNSPECIFIED TYPE: ICD-10-CM

## 2019-09-23 LAB
ALBUMIN SERPL-MCNC: 4.1 G/DL (ref 3.5–5.2)
ALBUMIN/GLOB SERPL: 1.1 G/DL
ALP SERPL-CCNC: 84 U/L (ref 39–117)
ALT SERPL W P-5'-P-CCNC: 14 U/L (ref 1–33)
ANION GAP SERPL CALCULATED.3IONS-SCNC: 15 MMOL/L (ref 5–15)
AST SERPL-CCNC: 12 U/L (ref 1–32)
BASOPHILS # BLD AUTO: 0.03 10*3/MM3 (ref 0–0.2)
BASOPHILS NFR BLD AUTO: 0.5 % (ref 0–1.5)
BILIRUB SERPL-MCNC: 0.3 MG/DL (ref 0.2–1.2)
BUN BLD-MCNC: 9 MG/DL (ref 8–23)
BUN/CREAT SERPL: 14.3 (ref 7–25)
CALCIUM SPEC-SCNC: 9.2 MG/DL (ref 8.6–10.5)
CHLORIDE SERPL-SCNC: 98 MMOL/L (ref 98–107)
CO2 SERPL-SCNC: 23 MMOL/L (ref 22–29)
CREAT BLD-MCNC: 0.63 MG/DL (ref 0.57–1)
DEPRECATED RDW RBC AUTO: 44.3 FL (ref 37–54)
EOSINOPHIL # BLD AUTO: 0.08 10*3/MM3 (ref 0–0.4)
EOSINOPHIL NFR BLD AUTO: 1.3 % (ref 0.3–6.2)
ERYTHROCYTE [DISTWIDTH] IN BLOOD BY AUTOMATED COUNT: 14.7 % (ref 12.3–15.4)
FERRITIN SERPL-MCNC: 131.6 NG/ML (ref 13–150)
FOLATE SERPL-MCNC: >20 NG/ML (ref 4.78–24.2)
GFR SERPL CREATININE-BSD FRML MDRD: 116 ML/MIN/1.73
GLOBULIN UR ELPH-MCNC: 3.6 GM/DL
GLUCOSE BLD-MCNC: 357 MG/DL (ref 65–99)
HCT VFR BLD AUTO: 37.5 % (ref 34–46.6)
HGB BLD-MCNC: 12.3 G/DL (ref 12–15.9)
IMM GRANULOCYTES # BLD AUTO: 0.03 10*3/MM3 (ref 0–0.05)
IMM GRANULOCYTES NFR BLD AUTO: 0.5 % (ref 0–0.5)
IRON 24H UR-MRATE: 71 MCG/DL (ref 37–145)
IRON SATN MFR SERPL: 19 % (ref 20–50)
LYMPHOCYTES # BLD AUTO: 1.73 10*3/MM3 (ref 0.7–3.1)
LYMPHOCYTES NFR BLD AUTO: 28.1 % (ref 19.6–45.3)
MCH RBC QN AUTO: 27.3 PG (ref 26.6–33)
MCHC RBC AUTO-ENTMCNC: 32.8 G/DL (ref 31.5–35.7)
MCV RBC AUTO: 83.1 FL (ref 79–97)
MONOCYTES # BLD AUTO: 0.48 10*3/MM3 (ref 0.1–0.9)
MONOCYTES NFR BLD AUTO: 7.8 % (ref 5–12)
NEUTROPHILS # BLD AUTO: 3.8 10*3/MM3 (ref 1.7–7)
NEUTROPHILS NFR BLD AUTO: 61.8 % (ref 42.7–76)
NRBC BLD AUTO-RTO: 0 /100 WBC (ref 0–0.2)
PLATELET # BLD AUTO: 256 10*3/MM3 (ref 140–450)
PMV BLD AUTO: 10.8 FL (ref 6–12)
POTASSIUM BLD-SCNC: 4.1 MMOL/L (ref 3.5–5.2)
PROT SERPL-MCNC: 7.7 G/DL (ref 6–8.5)
RBC # BLD AUTO: 4.51 10*6/MM3 (ref 3.77–5.28)
SODIUM BLD-SCNC: 136 MMOL/L (ref 136–145)
TIBC SERPL-MCNC: 378 MCG/DL (ref 298–536)
TRANSFERRIN SERPL-MCNC: 254 MG/DL (ref 200–360)
VIT B12 BLD-MCNC: 653 PG/ML (ref 211–946)
WBC NRBC COR # BLD: 6.15 10*3/MM3 (ref 3.4–10.8)

## 2019-09-23 PROCEDURE — 99214 OFFICE O/P EST MOD 30 MIN: CPT | Performed by: INTERNAL MEDICINE

## 2019-09-23 PROCEDURE — 80053 COMPREHEN METABOLIC PANEL: CPT | Performed by: INTERNAL MEDICINE

## 2019-09-23 PROCEDURE — 1123F ACP DISCUSS/DSCN MKR DOCD: CPT | Performed by: INTERNAL MEDICINE

## 2019-09-23 PROCEDURE — 82607 VITAMIN B-12: CPT | Performed by: INTERNAL MEDICINE

## 2019-09-23 PROCEDURE — G9903 PT SCRN TBCO ID AS NON USER: HCPCS | Performed by: INTERNAL MEDICINE

## 2019-09-23 PROCEDURE — 84466 ASSAY OF TRANSFERRIN: CPT | Performed by: INTERNAL MEDICINE

## 2019-09-23 PROCEDURE — 36415 COLL VENOUS BLD VENIPUNCTURE: CPT | Performed by: INTERNAL MEDICINE

## 2019-09-23 PROCEDURE — G0463 HOSPITAL OUTPT CLINIC VISIT: HCPCS | Performed by: INTERNAL MEDICINE

## 2019-09-23 PROCEDURE — 82728 ASSAY OF FERRITIN: CPT | Performed by: INTERNAL MEDICINE

## 2019-09-23 PROCEDURE — 83540 ASSAY OF IRON: CPT | Performed by: INTERNAL MEDICINE

## 2019-09-23 PROCEDURE — 82746 ASSAY OF FOLIC ACID SERUM: CPT | Performed by: INTERNAL MEDICINE

## 2019-09-23 PROCEDURE — 85025 COMPLETE CBC W/AUTO DIFF WBC: CPT | Performed by: INTERNAL MEDICINE

## 2019-09-23 PROCEDURE — G8730 PAIN DOC POS AND PLAN: HCPCS | Performed by: INTERNAL MEDICINE

## 2019-09-23 NOTE — PROGRESS NOTES
DATE OF VISIT: 2019      REASON FOR VISIT: Pulmonary embolism on Xarelto, anemia,         HISTORY OF PRESENT ILLNESS:    63-year-old female with past medical problems consisting of hypertension, dyslipidemia, poorly controlled diabetes mellitus, history of left knee replacement with revision ×3.  Most recent revision was done on 2018.  And was diagnosed with subacute pulmonary embolism on 2018 after revision of left knee and was started on xarelto.  Patient was initially seen in consultation when she was  admitted to hospital on 2018 with bleeding into her left knee.  Xarelto was discontinued and patient was started on heparin drip with coumadin.  Patient has started back on Xarelto in 2018.  Patient is here for follow-up appointment today.  States her fatigue is improved.  Denies any bleeding.  Complains of pain in left knee.        PAST MEDICAL HISTORY:    Past Medical History:   Diagnosis Date   • Acute vaginitis     resolved   • Carpal tunnel syndrome    • Chronic osteoarthritis     Nino TKA   • Chronic pain    • Chronic urticaria    • Dietary counseling and surveillance    • Dysuria    • External hordeolum    • Hyperlipidemia    • Hypertension    • Low back pain     strain   • Microalbuminuria     on ARB   • Noncompliance with treatment    • Obesity      Dieting , exercise, appetite suppressant      • Osteoarthritis of left knee    • Plantar fasciitis     resolved   • Seasonal rhinitis    • Type 2 diabetes mellitus (CMS/HCC)    • Urinary tract infectious disease    • Vaginal discharge    • Vitamin deficiency        SOCIAL HISTORY:    Social History     Tobacco Use   • Smoking status: Former Smoker     Packs/day: 1.00     Years: 6.00     Pack years: 6.00     Last attempt to quit: 2006     Years since quittin.7   • Smokeless tobacco: Never Used   Substance Use Topics   • Alcohol use: No   • Drug use: No       Surgical History :  Past Surgical History:    Procedure Laterality Date   • CARPAL TUNNEL RELEASE      Bilateral endoscopic release 01/20/2003    • ENDOSCOPY      normal 04/25/2008      • KNEE ARTHROSCOPY     • KNEE INCISION AND DRAINAGE Left 9/22/2018    Procedure: KNEE INCISION AND DRAINAGE;  Surgeon: Elias Su MD;  Location: [facility];  Service: Orthopedics   • KNEE SURGERY      Removal of existing left total knee arthroplasty and revision left total knee arthroplasty. 12/15/2014       • REPLACEMENT TOTAL KNEE     • SHOULDER SURGERY     • TOTAL KNEE ARTHROPLASTY REVISION Left 8/28/2018    Procedure: REVISION LEFT TOTAL KNEE;  Surgeon: Elias Su MD;  Location: [facility];  Service: Orthopedics   • TUBAL ABDOMINAL LIGATION         ALLERGIES:    Allergies   Allergen Reactions   • Penicillins Rash   • Percocet [Oxycodone-Acetaminophen] Rash   • Rocephin [Ceftriaxone] Rash         FAMILY HISTORY:  Family History   Problem Relation Age of Onset   • Diabetes Mother    • Hypertension Mother    • Colon cancer Father    • Diabetes Father    • Hypertension Father    • Breast cancer Sister    • Cancer Other    • Diabetes Other    • Hypertension Other            REVIEW OF SYSTEMS:      CONSTITUTIONAL:  Complains of fatigue.  No recent weight change.  Denies any fever, chills .      EYES: No visual disturbances. No discharge. No new lesions     ENMT:  No epistaxis, mouth sores or difficulty swallowing.     RESPIRATORY:  No new shortness of breath. No new cough or hemoptysis.     CARDIOVASCULAR:  No chest pain or palpitations.     GASTROINTESTINAL:  No abdominal pain nausea, vomiting or blood in the stool.     GENITOURINARY: No Dysuria or Hematuria.     MUSCULOSKELETAL:  Complains of chronic back pain.  Complains of pain in left knee.     LYMPHATICS:  Denies any abnormal swollen glands anywhere in the body.     NEUROLOGICAL : No tingling or numbness. No headache or dizziness. No seizures or balance problems.     SKIN: Denies any new skin  "lesion.          PHYSICAL EXAMINATION:      VITAL SIGNS:  /71   Pulse 76   Temp 98 °F (36.7 °C) (Temporal)   Resp 18   Ht 157.5 cm (62.01\")   Wt 89.4 kg (197 lb 3.2 oz)   SpO2 98%   BMI 36.06 kg/m²       09/23/19  0900   Weight: 89.4 kg (197 lb 3.2 oz)         ECOG performance status: 2    CONSTITUTIONAL:  Not in any distress.    EYES: Mild conjunctival Pallor. No Icterus. No Pterygium. Extraocular Movements intact.No ptosis.    ENMT:  Normocephalic, Atraumatic.No Facial Asymmetry noted.    NECK:  No adenopathy.Trachea midline. NO JVD.    RESPIRATORY:  Fair air entry bilateral. No rhonchi or wheezing.Fair respiratory effort.    CARDIOVASCULAR:  S1, S2. Regular rate and rhythm. No murmur or gallop appreciated.    ABDOMEN:  Soft, obese, nontender. Bowel sounds present in all four quadrants.  No Hepatosplenomegaly appreciated.    MUSCULOSKELETAL: Trace edema.No Calf Tenderness.Decreased range of motion.  Surgical scar present on left knee    NEUROLOGIC:    No  Motor  deficit appreciated. Cranial Nerves 2-12 grossly intact.    SKIN : No new skin lesion identified.  Skin is warm and moist.    LYMPHATICS: No new enlarged lymph nodes in neck or supraclavicular area.    PSYCHIATRY: Alert, awake and oriented ×3.Normal affect.  Normal judgment.  Makes good eye contact.        DIAGNOSTIC DATA:    Glucose   Date Value Ref Range Status   09/23/2019 357 (H) 65 - 99 mg/dL Final     Sodium   Date Value Ref Range Status   09/23/2019 136 136 - 145 mmol/L Final     Potassium   Date Value Ref Range Status   09/23/2019 4.1 3.5 - 5.2 mmol/L Final     CO2   Date Value Ref Range Status   09/23/2019 23.0 22.0 - 29.0 mmol/L Final     Chloride   Date Value Ref Range Status   09/23/2019 98 98 - 107 mmol/L Final     Anion Gap   Date Value Ref Range Status   09/23/2019 15.0 5.0 - 15.0 mmol/L Final     Creatinine   Date Value Ref Range Status   09/23/2019 0.63 0.57 - 1.00 mg/dL Final     BUN   Date Value Ref Range Status   09/23/2019 " 9 8 - 23 mg/dL Final     BUN/Creatinine Ratio   Date Value Ref Range Status   09/23/2019 14.3 7.0 - 25.0 Final     Calcium   Date Value Ref Range Status   09/23/2019 9.2 8.6 - 10.5 mg/dL Final     Alkaline Phosphatase   Date Value Ref Range Status   09/23/2019 84 39 - 117 U/L Final     Total Protein   Date Value Ref Range Status   09/23/2019 7.7 6.0 - 8.5 g/dL Final     ALT (SGPT)   Date Value Ref Range Status   09/23/2019 14 1 - 33 U/L Final     AST (SGOT)   Date Value Ref Range Status   09/23/2019 12 1 - 32 U/L Final     Total Bilirubin   Date Value Ref Range Status   09/23/2019 0.3 0.2 - 1.2 mg/dL Final     Albumin   Date Value Ref Range Status   09/23/2019 4.10 3.50 - 5.20 g/dL Final     Globulin   Date Value Ref Range Status   09/23/2019 3.6 gm/dL Final     Lab Results   Component Value Date    WBC 6.15 09/23/2019    HGB 12.3 09/23/2019    HCT 37.5 09/23/2019    MCV 83.1 09/23/2019     09/23/2019     Lab Results   Component Value Date    NEUTROABS 3.80 09/23/2019    IRON 71 09/23/2019    TIBC 378 09/23/2019    LABIRON 19 (L) 09/23/2019    FERRITIN 131.60 09/23/2019    [id number] 584 06/03/2019    FOLATE >20.00 06/03/2019     Lab Results   Component Value Date    REFLABREPO SEE NOTE: 05/28/2015       Factor V Leiden, prothrombin gene mutation, anticardiolipin antibodies, beta-2 glycoprotein antibodies were negative on February 20, 2019.    Lupus anticoagulant done on February 20, 2019 and Peggy 3, 2019 was positive.              Radiology Data :  CT angiogram of chest with contrast done on February 20, 2019 was reviewed, discussed with patient, it showed:  IMPRESSION:  1. No evidence of pulmonary embolus..  2. Bilateral kidney upper pole simple renal cortical cysts..  3. Otherwise unremarkable CT chest..         CT angiogram of chest with contrast done on August 30, 2018 showed:  IMPRESSION:  CONCLUSION: Pulmonary emboli in right middle lobe and right lower  lobe branches probably subacute in  origin.              ASSESSMENT AND PLAN:       1.  Subacute pulmonary embolism on right side:  -Patient was diagnosed with subacute pulmonary embolism on right side after most recent revision of left knee done on August 28, 2018.  -Patient was started on xarelto 15 mg twice daily.  -She was admitted to [facility] on September 20, 2018 with bleeding into her left knee.  -Patient was started on heparin drip.  -Patient had incision and Drainage done by Dr. Su on September 22, 2018.  -Patient was on Coumadin.  Patient was started back on Xarelto in December 2018.  -CT angiogram of chest with contrast done on February 20, 2019 shows resolution of pulmonary embolism.  -Hypercoagulable workup done consisting of factor V Leyden, prothrombin gene mutation, anticardiolipin antibody, beta-2 glycoprotein antibody were negative on February 20, 2019.  -Lupus anticoagulant was positive on February 20, 2019 as well as Peggy 3, 2019.  In view of lupus anticoagulant being positive, recommend continuing with Xarelto for now.  -Patient was instructed to come to the emergency room if she starts having any bleeding on Xarelto      2.  Anemia:  -Patient required 1 unit of PRBC on September 22, 2018 with hemoglobin of 6.9.  -Anemia work-up done on September 23, 2019 shows hemoglobin is 12.3.  -Recommend continuing with ferrous sulfate 1 tablet p.o. daily along with B12  supplement.  -We will repeat anemia work-up upon next clinic visit in 3 months.    3.  Poorly controlled diabetes mellitus        4.  Health maintenance: Patient does not smoke.    5. Advance Care Planning: For now patient remains full code and is able to make her decisions.  Patient has health care surrogate mentioned on chart.    6. BMI: Patient's Body mass index is 36.06 kg/m². BMI is higher than reference range.  Patient was notified about her elevated BMI.    7.  Pain assessment:  -Patient complains of back pain.  Recommend following up with  primary medical provider for back pain.    8.  Prescriptions: Patient has enough prescription for Xarelto.        Otf Nagy MD  9/23/2019  5:47 PM        EMR Dragon/Transcription disclaimer:   Much of this encounter note is an electronic transcription/translation of spoken language to printed text. The electronic translation of spoken language may permit erroneous, or at times, nonsensical words or phrases to be inadvertently transcribed; Although I have reviewed the note for such errors, some may still exist.

## 2019-09-24 RX ORDER — LANOLIN ALCOHOL/MO/W.PET/CERES
1000 CREAM (GRAM) TOPICAL DAILY
Qty: 90 TABLET | Refills: 1 | Status: SHIPPED | OUTPATIENT
Start: 2019-09-24 | End: 2020-07-21 | Stop reason: SDUPTHER

## 2019-09-25 ENCOUNTER — TELEPHONE (OUTPATIENT)
Dept: ONCOLOGY | Facility: CLINIC | Age: 64
End: 2019-09-25

## 2019-09-25 NOTE — TELEPHONE ENCOUNTER
----- Message from Otf Nagy MD sent at 9/24/2019  7:50 AM CDT -----  Please let patient know, she needs to continue taking ferrous sulfate 1 tablet p.o. daily.  She also needs to continue with vitamin B12 thousand micrograms p.o. daily.  Thank you

## 2019-10-28 RX ORDER — RIVAROXABAN 20 MG/1
TABLET, FILM COATED ORAL
Qty: 90 TABLET | Refills: 0 | Status: SHIPPED | OUTPATIENT
Start: 2019-10-28 | End: 2019-12-31 | Stop reason: SDUPTHER

## 2019-11-27 RX ORDER — FERROUS SULFATE 325(65) MG
325 TABLET ORAL
Qty: 90 TABLET | Refills: 1 | Status: SHIPPED | OUTPATIENT
Start: 2019-11-27 | End: 2020-06-24

## 2019-12-05 ENCOUNTER — LAB (OUTPATIENT)
Dept: LAB | Facility: HOSPITAL | Age: 64
End: 2019-12-05

## 2019-12-05 DIAGNOSIS — D64.9 ANEMIA, UNSPECIFIED TYPE: ICD-10-CM

## 2019-12-05 DIAGNOSIS — I26.99 OTHER ACUTE PULMONARY EMBOLISM WITHOUT ACUTE COR PULMONALE (HCC): ICD-10-CM

## 2019-12-05 DIAGNOSIS — R76.0 LUPUS ANTICOAGULANT POSITIVE: ICD-10-CM

## 2019-12-05 DIAGNOSIS — Z71.89 ENCOUNTER FOR ANTICOAGULATION DISCUSSION AND COUNSELING: ICD-10-CM

## 2019-12-05 LAB
ANION GAP SERPL CALCULATED.3IONS-SCNC: 18 MMOL/L (ref 5–15)
BASOPHILS # BLD AUTO: 0.03 10*3/MM3 (ref 0–0.2)
BASOPHILS NFR BLD AUTO: 0.4 % (ref 0–1.5)
BUN BLD-MCNC: 15 MG/DL (ref 8–23)
BUN/CREAT SERPL: 20.8 (ref 7–25)
CALCIUM SPEC-SCNC: 10.6 MG/DL (ref 8.6–10.5)
CHLORIDE SERPL-SCNC: 99 MMOL/L (ref 98–107)
CO2 SERPL-SCNC: 22 MMOL/L (ref 22–29)
CREAT BLD-MCNC: 0.72 MG/DL (ref 0.57–1)
DEPRECATED RDW RBC AUTO: 44.3 FL (ref 37–54)
EOSINOPHIL # BLD AUTO: 0.09 10*3/MM3 (ref 0–0.4)
EOSINOPHIL NFR BLD AUTO: 1.3 % (ref 0.3–6.2)
ERYTHROCYTE [DISTWIDTH] IN BLOOD BY AUTOMATED COUNT: 14.5 % (ref 12.3–15.4)
FERRITIN SERPL-MCNC: 107.6 NG/ML (ref 13–150)
FOLATE SERPL-MCNC: 15.1 NG/ML (ref 4.78–24.2)
GFR SERPL CREATININE-BSD FRML MDRD: 99 ML/MIN/1.73
GLUCOSE BLD-MCNC: 199 MG/DL (ref 65–99)
HCT VFR BLD AUTO: 42.4 % (ref 34–46.6)
HGB BLD-MCNC: 13.5 G/DL (ref 12–15.9)
IMM GRANULOCYTES # BLD AUTO: 0.03 10*3/MM3 (ref 0–0.05)
IMM GRANULOCYTES NFR BLD AUTO: 0.4 % (ref 0–0.5)
IRON 24H UR-MRATE: 103 MCG/DL (ref 37–145)
IRON SATN MFR SERPL: 24 % (ref 20–50)
LYMPHOCYTES # BLD AUTO: 2.18 10*3/MM3 (ref 0.7–3.1)
LYMPHOCYTES NFR BLD AUTO: 32.2 % (ref 19.6–45.3)
MCH RBC QN AUTO: 27 PG (ref 26.6–33)
MCHC RBC AUTO-ENTMCNC: 31.8 G/DL (ref 31.5–35.7)
MCV RBC AUTO: 84.8 FL (ref 79–97)
MONOCYTES # BLD AUTO: 0.43 10*3/MM3 (ref 0.1–0.9)
MONOCYTES NFR BLD AUTO: 6.4 % (ref 5–12)
NEUTROPHILS # BLD AUTO: 4.01 10*3/MM3 (ref 1.7–7)
NEUTROPHILS NFR BLD AUTO: 59.3 % (ref 42.7–76)
NRBC BLD AUTO-RTO: 0 /100 WBC (ref 0–0.2)
PLATELET # BLD AUTO: 260 10*3/MM3 (ref 140–450)
PMV BLD AUTO: 12.5 FL (ref 6–12)
POTASSIUM BLD-SCNC: 3.6 MMOL/L (ref 3.5–5.2)
RBC # BLD AUTO: 5 10*6/MM3 (ref 3.77–5.28)
SODIUM BLD-SCNC: 139 MMOL/L (ref 136–145)
TIBC SERPL-MCNC: 429 MCG/DL (ref 298–536)
TRANSFERRIN SERPL-MCNC: 288 MG/DL (ref 200–360)
VIT B12 BLD-MCNC: >2000 PG/ML (ref 211–946)
WBC NRBC COR # BLD: 6.77 10*3/MM3 (ref 3.4–10.8)

## 2019-12-05 PROCEDURE — 83540 ASSAY OF IRON: CPT

## 2019-12-05 PROCEDURE — 82728 ASSAY OF FERRITIN: CPT

## 2019-12-05 PROCEDURE — 80048 BASIC METABOLIC PNL TOTAL CA: CPT

## 2019-12-05 PROCEDURE — 84466 ASSAY OF TRANSFERRIN: CPT

## 2019-12-05 PROCEDURE — 82607 VITAMIN B-12: CPT

## 2019-12-05 PROCEDURE — 85025 COMPLETE CBC W/AUTO DIFF WBC: CPT

## 2019-12-05 PROCEDURE — 82746 ASSAY OF FOLIC ACID SERUM: CPT

## 2019-12-23 ENCOUNTER — APPOINTMENT (OUTPATIENT)
Dept: ONCOLOGY | Facility: CLINIC | Age: 64
End: 2019-12-23

## 2019-12-31 ENCOUNTER — OFFICE VISIT (OUTPATIENT)
Dept: ONCOLOGY | Facility: CLINIC | Age: 64
End: 2019-12-31

## 2019-12-31 VITALS
BODY MASS INDEX: 36.75 KG/M2 | HEART RATE: 94 BPM | TEMPERATURE: 97 F | WEIGHT: 201 LBS | DIASTOLIC BLOOD PRESSURE: 71 MMHG | SYSTOLIC BLOOD PRESSURE: 168 MMHG

## 2019-12-31 DIAGNOSIS — D64.9 ANEMIA, UNSPECIFIED TYPE: Chronic | ICD-10-CM

## 2019-12-31 DIAGNOSIS — I26.99 OTHER ACUTE PULMONARY EMBOLISM WITHOUT ACUTE COR PULMONALE (HCC): Primary | ICD-10-CM

## 2019-12-31 DIAGNOSIS — E83.52 HYPERCALCEMIA: ICD-10-CM

## 2019-12-31 DIAGNOSIS — R76.0 LUPUS ANTICOAGULANT POSITIVE: ICD-10-CM

## 2019-12-31 PROCEDURE — G8730 PAIN DOC POS AND PLAN: HCPCS | Performed by: INTERNAL MEDICINE

## 2019-12-31 PROCEDURE — 1123F ACP DISCUSS/DSCN MKR DOCD: CPT | Performed by: INTERNAL MEDICINE

## 2019-12-31 PROCEDURE — G9903 PT SCRN TBCO ID AS NON USER: HCPCS | Performed by: INTERNAL MEDICINE

## 2019-12-31 PROCEDURE — 99214 OFFICE O/P EST MOD 30 MIN: CPT | Performed by: INTERNAL MEDICINE

## 2019-12-31 PROCEDURE — G0463 HOSPITAL OUTPT CLINIC VISIT: HCPCS | Performed by: INTERNAL MEDICINE

## 2019-12-31 NOTE — PROGRESS NOTES
DATE OF VISIT: 2019      REASON FOR VISIT: Pulmonary embolism on Xarelto, anemia,         HISTORY OF PRESENT ILLNESS:    64-year-old female with past medical problems consisting of hypertension, dyslipidemia, poorly controlled diabetes mellitus, history of left knee replacement with revision ×3.  Most recent revision was done on 2018.  And was diagnosed with subacute pulmonary embolism on 2018 after revision of left knee and was started on xarelto.  Patient was initially seen in consultation when she was  admitted to hospital on 2018 with bleeding into her left knee.  Xarelto was discontinued and patient was started on heparin drip with coumadin.  Patient has started back on Xarelto in 2018.  Patient is here for follow-up appointment today.  States her fatigue is improved.  Denies any bleeding.  Complains of pain in left knee.        PAST MEDICAL HISTORY:    Past Medical History:   Diagnosis Date   • Acute vaginitis     resolved   • Carpal tunnel syndrome    • Chronic osteoarthritis     Nino TKA   • Chronic pain    • Chronic urticaria    • Dietary counseling and surveillance    • Dysuria    • External hordeolum    • Hyperlipidemia    • Hypertension    • Low back pain     strain   • Microalbuminuria     on ARB   • Noncompliance with treatment    • Obesity      Dieting , exercise, appetite suppressant      • Osteoarthritis of left knee    • Plantar fasciitis     resolved   • Seasonal rhinitis    • Type 2 diabetes mellitus (CMS/HCC)    • Urinary tract infectious disease    • Vaginal discharge    • Vitamin deficiency        SOCIAL HISTORY:    Social History     Tobacco Use   • Smoking status: Former Smoker     Packs/day: 1.00     Years: 6.00     Pack years: 6.00     Last attempt to quit: 2006     Years since quittin.0   • Smokeless tobacco: Never Used   Substance Use Topics   • Alcohol use: No   • Drug use: No       Surgical History :  Past Surgical History:    Procedure Laterality Date   • CARPAL TUNNEL RELEASE      Bilateral endoscopic release 01/20/2003    • ENDOSCOPY      normal 04/25/2008      • KNEE ARTHROSCOPY     • KNEE INCISION AND DRAINAGE Left 9/22/2018    Procedure: KNEE INCISION AND DRAINAGE;  Surgeon: Elias Su MD;  Location: [facility];  Service: Orthopedics   • KNEE SURGERY      Removal of existing left total knee arthroplasty and revision left total knee arthroplasty. 12/15/2014       • REPLACEMENT TOTAL KNEE     • SHOULDER SURGERY     • TOTAL KNEE ARTHROPLASTY REVISION Left 8/28/2018    Procedure: REVISION LEFT TOTAL KNEE;  Surgeon: Elias Su MD;  Location: [facility];  Service: Orthopedics   • TUBAL ABDOMINAL LIGATION         ALLERGIES:    Allergies   Allergen Reactions   • Penicillins Rash   • Percocet [Oxycodone-Acetaminophen] Rash   • Rocephin [Ceftriaxone] Rash         FAMILY HISTORY:  Family History   Problem Relation Age of Onset   • Diabetes Mother    • Hypertension Mother    • Colon cancer Father    • Diabetes Father    • Hypertension Father    • Breast cancer Sister    • Cancer Other    • Diabetes Other    • Hypertension Other            REVIEW OF SYSTEMS:      CONSTITUTIONAL:  Complains of fatigue.  Has gained 4 pounds since last clinic visit.  Denies any fever, chills .      EYES: No visual disturbances. No discharge. No new lesions     ENMT:  No epistaxis, mouth sores or difficulty swallowing.     RESPIRATORY:  No new shortness of breath. No new cough or hemoptysis.     CARDIOVASCULAR:  No chest pain or palpitations.     GASTROINTESTINAL:  No abdominal pain nausea, vomiting or blood in the stool.     GENITOURINARY: No Dysuria or Hematuria.     MUSCULOSKELETAL:  Complains of chronic back pain.  Complains of pain in left knee.     LYMPHATICS:  Denies any abnormal swollen glands anywhere in the body.     NEUROLOGICAL : No tingling or numbness. No headache or dizziness. No seizures or balance problems.     SKIN: Denies any  new skin lesion.          PHYSICAL EXAMINATION:      VITAL SIGNS:  /71   Pulse 94   Temp 97 °F (36.1 °C)   Wt 91.2 kg (201 lb)   BMI 36.75 kg/m²       12/31/19  0941   Weight: 91.2 kg (201 lb)         ECOG performance status: 2    CONSTITUTIONAL:  Not in any distress.    EYES: Mild conjunctival Pallor. No Icterus. No Pterygium. Extraocular Movements intact.No ptosis.    ENMT:  Normocephalic, Atraumatic.No Facial Asymmetry noted.    NECK:  No adenopathy.Trachea midline. NO JVD.    RESPIRATORY:  Fair air entry bilateral. No rhonchi or wheezing.Fair respiratory effort.    CARDIOVASCULAR:  S1, S2. Regular rate and rhythm. No murmur or gallop appreciated.    ABDOMEN:  Soft, obese, nontender. Bowel sounds present in all four quadrants.  No Hepatosplenomegaly appreciated.    MUSCULOSKELETAL: Trace edema.No Calf Tenderness.Decreased range of motion.  Surgical scar present on left knee    NEUROLOGIC:    No  Motor  deficit appreciated. Cranial Nerves 2-12 grossly intact.    SKIN : No new skin lesion identified.  Skin is warm and moist.    LYMPHATICS: No new enlarged lymph nodes in neck or supraclavicular area.    PSYCHIATRY: Alert, awake and oriented ×3.Normal affect.  Normal judgment.  Makes good eye contact.        DIAGNOSTIC DATA:    Glucose   Date Value Ref Range Status   12/05/2019 199 (H) 65 - 99 mg/dL Final     Sodium   Date Value Ref Range Status   12/05/2019 139 136 - 145 mmol/L Final     Potassium   Date Value Ref Range Status   12/05/2019 3.6 3.5 - 5.2 mmol/L Final     CO2   Date Value Ref Range Status   12/05/2019 22.0 22.0 - 29.0 mmol/L Final     Chloride   Date Value Ref Range Status   12/05/2019 99 98 - 107 mmol/L Final     Anion Gap   Date Value Ref Range Status   12/05/2019 18.0 (H) 5.0 - 15.0 mmol/L Final     Creatinine   Date Value Ref Range Status   12/05/2019 0.72 0.57 - 1.00 mg/dL Final     BUN   Date Value Ref Range Status   12/05/2019 15 8 - 23 mg/dL Final     BUN/Creatinine Ratio   Date  Value Ref Range Status   12/05/2019 20.8 7.0 - 25.0 Final     Calcium   Date Value Ref Range Status   12/05/2019 10.6 (H) 8.6 - 10.5 mg/dL Final     Alkaline Phosphatase   Date Value Ref Range Status   09/23/2019 84 39 - 117 U/L Final     Total Protein   Date Value Ref Range Status   09/23/2019 7.7 6.0 - 8.5 g/dL Final     ALT (SGPT)   Date Value Ref Range Status   09/23/2019 14 1 - 33 U/L Final     AST (SGOT)   Date Value Ref Range Status   09/23/2019 12 1 - 32 U/L Final     Total Bilirubin   Date Value Ref Range Status   09/23/2019 0.3 0.2 - 1.2 mg/dL Final     Albumin   Date Value Ref Range Status   09/23/2019 4.10 3.50 - 5.20 g/dL Final     Globulin   Date Value Ref Range Status   09/23/2019 3.6 gm/dL Final     Lab Results   Component Value Date    WBC 6.77 12/05/2019    HGB 13.5 12/05/2019    HCT 42.4 12/05/2019    MCV 84.8 12/05/2019     12/05/2019     Lab Results   Component Value Date    NEUTROABS 4.01 12/05/2019    IRON 103 12/05/2019    TIBC 429 12/05/2019    LABIRON 24 12/05/2019    FERRITIN 107.60 12/05/2019    [id number] >2,000 (H) 12/05/2019    FOLATE 15.10 12/05/2019     Lab Results   Component Value Date    REFLABREPO SEE NOTE: 05/28/2015       Factor V Leiden, prothrombin gene mutation, anticardiolipin antibodies, beta-2 glycoprotein antibodies were negative on February 20, 2019.    Lupus anticoagulant done on February 20, 2019 and Peggy 3, 2019 was positive.              Radiology Data :  CT angiogram of chest with contrast done on February 20, 2019 was reviewed, discussed with patient, it showed:  IMPRESSION:  1. No evidence of pulmonary embolus..  2. Bilateral kidney upper pole simple renal cortical cysts..  3. Otherwise unremarkable CT chest..         CT angiogram of chest with contrast done on August 30, 2018 showed:  IMPRESSION:  CONCLUSION: Pulmonary emboli in right middle lobe and right lower  lobe branches probably subacute in origin.              ASSESSMENT AND  PLAN:       1.  Subacute pulmonary embolism on right side:  -Patient was diagnosed with subacute pulmonary embolism on right side after most recent revision of left knee done on August 28, 2018.  -Patient was started on xarelto 15 mg twice daily.  -She was admitted to [facility] on September 20, 2018 with bleeding into her left knee.  -Patient was started on heparin drip.  -Patient had incision and Drainage done by Dr. Su on September 22, 2018.  -Patient was on Coumadin.  Patient was started back on Xarelto in December 2018.  -CT angiogram of chest with contrast done on February 20, 2019 shows resolution of pulmonary embolism.  -Hypercoagulable workup done consisting of factor V Leyden, prothrombin gene mutation, anticardiolipin antibody, beta-2 glycoprotein antibody were negative on February 20, 2019.  -Lupus anticoagulant was positive on February 20, 2019 as well as Peggy 3, 2019.  In view of lupus anticoagulant being positive, recommend continuing with Xarelto for now.  -Patient was instructed to come to the emergency room if she starts having any bleeding on Xarelto      2.  Anemia:  -Patient required 1 unit of PRBC on September 22, 2018 with hemoglobin of 6.9.  -Anemia work-up done on December 5 , 2019 shows hemoglobin is 13.5.  -Recommend continuing with ferrous sulfate 1 tablet p.o. daily .  recommend decreasing B12 and folic acid to 1 tablet 3 times a week.  -We will repeat anemia work-up upon next clinic visit in 3 months.    3.  Poorly controlled diabetes mellitus    4.  Hypercalcemia:  -Patient's calcium is mildly elevated at 10.6.  Patient was instructed to keep herself hydrated and avoid any supplemental calcium.        5.  Health maintenance: Patient does not smoke.    6. Advance Care Planning: For now patient remains full code and is able to make her decisions.  Patient has health care surrogate mentioned on chart.    7. BMI: Patient's Body mass index is 36.75 kg/m². BMI is  higher than reference range.  Patient was notified about her elevated BMI.    8.  Pain assessment:  -Patient complains of knee pain.  Recommend following up with primary medical provider for back pain.    9.  Prescriptions: Prescription for Xarelto 90-day supply has been sent to her pharmacy today on December 31, 2090.        Otf Nagy MD  12/31/2019  9:55 AM        EMR Dragon/Transcription disclaimer:   Much of this encounter note is an electronic transcription/translation of spoken language to printed text. The electronic translation of spoken language may permit erroneous, or at times, nonsensical words or phrases to be inadvertently transcribed; Although I have reviewed the note for such errors, some may still exist.

## 2020-03-17 ENCOUNTER — TELEPHONE (OUTPATIENT)
Dept: ONCOLOGY | Facility: CLINIC | Age: 65
End: 2020-03-17

## 2020-03-31 ENCOUNTER — APPOINTMENT (OUTPATIENT)
Dept: ONCOLOGY | Facility: CLINIC | Age: 65
End: 2020-03-31

## 2020-05-15 ENCOUNTER — LAB (OUTPATIENT)
Dept: LAB | Facility: HOSPITAL | Age: 65
End: 2020-05-15

## 2020-05-15 DIAGNOSIS — I26.99 OTHER ACUTE PULMONARY EMBOLISM WITHOUT ACUTE COR PULMONALE (HCC): ICD-10-CM

## 2020-05-15 DIAGNOSIS — R76.0 LUPUS ANTICOAGULANT POSITIVE: ICD-10-CM

## 2020-05-15 LAB
ALBUMIN SERPL-MCNC: 4.2 G/DL (ref 3.5–5.2)
ALBUMIN/GLOB SERPL: 1.2 G/DL
ALP SERPL-CCNC: 93 U/L (ref 39–117)
ALT SERPL W P-5'-P-CCNC: 12 U/L (ref 1–33)
ANION GAP SERPL CALCULATED.3IONS-SCNC: 17 MMOL/L (ref 5–15)
AST SERPL-CCNC: 12 U/L (ref 1–32)
BASOPHILS # BLD AUTO: 0.05 10*3/MM3 (ref 0–0.2)
BASOPHILS NFR BLD AUTO: 0.8 % (ref 0–1.5)
BILIRUB SERPL-MCNC: 0.3 MG/DL (ref 0.2–1.2)
BUN BLD-MCNC: 14 MG/DL (ref 8–23)
BUN/CREAT SERPL: 21.9 (ref 7–25)
CALCIUM SPEC-SCNC: 9.7 MG/DL (ref 8.6–10.5)
CHLORIDE SERPL-SCNC: 98 MMOL/L (ref 98–107)
CO2 SERPL-SCNC: 24 MMOL/L (ref 22–29)
CREAT BLD-MCNC: 0.64 MG/DL (ref 0.57–1)
DEPRECATED RDW RBC AUTO: 46.7 FL (ref 37–54)
EOSINOPHIL # BLD AUTO: 0.12 10*3/MM3 (ref 0–0.4)
EOSINOPHIL NFR BLD AUTO: 2 % (ref 0.3–6.2)
ERYTHROCYTE [DISTWIDTH] IN BLOOD BY AUTOMATED COUNT: 15 % (ref 12.3–15.4)
FERRITIN SERPL-MCNC: 124 NG/ML (ref 13–150)
FOLATE SERPL-MCNC: 17.3 NG/ML (ref 4.78–24.2)
GFR SERPL CREATININE-BSD FRML MDRD: 114 ML/MIN/1.73
GLOBULIN UR ELPH-MCNC: 3.4 GM/DL
GLUCOSE BLD-MCNC: 265 MG/DL (ref 65–99)
HCT VFR BLD AUTO: 40.6 % (ref 34–46.6)
HGB BLD-MCNC: 12.6 G/DL (ref 12–15.9)
IMM GRANULOCYTES # BLD AUTO: 0.02 10*3/MM3 (ref 0–0.05)
IMM GRANULOCYTES NFR BLD AUTO: 0.3 % (ref 0–0.5)
IRON 24H UR-MRATE: 74 MCG/DL (ref 37–145)
IRON SATN MFR SERPL: 19 % (ref 20–50)
LYMPHOCYTES # BLD AUTO: 2.2 10*3/MM3 (ref 0.7–3.1)
LYMPHOCYTES NFR BLD AUTO: 36.6 % (ref 19.6–45.3)
MCH RBC QN AUTO: 26.6 PG (ref 26.6–33)
MCHC RBC AUTO-ENTMCNC: 31 G/DL (ref 31.5–35.7)
MCV RBC AUTO: 85.7 FL (ref 79–97)
MONOCYTES # BLD AUTO: 0.47 10*3/MM3 (ref 0.1–0.9)
MONOCYTES NFR BLD AUTO: 7.8 % (ref 5–12)
NEUTROPHILS # BLD AUTO: 3.15 10*3/MM3 (ref 1.7–7)
NEUTROPHILS NFR BLD AUTO: 52.5 % (ref 42.7–76)
NRBC BLD AUTO-RTO: 0 /100 WBC (ref 0–0.2)
PLATELET # BLD AUTO: 256 10*3/MM3 (ref 140–450)
PMV BLD AUTO: 12.1 FL (ref 6–12)
POTASSIUM BLD-SCNC: 4.5 MMOL/L (ref 3.5–5.2)
PROT SERPL-MCNC: 7.6 G/DL (ref 6–8.5)
RBC # BLD AUTO: 4.74 10*6/MM3 (ref 3.77–5.28)
SODIUM BLD-SCNC: 139 MMOL/L (ref 136–145)
TIBC SERPL-MCNC: 389 MCG/DL (ref 298–536)
TRANSFERRIN SERPL-MCNC: 261 MG/DL (ref 200–360)
VIT B12 BLD-MCNC: 1916 PG/ML (ref 211–946)
WBC NRBC COR # BLD: 6.01 10*3/MM3 (ref 3.4–10.8)

## 2020-05-15 PROCEDURE — 82607 VITAMIN B-12: CPT

## 2020-05-15 PROCEDURE — 82746 ASSAY OF FOLIC ACID SERUM: CPT

## 2020-05-15 PROCEDURE — 85025 COMPLETE CBC W/AUTO DIFF WBC: CPT

## 2020-05-15 PROCEDURE — 83540 ASSAY OF IRON: CPT

## 2020-05-15 PROCEDURE — 82728 ASSAY OF FERRITIN: CPT

## 2020-05-15 PROCEDURE — 80053 COMPREHEN METABOLIC PANEL: CPT

## 2020-05-15 PROCEDURE — 84466 ASSAY OF TRANSFERRIN: CPT

## 2020-05-18 ENCOUNTER — TELEMEDICINE (OUTPATIENT)
Dept: ONCOLOGY | Facility: CLINIC | Age: 65
End: 2020-05-18

## 2020-05-18 DIAGNOSIS — D50.8 OTHER IRON DEFICIENCY ANEMIA: Primary | Chronic | ICD-10-CM

## 2020-05-18 PROCEDURE — 99213 OFFICE O/P EST LOW 20 MIN: CPT | Performed by: NURSE PRACTITIONER

## 2020-05-18 NOTE — PROGRESS NOTES
You have chosen to receive care through a video visit. Do you consent to use a telephone visit for your medical care today? Yes    This visit has been rescheduled as a video visit to comply with patient safety concerns in accordance with CDC recommendations. Total time of discussion was 11 minutes.      REASON FOR VISIT: Pulmonary embolism on Xarelto, anemia,            HISTORY OF PRESENT ILLNESS:    64-year-old female with past medical problems consisting of hypertension, dyslipidemia, poorly controlled diabetes mellitus, history of left knee replacement with revision ×3.  Most recent revision was done on August 28, 2018.  And was diagnosed with subacute pulmonary embolism on August 30, 2018 after revision of left knee and was started on xarelto.  Patient was initially seen in consultation when she was  admitted to hospital on September 21, 2018 with bleeding into her left knee.  Xarelto was discontinued and patient was started on heparin drip with coumadin.  Patient has started back on Xarelto in December 2018.    Patient was last seen by Dr. Nagy in December 2019; B-12 and folic acid was decreased to TIW; she is still taking iron daily. Denies any bleeding.       Review of Systems   Constitutional: Negative.    HENT: Negative.    Eyes: Negative.    Respiratory: Negative.    Cardiovascular: Negative.    Gastrointestinal: Negative.    Endocrine: Negative.    Genitourinary: Negative.    Musculoskeletal: Positive for back pain.   Skin: Negative.    Allergic/Immunologic: Negative.    Neurological: Negative.      Physical Exam   Constitutional: She appears well-developed and well-nourished.   HENT:   Head: Normocephalic and atraumatic.   Eyes: Pupils are equal, round, and reactive to light. Conjunctivae are normal.   Neck: Neck normal appearance.  Pulmonary/Chest: Effort normal.   Abdominal: Soft.   Neurological: She is alert.   Skin: Skin is warm and dry.   Psychiatric: She has a normal mood and affect.     ASSESSMENT  AND PLAN:       1.  Subacute pulmonary embolism on right side:  -Patient was diagnosed with subacute pulmonary embolism on right side after most recent revision of left knee done on August 28, 2018.  -Patient was started on xarelto 15 mg twice daily.  -She was admitted to [facility] on September 20, 2018 with bleeding into her left knee.  -Patient was started on heparin drip.  -Patient had incision and Drainage done by Dr. Su on September 22, 2018.  -Patient was on Coumadin.  Patient was started back on Xarelto in December 2018.  -CT angiogram of chest with contrast done on February 20, 2019 shows resolution of pulmonary embolism.  -Hypercoagulable workup done consisting of factor V Leyden, prothrombin gene mutation, anticardiolipin antibody, beta-2 glycoprotein antibody were negative on February 20, 2019.  -Lupus anticoagulant was positive on February 20, 2019 as well as Peggy 3, 2019.  In view of lupus anticoagulant being positive, recommend continuing with Xarelto for now.  -Patient was instructed to come to the emergency room if she starts having any bleeding on Xarelto        2.  Anemia:  -Patient required 1 unit of PRBC on September 22, 2018 with hemoglobin of 6.9.  -Anemia work-up done on 5/15/2020 shows hemoglobin is 12.6  -Recommend continuing with ferrous sulfate 1 tablet p.o. daily .  recommend decreasing B12 and folic acid to 1 tablet 3 times a week.  -We will repeat anemia work-up upon next clinic visit in 5 months.     3. Hypercalcemia; resolved; previous was 10.6 rechecked and is now 9.7

## 2020-06-24 RX ORDER — FERROUS SULFATE 325(65) MG
325 TABLET ORAL
Qty: 90 TABLET | Refills: 1 | Status: SHIPPED | OUTPATIENT
Start: 2020-06-24 | End: 2020-07-21 | Stop reason: SDUPTHER

## 2020-06-30 ENCOUNTER — OFFICE VISIT (OUTPATIENT)
Dept: FAMILY MEDICINE CLINIC | Facility: CLINIC | Age: 65
End: 2020-06-30

## 2020-06-30 VITALS
OXYGEN SATURATION: 96 % | WEIGHT: 199 LBS | RESPIRATION RATE: 20 BRPM | HEART RATE: 103 BPM | BODY MASS INDEX: 36.62 KG/M2 | HEIGHT: 62 IN | SYSTOLIC BLOOD PRESSURE: 150 MMHG | TEMPERATURE: 97.4 F | DIASTOLIC BLOOD PRESSURE: 74 MMHG

## 2020-06-30 DIAGNOSIS — Z79.4 TYPE 2 DIABETES MELLITUS WITH OTHER SPECIFIED COMPLICATION, WITH LONG-TERM CURRENT USE OF INSULIN (HCC): ICD-10-CM

## 2020-06-30 DIAGNOSIS — E66.09 CLASS 2 OBESITY DUE TO EXCESS CALORIES WITH BODY MASS INDEX (BMI) OF 36.0 TO 36.9 IN ADULT, UNSPECIFIED WHETHER SERIOUS COMORBIDITY PRESENT: ICD-10-CM

## 2020-06-30 DIAGNOSIS — N76.0 ACUTE VAGINITIS: Primary | ICD-10-CM

## 2020-06-30 DIAGNOSIS — E11.69 TYPE 2 DIABETES MELLITUS WITH OTHER SPECIFIED COMPLICATION, WITH LONG-TERM CURRENT USE OF INSULIN (HCC): ICD-10-CM

## 2020-06-30 LAB
CANDIDA ALBICANS: POSITIVE
GARDNERELLA VAGINALIS: NEGATIVE
T VAGINALIS DNA VAG QL PROBE+SIG AMP: NEGATIVE

## 2020-06-30 PROCEDURE — 99213 OFFICE O/P EST LOW 20 MIN: CPT | Performed by: NURSE PRACTITIONER

## 2020-06-30 PROCEDURE — 87510 GARDNER VAG DNA DIR PROBE: CPT | Performed by: NURSE PRACTITIONER

## 2020-06-30 PROCEDURE — 87660 TRICHOMONAS VAGIN DIR PROBE: CPT | Performed by: NURSE PRACTITIONER

## 2020-06-30 PROCEDURE — 87480 CANDIDA DNA DIR PROBE: CPT | Performed by: NURSE PRACTITIONER

## 2020-06-30 RX ORDER — NYSTATIN 100000 U/G
CREAM TOPICAL 2 TIMES DAILY
Qty: 30 G | Refills: 1 | Status: SHIPPED | OUTPATIENT
Start: 2020-06-30 | End: 2020-07-21 | Stop reason: SDUPTHER

## 2020-06-30 RX ORDER — ATORVASTATIN CALCIUM 80 MG/1
80 TABLET, FILM COATED ORAL DAILY
COMMUNITY
Start: 2020-06-15 | End: 2020-07-21 | Stop reason: SDUPTHER

## 2020-06-30 RX ORDER — PYRANTEL PAMOATE 50 MG/ML
1 SUSPENSION, ORAL (FINAL DOSE FORM) ORAL DAILY
COMMUNITY
Start: 2020-06-05 | End: 2020-07-21 | Stop reason: SDUPTHER

## 2020-06-30 RX ORDER — FLUCONAZOLE 150 MG/1
TABLET ORAL
Qty: 2 TABLET | Refills: 0 | Status: SHIPPED | OUTPATIENT
Start: 2020-06-30 | End: 2020-10-28

## 2020-06-30 RX ORDER — EMPAGLIFLOZIN 25 MG/1
1 TABLET, FILM COATED ORAL DAILY
COMMUNITY
Start: 2020-06-18 | End: 2020-10-28

## 2020-06-30 RX ORDER — OMEPRAZOLE 20 MG/1
20 CAPSULE, DELAYED RELEASE ORAL DAILY
COMMUNITY
Start: 2020-06-12 | End: 2020-07-21 | Stop reason: SDUPTHER

## 2020-06-30 RX ORDER — LOSARTAN POTASSIUM 50 MG/1
50 TABLET ORAL DAILY
COMMUNITY
Start: 2020-06-03 | End: 2020-06-30 | Stop reason: ALTCHOICE

## 2020-06-30 RX ORDER — FAMOTIDINE 20 MG/1
20 TABLET, FILM COATED ORAL 2 TIMES DAILY
COMMUNITY
Start: 2020-06-06 | End: 2020-07-21 | Stop reason: SDUPTHER

## 2020-06-30 NOTE — PATIENT INSTRUCTIONS
Vaginitis    Vaginitis is irritation and swelling (inflammation) of the vagina. It happens when normal bacteria and yeast in the vagina grow too much. There are many types of this condition. Treatment will depend on the type you have.  Follow these instructions at home:  Lifestyle  · Keep your vagina area clean and dry.  ? Avoid using soap.  ? Rinse the area with water.  · Do not do the following until your doctor says it is okay:  ? Wash and clean out the vagina (douche).  ? Use tampons.  ? Have sex.  · Wipe from front to back after going to the bathroom.  · Let air reach your vagina.  ? Wear cotton underwear.  ? Do not wear:  ? Underwear while you sleep.  ? Tight pants.  ? Thong underwear.  ? Underwear or nylons without a cotton panel.  ? Take off any wet clothing, such as bathing suits, as soon as possible.  · Use gentle, non-scented products. Do not use things that can irritate the vagina, such as fabric softeners. Avoid the following products if they are scented:  ? Feminine sprays.  ? Detergents.  ? Tampons.  ? Feminine hygiene products.  ? Soaps or bubble baths.  · Practice safe sex and use condoms.  General instructions  · Take over-the-counter and prescription medicines only as told by your doctor.  · If you were prescribed an antibiotic medicine, take or use it as told by your doctor. Do not stop taking or using the antibiotic even if you start to feel better.  · Keep all follow-up visits as told by your doctor. This is important.  Contact a doctor if:  · You have pain in your belly.  · You have a fever.  · Your symptoms last for more than 2-3 days.  Get help right away if:  · You have a fever and your symptoms get worse all of a sudden.  Summary  · Vaginitis is irritation and swelling of the vagina. It can happen when the normal bacteria and yeast in the vagina grow too much. There are many types.  · Treatment will depend on the type you have.  · Do not douche, use tampons , or have sex until your health  care provider approves. When you can return to sex, practice safe sex and use condoms.  This information is not intended to replace advice given to you by your health care provider. Make sure you discuss any questions you have with your health care provider.  Document Released: 03/16/2010 Document Revised: 11/30/2018 Document Reviewed: 01/09/2018  Elsevier Patient Education © 2020 [employer]

## 2020-06-30 NOTE — PROGRESS NOTES
"Subjective   Meeta Marmolejo is a 64 y.o. female.     FP Same Day Appointment    PCP: Previously Dr. Trivedi, will be establishing with ZELDA Ashley on 7/7/2020    CC: \"itching, discharge\"    Started shortly after starting Jardiance for her diabetes.  Unsure of last A1C.   and no concern for STD.     Vaginitis   This is a new problem. The current episode started in the past 7 days. The problem occurs constantly. The problem has been gradually worsening. Pertinent negatives include no abdominal pain, anorexia, arthralgias, change in bowel habit, chest pain, chills, congestion, coughing, diaphoresis, fatigue, fever, headaches, joint swelling, myalgias, nausea, neck pain, numbness, rash, sore throat, swollen glands, urinary symptoms, vertigo, visual change, vomiting or weakness. Nothing aggravates the symptoms. Treatments tried: tried Monistat today, burned. The treatment provided no relief.        The following portions of the patient's history were reviewed and updated as appropriate: allergies, current medications, past medical history, past social history, past surgical history and problem list.    Review of Systems   Constitutional: Negative for chills, diaphoresis, fatigue and fever.   HENT: Negative for congestion, sore throat and swollen glands.    Respiratory: Negative.  Negative for cough.    Cardiovascular: Negative.  Negative for chest pain.   Gastrointestinal: Negative for abdominal pain, anorexia, change in bowel habit, nausea and vomiting.   Genitourinary: Positive for vaginal discharge (white, clumpy). Negative for difficulty urinating, dysuria, flank pain and frequency.        +vaginal itching     Musculoskeletal: Negative for arthralgias, joint swelling, myalgias and neck pain.   Skin: Negative for rash.   Neurological: Negative for vertigo, weakness, numbness and headache.     /74 (BP Location: Right arm, Patient Position: Sitting, Cuff Size: Adult)   Pulse 103   Temp 97.4 °F " "(36.3 °C) (Temporal)   Resp 20   Ht 157.5 cm (62\")   Wt 90.3 kg (199 lb)   SpO2 96%   BMI 36.40 kg/m²     Objective   Physical Exam   Constitutional: She is oriented to person, place, and time. She appears well-developed and well-nourished. No distress.   Cardiovascular: Normal rate and regular rhythm.   Pulmonary/Chest: Effort normal and breath sounds normal. She has no wheezes. She has no rales.   Abdominal: Soft. Bowel sounds are normal. There is no tenderness. There is no rebound and no guarding.   Genitourinary:   Genitourinary Comments: No flank pain  Vaginal exam deferred   Neurological: She is alert and oriented to person, place, and time.   Nursing note and vitals reviewed.    No results found for this or any previous visit (from the past 24 hour(s)).  No Images in the past 120 days found..      Assessment/Plan   Diagnoses and all orders for this visit:    Acute vaginitis  -     Gardnerella vaginalis, Trichomonas vaginalis, Candida albicans, DNA - Swab, Vagina  -     fluconazole (Diflucan) 150 MG tablet; 1 tab po x 1 now, may repeat in 4 days prn yeast  -     nystatin (MYCOSTATIN) 592484 UNIT/GM cream; Apply  topically to the appropriate area as directed 2 (two) times a day. For external vaginal itching    Type 2 diabetes mellitus with other specified complication, with long-term current use of insulin (CMS/[facility])    Class 2 obesity due to excess calories with body mass index (BMI) of 36.0 to 36.9 in adult, unspecified whether serious comorbidity present      Suspect candida due to recently starting Jardiance  Rx for Diflucan, Nystatin to use externally if needed  Vaginal culture pending--will call with results when available and give further treatment as needed    Continue with all other current medications  Keep appt to establish with ZELDA Rojas next week as scheduled    Patient's Body mass index is 36.4 kg/m². BMI is above normal parameters. Recommendations include: referral to primary care.  Has " recently started walking/exercising again.     See PCP or RTC if symptoms persist/worsen  See PCP for routine f/u visit and management of chronic medical conditions      This document has been electronically signed by ZELDA Santiago on June 30, 2020 10:03,.

## 2020-07-07 ENCOUNTER — LAB (OUTPATIENT)
Dept: LAB | Facility: HOSPITAL | Age: 65
End: 2020-07-07

## 2020-07-07 ENCOUNTER — OFFICE VISIT (OUTPATIENT)
Dept: FAMILY MEDICINE CLINIC | Facility: CLINIC | Age: 65
End: 2020-07-07

## 2020-07-07 VITALS
SYSTOLIC BLOOD PRESSURE: 140 MMHG | DIASTOLIC BLOOD PRESSURE: 80 MMHG | TEMPERATURE: 97.5 F | HEIGHT: 62 IN | WEIGHT: 200 LBS | RESPIRATION RATE: 20 BRPM | BODY MASS INDEX: 36.8 KG/M2

## 2020-07-07 DIAGNOSIS — D50.8 OTHER IRON DEFICIENCY ANEMIA: ICD-10-CM

## 2020-07-07 DIAGNOSIS — Z76.89 ENCOUNTER TO ESTABLISH CARE: Primary | ICD-10-CM

## 2020-07-07 DIAGNOSIS — E78.5 HYPERLIPIDEMIA, UNSPECIFIED HYPERLIPIDEMIA TYPE: ICD-10-CM

## 2020-07-07 DIAGNOSIS — K21.9 GASTROESOPHAGEAL REFLUX DISEASE, ESOPHAGITIS PRESENCE NOT SPECIFIED: ICD-10-CM

## 2020-07-07 DIAGNOSIS — E11.69 TYPE 2 DIABETES MELLITUS WITH OTHER SPECIFIED COMPLICATION, WITH LONG-TERM CURRENT USE OF INSULIN (HCC): ICD-10-CM

## 2020-07-07 DIAGNOSIS — I10 BENIGN ESSENTIAL HYPERTENSION: ICD-10-CM

## 2020-07-07 DIAGNOSIS — I26.99 OTHER ACUTE PULMONARY EMBOLISM WITHOUT ACUTE COR PULMONALE (HCC): ICD-10-CM

## 2020-07-07 DIAGNOSIS — D68.62 LUPUS ANTICOAGULANT SYNDROME (HCC): ICD-10-CM

## 2020-07-07 DIAGNOSIS — Z12.11 SCREENING FOR COLON CANCER: ICD-10-CM

## 2020-07-07 DIAGNOSIS — Z79.4 TYPE 2 DIABETES MELLITUS WITH OTHER SPECIFIED COMPLICATION, WITH LONG-TERM CURRENT USE OF INSULIN (HCC): ICD-10-CM

## 2020-07-07 DIAGNOSIS — Z12.39 SCREENING FOR BREAST CANCER: ICD-10-CM

## 2020-07-07 DIAGNOSIS — M81.0 OSTEOPOROSIS, UNSPECIFIED OSTEOPOROSIS TYPE, UNSPECIFIED PATHOLOGICAL FRACTURE PRESENCE: ICD-10-CM

## 2020-07-07 DIAGNOSIS — E66.01 MORBIDLY OBESE (HCC): ICD-10-CM

## 2020-07-07 PROBLEM — J30.89 PERENNIAL ALLERGIC RHINITIS: Status: ACTIVE | Noted: 2017-03-24

## 2020-07-07 PROBLEM — I70.0 AORTO-ILIAC ATHEROSCLEROSIS: Status: ACTIVE | Noted: 2017-03-23

## 2020-07-07 PROBLEM — M25.50 MULTIPLE JOINT PAIN: Status: ACTIVE | Noted: 2018-07-10

## 2020-07-07 PROBLEM — E04.9 GOITER: Status: ACTIVE | Noted: 2020-07-07

## 2020-07-07 PROBLEM — E11.21 MICROALBUMINURIC DIABETIC NEPHROPATHY: Status: ACTIVE | Noted: 2018-07-10

## 2020-07-07 PROBLEM — I70.8 AORTO-ILIAC ATHEROSCLEROSIS (HCC): Status: ACTIVE | Noted: 2017-03-23

## 2020-07-07 PROBLEM — Z98.890 HISTORY OF SHOULDER SURGERY: Status: ACTIVE | Noted: 2018-08-27

## 2020-07-07 PROBLEM — IMO0002 UNCONTROLLED TYPE 2 DIABETES MELLITUS: Status: ACTIVE | Noted: 2018-08-27

## 2020-07-07 PROBLEM — H10.10 ALLERGIC CONJUNCTIVITIS: Status: ACTIVE | Noted: 2017-03-24

## 2020-07-07 PROBLEM — M15.9 GENERALIZED OSTEOARTHRITIS: Status: ACTIVE | Noted: 2018-08-27

## 2020-07-07 PROBLEM — E05.90 SUBCLINICAL HYPERTHYROIDISM: Status: ACTIVE | Noted: 2017-11-20

## 2020-07-07 PROBLEM — E04.1 THYROID NODULE: Status: ACTIVE | Noted: 2020-07-07

## 2020-07-07 PROBLEM — E78.00 PRIMARY HYPERCHOLESTEROLEMIA: Status: ACTIVE | Noted: 2018-08-27

## 2020-07-07 LAB
ALBUMIN SERPL-MCNC: 4.7 G/DL (ref 3.5–5.2)
ALBUMIN/GLOB SERPL: 1.5 G/DL
ALP SERPL-CCNC: 79 U/L (ref 39–117)
ALT SERPL W P-5'-P-CCNC: 14 U/L (ref 1–33)
ANION GAP SERPL CALCULATED.3IONS-SCNC: 13 MMOL/L (ref 5–15)
AST SERPL-CCNC: 16 U/L (ref 1–32)
BASOPHILS # BLD AUTO: 0.04 10*3/MM3 (ref 0–0.2)
BASOPHILS NFR BLD AUTO: 0.8 % (ref 0–1.5)
BILIRUB SERPL-MCNC: 0.2 MG/DL (ref 0–1.2)
BUN SERPL-MCNC: 17 MG/DL (ref 8–23)
BUN/CREAT SERPL: 31.5 (ref 7–25)
CALCIUM SPEC-SCNC: 9.3 MG/DL (ref 8.6–10.5)
CHLORIDE SERPL-SCNC: 99 MMOL/L (ref 98–107)
CHOLEST SERPL-MCNC: 157 MG/DL (ref 0–200)
CO2 SERPL-SCNC: 23 MMOL/L (ref 22–29)
CREAT SERPL-MCNC: 0.54 MG/DL (ref 0.57–1)
DEPRECATED RDW RBC AUTO: 45.1 FL (ref 37–54)
EOSINOPHIL # BLD AUTO: 0.12 10*3/MM3 (ref 0–0.4)
EOSINOPHIL NFR BLD AUTO: 2.4 % (ref 0.3–6.2)
ERYTHROCYTE [DISTWIDTH] IN BLOOD BY AUTOMATED COUNT: 15 % (ref 12.3–15.4)
FERRITIN SERPL-MCNC: 155.4 NG/ML (ref 13–150)
FOLATE SERPL-MCNC: >20 NG/ML (ref 4.78–24.2)
GFR SERPL CREATININE-BSD FRML MDRD: 138 ML/MIN/1.73
GLOBULIN UR ELPH-MCNC: 3.1 GM/DL
GLUCOSE SERPL-MCNC: 278 MG/DL (ref 65–99)
HBA1C MFR BLD: 13.2 % (ref 4.8–5.6)
HCT VFR BLD AUTO: 40.1 % (ref 34–46.6)
HDLC SERPL-MCNC: 49 MG/DL (ref 40–60)
HGB BLD-MCNC: 12.9 G/DL (ref 12–15.9)
IMM GRANULOCYTES # BLD AUTO: 0.01 10*3/MM3 (ref 0–0.05)
IMM GRANULOCYTES NFR BLD AUTO: 0.2 % (ref 0–0.5)
IRON 24H UR-MRATE: 79 MCG/DL (ref 37–145)
IRON SATN MFR SERPL: 19 % (ref 20–50)
LDLC SERPL CALC-MCNC: 81 MG/DL (ref 0–100)
LDLC/HDLC SERPL: 1.66 {RATIO}
LYMPHOCYTES # BLD AUTO: 1.78 10*3/MM3 (ref 0.7–3.1)
LYMPHOCYTES NFR BLD AUTO: 35.6 % (ref 19.6–45.3)
MCH RBC QN AUTO: 26.5 PG (ref 26.6–33)
MCHC RBC AUTO-ENTMCNC: 32.2 G/DL (ref 31.5–35.7)
MCV RBC AUTO: 82.3 FL (ref 79–97)
MONOCYTES # BLD AUTO: 0.4 10*3/MM3 (ref 0.1–0.9)
MONOCYTES NFR BLD AUTO: 8 % (ref 5–12)
NEUTROPHILS NFR BLD AUTO: 2.65 10*3/MM3 (ref 1.7–7)
NEUTROPHILS NFR BLD AUTO: 53 % (ref 42.7–76)
NRBC BLD AUTO-RTO: 0 /100 WBC (ref 0–0.2)
PLATELET # BLD AUTO: 228 10*3/MM3 (ref 140–450)
PMV BLD AUTO: 12.7 FL (ref 6–12)
POTASSIUM SERPL-SCNC: 4 MMOL/L (ref 3.5–5.2)
PROT SERPL-MCNC: 7.8 G/DL (ref 6–8.5)
RBC # BLD AUTO: 4.87 10*6/MM3 (ref 3.77–5.28)
SODIUM SERPL-SCNC: 135 MMOL/L (ref 136–145)
TIBC SERPL-MCNC: 420 MCG/DL (ref 298–536)
TRANSFERRIN SERPL-MCNC: 282 MG/DL (ref 200–360)
TRIGL SERPL-MCNC: 133 MG/DL (ref 0–150)
VIT B12 BLD-MCNC: >2000 PG/ML (ref 211–946)
VLDLC SERPL-MCNC: 26.6 MG/DL (ref 5–40)
WBC # BLD AUTO: 5 10*3/MM3 (ref 3.4–10.8)

## 2020-07-07 PROCEDURE — 83036 HEMOGLOBIN GLYCOSYLATED A1C: CPT

## 2020-07-07 PROCEDURE — 99214 OFFICE O/P EST MOD 30 MIN: CPT | Performed by: NURSE PRACTITIONER

## 2020-07-07 PROCEDURE — 81003 URINALYSIS AUTO W/O SCOPE: CPT

## 2020-07-07 PROCEDURE — 82607 VITAMIN B-12: CPT

## 2020-07-07 PROCEDURE — 82728 ASSAY OF FERRITIN: CPT

## 2020-07-07 PROCEDURE — 80061 LIPID PANEL: CPT

## 2020-07-07 PROCEDURE — 84466 ASSAY OF TRANSFERRIN: CPT

## 2020-07-07 PROCEDURE — 85025 COMPLETE CBC W/AUTO DIFF WBC: CPT

## 2020-07-07 PROCEDURE — 80053 COMPREHEN METABOLIC PANEL: CPT

## 2020-07-07 PROCEDURE — 82746 ASSAY OF FOLIC ACID SERUM: CPT

## 2020-07-07 PROCEDURE — 83540 ASSAY OF IRON: CPT

## 2020-07-07 PROCEDURE — 82043 UR ALBUMIN QUANTITATIVE: CPT

## 2020-07-07 RX ORDER — PEN NEEDLE, DIABETIC 32GX 5/32"
NEEDLE, DISPOSABLE MISCELLANEOUS DAILY
COMMUNITY
Start: 2020-06-24 | End: 2021-01-05

## 2020-07-08 LAB
ALBUMIN UR-MCNC: 4.8 MG/DL
BILIRUB UR QL STRIP: NEGATIVE
CLARITY UR: CLEAR
COLOR UR: YELLOW
GLUCOSE UR STRIP-MCNC: ABNORMAL MG/DL
HGB UR QL STRIP.AUTO: NEGATIVE
KETONES UR QL STRIP: NEGATIVE
LEUKOCYTE ESTERASE UR QL STRIP.AUTO: NEGATIVE
NITRITE UR QL STRIP: NEGATIVE
PH UR STRIP.AUTO: 5.5 [PH] (ref 5–8)
PROT UR QL STRIP: NEGATIVE
SP GR UR STRIP: >=1.03 (ref 1–1.03)
UROBILINOGEN UR QL STRIP: ABNORMAL

## 2020-07-15 ENCOUNTER — RESULTS ENCOUNTER (OUTPATIENT)
Dept: FAMILY MEDICINE CLINIC | Facility: CLINIC | Age: 65
End: 2020-07-15

## 2020-07-15 DIAGNOSIS — Z12.11 SCREENING FOR COLON CANCER: ICD-10-CM

## 2020-07-15 PROBLEM — D68.62 LUPUS ANTICOAGULANT SYNDROME: Status: ACTIVE | Noted: 2019-06-25

## 2020-07-15 RX ORDER — LOSARTAN POTASSIUM 50 MG/1
TABLET ORAL
COMMUNITY
End: 2020-07-21 | Stop reason: SDUPTHER

## 2020-07-16 NOTE — PROGRESS NOTES
Chief Complaint   Patient presents with   • Establish Care   • Diabetes       Subjective:  Meeta Marmolejo is a 64 y.o. female who presents to [facility]. Reports multiple chronic health problems that include HTN, T2DM, osteoporosis, pulmonary embolism, GERD, lupus anticoag disorder, anemia hyperlipid and seasonal allergies. Currently seeing Dr. Nagy for PE and lupus anticoag mgmt. Recently seeing Dr. Cathryn Trivedi as PCP but wishes to establish care with [facility]. Does not need refills today but is requesting referral for mammogram and colonoscopy and labwork. Also needs to schedule PAP.      The following portions of the patient's history were reviewed and updated as appropriate: allergies, current medications, past family history, past medical history, past social history, past surgical history and problem list.    Diabetes   She presents for her initial diabetic visit. She has type 2 diabetes mellitus. Her disease course has been fluctuating. Pertinent negatives for hypoglycemia include no dizziness or headaches. There are no diabetic associated symptoms. Pertinent negatives for diabetes include no fatigue, no polydipsia, no polyphagia and no polyuria. Risk factors for coronary artery disease include post-menopausal, hypertension, obesity, dyslipidemia and diabetes mellitus. Current diabetic treatment includes insulin injections and oral agent (triple therapy). Her weight is stable. When asked about meal planning, she reported none. She has not had a previous visit with a dietitian. She never participates in exercise. Her home blood glucose trend is fluctuating minimally. An ACE inhibitor/angiotensin II receptor blocker is being taken.   Hypertension   This is a chronic problem. The current episode started more than 1 year ago. The problem is uncontrolled. Pertinent negatives include no headaches or shortness of breath. There are no associated agents to hypertension. Risk factors for coronary  artery disease include diabetes mellitus, dyslipidemia, obesity, post-menopausal state and sedentary lifestyle. Past treatments include angiotensin blockers. The current treatment provides moderate improvement.   Heartburn   She complains of heartburn and water brash. She reports no abdominal pain, no coughing or no nausea. This is a chronic problem. The current episode started more than 1 year ago. The problem occurs occasionally. The problem has been unchanged. The heartburn is located in the substernum. The heartburn is of moderate intensity. The symptoms are aggravated by certain foods. Pertinent negatives include no fatigue. Risk factors include obesity and lack of exercise. She has tried a PPI for the symptoms. The treatment provided moderate relief.        Past Medical History:   Diagnosis Date   • Acute vaginitis     resolved   • Carpal tunnel syndrome    • Chronic osteoarthritis     Nino TKA   • Chronic pain    • Chronic urticaria    • Dietary counseling and surveillance    • Dysuria    • External hordeolum    • Hyperlipidemia    • Hypertension    • Low back pain     strain   • Microalbuminuria     on ARB   • Noncompliance with treatment    • Obesity      Dieting , exercise, appetite suppressant      • Osteoarthritis of left knee    • Plantar fasciitis     resolved   • Seasonal rhinitis    • Type 2 diabetes mellitus (CMS/HCC)    • Urinary tract infectious disease    • Vaginal discharge    • Vitamin deficiency          Current Outpatient Medications:   •  alendronate (FOSAMAX) 70 MG tablet, Take 70 mg by mouth Every 7 (Seven) Days. Every Sunday morning, Disp: , Rfl:   •  atorvastatin (LIPITOR) 80 MG tablet, Take 80 mg by mouth Daily., Disp: , Rfl:   •  [employer] VITAMIN B12 1000 MCG tablet controlled-release, Take 1 tablet by mouth Daily., Disp: , Rfl:   •  docusate sodium (COLACE) 100 MG capsule, Take 1 capsule by mouth 2 (Two) Times a Day As Needed for Constipation., Disp: 60 capsule, Rfl: 3  •  famotidine  (PEPCID) 20 MG tablet, Take 20 mg by mouth 2 (Two) Times a Day., Disp: , Rfl:   •  ferrous sulfate 325 (65 FE) MG tablet, TAKE 1 TABLET BY MOUTH DAILY WITH BREAKFAST, Disp: 90 tablet, Rfl: 1  •  glipiZIDE (GLUCOTROL) 10 MG tablet, Take 10 mg by mouth Daily., Disp: , Rfl:   •  glucose blood test strip, 1 each by Other route 2 (Two) Times a Day. Use as instructed, Disp: 50 each, Rfl: 11  •  metFORMIN (GLUCOPHAGE) 1000 MG tablet, Take 1,000 mg by mouth 2 (Two) Times a Day With Meals., Disp: , Rfl:   •  montelukast (SINGULAIR) 10 MG tablet, Take 10 mg by mouth Daily., Disp: , Rfl:   •  omeprazole (priLOSEC) 20 MG capsule, Take 20 mg by mouth Daily., Disp: , Rfl:   •  rivaroxaban (Xarelto) 20 MG tablet, Take 1 tablet by mouth Daily., Disp: 90 tablet, Rfl: 0  •  vitamin B-12 (CYANOCOBALAMIN) 1000 MCG tablet, Take 1 tablet by mouth Daily., Disp: 90 tablet, Rfl: 1  •  BD PEN NEEDLE CITLALI U/F 32G X 4 MM misc, Daily., Disp: , Rfl:   •  fluconazole (Diflucan) 150 MG tablet, 1 tab po x 1 now, may repeat in 4 days prn yeast, Disp: 2 tablet, Rfl: 0  •  insulin detemir (Levemir FlexTouch) 100 UNIT/ML injection, Levemir FlexTouch U-100 Insulin 100 unit/mL (3 mL) subcutaneous pen, Disp: , Rfl:   •  JARDIANCE 25 MG tablet, Take 1 tablet by mouth Daily., Disp: , Rfl:   •  losartan (COZAAR) 50 MG tablet, losartan 50 mg tablet  TAKE 1 TABLET BY MOUTH EVERY DAY, Disp: , Rfl:   •  nystatin (MYCOSTATIN) [id number] UNIT/GM cream, Apply  topically to the appropriate area as directed 2 (two) times a day. For external vaginal itching, Disp: 30 g, Rfl: 1  •  Semaglutide,0.25 or 0.5MG/DOS, (Ozempic, 0.25 or 0.5 MG/DOSE,) 2 MG/1.5ML solution pen-injector, 0.25 mg., Disp: , Rfl:     Review of Systems    Review of Systems   Constitutional: Negative for activity change, appetite change, fatigue and unexpected weight change.   Respiratory: Negative for cough, chest tightness and shortness of breath.    Gastrointestinal: Positive for heartburn. Negative  "for abdominal pain, constipation, diarrhea, nausea and vomiting.   Endocrine: Negative for cold intolerance, heat intolerance, polydipsia, polyphagia and polyuria.   Musculoskeletal: Negative for arthralgias.   Skin: Negative for rash.   Neurological: Negative for dizziness, syncope, light-headedness and headaches.   Psychiatric/Behavioral: Negative for sleep disturbance.       Objective  Vitals:    07/07/20 0827   BP: 140/80   BP Location: Right arm   Patient Position: Sitting   Cuff Size: Adult   Resp: 20   Temp: 97.5 °F (36.4 °C)   Weight: 90.7 kg (200 lb)   Height: 157.5 cm (62\")   PainSc: 0-No pain       Physical Exam   Constitutional: She is oriented to person, place, and time. She appears well-developed and well-nourished.   HENT:   Head: Normocephalic and atraumatic.   Wearing face mask d/t pandemic   Eyes: Pupils are equal, round, and reactive to light. Conjunctivae are normal.   Cardiovascular: Normal rate and regular rhythm.   Pulmonary/Chest: Effort normal and breath sounds normal. No respiratory distress.   Musculoskeletal: Normal range of motion.   Neurological: She is alert and oriented to person, place, and time.   Skin: Skin is warm and dry.   Psychiatric: She has a normal mood and affect. Her behavior is normal.   Nursing note and vitals reviewed.        Meeta was seen today for establish care and diabetes.    Diagnoses and all orders for this visit:    Encounter to establish care    Benign essential hypertension  -     Comprehensive metabolic panel; Future  -     MicroAlbumin, Urine, Random - Urine, Clean Catch; Future  -     Urinalysis With Culture If Indicated -; Future    Type 2 diabetes mellitus with other specified complication, with long-term current use of insulin (CMS/[facility])  -     Comprehensive metabolic panel; Future  -     Hemoglobin A1c; Future    Osteoporosis, unspecified osteoporosis type, unspecified pathological fracture presence    Other acute pulmonary embolism without acute cor " pulmonale (CMS/HCC)    Gastroesophageal reflux disease, esophagitis presence not specified    Morbidly obese (CMS/HCC)    Screening for breast cancer  -     Mammo Screening Bilateral With CAD; Future    Screening for colon cancer  -     Cologuard - Stool, Per Rectum; Future    Hyperlipidemia, unspecified hyperlipidemia type  -     Lipid Panel With LDL / HDL Ratio; Future    Lupus anticoagulant syndrome (CMS/HCC)    1. HTN - Overall controlled. No refills needed. Cont current tx.    CMP, microalbumin urine and urinalysis ordered.  2. T2DM - continue current tx - unsure if already seeing endo. Will refer if not seeing    A1c, lipids ordered.  3. Osteoporosis - continue alendronate - May need DEXA  4. Pulmonary embolism - hx of PE post orthopedic surgery on anticoag x 6 mos. Xarelto restarted by hematology d/t lupus anticoag.   5. GERD - well controlled on current tx.  6. Obesity - healthy diet, weight loss, exericise.  7. Breast cancer screen - mammogram ordered.  8. Colon cancer screen - cologuard ordered.  9. RTC 1 week for f/u - advised to sign for medical records.                                                                                             This document has been electronically signed by ZELDA Solares on July 19, 2020 22:03

## 2020-07-21 ENCOUNTER — PROCEDURE VISIT (OUTPATIENT)
Dept: FAMILY MEDICINE CLINIC | Facility: CLINIC | Age: 65
End: 2020-07-21

## 2020-07-21 VITALS
DIASTOLIC BLOOD PRESSURE: 86 MMHG | HEART RATE: 95 BPM | RESPIRATION RATE: 20 BRPM | WEIGHT: 200.2 LBS | TEMPERATURE: 98.7 F | BODY MASS INDEX: 36.84 KG/M2 | OXYGEN SATURATION: 97 % | SYSTOLIC BLOOD PRESSURE: 142 MMHG | HEIGHT: 62 IN

## 2020-07-21 DIAGNOSIS — I26.99 OTHER ACUTE PULMONARY EMBOLISM WITHOUT ACUTE COR PULMONALE (HCC): ICD-10-CM

## 2020-07-21 DIAGNOSIS — Z12.4 ENCOUNTER FOR PAPANICOLAOU SMEAR FOR CERVICAL CANCER SCREENING: ICD-10-CM

## 2020-07-21 DIAGNOSIS — K21.9 GASTROESOPHAGEAL REFLUX DISEASE, ESOPHAGITIS PRESENCE NOT SPECIFIED: ICD-10-CM

## 2020-07-21 DIAGNOSIS — E11.69 TYPE 2 DIABETES MELLITUS WITH OTHER SPECIFIED COMPLICATION, WITH LONG-TERM CURRENT USE OF INSULIN (HCC): ICD-10-CM

## 2020-07-21 DIAGNOSIS — Z79.4 TYPE 2 DIABETES MELLITUS WITH OTHER SPECIFIED COMPLICATION, WITH LONG-TERM CURRENT USE OF INSULIN (HCC): ICD-10-CM

## 2020-07-21 DIAGNOSIS — M81.0 OSTEOPOROSIS, UNSPECIFIED OSTEOPOROSIS TYPE, UNSPECIFIED PATHOLOGICAL FRACTURE PRESENCE: ICD-10-CM

## 2020-07-21 DIAGNOSIS — D68.62 LUPUS ANTICOAGULANT SYNDROME (HCC): ICD-10-CM

## 2020-07-21 DIAGNOSIS — N76.0 ACUTE VAGINITIS: ICD-10-CM

## 2020-07-21 DIAGNOSIS — Z00.00 NORMAL BREAST EXAM: ICD-10-CM

## 2020-07-21 DIAGNOSIS — I10 BENIGN ESSENTIAL HYPERTENSION: ICD-10-CM

## 2020-07-21 DIAGNOSIS — D50.8 OTHER IRON DEFICIENCY ANEMIA: Chronic | ICD-10-CM

## 2020-07-21 DIAGNOSIS — Z01.419 WELL WOMAN EXAM: Primary | ICD-10-CM

## 2020-07-21 DIAGNOSIS — E78.5 HYPERLIPIDEMIA, UNSPECIFIED HYPERLIPIDEMIA TYPE: ICD-10-CM

## 2020-07-21 PROCEDURE — 99396 PREV VISIT EST AGE 40-64: CPT | Performed by: NURSE PRACTITIONER

## 2020-07-21 RX ORDER — METHOCARBAMOL 500 MG/1
500 TABLET, FILM COATED ORAL 4 TIMES DAILY PRN
Qty: 60 TABLET | Refills: 5 | Status: SHIPPED | OUTPATIENT
Start: 2020-07-21 | End: 2020-07-21

## 2020-07-21 RX ORDER — ALENDRONATE SODIUM 70 MG/1
70 TABLET ORAL
Qty: 12 TABLET | Refills: 1 | Status: SHIPPED | OUTPATIENT
Start: 2020-07-21 | End: 2020-07-21

## 2020-07-21 RX ORDER — PYRANTEL PAMOATE 50 MG/ML
1 SUSPENSION, ORAL (FINAL DOSE FORM) ORAL DAILY
Status: CANCELLED | OUTPATIENT
Start: 2020-07-21

## 2020-07-21 RX ORDER — GLIPIZIDE 10 MG/1
10 TABLET ORAL DAILY
Qty: 90 TABLET | Refills: 1 | Status: SHIPPED | OUTPATIENT
Start: 2020-07-21 | End: 2020-12-23 | Stop reason: SDUPTHER

## 2020-07-21 RX ORDER — NYSTATIN 100000 U/G
CREAM TOPICAL 2 TIMES DAILY
Qty: 30 G | Refills: 1 | Status: SHIPPED | OUTPATIENT
Start: 2020-07-21 | End: 2020-07-21

## 2020-07-21 RX ORDER — OMEPRAZOLE 20 MG/1
20 CAPSULE, DELAYED RELEASE ORAL DAILY
Qty: 90 CAPSULE | Refills: 1 | Status: SHIPPED | OUTPATIENT
Start: 2020-07-21 | End: 2020-07-21

## 2020-07-21 RX ORDER — FERROUS SULFATE 325(65) MG
325 TABLET ORAL
Qty: 90 TABLET | Refills: 1 | Status: SHIPPED | OUTPATIENT
Start: 2020-07-21 | End: 2020-10-28

## 2020-07-21 RX ORDER — LANOLIN ALCOHOL/MO/W.PET/CERES
1000 CREAM (GRAM) TOPICAL DAILY
Qty: 90 TABLET | Refills: 1 | Status: SHIPPED | OUTPATIENT
Start: 2020-07-21 | End: 2020-12-23 | Stop reason: SDUPTHER

## 2020-07-21 RX ORDER — GLIPIZIDE 10 MG/1
10 TABLET ORAL DAILY
Qty: 90 TABLET | Refills: 1 | Status: SHIPPED | OUTPATIENT
Start: 2020-07-21 | End: 2020-07-21

## 2020-07-21 RX ORDER — LOSARTAN POTASSIUM 50 MG/1
50 TABLET ORAL DAILY
Qty: 90 TABLET | Refills: 1 | Status: SHIPPED | OUTPATIENT
Start: 2020-07-21 | End: 2020-12-23 | Stop reason: SDUPTHER

## 2020-07-21 RX ORDER — ATORVASTATIN CALCIUM 80 MG/1
80 TABLET, FILM COATED ORAL DAILY
Qty: 90 TABLET | Refills: 1 | Status: SHIPPED | OUTPATIENT
Start: 2020-07-21 | End: 2020-12-23 | Stop reason: SDUPTHER

## 2020-07-21 RX ORDER — ALENDRONATE SODIUM 70 MG/1
70 TABLET ORAL
Qty: 12 TABLET | Refills: 1 | Status: SHIPPED | OUTPATIENT
Start: 2020-07-21 | End: 2020-12-23 | Stop reason: SDUPTHER

## 2020-07-21 RX ORDER — FERROUS SULFATE 325(65) MG
325 TABLET ORAL
Qty: 90 TABLET | Refills: 1 | Status: SHIPPED | OUTPATIENT
Start: 2020-07-21 | End: 2020-07-21

## 2020-07-21 RX ORDER — ATORVASTATIN CALCIUM 80 MG/1
80 TABLET, FILM COATED ORAL DAILY
Qty: 90 TABLET | Refills: 1 | Status: SHIPPED | OUTPATIENT
Start: 2020-07-21 | End: 2020-07-21

## 2020-07-21 RX ORDER — LANOLIN ALCOHOL/MO/W.PET/CERES
1000 CREAM (GRAM) TOPICAL DAILY
Qty: 90 TABLET | Refills: 1 | Status: SHIPPED | OUTPATIENT
Start: 2020-07-21 | End: 2020-07-21

## 2020-07-21 RX ORDER — NYSTATIN 100000 U/G
CREAM TOPICAL 2 TIMES DAILY
Qty: 30 G | Refills: 1 | Status: SHIPPED | OUTPATIENT
Start: 2020-07-21 | End: 2020-10-28

## 2020-07-21 RX ORDER — OMEPRAZOLE 20 MG/1
20 CAPSULE, DELAYED RELEASE ORAL DAILY
Qty: 90 CAPSULE | Refills: 1 | Status: SHIPPED | OUTPATIENT
Start: 2020-07-21 | End: 2020-12-23 | Stop reason: SDUPTHER

## 2020-07-21 RX ORDER — LOSARTAN POTASSIUM 50 MG/1
50 TABLET ORAL DAILY
Qty: 90 TABLET | Refills: 1 | Status: SHIPPED | OUTPATIENT
Start: 2020-07-21 | End: 2020-07-21

## 2020-07-21 NOTE — PROGRESS NOTES
Chief Complaint   Patient presents with   • Gynecologic Exam       Subjective:  Meeta Marmolejo is a 64 y.o. female who presents for PAP exam. Pt is menopausal. Denies vaginal pain, discharge or urinary issues. Needs DM foot exam, DM eye exam is due Oct 2020 by Dr. Kelley. Mammogram and cologuard ordered at last visit. Requesitng refill of medication.       The following portions of the patient's history were reviewed and updated as appropriate: allergies, current medications, past family history, past medical history, past social history, past surgical history and problem list.    Gynecologic Exam   The patient's pertinent negatives include no genital itching, genital lesions, genital odor, genital rash, pelvic pain, vaginal bleeding or vaginal discharge. The patient is experiencing no pain. She is not pregnant. Pertinent negatives include no abdominal pain, anorexia, back pain, chills, constipation, diarrhea, discolored urine, dysuria, fever, flank pain, frequency, headaches, hematuria, joint pain, joint swelling, nausea, painful intercourse, rash, sore throat, urgency or vomiting. Nothing aggravates the symptoms. She has tried nothing for the symptoms. She is sexually active. No, her partner does not have an STD. She uses tubal ligation for contraception. She is postmenopausal. Her past medical history is significant for vaginosis.        Past Medical History:   Diagnosis Date   • Acute vaginitis     resolved   • Carpal tunnel syndrome    • Chronic osteoarthritis     Nino TKA   • Chronic pain    • Chronic urticaria    • Dietary counseling and surveillance    • Dysuria    • External hordeolum    • Hyperlipidemia    • Hypertension    • Low back pain     strain   • Microalbuminuria     on ARB   • Noncompliance with treatment    • Obesity      Dieting , exercise, appetite suppressant      • Osteoarthritis of left knee    • Plantar fasciitis     resolved   • Seasonal rhinitis    • Type 2 diabetes mellitus (CMS/[facility])     • Urinary tract infectious disease    • Vaginal discharge    • Vitamin deficiency          Current Outpatient Medications:   •  alendronate (FOSAMAX) 70 MG tablet, Take 1 tablet by mouth Every 7 (Seven) Days. Every Sunday morning, Disp: 12 tablet, Rfl: 1  •  atorvastatin (LIPITOR) 80 MG tablet, Take 1 tablet by mouth Daily., Disp: 90 tablet, Rfl: 1  •  BD PEN NEEDLE CITLALI U/F 32G X 4 MM misc, Daily., Disp: , Rfl:   •  ferrous sulfate 325 (65 FE) MG tablet, Take 1 tablet by mouth Daily With Breakfast., Disp: 90 tablet, Rfl: 1  •  glipizide (GLUCOTROL) 10 MG tablet, Take 1 tablet by mouth Daily., Disp: 90 tablet, Rfl: 1  •  glucose blood test strip, 1 each by Other route 2 (Two) Times a Day. Use as instructed, Disp: 50 each, Rfl: 11  •  insulin detemir (Levemir FlexTouch) 100 UNIT/ML injection, Levemir FlexTouch U-100 Insulin 100 unit/mL (3 mL) subcutaneous pen, Disp: , Rfl:   •  losartan (COZAAR) 50 MG tablet, Take 1 tablet by mouth Daily., Disp: 90 tablet, Rfl: 1  •  metFORMIN (GLUCOPHAGE) 1000 MG tablet, Take 1 tablet by mouth 2 (Two) Times a Day With Meals., Disp: 180 tablet, Rfl: 1  •  montelukast (SINGULAIR) 10 MG tablet, Take 10 mg by mouth Daily., Disp: , Rfl:   •  nystatin (MYCOSTATIN) 881572 UNIT/GM cream, Apply  topically to the appropriate area as directed 2 (two) times a day. For external vaginal itching, Disp: 30 g, Rfl: 1  •  omeprazole (priLOSEC) 20 MG capsule, Take 1 capsule by mouth Daily., Disp: 90 capsule, Rfl: 1  •  rivaroxaban (Xarelto) 20 MG tablet, Take 1 tablet by mouth Daily With Dinner., Disp: 90 tablet, Rfl: 1  •  Semaglutide,0.25 or 0.5MG/DOS, (Ozempic, 0.25 or 0.5 MG/DOSE,) 2 MG/1.5ML solution pen-injector, Inject 0.25 mg under the skin into the appropriate area as directed 1 (One) Time Per Week., Disp: 1.5 mL, Rfl: 5  •  vitamin B-12 (CYANOCOBALAMIN) 1000 MCG tablet, Take 1 tablet by mouth Daily., Disp: 90 tablet, Rfl: 1  •  docusate sodium (COLACE) 100 MG capsule, Take 1 capsule by  "mouth 2 (Two) Times a Day As Needed for Constipation., Disp: 60 capsule, Rfl: 3  •  fluconazole (Diflucan) 150 MG tablet, 1 tab po x 1 now, may repeat in 4 days prn yeast, Disp: 2 tablet, Rfl: 0  •  JARDIANCE 25 MG tablet, Take 1 tablet by mouth Daily., Disp: , Rfl:     Review of Systems    Review of Systems   Constitutional: Negative for chills and fever.   HENT: Negative for sore throat.    Gastrointestinal: Negative for abdominal pain, anorexia, constipation, diarrhea, nausea and vomiting.   Endocrine: Negative for cold intolerance, heat intolerance, polydipsia, polyphagia and polyuria.   Genitourinary: Negative for difficulty urinating, dysuria, flank pain, frequency, genital sores, hematuria, pelvic pain, urgency, vaginal bleeding, vaginal discharge and vaginal pain.   Musculoskeletal: Negative for back pain and joint pain.   Skin: Negative for rash.   Neurological: Negative for headaches.       Objective  Vitals:    07/21/20 0821   BP: 142/86   BP Location: Left arm   Patient Position: Sitting   Cuff Size: Adult   Pulse: 95   Resp: 20   Temp: 98.7 °F (37.1 °C)   SpO2: 97%   Weight: 90.8 kg (200 lb 3.2 oz)   Height: 157.5 cm (62\")   PainSc: 0-No pain       Physical Exam   Constitutional: She is oriented to person, place, and time. She appears well-developed and well-nourished. No distress.   HENT:   Head: Normocephalic and atraumatic.   Wearing face mask d/t pandemic   Neck: Normal range of motion. Neck supple.   Cardiovascular: Normal rate, regular rhythm and normal heart sounds.   Pulmonary/Chest: Effort normal and breath sounds normal. Right breast exhibits no inverted nipple, no mass, no nipple discharge, no skin change and no tenderness. Left breast exhibits no inverted nipple, no mass, no nipple discharge, no skin change and no tenderness. No breast swelling, tenderness, discharge or bleeding.   Genitourinary: Vagina normal and uterus normal. No breast swelling, tenderness, discharge or bleeding. No " vaginal discharge found.   Genitourinary Comments: Hanna Weller MA, present during exam; PAP performed cervical sample taken.    Neurological: She is alert and oriented to person, place, and time.   Nursing note and vitals reviewed.        Meeta was seen today for gynecologic exam.    Diagnoses and all orders for this visit:    Well woman exam    Encounter for Papanicolaou smear for cervical cancer screening  -     Liquid-based Pap Smear, Screening; Future  -     Liquid-based Pap Smear, Screening    Normal breast exam    Type 2 diabetes mellitus with other specified complication, with long-term current use of insulin (CMS/[facility])  -     Ambulatory Referral to Podiatry  -     Discontinue: glipizide (GLUCOTROL) 10 MG tablet; Take 1 tablet by mouth Daily.  -     Discontinue: metFORMIN (GLUCOPHAGE) 1000 MG tablet; Take 1 tablet by mouth 2 (Two) Times a Day With Meals.  -     Discontinue: Semaglutide,0.25 or 0.5MG/DOS, (Ozempic, 0.25 or 0.5 MG/DOSE,) 2 MG/1.5ML solution pen-injector; Inject 0.25 mg under the skin into the appropriate area as directed 1 (One) Time Per Week.  -     glipizide (GLUCOTROL) 10 MG tablet; Take 1 tablet by mouth Daily.  -     metFORMIN (GLUCOPHAGE) 1000 MG tablet; Take 1 tablet by mouth 2 (Two) Times a Day With Meals.  -     Semaglutide,0.25 or 0.5MG/DOS, (Ozempic, 0.25 or 0.5 MG/DOSE,) 2 MG/1.5ML solution pen-injector; Inject 0.25 mg under the skin into the appropriate area as directed 1 (One) Time Per Week.    Benign essential hypertension  -     Discontinue: losartan (COZAAR) 50 MG tablet; Take 1 tablet by mouth Daily.  -     losartan (COZAAR) 50 MG tablet; Take 1 tablet by mouth Daily.    Osteoporosis, unspecified osteoporosis type, unspecified pathological fracture presence  -     DEXA Bone Density Axial  -     Discontinue: alendronate (FOSAMAX) 70 MG tablet; Take 1 tablet by mouth Every 7 (Seven) Days. Every Sunday morning  -     alendronate (FOSAMAX) 70 MG tablet; Take 1 tablet by mouth  Every 7 (Seven) Days. Every Sunday morning    Acute vaginitis  -     Discontinue: nystatin (MYCOSTATIN) 242492 UNIT/GM cream; Apply  topically to the appropriate area as directed 2 (two) times a day. For external vaginal itching  -     nystatin (MYCOSTATIN) 886853 UNIT/GM cream; Apply  topically to the appropriate area as directed 2 (two) times a day. For external vaginal itching    Hyperlipidemia, unspecified hyperlipidemia type  -     Discontinue: atorvastatin (LIPITOR) 80 MG tablet; Take 1 tablet by mouth Daily.  -     atorvastatin (LIPITOR) 80 MG tablet; Take 1 tablet by mouth Daily.    Other iron deficiency anemia  -     Discontinue: vitamin B-12 (CYANOCOBALAMIN) 1000 MCG tablet; Take 1 tablet by mouth Daily.  -     Discontinue: ferrous sulfate 325 (65 FE) MG tablet; Take 1 tablet by mouth Daily With Breakfast.  -     ferrous sulfate 325 (65 FE) MG tablet; Take 1 tablet by mouth Daily With Breakfast.  -     vitamin B-12 (CYANOCOBALAMIN) 1000 MCG tablet; Take 1 tablet by mouth Daily.    Gastroesophageal reflux disease, esophagitis presence not specified  -     Discontinue: omeprazole (priLOSEC) 20 MG capsule; Take 1 capsule by mouth Daily.  -     omeprazole (priLOSEC) 20 MG capsule; Take 1 capsule by mouth Daily.    Lupus anticoagulant syndrome (CMS/HCC)  -     Discontinue: rivaroxaban (Xarelto) 20 MG tablet; Take 1 tablet by mouth Daily With Dinner.  -     rivaroxaban (Xarelto) 20 MG tablet; Take 1 tablet by mouth Daily With Dinner.    Other acute pulmonary embolism without acute cor pulmonale (CMS/HCC)  -     Discontinue: rivaroxaban (Xarelto) 20 MG tablet; Take 1 tablet by mouth Daily With Dinner.  -     rivaroxaban (Xarelto) 20 MG tablet; Take 1 tablet by mouth Daily With Dinner.    Other orders  -     Discontinue: methocarbamol (Robaxin) 500 MG tablet; Take 1 tablet by mouth 4 (Four) Times a Day As Needed for Muscle Spasms.    1. PAP performed - cervical sample obtained for testing. Will call with  results  2. Clinical breast exam performed - no abnormalities palpated. Mammogram pending.  3. All chronic meds refilled.  4. Dexa scan ordered.  5. Cologuard colon cancer screening pending.  6. RTC yearly for PE and PAP; Oct for normal f/u on chronic conditions.      This document has been electronically signed by ZELDA Solares on July 22, 2020 20:39

## 2020-07-27 LAB
LAB AP CASE REPORT: NORMAL
PATH INTERP SPEC-IMP: NORMAL

## 2020-10-05 ENCOUNTER — OFFICE VISIT (OUTPATIENT)
Dept: FAMILY MEDICINE CLINIC | Facility: CLINIC | Age: 65
End: 2020-10-05

## 2020-10-05 ENCOUNTER — TELEPHONE (OUTPATIENT)
Dept: FAMILY MEDICINE CLINIC | Facility: CLINIC | Age: 65
End: 2020-10-05

## 2020-10-05 VITALS
TEMPERATURE: 97.2 F | HEART RATE: 102 BPM | RESPIRATION RATE: 19 BRPM | WEIGHT: 198.4 LBS | DIASTOLIC BLOOD PRESSURE: 84 MMHG | SYSTOLIC BLOOD PRESSURE: 136 MMHG | HEIGHT: 62 IN | BODY MASS INDEX: 36.51 KG/M2 | OXYGEN SATURATION: 96 %

## 2020-10-05 DIAGNOSIS — N89.8 VAGINAL LESION: ICD-10-CM

## 2020-10-05 DIAGNOSIS — E11.69 TYPE 2 DIABETES MELLITUS WITH OTHER SPECIFIED COMPLICATION, WITH LONG-TERM CURRENT USE OF INSULIN (HCC): Primary | ICD-10-CM

## 2020-10-05 DIAGNOSIS — Z23 ENCOUNTER FOR IMMUNIZATION: ICD-10-CM

## 2020-10-05 DIAGNOSIS — R30.0 DYSURIA: ICD-10-CM

## 2020-10-05 DIAGNOSIS — Z79.4 TYPE 2 DIABETES MELLITUS WITH OTHER SPECIFIED COMPLICATION, WITH LONG-TERM CURRENT USE OF INSULIN (HCC): Primary | ICD-10-CM

## 2020-10-05 LAB
BILIRUB BLD-MCNC: NEGATIVE MG/DL
CLARITY, POC: CLEAR
COLOR UR: YELLOW
GLUCOSE BLDC GLUCOMTR-MCNC: 305 MG/DL (ref 70–130)
GLUCOSE UR STRIP-MCNC: ABNORMAL MG/DL
KETONES UR QL: NEGATIVE
LEUKOCYTE EST, POC: NEGATIVE
NITRITE UR-MCNC: NEGATIVE MG/ML
PH UR: 5.5 [PH] (ref 5–8)
PROT UR STRIP-MCNC: ABNORMAL MG/DL
RBC # UR STRIP: ABNORMAL /UL
SP GR UR: 1.02 (ref 1–1.03)
UROBILINOGEN UR QL: NORMAL

## 2020-10-05 PROCEDURE — 87255 GENET VIRUS ISOLATE HSV: CPT | Performed by: NURSE PRACTITIONER

## 2020-10-05 PROCEDURE — 99214 OFFICE O/P EST MOD 30 MIN: CPT | Performed by: NURSE PRACTITIONER

## 2020-10-05 PROCEDURE — 81003 URINALYSIS AUTO W/O SCOPE: CPT | Performed by: NURSE PRACTITIONER

## 2020-10-05 PROCEDURE — 83036 HEMOGLOBIN GLYCOSYLATED A1C: CPT | Performed by: NURSE PRACTITIONER

## 2020-10-05 PROCEDURE — 87070 CULTURE OTHR SPECIMN AEROBIC: CPT | Performed by: NURSE PRACTITIONER

## 2020-10-05 PROCEDURE — 82962 GLUCOSE BLOOD TEST: CPT | Performed by: NURSE PRACTITIONER

## 2020-10-05 PROCEDURE — 87086 URINE CULTURE/COLONY COUNT: CPT | Performed by: NURSE PRACTITIONER

## 2020-10-05 PROCEDURE — 87205 SMEAR GRAM STAIN: CPT | Performed by: NURSE PRACTITIONER

## 2020-10-05 PROCEDURE — 90471 IMMUNIZATION ADMIN: CPT | Performed by: NURSE PRACTITIONER

## 2020-10-05 PROCEDURE — 90686 IIV4 VACC NO PRSV 0.5 ML IM: CPT | Performed by: NURSE PRACTITIONER

## 2020-10-05 PROCEDURE — 87254 VIRUS INOCULATION SHELL VIA: CPT | Performed by: NURSE PRACTITIONER

## 2020-10-05 RX ORDER — SEMAGLUTIDE 1.34 MG/ML
0.25 INJECTION, SOLUTION SUBCUTANEOUS WEEKLY
Qty: 1.5 ML | Refills: 5 | Status: SHIPPED | OUTPATIENT
Start: 2020-10-05 | End: 2020-12-23 | Stop reason: SDUPTHER

## 2020-10-05 RX ORDER — CLOTRIMAZOLE 1 G/ML
SOLUTION TOPICAL 2 TIMES DAILY
Qty: 60 ML | Refills: 0 | Status: SHIPPED | OUTPATIENT
Start: 2020-10-05 | End: 2020-10-28

## 2020-10-05 NOTE — TELEPHONE ENCOUNTER
PATIENT IS INQUIRING ABOUT NOTIFICATION OF MEDICATION FILL FROM [facility]. PATIENT STATES SHE IS UNAWARE OF A NEW MEDICATION OR REFILL AND IS REQUESTING INFORMATION IN REGARDS TO.    CALLBACK NUMBER IS   [phone]    [place] [address] [address] AT [facility] [address] - [phone]  - [phone] FX

## 2020-10-05 NOTE — PROGRESS NOTES
Chief Complaint   Patient presents with   • Med Refill   • Urinary Tract Infection       Subjective:  Meeta Marmolejo is a 64 y.o. female who presents for request for medication refill and possible UTI. Reports vaginal discharge, itching and irritation that started Sunday - reports increased frequency. Denies fever or abdominal pain. Reports FSBS at home run 120s - does admit to eating cupcakes and Halloween candy. Reports eye exam this past Friday with Dr. Kelley - will have copy of exam sent here. Was a no show for referral to Dr. Nunez for diabetic foot exam but reports she has appt with Dr. Salgado, podiatry, on 10- - will have visit note sent here. Reports not going for her mammogram because it was at the hospital and she was scared she would get Covid if she went to hospital - wants mammogram rescheduled for imaging center instead. Also c/o lesion in vaginal area that burns when she urinates.       The following portions of the patient's history were reviewed and updated as appropriate: allergies, current medications, past family history, past medical history, past social history, past surgical history and problem list.    Urinary Tract Infection   This is a new problem. The current episode started yesterday. The quality of the pain is described as burning. There has been no fever. Associated symptoms include a discharge, frequency and urgency. Pertinent negatives include no chills, flank pain, hematuria, hesitancy, nausea or vomiting. She has tried nothing for the symptoms.   Diabetes  She presents for her follow-up diabetic visit. She has type 2 diabetes mellitus. Her disease course has been worsening. Pertinent negatives for hypoglycemia include no dizziness. Pertinent negatives for diabetes include no blurred vision, no chest pain, no fatigue, no foot ulcerations, no polydipsia, no polyphagia and no visual change. Symptoms are worsening. Diabetic complications include nephropathy. Risk factors  for coronary artery disease include diabetes mellitus, hypertension, obesity and dyslipidemia. Current diabetic treatment includes oral agent (triple therapy) and insulin injections. She is compliant with treatment some of the time. Her weight is stable. She is following a generally unhealthy diet. When asked about meal planning, she reported none. She has not had a previous visit with a dietitian. She never participates in exercise. Her overall blood glucose range is 110-130 mg/dl. An ACE inhibitor/angiotensin II receptor blocker is being taken. She does not see a podiatrist (appt 10-13-20).Eye exam is current.   Rash  This is a new problem. The current episode started yesterday. The affected locations include the genitalia. The rash is characterized by burning. It is unknown if there was an exposure to a precipitant. Pertinent negatives include no fatigue, fever or vomiting. Past treatments include nothing. The treatment provided no relief.        Past Medical History:   Diagnosis Date   • Acute vaginitis     resolved   • Carpal tunnel syndrome    • Chronic osteoarthritis     Nino TKA   • Chronic pain    • Chronic urticaria    • Dietary counseling and surveillance    • Dysuria    • External hordeolum    • Hyperlipidemia    • Hypertension    • Low back pain     strain   • Microalbuminuria     on ARB   • Noncompliance with treatment    • Obesity      Dieting , exercise, appetite suppressant      • Osteoarthritis of left knee    • Plantar fasciitis     resolved   • Seasonal rhinitis    • Type 2 diabetes mellitus (CMS/HCC)    • Urinary tract infectious disease    • Vaginal discharge    • Vitamin deficiency          Current Outpatient Medications:   •  alendronate (FOSAMAX) 70 MG tablet, Take 1 tablet by mouth Every 7 (Seven) Days. Every Sunday morning, Disp: 12 tablet, Rfl: 1  •  atorvastatin (LIPITOR) 80 MG tablet, Take 1 tablet by mouth Daily., Disp: 90 tablet, Rfl: 1  •  BD PEN NEEDLE CITLALI U/F 32G X 4 MM misc,  Daily., Disp: , Rfl:   •  docusate sodium (COLACE) 100 MG capsule, Take 1 capsule by mouth 2 (Two) Times a Day As Needed for Constipation., Disp: 60 capsule, Rfl: 3  •  ferrous sulfate 325 (65 FE) MG tablet, Take 1 tablet by mouth Daily With Breakfast., Disp: 90 tablet, Rfl: 1  •  fluconazole (Diflucan) 150 MG tablet, 1 tab po x 1 now, may repeat in 4 days prn yeast, Disp: 2 tablet, Rfl: 0  •  glipizide (GLUCOTROL) 10 MG tablet, Take 1 tablet by mouth Daily., Disp: 90 tablet, Rfl: 1  •  glucose blood test strip, 1 each by Other route 2 (two) times a day. Use as instructed, Disp: 60 each, Rfl: 11  •  insulin detemir (Levemir FlexTouch) 100 UNIT/ML injection, Levemir FlexTouch U-100 Insulin 100 unit/mL (3 mL) subcutaneous pen, Disp: , Rfl:   •  JARDIANCE 25 MG tablet, Take 1 tablet by mouth Daily., Disp: , Rfl:   •  losartan (COZAAR) 50 MG tablet, Take 1 tablet by mouth Daily., Disp: 90 tablet, Rfl: 1  •  metFORMIN (GLUCOPHAGE) 1000 MG tablet, Take 1 tablet by mouth 2 (Two) Times a Day With Meals., Disp: 180 tablet, Rfl: 1  •  montelukast (SINGULAIR) 10 MG tablet, Take 10 mg by mouth Daily., Disp: , Rfl:   •  nystatin (MYCOSTATIN) [id number] UNIT/GM cream, Apply  topically to the appropriate area as directed 2 (two) times a day. For external vaginal itching, Disp: 30 g, Rfl: 1  •  omeprazole (priLOSEC) 20 MG capsule, Take 1 capsule by mouth Daily., Disp: 90 capsule, Rfl: 1  •  rivaroxaban (Xarelto) 20 MG tablet, Take 1 tablet by mouth Daily With Dinner., Disp: 90 tablet, Rfl: 1  •  Semaglutide,0.25 or 0.5MG/DOS, (Ozempic, 0.25 or 0.5 MG/DOSE,) 2 MG/1.5ML solution pen-injector, Inject 0.25 mg under the skin into the appropriate area as directed 1 (One) Time Per Week., Disp: 1.5 mL, Rfl: 5  •  vitamin B-12 (CYANOCOBALAMIN) 1000 MCG tablet, Take 1 tablet by mouth Daily., Disp: 90 tablet, Rfl: 1  •  clotrimazole (LOTRIMIN) 1 % external solution, Apply  topically to the appropriate area as directed 2 (Two) Times a Day., Disp:  "60 mL, Rfl: 0    Review of Systems    Review of Systems   Constitutional: Negative for chills, fatigue and fever.   Eyes: Negative for blurred vision.   Cardiovascular: Negative for chest pain.   Gastrointestinal: Negative for nausea and vomiting.   Endocrine: Negative for polydipsia and polyphagia.   Genitourinary: Positive for frequency, genital sores and urgency. Negative for flank pain, hematuria and hesitancy.   Skin: Positive for rash.   Neurological: Negative for dizziness and light-headedness.       Objective  Vitals:    10/05/20 0812   BP: 136/84   BP Location: Right arm   Patient Position: Sitting   Cuff Size: Adult   Pulse: 102   Resp: 19   Temp: 97.2 °F (36.2 °C)   TempSrc: Tympanic   SpO2: 96%   Weight: 90 kg (198 lb 6.4 oz)   Height: 157.5 cm (62\")   PainSc: 0-No pain       Physical Exam  Vitals signs and nursing note reviewed. Exam conducted with a chaperone present (Hanna LOVE, present for vag exam).   Constitutional:       General: She is not in acute distress.     Appearance: Normal appearance. She is obese. She is not ill-appearing.   HENT:      Head: Normocephalic and atraumatic.   Eyes:      Conjunctiva/sclera: Conjunctivae normal.      Pupils: Pupils are equal, round, and reactive to light.   Neck:      Musculoskeletal: Normal range of motion and neck supple.   Cardiovascular:      Rate and Rhythm: Regular rhythm. Tachycardia present.      Heart sounds: Normal heart sounds.   Pulmonary:      Effort: Pulmonary effort is normal.      Breath sounds: Normal breath sounds.   Abdominal:      General: Bowel sounds are normal.      Palpations: Abdomen is soft.      Tenderness: There is no abdominal tenderness.   Genitourinary:     Exam position: Supine.      Vagina: No vaginal discharge.          Comments: Excoriated, erythematous macular lesions  Musculoskeletal: Normal range of motion.         General: Swelling, tenderness and deformity present.      Comments: Chronic left knee OA   Skin:     " General: Skin is warm and dry.      Findings: Lesion (left outer labial area) present.   Neurological:      General: No focal deficit present.      Mental Status: She is alert and oriented to person, place, and time.   Psychiatric:         Mood and Affect: Mood normal.         Behavior: Behavior normal.         Recent Results (from the past 672 hour(s))   POC Urinalysis Dipstick, Automated    Collection Time: 10/05/20  8:39 AM    Specimen: Urine   Result Value Ref Range    Color Yellow Yellow, Straw, Dark Yellow, Vonnie    Clarity, UA Clear Clear    Specific Gravity  1.025 1.005 - 1.030    pH, Urine 5.5 5.0 - 8.0    Leukocytes Negative Negative    Nitrite, UA Negative Negative    Protein, POC Trace (A) Negative mg/dL    Glucose, UA 3+ (A) Negative, 1000 mg/dL (3+) mg/dL    Ketones, UA Negative Negative    Urobilinogen, UA Normal Normal    Bilirubin Negative Negative    Blood, UA Trace (A) Negative   POCT Glucose    Collection Time: 10/05/20  8:47 AM    Specimen: Blood   Result Value Ref Range    Glucose 305 (A) 70 - 130 mg/dL   Urine Culture - Urine, Urine, Clean Catch    Collection Time: 10/05/20  9:34 AM    Specimen: Urine, Clean Catch   Result Value Ref Range    Urine Culture No growth    Wound Culture - Wound, Vagina    Collection Time: 10/05/20 10:17 AM    Specimen: Vagina; Wound   Result Value Ref Range    Wound Culture Culture in progress     Gram Stain No WBCs seen     Gram Stain Rare (1+) Gram positive bacilli        No Images in the past 120 days found..      Meeta was seen today for med refill and urinary tract infection.    Diagnoses and all orders for this visit:    Type 2 diabetes mellitus with other specified complication, with long-term current use of insulin (CMS/[facility])  -     POC Glycosylated Hemoglobin (Hb A1C)  -     Semaglutide,0.25 or 0.5MG/DOS, (Ozempic, 0.25 or 0.5 MG/DOSE,) 2 MG/1.5ML solution pen-injector; Inject 0.25 mg under the skin into the appropriate area as directed 1 (One) Time Per  "Week.  -     POCT Glucose  -     glucose blood test strip; 1 each by Other route 2 (two) times a day. Use as instructed  -     Ambulatory Referral to Endocrinology    Dysuria  -     POC Urinalysis Dipstick, Automated  -     Urine Culture - Urine, Urine, Clean Catch; Future  -     Urine Culture - Urine, Urine, Clean Catch    Vaginal lesion  -     Viral Culture, Rapid, Lesion - Swab, Vagina; Future  -     Wound Culture - Wound, Vagina  -     clotrimazole (LOTRIMIN) 1 % external solution; Apply  topically to the appropriate area as directed 2 (Two) Times a Day.  -     Viral Culture, Rapid, Lesion - Swab, Vagina    Encounter for immunization  -     FluLaval Quad >6 Months ([id number])    1. T2DM - uncontrolled and non-compliant   - Discussed importance of healthy diet and compliance with treatment plan   - Referral to endocrinology for help in managing DM.   - Refill submitted for Ozempic   - Refill of One Touch Ultra test strips   - POCT FSBS 305   - POCT A1C unable to read - reports \"out of range\"  2. Dysuria - will send urine for cx - will call with results.  3. Vaginal lesion - most likely yeast r/t elevated BS   - Viral and bacterial swabs obtained    - Clotrimazole cream perscribed  4. Flu shot administered at todays visit.  5. Pt requesting refills of all medications but refills were submitted on 7-21-20 with 6 mos of refills   - Will call pharmacy to verify what meds need refills.  6. Will reschedule mammogram for [facility] imaging center.   7. RTC as scheduled or PRN.      This document has been electronically signed by ZELDA Solares on October 6, 2020 21:30 CDT   "

## 2020-10-06 LAB
BACTERIA SPEC AEROBE CULT: NO GROWTH
HBA1C MFR BLD: ABNORMAL %

## 2020-10-08 LAB
BACTERIA SPEC AEROBE CULT: NORMAL
GRAM STN SPEC: NORMAL
GRAM STN SPEC: NORMAL

## 2020-10-09 DIAGNOSIS — N89.8 VAGINAL LESION: Primary | ICD-10-CM

## 2020-10-12 LAB
HSV SKIN CULT: NORMAL
VZV SPEC QL CULT: NORMAL

## 2020-10-28 ENCOUNTER — OFFICE VISIT (OUTPATIENT)
Dept: FAMILY MEDICINE CLINIC | Facility: CLINIC | Age: 65
End: 2020-10-28

## 2020-10-28 VITALS
BODY MASS INDEX: 36.62 KG/M2 | WEIGHT: 199 LBS | DIASTOLIC BLOOD PRESSURE: 80 MMHG | HEIGHT: 62 IN | OXYGEN SATURATION: 98 % | TEMPERATURE: 98 F | HEART RATE: 79 BPM | SYSTOLIC BLOOD PRESSURE: 140 MMHG

## 2020-10-28 DIAGNOSIS — L29.9 PRURITUS: Primary | ICD-10-CM

## 2020-10-28 PROCEDURE — 99213 OFFICE O/P EST LOW 20 MIN: CPT | Performed by: NURSE PRACTITIONER

## 2020-10-28 RX ORDER — DOCUSATE SODIUM 100 MG/1
100 CAPSULE, LIQUID FILLED ORAL DAILY
COMMUNITY
End: 2021-02-03

## 2020-10-28 RX ORDER — FERROUS SULFATE 325(65) MG
325 TABLET ORAL
COMMUNITY
End: 2020-11-04 | Stop reason: SDUPTHER

## 2020-10-28 RX ORDER — HYDROXYZINE HYDROCHLORIDE 25 MG/1
25 TABLET, FILM COATED ORAL 3 TIMES DAILY PRN
Qty: 45 TABLET | Refills: 0 | Status: SHIPPED | OUTPATIENT
Start: 2020-10-28 | End: 2021-01-05

## 2020-10-28 NOTE — PROGRESS NOTES
Chief Complaint   Patient presents with   • Rash     LEG BILATERAL        Subjective:  Meeta Marmolejo is a 65 y.o. female who presents for c/o itchy rash on bilat shins and bilat forearms x 1 week, also reports itchy left eye - no drainage . Denies new meds, foods, hygiene products or other family members with similar sx. Does report her daughter who she lives with got a new cat approx 2 weeks ago. Denies knowledge of allergy to cats. Has been applying diaper rash cream to areas with no sx relief. Reports BS running 150-160s.       The following portions of the patient's history were reviewed and updated as appropriate: allergies, current medications, past family history, past medical history, past social history, past surgical history and problem list.    Rash  This is a new problem. The current episode started 1 to 4 weeks ago. The problem is unchanged. The affected locations include the right lower leg, left lower leg, left arm and right arm. The rash is characterized by burning and itchiness. She was exposed to a new animal. Pertinent negatives include no congestion, cough, eye pain, facial edema, fatigue, fever, rhinorrhea, shortness of breath or sore throat. Treatments tried: diaper rash cream. The treatment provided no relief. Her past medical history is significant for allergies. There is no history of asthma or eczema.        Past Medical History:   Diagnosis Date   • Acute vaginitis     resolved   • Carpal tunnel syndrome    • Chronic osteoarthritis     Nino TKA   • Chronic pain    • Chronic urticaria    • Dietary counseling and surveillance    • Dysuria    • External hordeolum    • Hyperlipidemia    • Hypertension    • Low back pain     strain   • Microalbuminuria     on ARB   • Noncompliance with treatment    • Obesity      Dieting , exercise, appetite suppressant      • Osteoarthritis of left knee    • Plantar fasciitis     resolved   • Seasonal rhinitis    • Type 2 diabetes mellitus (CMS/HCC)    •  Urinary tract infectious disease    • Vaginal discharge    • Vitamin deficiency          Current Outpatient Medications:   •  alendronate (FOSAMAX) 70 MG tablet, Take 1 tablet by mouth Every 7 (Seven) Days. Every Sunday morning, Disp: 12 tablet, Rfl: 1  •  atorvastatin (LIPITOR) 80 MG tablet, Take 1 tablet by mouth Daily., Disp: 90 tablet, Rfl: 1  •  BD PEN NEEDLE CITLALI U/F 32G X 4 MM misc, Daily., Disp: , Rfl:   •  docusate sodium (COLACE) 100 MG capsule, Take 100 mg by mouth Daily., Disp: , Rfl:   •  ferrous sulfate 325 (65 FE) MG tablet, Take 325 mg by mouth Daily With Breakfast., Disp: , Rfl:   •  glipizide (GLUCOTROL) 10 MG tablet, Take 1 tablet by mouth Daily., Disp: 90 tablet, Rfl: 1  •  insulin detemir (Levemir FlexTouch) 100 UNIT/ML injection, Levemir FlexTouch U-100 Insulin 100 unit/mL (3 mL) subcutaneous pen, Disp: , Rfl:   •  losartan (COZAAR) 50 MG tablet, Take 1 tablet by mouth Daily., Disp: 90 tablet, Rfl: 1  •  metFORMIN (GLUCOPHAGE) 1000 MG tablet, Take 1 tablet by mouth 2 (Two) Times a Day With Meals., Disp: 180 tablet, Rfl: 1  •  montelukast (SINGULAIR) 10 MG tablet, Take 10 mg by mouth Daily., Disp: , Rfl:   •  omeprazole (priLOSEC) 20 MG capsule, Take 1 capsule by mouth Daily., Disp: 90 capsule, Rfl: 1  •  Semaglutide,0.25 or 0.5MG/DOS, (Ozempic, 0.25 or 0.5 MG/DOSE,) 2 MG/1.5ML solution pen-injector, Inject 0.25 mg under the skin into the appropriate area as directed 1 (One) Time Per Week., Disp: 1.5 mL, Rfl: 5  •  vitamin B-12 (CYANOCOBALAMIN) 1000 MCG tablet, Take 1 tablet by mouth Daily., Disp: 90 tablet, Rfl: 1  •  hydrOXYzine (ATARAX) 25 MG tablet, Take 1 tablet by mouth 3 (Three) Times a Day As Needed for Itching or Allergies., Disp: 45 tablet, Rfl: 0  •  triamcinolone (KENALOG) 0.1 % ointment, Apply  topically to the appropriate area as directed 2 (Two) Times a Day., Disp: 80 g, Rfl: 0    Review of Systems    Review of Systems   Constitutional: Negative for fatigue and fever.   HENT:  "Negative for congestion, facial swelling, rhinorrhea and sore throat.    Eyes: Negative for pain.   Respiratory: Negative for cough and shortness of breath.    Musculoskeletal: Negative for myalgias.   Skin: Positive for rash. Negative for color change, pallor and wound.       Objective  Vitals:    10/28/20 0938   BP: 140/80   Pulse: 79   Temp: 98 °F (36.7 °C)   SpO2: 98%   Weight: 90.3 kg (199 lb)   Height: 157.5 cm (62\")   PainSc:   8   PainLoc: Leg  Comment: phyllis       Physical Exam  Vitals signs and nursing note reviewed.   Constitutional:       General: She is not in acute distress.     Appearance: Normal appearance. She is obese. She is not ill-appearing.   HENT:      Head: Normocephalic and atraumatic.      Right Ear: Tympanic membrane, ear canal and external ear normal.      Left Ear: Tympanic membrane, ear canal and external ear normal.      Nose: Nose normal.      Mouth/Throat:      Mouth: Mucous membranes are moist.      Pharynx: Oropharynx is clear.   Eyes:      Conjunctiva/sclera: Conjunctivae normal.      Pupils: Pupils are equal, round, and reactive to light.   Neck:      Musculoskeletal: Normal range of motion.   Cardiovascular:      Rate and Rhythm: Normal rate and regular rhythm.      Heart sounds: Normal heart sounds.   Pulmonary:      Effort: Pulmonary effort is normal.      Breath sounds: No wheezing.   Musculoskeletal: Normal range of motion.   Lymphadenopathy:      Cervical: No cervical adenopathy.   Skin:     General: Skin is warm and dry.      Findings: Erythema (mild) and rash present.             Comments: Localized macular rash with mild erythema; no warmth, drainage or streaking present   Neurological:      General: No focal deficit present.      Mental Status: She is alert and oriented to person, place, and time.   Psychiatric:         Mood and Affect: Mood normal.         Behavior: Behavior normal.         Recent Results (from the past 672 hour(s))   POC Urinalysis Dipstick, Automated "    Collection Time: 10/05/20  8:39 AM    Specimen: Urine   Result Value Ref Range    Color Yellow Yellow, Straw, Dark Yellow, Vonnie    Clarity, UA Clear Clear    Specific Gravity  1.025 1.005 - 1.030    pH, Urine 5.5 5.0 - 8.0    Leukocytes Negative Negative    Nitrite, UA Negative Negative    Protein, POC Trace (A) Negative mg/dL    Glucose, UA 3+ (A) Negative, 1000 mg/dL (3+) mg/dL    Ketones, UA Negative Negative    Urobilinogen, UA Normal Normal    Bilirubin Negative Negative    Blood, UA Trace (A) Negative   POCT Glucose    Collection Time: 10/05/20  8:47 AM    Specimen: Blood   Result Value Ref Range    Glucose 305 (A) 70 - 130 mg/dL   POC Glycosylated Hemoglobin (Hb A1C)    Collection Time: 10/05/20  9:00 AM    Specimen: Blood   Result Value Ref Range    Hemoglobin A1C     Urine Culture - Urine, Urine, Clean Catch    Collection Time: 10/05/20  9:34 AM    Specimen: Urine, Clean Catch   Result Value Ref Range    Urine Culture No growth    Wound Culture - Wound, Vagina    Collection Time: 10/05/20 10:17 AM    Specimen: Vagina; Wound   Result Value Ref Range    Wound Culture Scant growth (1+) Normal Vaginal Kelsey     Gram Stain No WBCs seen     Gram Stain Rare (1+) Gram positive bacilli    Viral Culture, Rapid, Lesion - Swab, Vagina    Collection Time: 10/05/20 10:18 AM    Specimen: Vagina; Swab   Result Value Ref Range    Viral Culture, Rapid, Varicella Comment     HSV Culture/Type Comment        No Images in the past 120 days found..       Diagnoses and all orders for this visit:    1. Pruritus (Primary)  -     hydrOXYzine (ATARAX) 25 MG tablet; Take 1 tablet by mouth 3 (Three) Times a Day As Needed for Itching or Allergies.  Dispense: 45 tablet; Refill: 0  -     triamcinolone (KENALOG) 0.1 % ointment; Apply  topically to the appropriate area as directed 2 (Two) Times a Day.  Dispense: 80 g; Refill: 0    - Rash appears to be possible contact reaction  - Vistaril prescribed for itching  - Triamcinolone  prescribed for inflammation of area  - Advised to monitor sx and f/u if rash persists or worsens  - RTC as scheduled or PRN      This document has been electronically signed by ZELDA Solares on October 29, 2020 13:50 CDT

## 2020-10-28 NOTE — PATIENT INSTRUCTIONS
Contact Dermatitis  Dermatitis is redness, soreness, and swelling (inflammation) of the skin. Contact dermatitis is a reaction to something that touches the skin.  There are two types of contact dermatitis:  · Irritant contact dermatitis. This happens when something bothers (irritates) your skin, like soap.  · Allergic contact dermatitis. This is caused when you are exposed to something that you are allergic to, such as poison ivy.  What are the causes?  · Common causes of irritant contact dermatitis include:  ? Makeup.  ? Soaps.  ? Detergents.  ? Bleaches.  ? Acids.  ? Metals, such as nickel.  · Common causes of allergic contact dermatitis include:  ? Plants.  ? Chemicals.  ? Jewelry.  ? Latex.  ? Medicines.  ? Preservatives in products, such as clothing.  What increases the risk?  · Having a job that exposes you to things that bother your skin.  · Having asthma or eczema.  What are the signs or symptoms?  Symptoms may happen anywhere the irritant has touched your skin. Symptoms include:  · Dry or flaky skin.  · Redness.  · Cracks.  · Itching.  · Pain or a burning feeling.  · Blisters.  · Blood or clear fluid draining from skin cracks.  With allergic contact dermatitis, swelling may occur. This may happen in places such as the eyelids, mouth, or genitals.  How is this treated?  · This condition is treated by checking for the cause of the reaction and protecting your skin. Treatment may also include:  ? Steroid creams, ointments, or medicines.  ? Antibiotic medicines or other ointments, if you have a skin infection.  ? Lotion or medicines to help with itching.  ? A bandage (dressing).  Follow these instructions at home:  Skin care  · Moisturize your skin as needed.  · Put cool cloths on your skin.  · Put a baking soda paste on your skin. Stir water into baking soda until it looks like a paste.  · Do not scratch your skin.  · Avoid having things rub up against your skin.  · Avoid the use of soaps, perfumes, and  dyes.  Medicines  · Take or apply over-the-counter and prescription medicines only as told by your doctor.  · If you were prescribed an antibiotic medicine, take or apply it as told by your doctor. Do not stop using it even if your condition starts to get better.  Bathing  · Take a bath with:  ? Epsom salts.  ? Baking soda.  ? Colloidal oatmeal.  · Bathe less often.  · Bathe in warm water. Avoid using hot water.  Bandage care  · If you were given a bandage, change it as told by your health care provider.  · Wash your hands with soap and water before and after you change your bandage. If soap and water are not available, use hand .  General instructions  · Avoid the things that caused your reaction. If you do not know what caused it, keep a journal. Write down:  ? What you eat.  ? What skin products you use.  ? What you drink.  ? What you wear in the area that has symptoms. This includes jewelry.  · Check the affected areas every day for signs of infection. Check for:  ? More redness, swelling, or pain.  ? More fluid or blood.  ? Warmth.  ? Pus or a bad smell.  · Keep all follow-up visits as told by your doctor. This is important.  Contact a doctor if:  · You do not get better with treatment.  · Your condition gets worse.  · You have signs of infection, such as:  ? More swelling.  ? Tenderness.  ? More redness.  ? Soreness.  ? Warmth.  · You have a fever.  · You have new symptoms.  Get help right away if:  · You have a very bad headache.  · You have neck pain.  · Your neck is stiff.  · You throw up (vomit).  · You feel very sleepy.  · You see red streaks coming from the area.  · Your bone or joint near the area hurts after the skin has healed.  · The area turns darker.  · You have trouble breathing.  Summary  · Dermatitis is redness, soreness, and swelling of the skin.  · Symptoms may occur where the irritant has touched you.  · Treatment may include medicines and skin care.  · If you do not know what caused  your reaction, keep a journal.  · Contact a doctor if your condition gets worse or you have signs of infection.  This information is not intended to replace advice given to you by your health care provider. Make sure you discuss any questions you have with your health care provider.  Document Released: 10/15/2010 Document Revised: 04/08/2020 Document Reviewed: 07/03/2019  Elsevier Patient Education © 2020 [employer]

## 2020-11-02 DIAGNOSIS — Z79.4 TYPE 2 DIABETES MELLITUS WITH OTHER SPECIFIED COMPLICATION, WITH LONG-TERM CURRENT USE OF INSULIN (HCC): ICD-10-CM

## 2020-11-02 DIAGNOSIS — M81.0 OSTEOPOROSIS, UNSPECIFIED OSTEOPOROSIS TYPE, UNSPECIFIED PATHOLOGICAL FRACTURE PRESENCE: ICD-10-CM

## 2020-11-02 DIAGNOSIS — K21.9 GASTROESOPHAGEAL REFLUX DISEASE: ICD-10-CM

## 2020-11-02 DIAGNOSIS — I10 BENIGN ESSENTIAL HYPERTENSION: ICD-10-CM

## 2020-11-02 DIAGNOSIS — E11.69 TYPE 2 DIABETES MELLITUS WITH OTHER SPECIFIED COMPLICATION, WITH LONG-TERM CURRENT USE OF INSULIN (HCC): ICD-10-CM

## 2020-11-02 DIAGNOSIS — E78.5 HYPERLIPIDEMIA, UNSPECIFIED HYPERLIPIDEMIA TYPE: ICD-10-CM

## 2020-11-02 DIAGNOSIS — D50.8 OTHER IRON DEFICIENCY ANEMIA: Chronic | ICD-10-CM

## 2020-11-02 DIAGNOSIS — L29.9 PRURITUS: ICD-10-CM

## 2020-11-02 NOTE — TELEPHONE ENCOUNTER
Caller: Jaleel Meeta Tang    Relationship: Self    Best call back number: [phone]    Medication needed:   Requested Prescriptions     Pending Prescriptions Disp Refills   • insulin detemir (Levemir FlexTouch) 100 UNIT/ML injection      • alendronate (FOSAMAX) 70 MG tablet 12 tablet 1     Sig: Take 1 tablet by mouth Every 7 (Seven) Days. Every Sunday morning   • omeprazole (priLOSEC) 20 MG capsule 90 capsule 1     Sig: Take 1 capsule by mouth Daily.   • metFORMIN (GLUCOPHAGE) 1000 MG tablet 180 tablet 1     Sig: Take 1 tablet by mouth 2 (Two) Times a Day With Meals.   • losartan (COZAAR) 50 MG tablet 90 tablet 1     Sig: Take 1 tablet by mouth Daily.   • vitamin B-12 (CYANOCOBALAMIN) 1000 MCG tablet 90 tablet 1     Sig: Take 1 tablet by mouth Daily.   • atorvastatin (LIPITOR) 80 MG tablet 90 tablet 1     Sig: Take 1 tablet by mouth Daily.   • glipizide (GLUCOTROL) 10 MG tablet 90 tablet 1     Sig: Take 1 tablet by mouth Daily.   • ferrous sulfate 325 (65 FE) MG tablet        Sig: Take 1 tablet by mouth Daily With Breakfast.   • montelukast (SINGULAIR) 10 MG tablet        Sig: Take 1 tablet by mouth Daily.       When do you need the refill by:   ASAP    What details did the patient provide when requesting the medication:     Patient wanted to request to have following Rx also filled:    Xarelto- 20 mg  Stool Softener    Does the patient have less than a 3 day supply:  [x] Yes  [] No    What is the patient's preferred pharmacy: [facility][id number] [address] [phone] [facility] [phone]

## 2020-11-03 ENCOUNTER — LAB (OUTPATIENT)
Dept: LAB | Facility: HOSPITAL | Age: 65
End: 2020-11-03

## 2020-11-03 ENCOUNTER — TELEPHONE (OUTPATIENT)
Dept: ONCOLOGY | Facility: CLINIC | Age: 65
End: 2020-11-03

## 2020-11-03 DIAGNOSIS — D64.9 ANEMIA, UNSPECIFIED TYPE: ICD-10-CM

## 2020-11-03 DIAGNOSIS — D64.9 ANEMIA, UNSPECIFIED TYPE: Primary | ICD-10-CM

## 2020-11-03 LAB
BASOPHILS # BLD AUTO: 0.05 10*3/MM3 (ref 0–0.2)
BASOPHILS NFR BLD AUTO: 0.7 % (ref 0–1.5)
DEPRECATED RDW RBC AUTO: 44.8 FL (ref 37–54)
EOSINOPHIL # BLD AUTO: 0.17 10*3/MM3 (ref 0–0.4)
EOSINOPHIL NFR BLD AUTO: 2.5 % (ref 0.3–6.2)
ERYTHROCYTE [DISTWIDTH] IN BLOOD BY AUTOMATED COUNT: 14.6 % (ref 12.3–15.4)
FERRITIN SERPL-MCNC: 223.7 NG/ML (ref 13–150)
HCT VFR BLD AUTO: 38.7 % (ref 34–46.6)
HGB BLD-MCNC: 12.5 G/DL (ref 12–15.9)
IMM GRANULOCYTES # BLD AUTO: 0.03 10*3/MM3 (ref 0–0.05)
IMM GRANULOCYTES NFR BLD AUTO: 0.4 % (ref 0–0.5)
IRON 24H UR-MRATE: 78 MCG/DL (ref 37–145)
IRON SATN MFR SERPL: 21 % (ref 20–50)
LYMPHOCYTES # BLD AUTO: 2.23 10*3/MM3 (ref 0.7–3.1)
LYMPHOCYTES NFR BLD AUTO: 33.4 % (ref 19.6–45.3)
MCH RBC QN AUTO: 27.2 PG (ref 26.6–33)
MCHC RBC AUTO-ENTMCNC: 32.3 G/DL (ref 31.5–35.7)
MCV RBC AUTO: 84.1 FL (ref 79–97)
MONOCYTES # BLD AUTO: 0.45 10*3/MM3 (ref 0.1–0.9)
MONOCYTES NFR BLD AUTO: 6.7 % (ref 5–12)
NEUTROPHILS NFR BLD AUTO: 3.74 10*3/MM3 (ref 1.7–7)
NEUTROPHILS NFR BLD AUTO: 56.3 % (ref 42.7–76)
NRBC BLD AUTO-RTO: 0 /100 WBC (ref 0–0.2)
PLATELET # BLD AUTO: 282 10*3/MM3 (ref 140–450)
PMV BLD AUTO: 11.7 FL (ref 6–12)
RBC # BLD AUTO: 4.6 10*6/MM3 (ref 3.77–5.28)
TIBC SERPL-MCNC: 365 MCG/DL (ref 298–536)
TRANSFERRIN SERPL-MCNC: 245 MG/DL (ref 200–360)
WBC # BLD AUTO: 6.67 10*3/MM3 (ref 3.4–10.8)

## 2020-11-03 PROCEDURE — 84466 ASSAY OF TRANSFERRIN: CPT

## 2020-11-03 PROCEDURE — 82728 ASSAY OF FERRITIN: CPT

## 2020-11-03 PROCEDURE — 83540 ASSAY OF IRON: CPT

## 2020-11-03 PROCEDURE — 85025 COMPLETE CBC W/AUTO DIFF WBC: CPT

## 2020-11-04 RX ORDER — ALENDRONATE SODIUM 70 MG/1
70 TABLET ORAL
Qty: 12 TABLET | Refills: 1 | OUTPATIENT
Start: 2020-11-04

## 2020-11-04 RX ORDER — OMEPRAZOLE 20 MG/1
20 CAPSULE, DELAYED RELEASE ORAL DAILY
Qty: 90 CAPSULE | Refills: 1 | OUTPATIENT
Start: 2020-11-04

## 2020-11-04 RX ORDER — LANOLIN ALCOHOL/MO/W.PET/CERES
1000 CREAM (GRAM) TOPICAL DAILY
Qty: 90 TABLET | Refills: 1 | OUTPATIENT
Start: 2020-11-04

## 2020-11-04 RX ORDER — INSULIN DETEMIR 100 [IU]/ML
30 INJECTION, SOLUTION SUBCUTANEOUS NIGHTLY
Qty: 5 PEN | Refills: 0 | Status: SHIPPED | OUTPATIENT
Start: 2020-11-04 | End: 2020-12-23 | Stop reason: SDUPTHER

## 2020-11-04 RX ORDER — LOSARTAN POTASSIUM 50 MG/1
50 TABLET ORAL DAILY
Qty: 90 TABLET | Refills: 1 | OUTPATIENT
Start: 2020-11-04

## 2020-11-04 RX ORDER — INSULIN DETEMIR 100 [IU]/ML
INJECTION, SOLUTION SUBCUTANEOUS
OUTPATIENT
Start: 2020-11-04

## 2020-11-04 RX ORDER — MONTELUKAST SODIUM 10 MG/1
10 TABLET ORAL DAILY
Qty: 90 TABLET | Refills: 1 | OUTPATIENT
Start: 2020-11-04

## 2020-11-04 RX ORDER — ATORVASTATIN CALCIUM 80 MG/1
80 TABLET, FILM COATED ORAL DAILY
Qty: 90 TABLET | Refills: 1 | OUTPATIENT
Start: 2020-11-04

## 2020-11-04 RX ORDER — FERROUS SULFATE 325(65) MG
325 TABLET ORAL
Qty: 30 TABLET | Refills: 5 | Status: SHIPPED | OUTPATIENT
Start: 2020-11-04 | End: 2020-12-17

## 2020-11-04 RX ORDER — FERROUS SULFATE 325(65) MG
325 TABLET ORAL
Qty: 90 TABLET | Refills: 1 | OUTPATIENT
Start: 2020-11-04

## 2020-11-04 RX ORDER — GLIPIZIDE 10 MG/1
10 TABLET ORAL DAILY
Qty: 90 TABLET | Refills: 1 | OUTPATIENT
Start: 2020-11-04

## 2020-11-18 ENCOUNTER — APPOINTMENT (OUTPATIENT)
Dept: ONCOLOGY | Facility: CLINIC | Age: 65
End: 2020-11-18

## 2020-11-18 ENCOUNTER — OFFICE VISIT (OUTPATIENT)
Dept: ONCOLOGY | Facility: CLINIC | Age: 65
End: 2020-11-18

## 2020-11-18 DIAGNOSIS — D68.62 LUPUS ANTICOAGULANT SYNDROME (HCC): Primary | ICD-10-CM

## 2020-11-18 PROCEDURE — 99442 PR PHYS/QHP TELEPHONE EVALUATION 11-20 MIN: CPT | Performed by: NURSE PRACTITIONER

## 2020-11-19 NOTE — PROGRESS NOTES
11/18/2020      This visit has been rescheduled as a video visit to comply with patient safety concerns in accordance with CDC recommendations. Total time of discussion was 11 minutes.    You have chosen to receive care through a telephone visit. Do you consent to use a telephone visit for your medical care today? Yes        REASON FOR VISIT: Pulmonary embolism on Xarelto, anemia,            HISTORY OF PRESENT ILLNESS:    65-year-old female with past medical problems consisting of hypertension, dyslipidemia, poorly controlled diabetes mellitus, history of left knee replacement with revision ×3.  Most recent revision was done on August 28, 2018.  And was diagnosed with subacute pulmonary embolism on August 30, 2018 after revision of left knee and was started on xarelto.  Patient was initially seen in consultation when she was  admitted to hospital on September 21, 2018 with bleeding into her left knee.  Xarelto was discontinued and patient was started on heparin drip with coumadin.  Patient has started back on Xarelto in December 2018.    Patient was last seen by Dr. Nagy in December 2019; B-12 and folic acid was decreased to TIW; she is still taking iron daily. Denies any bleeding.     Review of Systems   Constitutional: Negative.    HENT: Negative.    Eyes: Negative.    Respiratory: Negative.    Cardiovascular: Negative.    Gastrointestinal: Negative.    Endocrine: Negative.    Genitourinary: Negative.    Musculoskeletal: Negative.    Skin: Negative.    Allergic/Immunologic: Negative.    Neurological: Negative.    Hematological: Negative.    Psychiatric/Behavioral: Negative.       1.  Subacute pulmonary embolism on right side:  -Patient was diagnosed with subacute pulmonary embolism on right side after most recent revision of left knee done on August 28, 2018.  -Patient was started on xarelto 15 mg twice daily.  -She was admitted to [facility] on September 20, 2018 with bleeding into her left  knee.  -Patient was started on heparin drip.  -Patient had incision and Drainage done by Dr. Su on September 22, 2018.  -Patient was on Coumadin.  Patient was started back on Xarelto in December 2018.  -CT angiogram of chest with contrast done on February 20, 2019 shows resolution of pulmonary embolism.  -Hypercoagulable workup done consisting of factor V Leyden, prothrombin gene mutation, anticardiolipin antibody, beta-2 glycoprotein antibody were negative on February 20, 2019.  -Lupus anticoagulant was positive on February 20, 2019 as well as Peggy 3, 2019.  In view of lupus anticoagulant being positive, recommend continuing with Xarelto for now.  -Patient was instructed to come to the emergency room if she starts having any bleeding on Xarelto        2.  Anemia:  -Patient required 1 unit of PRBC on September 22, 2018 with hemoglobin of 6.9.  -Anemia work-up  shows hemoglobin is 12.5  -Recommend continuing with ferrous sulfate 1 tablet p.o. daily .  recommend decreasing B12 and folic acid to 1 tablet 3 times a week.  -We will repeat anemia work-up upon next clinic visit in 5 months.

## 2020-12-17 RX ORDER — FERROUS SULFATE 325(65) MG
TABLET ORAL
Qty: 90 TABLET | Refills: 1 | Status: SHIPPED | OUTPATIENT
Start: 2020-12-17 | End: 2021-05-18 | Stop reason: SDUPTHER

## 2020-12-22 ENCOUNTER — TELEPHONE (OUTPATIENT)
Dept: FAMILY MEDICINE CLINIC | Facility: CLINIC | Age: 65
End: 2020-12-22

## 2020-12-22 NOTE — TELEPHONE ENCOUNTER
Left message to see if she would rather see be put on Angelina Lyles Wednesday instead of Dr. Renner today./hub to read

## 2020-12-23 ENCOUNTER — OFFICE VISIT (OUTPATIENT)
Dept: FAMILY MEDICINE CLINIC | Facility: CLINIC | Age: 65
End: 2020-12-23

## 2020-12-23 ENCOUNTER — TELEPHONE (OUTPATIENT)
Dept: FAMILY MEDICINE CLINIC | Facility: CLINIC | Age: 65
End: 2020-12-23

## 2020-12-23 VITALS
HEART RATE: 126 BPM | BODY MASS INDEX: 33.86 KG/M2 | SYSTOLIC BLOOD PRESSURE: 140 MMHG | DIASTOLIC BLOOD PRESSURE: 80 MMHG | OXYGEN SATURATION: 100 % | WEIGHT: 184 LBS | HEIGHT: 62 IN

## 2020-12-23 DIAGNOSIS — Z79.4 TYPE 2 DIABETES MELLITUS WITH OTHER SPECIFIED COMPLICATION, WITH LONG-TERM CURRENT USE OF INSULIN (HCC): ICD-10-CM

## 2020-12-23 DIAGNOSIS — B37.31 VAGINAL YEAST INFECTION: ICD-10-CM

## 2020-12-23 DIAGNOSIS — J18.9 PNEUMONIA DUE TO INFECTIOUS ORGANISM, UNSPECIFIED LATERALITY, UNSPECIFIED PART OF LUNG: ICD-10-CM

## 2020-12-23 DIAGNOSIS — E78.5 HYPERLIPIDEMIA, UNSPECIFIED HYPERLIPIDEMIA TYPE: ICD-10-CM

## 2020-12-23 DIAGNOSIS — Z09 HOSPITAL DISCHARGE FOLLOW-UP: Primary | ICD-10-CM

## 2020-12-23 DIAGNOSIS — I10 BENIGN ESSENTIAL HYPERTENSION: ICD-10-CM

## 2020-12-23 DIAGNOSIS — M81.0 OSTEOPOROSIS, UNSPECIFIED OSTEOPOROSIS TYPE, UNSPECIFIED PATHOLOGICAL FRACTURE PRESENCE: ICD-10-CM

## 2020-12-23 DIAGNOSIS — D50.8 OTHER IRON DEFICIENCY ANEMIA: Chronic | ICD-10-CM

## 2020-12-23 DIAGNOSIS — B37.0 THRUSH: ICD-10-CM

## 2020-12-23 DIAGNOSIS — E11.69 TYPE 2 DIABETES MELLITUS WITH OTHER SPECIFIED COMPLICATION, WITH LONG-TERM CURRENT USE OF INSULIN (HCC): ICD-10-CM

## 2020-12-23 DIAGNOSIS — K21.9 GASTROESOPHAGEAL REFLUX DISEASE, UNSPECIFIED WHETHER ESOPHAGITIS PRESENT: ICD-10-CM

## 2020-12-23 PROCEDURE — 99214 OFFICE O/P EST MOD 30 MIN: CPT | Performed by: NURSE PRACTITIONER

## 2020-12-23 RX ORDER — HYDROXYZINE HYDROCHLORIDE 25 MG/1
25 TABLET, FILM COATED ORAL 3 TIMES DAILY PRN
Qty: 45 TABLET | Refills: 0 | Status: CANCELLED | OUTPATIENT
Start: 2020-12-23

## 2020-12-23 RX ORDER — LIDOCAINE HYDROCHLORIDE 20 MG/ML
SOLUTION OROPHARYNGEAL AS NEEDED
Status: CANCELLED | OUTPATIENT
Start: 2020-12-23

## 2020-12-23 RX ORDER — ALENDRONATE SODIUM 70 MG/1
70 TABLET ORAL
Qty: 12 TABLET | Refills: 1 | Status: SHIPPED | OUTPATIENT
Start: 2020-12-23 | End: 2021-01-05

## 2020-12-23 RX ORDER — ATORVASTATIN CALCIUM 80 MG/1
80 TABLET, FILM COATED ORAL DAILY
Qty: 90 TABLET | Refills: 1 | Status: SHIPPED | OUTPATIENT
Start: 2020-12-23 | End: 2021-01-05 | Stop reason: SDUPTHER

## 2020-12-23 RX ORDER — BROMPHENIRAMIN/PSEUDOEPHEDRINE 1-15MG/5ML
1 LIQUID (ML) ORAL DAILY
Qty: 21 G | Refills: 1 | Status: SHIPPED | OUTPATIENT
Start: 2020-12-23 | End: 2021-02-03

## 2020-12-23 RX ORDER — OMEPRAZOLE 20 MG/1
20 CAPSULE, DELAYED RELEASE ORAL DAILY
Qty: 90 CAPSULE | Refills: 1 | Status: SHIPPED | OUTPATIENT
Start: 2020-12-23 | End: 2021-09-28 | Stop reason: SDUPTHER

## 2020-12-23 RX ORDER — LANOLIN ALCOHOL/MO/W.PET/CERES
1000 CREAM (GRAM) TOPICAL DAILY
Qty: 90 TABLET | Refills: 1 | Status: SHIPPED | OUTPATIENT
Start: 2020-12-23 | End: 2021-07-13

## 2020-12-23 RX ORDER — FERROUS SULFATE 325(65) MG
1 TABLET ORAL
Qty: 90 TABLET | Refills: 1 | Status: CANCELLED | OUTPATIENT
Start: 2020-12-23

## 2020-12-23 RX ORDER — PEN NEEDLE, DIABETIC 32GX 5/32"
NEEDLE, DISPOSABLE MISCELLANEOUS DAILY
Status: CANCELLED | OUTPATIENT
Start: 2020-12-23

## 2020-12-23 RX ORDER — FLUCONAZOLE 150 MG/1
TABLET ORAL
Qty: 2 TABLET | Refills: 0 | Status: SHIPPED | OUTPATIENT
Start: 2020-12-23 | End: 2021-01-05

## 2020-12-23 RX ORDER — INSULIN DETEMIR 100 [IU]/ML
30 INJECTION, SOLUTION SUBCUTANEOUS NIGHTLY
Qty: 5 PEN | Refills: 0 | Status: SHIPPED | OUTPATIENT
Start: 2020-12-23 | End: 2021-02-03

## 2020-12-23 RX ORDER — MONTELUKAST SODIUM 10 MG/1
10 TABLET ORAL DAILY
Status: CANCELLED | OUTPATIENT
Start: 2020-12-23

## 2020-12-23 RX ORDER — SEMAGLUTIDE 1.34 MG/ML
0.25 INJECTION, SOLUTION SUBCUTANEOUS WEEKLY
Qty: 1.5 ML | Refills: 0 | Status: SHIPPED | OUTPATIENT
Start: 2020-12-23 | End: 2021-01-05

## 2020-12-23 RX ORDER — GLIPIZIDE 10 MG/1
10 TABLET ORAL DAILY
Qty: 30 TABLET | Refills: 0 | Status: SHIPPED | OUTPATIENT
Start: 2020-12-23 | End: 2021-01-05

## 2020-12-23 RX ORDER — DOCUSATE SODIUM 100 MG/1
100 CAPSULE, LIQUID FILLED ORAL DAILY
Status: CANCELLED | OUTPATIENT
Start: 2020-12-23

## 2020-12-23 RX ORDER — LOSARTAN POTASSIUM 50 MG/1
50 TABLET ORAL DAILY
Qty: 90 TABLET | Refills: 1 | Status: SHIPPED | OUTPATIENT
Start: 2020-12-23 | End: 2021-01-05

## 2020-12-23 NOTE — TELEPHONE ENCOUNTER
Called and notified patient her chest xray was normal and that as long as her O2 levels stay above 95 on room air she can stay off the oxygen.

## 2020-12-23 NOTE — PROGRESS NOTES
Chief Complaint   Patient presents with   • Hospital Follow Up Visit       Subjective:  Meeta Marmolejo is a 65 y.o. female who presents for hospital follow up for admission r/t SOA/resp distress r/t COVID + and pneumonia - only ER record available for review. Reports spending 4 days in hospital but was never intubated - reports  also tested positive and was in hospital at same time. Reports was dx with pneumonia and is currently finishing levofloxacin and steroid. Pt reports she is feeling much better - denies fever, SOA, cough, loss of taste/smell. Is currently on 2L O2 via NC with 100% spO2 - oxygen removed for entire visit time approx 30-40 min and O2 on RA 97%. Does report white coating in mouth that is sore - also has some vaginal irritation - is uncontrolled diabetic and reports BS while in hospital ranged from 151-260.      The following portions of the patient's history were reviewed and updated as appropriate: allergies, current medications, past family history, past medical history, past social history, past surgical history and problem list.    Pneumonia  There is no cough, difficulty breathing, shortness of breath, sputum production or wheezing. This is a new problem. The current episode started in the past 7 days. The problem has been rapidly improving. Associated symptoms include heartburn and malaise/fatigue. Pertinent negatives include no chest pain, dyspnea on exertion, fever, headaches, myalgias, nasal congestion or sore throat. Her symptoms are aggravated by strenuous activity. Her symptoms are alleviated by rest. She reports significant improvement on treatment. Her past medical history is significant for pneumonia.   Diabetes  She presents for her follow-up diabetic visit. She has type 2 diabetes mellitus. Her disease course has been fluctuating. Pertinent negatives for hypoglycemia include no dizziness or headaches. Associated symptoms include fatigue. Pertinent negatives for  diabetes include no chest pain. Risk factors for coronary artery disease include diabetes mellitus, dyslipidemia, obesity and hypertension. Current diabetic treatment includes insulin injections and oral agent (triple therapy). She is compliant with treatment most of the time. An ACE inhibitor/angiotensin II receptor blocker is being taken.   Heartburn  She complains of heartburn. She reports no abdominal pain, no chest pain, no coughing, no nausea, no sore throat or no wheezing. This is a chronic problem. The problem occurs rarely. The problem has been unchanged. The heartburn is located in the substernum. The heartburn is of mild intensity. The symptoms are aggravated by certain foods and stress. Associated symptoms include fatigue. Risk factors include obesity. She has tried a PPI for the symptoms. The treatment provided significant relief.   Mouth Lesions   The current episode started 5 to 7 days ago. The problem has been unchanged. The problem is moderate. The symptoms are aggravated by eating. Associated symptoms include mouth sores. Pertinent negatives include no fever, no abdominal pain, no constipation, no diarrhea, no nausea, no vomiting, no congestion, no headaches, no sore throat, no cough, no wheezing and no rash.   Vaginitis  This is a new problem. The current episode started in the past 7 days. The problem has been unchanged. Associated symptoms include fatigue. Pertinent negatives include no abdominal pain, chest pain, chills, congestion, coughing, diaphoresis, fever, headaches, myalgias, nausea, rash, sore throat or vomiting.        Past Medical History:   Diagnosis Date   • Acute vaginitis     resolved   • Carpal tunnel syndrome    • Chronic osteoarthritis     Nino TKA   • Chronic pain    • Chronic urticaria    • Dietary counseling and surveillance    • Dysuria    • External hordeolum    • Hyperlipidemia    • Hypertension    • Low back pain     strain   • Microalbuminuria     on ARB   •  Noncompliance with treatment    • Obesity      Dieting , exercise, appetite suppressant      • Osteoarthritis of left knee    • Plantar fasciitis     resolved   • Seasonal rhinitis    • Type 2 diabetes mellitus (CMS/HCC)    • Urinary tract infectious disease    • Vaginal discharge    • Vitamin deficiency          Current Outpatient Medications:   •  alendronate (FOSAMAX) 70 MG tablet, Take 1 tablet by mouth Every 7 (Seven) Days. Every Sunday morning, Disp: 12 tablet, Rfl: 1  •  atorvastatin (LIPITOR) 80 MG tablet, Take 1 tablet by mouth Daily., Disp: 90 tablet, Rfl: 1  •  BD PEN NEEDLE CITLALI U/F 32G X 4 MM misc, Daily., Disp: , Rfl:   •  docusate sodium (COLACE) 100 MG capsule, Take 100 mg by mouth Daily., Disp: , Rfl:   •  ferrous sulfate 325 (65 FE) MG tablet, TAKE 1 TABLET BY MOUTH EVERY DAY WITH BREAKFAST, Disp: 90 tablet, Rfl: 1  •  glipizide (GLUCOTROL) 10 MG tablet, Take 1 tablet by mouth Daily., Disp: 30 tablet, Rfl: 0  •  hydrOXYzine (ATARAX) 25 MG tablet, Take 1 tablet by mouth 3 (Three) Times a Day As Needed for Itching or Allergies., Disp: 45 tablet, Rfl: 0  •  insulin detemir (Levemir FlexTouch) 100 UNIT/ML injection, Inject 30 Units under the skin into the appropriate area as directed Every Night., Disp: 5 pen, Rfl: 0  •  losartan (COZAAR) 50 MG tablet, Take 1 tablet by mouth Daily., Disp: 90 tablet, Rfl: 1  •  metFORMIN (GLUCOPHAGE) 1000 MG tablet, Take 1 tablet by mouth 2 (Two) Times a Day With Meals., Disp: 60 tablet, Rfl: 0  •  montelukast (SINGULAIR) 10 MG tablet, Take 10 mg by mouth Daily., Disp: , Rfl:   •  omeprazole (priLOSEC) 20 MG capsule, Take 1 capsule by mouth Daily., Disp: 90 capsule, Rfl: 1  •  rivaroxaban (XARELTO) 20 MG tablet, Take 1 tablet by mouth Daily., Disp: 30 tablet, Rfl: 2  •  Semaglutide,0.25 or 0.5MG/DOS, (Ozempic, 0.25 or 0.5 MG/DOSE,) 2 MG/1.5ML solution pen-injector, Inject 0.25 mg under the skin into the appropriate area as directed 1 (One) Time Per Week., Disp: 1.5 mL,  "Rfl: 0  •  triamcinolone (KENALOG) 0.1 % ointment, Apply  topically to the appropriate area as directed 2 (Two) Times a Day., Disp: 80 g, Rfl: 0  •  vitamin B-12 (CYANOCOBALAMIN) 1000 MCG tablet, Take 1 tablet by mouth Daily., Disp: 90 tablet, Rfl: 1  •  Clotrimazole (Clotrimazole 3) 2 % vaginal cream, Insert 1 application into the vagina Daily., Disp: 21 g, Rfl: 1  •  fluconazole (DIFLUCAN) 150 MG tablet, Take one tab PO now and repeat dose at end of antibiotic course, Disp: 2 tablet, Rfl: 0  •  nystatin (MYCOSTATIN) 612551 UNIT/ML suspension, Swish and swallow 5 mL 4 (Four) Times a Day., Disp: 473 mL, Rfl: 0    Review of Systems    Review of Systems   Constitutional: Positive for fatigue and malaise/fatigue. Negative for chills, diaphoresis and fever.   HENT: Positive for mouth sores. Negative for congestion and sore throat.    Respiratory: Negative for cough, sputum production, shortness of breath and wheezing.    Cardiovascular: Negative for chest pain, dyspnea on exertion and palpitations.   Gastrointestinal: Positive for heartburn. Negative for abdominal pain, constipation, diarrhea, nausea and vomiting.   Genitourinary: Positive for vaginal pain (irritation). Negative for dysuria.   Musculoskeletal: Negative for myalgias.   Skin: Negative for rash.   Neurological: Negative for dizziness, syncope, light-headedness and headaches.   Hematological: Negative for adenopathy.   Psychiatric/Behavioral: Negative for sleep disturbance.       Objective  Vitals:    12/23/20 0832   BP: 140/80   Pulse: (!) 126   SpO2: 100%   Weight: 83.5 kg (184 lb)   Height: 157.5 cm (62\")   PainSc: 0-No pain       Physical Exam  Vitals signs and nursing note reviewed.   Constitutional:       General: She is not in acute distress.     Appearance: Normal appearance. She is obese. She is not ill-appearing.      Comments:  present today with pt; pt wearing oxygen at 2L via NC   HENT:      Head: Normocephalic and atraumatic.      Right " Ear: Tympanic membrane, ear canal and external ear normal.      Left Ear: Tympanic membrane, ear canal and external ear normal.      Nose: Nose normal.      Mouth/Throat:      Mouth: Mucous membranes are moist. No oral lesions.      Pharynx: Oropharynx is clear.      Comments: Oral mucosa erythematous with white patches consistent with thrush  Eyes:      Conjunctiva/sclera: Conjunctivae normal.      Pupils: Pupils are equal, round, and reactive to light.   Neck:      Musculoskeletal: Normal range of motion and neck supple.   Cardiovascular:      Rate and Rhythm: Regular rhythm. Tachycardia present.      Heart sounds: Normal heart sounds.   Pulmonary:      Effort: Pulmonary effort is normal. No respiratory distress.      Breath sounds: Normal breath sounds. No wheezing or rhonchi.   Genitourinary:     Comments: Pt reports vaginal irritation  Musculoskeletal: Normal range of motion.   Lymphadenopathy:      Cervical: No cervical adenopathy.   Skin:     General: Skin is warm and dry.   Neurological:      General: No focal deficit present.      Mental Status: She is alert and oriented to person, place, and time.   Psychiatric:         Mood and Affect: Mood normal.         Behavior: Behavior normal.         Thought Content: Thought content normal.         Judgment: Judgment normal.         No results found for this or any previous visit (from the past 672 hour(s)).    No Images in the past 120 days found.    Diagnoses and all orders for this visit:    1. Hospital discharge follow-up (Primary)    2. Pneumonia due to infectious organism, unspecified laterality, unspecified part of lung  -     XR Chest PA & Lateral (In Office)    3. Thrush  -     nystatin (MYCOSTATIN) 105963 UNIT/ML suspension; Swish and swallow 5 mL 4 (Four) Times a Day.  Dispense: 473 mL; Refill: 0    4. Vaginal yeast infection  -     Clotrimazole (Clotrimazole 3) 2 % vaginal cream; Insert 1 application into the vagina Daily.  Dispense: 21 g; Refill: 1  -      fluconazole (DIFLUCAN) 150 MG tablet; Take one tab PO now and repeat dose at end of antibiotic course  Dispense: 2 tablet; Refill: 0    5. Benign essential hypertension  -     losartan (COZAAR) 50 MG tablet; Take 1 tablet by mouth Daily.  Dispense: 90 tablet; Refill: 1    6. Type 2 diabetes mellitus with other specified complication, with long-term current use of insulin (CMS/[facility])  -     glipizide (GLUCOTROL) 10 MG tablet; Take 1 tablet by mouth Daily.  Dispense: 30 tablet; Refill: 0  -     insulin detemir (Levemir FlexTouch) 100 UNIT/ML injection; Inject 30 Units under the skin into the appropriate area as directed Every Night.  Dispense: 5 pen; Refill: 0  -     metFORMIN (GLUCOPHAGE) 1000 MG tablet; Take 1 tablet by mouth 2 (Two) Times a Day With Meals.  Dispense: 60 tablet; Refill: 0  -     Semaglutide,0.25 or 0.5MG/DOS, (Ozempic, 0.25 or 0.5 MG/DOSE,) 2 MG/1.5ML solution pen-injector; Inject 0.25 mg under the skin into the appropriate area as directed 1 (One) Time Per Week.  Dispense: 1.5 mL; Refill: 0    7. Hyperlipidemia, unspecified hyperlipidemia type  -     atorvastatin (LIPITOR) 80 MG tablet; Take 1 tablet by mouth Daily.  Dispense: 90 tablet; Refill: 1    8. Gastroesophageal reflux disease, unspecified whether esophagitis present  -     omeprazole (priLOSEC) 20 MG capsule; Take 1 capsule by mouth Daily.  Dispense: 90 capsule; Refill: 1    9. Osteoporosis, unspecified osteoporosis type, unspecified pathological fracture presence  -     alendronate (FOSAMAX) 70 MG tablet; Take 1 tablet by mouth Every 7 (Seven) Days. Every Sunday morning  Dispense: 12 tablet; Refill: 1    10. Other iron deficiency anemia  -     vitamin B-12 (CYANOCOBALAMIN) 1000 MCG tablet; Take 1 tablet by mouth Daily.  Dispense: 90 tablet; Refill: 1    Other orders  -     Cancel: BD Pen Needle Ember U/F 32G X 4 MM misc; Daily.  -     Cancel: docusate sodium (COLACE) 100 MG capsule; Take 1 capsule by mouth Daily.  -     Cancel: ferrous  sulfate 325 (65 FE) MG tablet; Take 1 tablet by mouth Daily With Breakfast.  Dispense: 90 tablet; Refill: 1  -     Cancel: hydrOXYzine (ATARAX) 25 MG tablet; Take 1 tablet by mouth 3 (Three) Times a Day As Needed for Itching or Allergies.  Dispense: 45 tablet; Refill: 0  -     Cancel: montelukast (SINGULAIR) 10 MG tablet; Take 1 tablet by mouth Daily.  -     Cancel: rivaroxaban (XARELTO) 20 MG tablet; Take 1 tablet by mouth Daily.  Dispense: 30 tablet; Refill: 2  -     Cancel: Lidocaine Viscous HCl (XYLOCAINE) 2 % solution; Take  by mouth As Needed for Mild Pain .    1. Hospital f/u - no hosp DC records for review - pt brought pt DC paperwork that was reviewed during visit. Request sent for records.    2. Pneumonia - currently on oxygen via NC but is doing well on RA while at visit. Discussed pt may DC oxygen as long as O2 remains above 95%. Will re-eval at f/u visit.   - CXR ordered today to verify pneumonia resolution    3. Thrush - most likely r/t COVID and/or medications currently taking for pneumonia.   - Nystatin swish and swallow ordered - S&S 4x daily    4. Vaginal yeast - most likely r/t abx and steroid   - Clotrimazole prescribed to apply external vaginal area for irritation   - Fluconazole prescribed x 2 doses for yeast r/t abx use.    - Refill of chronic meds - pt to have diabetes meds managed by endo and xarelto managed by hem/onc.  - RTC 1 week for f/u or PRN.      This document has been electronically signed by ZELDA Solares on December 23, 2020 22:09 CST

## 2020-12-30 ENCOUNTER — OFFICE VISIT (OUTPATIENT)
Dept: FAMILY MEDICINE CLINIC | Facility: CLINIC | Age: 65
End: 2020-12-30

## 2020-12-30 VITALS
BODY MASS INDEX: 36.07 KG/M2 | OXYGEN SATURATION: 96 % | HEIGHT: 62 IN | TEMPERATURE: 97.3 F | SYSTOLIC BLOOD PRESSURE: 138 MMHG | HEART RATE: 122 BPM | WEIGHT: 196 LBS | DIASTOLIC BLOOD PRESSURE: 80 MMHG

## 2020-12-30 DIAGNOSIS — J18.9 PNEUMONIA DUE TO INFECTIOUS ORGANISM, UNSPECIFIED LATERALITY, UNSPECIFIED PART OF LUNG: Primary | ICD-10-CM

## 2020-12-30 PROCEDURE — 99213 OFFICE O/P EST LOW 20 MIN: CPT | Performed by: NURSE PRACTITIONER

## 2020-12-30 RX ORDER — LOSARTAN POTASSIUM 100 MG/1
TABLET ORAL
COMMUNITY
Start: 2020-12-23 | End: 2021-03-31 | Stop reason: SDUPTHER

## 2021-01-05 ENCOUNTER — OFFICE VISIT (OUTPATIENT)
Dept: ENDOCRINOLOGY | Facility: CLINIC | Age: 66
End: 2021-01-05

## 2021-01-05 ENCOUNTER — TELEPHONE (OUTPATIENT)
Dept: ENDOCRINOLOGY | Facility: CLINIC | Age: 66
End: 2021-01-05

## 2021-01-05 VITALS
DIASTOLIC BLOOD PRESSURE: 70 MMHG | OXYGEN SATURATION: 99 % | HEIGHT: 62 IN | SYSTOLIC BLOOD PRESSURE: 112 MMHG | BODY MASS INDEX: 36.67 KG/M2 | WEIGHT: 199.3 LBS | HEART RATE: 111 BPM

## 2021-01-05 DIAGNOSIS — Z79.4 TYPE 2 DIABETES MELLITUS WITH HYPERGLYCEMIA, WITH LONG-TERM CURRENT USE OF INSULIN (HCC): Primary | ICD-10-CM

## 2021-01-05 DIAGNOSIS — I15.2 HYPERTENSION ASSOCIATED WITH DIABETES (HCC): ICD-10-CM

## 2021-01-05 DIAGNOSIS — E11.65 TYPE 2 DIABETES MELLITUS WITH HYPERGLYCEMIA, WITH LONG-TERM CURRENT USE OF INSULIN (HCC): Primary | ICD-10-CM

## 2021-01-05 DIAGNOSIS — E11.59 HYPERTENSION ASSOCIATED WITH DIABETES (HCC): ICD-10-CM

## 2021-01-05 DIAGNOSIS — E11.69 MIXED DIABETIC HYPERLIPIDEMIA ASSOCIATED WITH TYPE 2 DIABETES MELLITUS (HCC): ICD-10-CM

## 2021-01-05 DIAGNOSIS — M81.0 AGE-RELATED OSTEOPOROSIS WITHOUT CURRENT PATHOLOGICAL FRACTURE: ICD-10-CM

## 2021-01-05 DIAGNOSIS — E78.2 MIXED DIABETIC HYPERLIPIDEMIA ASSOCIATED WITH TYPE 2 DIABETES MELLITUS (HCC): ICD-10-CM

## 2021-01-05 DIAGNOSIS — E78.5 HYPERLIPIDEMIA, UNSPECIFIED HYPERLIPIDEMIA TYPE: ICD-10-CM

## 2021-01-05 PROBLEM — M25.562 LEFT KNEE PAIN: Status: RESOLVED | Noted: 2018-10-01 | Resolved: 2021-01-05

## 2021-01-05 PROBLEM — E78.00 PRIMARY HYPERCHOLESTEROLEMIA: Status: RESOLVED | Noted: 2018-08-27 | Resolved: 2021-01-05

## 2021-01-05 PROBLEM — H10.10 ALLERGIC CONJUNCTIVITIS: Status: RESOLVED | Noted: 2017-03-24 | Resolved: 2021-01-05

## 2021-01-05 PROBLEM — E83.52 HYPERCALCEMIA: Status: RESOLVED | Noted: 2019-12-31 | Resolved: 2021-01-05

## 2021-01-05 PROBLEM — Z71.89 ENCOUNTER FOR ANTICOAGULATION DISCUSSION AND COUNSELING: Status: RESOLVED | Noted: 2018-10-08 | Resolved: 2021-01-05

## 2021-01-05 PROBLEM — IMO0002 UNCONTROLLED TYPE 2 DIABETES MELLITUS: Status: RESOLVED | Noted: 2018-08-27 | Resolved: 2021-01-05

## 2021-01-05 PROBLEM — Z96.659 PAINFUL TOTAL KNEE REPLACEMENT, INITIAL ENCOUNTER: Status: RESOLVED | Noted: 2018-06-27 | Resolved: 2021-01-05

## 2021-01-05 PROBLEM — M00.062 STAPHYLOCOCCAL ARTHRITIS OF LEFT KNEE: Status: RESOLVED | Noted: 2018-09-20 | Resolved: 2021-01-05

## 2021-01-05 PROBLEM — I26.99 OTHER PULMONARY EMBOLISM WITHOUT ACUTE COR PULMONALE: Status: RESOLVED | Noted: 2018-10-04 | Resolved: 2021-01-05

## 2021-01-05 PROBLEM — T84.84XA PAINFUL TOTAL KNEE REPLACEMENT, INITIAL ENCOUNTER: Status: RESOLVED | Noted: 2018-06-27 | Resolved: 2021-01-05

## 2021-01-05 PROBLEM — J18.9 PNEUMONIA DUE TO INFECTIOUS ORGANISM: Status: RESOLVED | Noted: 2020-12-23 | Resolved: 2021-01-05

## 2021-01-05 PROBLEM — M00.9 SEPTIC JOINT OF LEFT KNEE JOINT (HCC): Status: RESOLVED | Noted: 2018-09-20 | Resolved: 2021-01-05

## 2021-01-05 PROBLEM — E04.9 GOITER: Status: RESOLVED | Noted: 2020-07-07 | Resolved: 2021-01-05

## 2021-01-05 PROBLEM — N76.0 ACUTE VAGINITIS: Status: RESOLVED | Noted: 2020-06-30 | Resolved: 2021-01-05

## 2021-01-05 PROCEDURE — 99204 OFFICE O/P NEW MOD 45 MIN: CPT | Performed by: INTERNAL MEDICINE

## 2021-01-05 RX ORDER — INSULIN LISPRO 100 [IU]/ML
INJECTION, SOLUTION INTRAVENOUS; SUBCUTANEOUS
Qty: 3 PEN | Refills: 11 | Status: SHIPPED | OUTPATIENT
Start: 2021-01-05 | End: 2021-11-29 | Stop reason: SDUPTHER

## 2021-01-05 RX ORDER — PEN NEEDLE, DIABETIC 30 GX3/16"
1 NEEDLE, DISPOSABLE MISCELLANEOUS 4 TIMES DAILY
Qty: 120 EACH | Refills: 11 | Status: SHIPPED | OUTPATIENT
Start: 2021-01-05 | End: 2021-11-29 | Stop reason: SDUPTHER

## 2021-01-05 RX ORDER — SEMAGLUTIDE 1.34 MG/ML
INJECTION, SOLUTION SUBCUTANEOUS
Qty: 1 PEN | Refills: 11 | Status: SHIPPED | OUTPATIENT
Start: 2021-01-05 | End: 2021-11-29 | Stop reason: SDUPTHER

## 2021-01-05 RX ORDER — ATORVASTATIN CALCIUM 80 MG/1
80 TABLET, FILM COATED ORAL NIGHTLY
Qty: 90 TABLET | Refills: 3 | Status: SHIPPED | OUTPATIENT
Start: 2021-01-05 | End: 2021-08-06

## 2021-01-05 RX ORDER — IBUPROFEN 600 MG/1
1 TABLET ORAL ONCE AS NEEDED
Qty: 1 EACH | Refills: 1 | Status: SHIPPED | OUTPATIENT
Start: 2021-01-05 | End: 2022-01-10 | Stop reason: SDUPTHER

## 2021-01-05 NOTE — PATIENT INSTRUCTIONS
Stop Glipizide     Keep Metformin 1000 mg twice a day    No Jardiance since associated with [facility]    Levemir 30 units at night ---- continue but if you ever wake up with a sugar less than 80 ----- decrease to 25    Ozempic -- increase from 0.25 mg weekly to 0.5 mg weekly     We will start a new insulin called either novolog/humalog/admelog as a correction before meals as follows    Less than 180 --- nothing  181-200 --- 2units  201-250  ---- 4 units  251-300  ----  6units  Above 300 ----- 8 units    3 days prior to coming back please have a food log     On atorvastatin 80 mg in the morning -- change to the night

## 2021-01-05 NOTE — PROGRESS NOTES
"Chief Complaint  Establish Care (type 2 diabetes )    Subjective          Meeta Marmolejo presents to [facility] for   History of Present Illness     65 y o female comes to establish care for type 2 Diabetes since age 31 years old    No macrovascular complications  She has microalbuminuria, no retinopathy ( eye exam 2020 ) , no neuropathy    Considers she eats healthy    Limited Exercise, multiple OA     Checks her glucose 4 x daily , injects insulin 4 x daily   BG range 50 to 500    Recent COVID pneumonia in Dec 2020     Objective   Vital Signs:   /70 (BP Location: Right arm, Patient Position: Sitting, Cuff Size: Large Adult)   Pulse 111   Ht 157.5 cm (62\")   Wt 90.4 kg (199 lb 4.8 oz)   SpO2 99%   BMI 36.45 kg/m²     Physical Exam  Constitutional:       Appearance: Normal appearance.   HENT:      Head: Normocephalic.   Eyes:      Conjunctiva/sclera: Conjunctivae normal.   Neck:      Musculoskeletal: Normal range of motion and neck supple.      Comments: No goiter  Cardiovascular:      Rate and Rhythm: Regular rhythm. Tachycardia present.   Pulmonary:      Effort: Pulmonary effort is normal.      Breath sounds: Normal breath sounds.   Musculoskeletal:      Right lower leg: No edema.      Left lower leg: No edema.      Right foot: Bunion present.      Left foot: Bunion present.   Feet:      Right foot:      Skin integrity: Callus present.      Left foot:      Skin integrity: Callus present.   Skin:     General: Skin is warm.   Neurological:      Mental Status: She is alert.        Result Review :   The following data was reviewed by: Sg Patel MD on 01/05/2021:  Labs detailed in assessment and plan               Assessment and Plan    Problem List Items Addressed This Visit     None      Visit Diagnoses     Type 2 diabetes mellitus with hyperglycemia, with long-term current use of insulin (CMS/[facility])    -  Primary    Hypertension associated with " "diabetes (CMS/[facility])        Mixed diabetic hyperlipidemia associated with type 2 diabetes mellitus (CMS/[facility])            Type 2 Diabetes    Lab Results   Component Value Date    HGBA1C  10/05/2020      Comment:      result \"out of range\"    HGBA1C 13.20 (H) 07/07/2020    HGBA1C 10.5 (H) 01/03/2017     Lab Results   Component Value Date    MICROALBUR 4.8 07/07/2020    CREATININE 0.54 (L) 07/07/2020         Stop Glipizide     Keep Metformin 1000 mg twice a day    No Jardiance since associated with [facility]    Levemir 30 units at night ---- continue but if you ever wake up with a sugar less than 80 ----- decrease to 25    Ozempic -- increase from 0.25 mg weekly to 0.5 mg weekly     We will start a new insulin called either novolog/humalog/admelog as a correction before meals as follows    Less than 180 --- nothing  181-200 --- 2units  201-250  ---- 4 units  251-300  ----  6units  Above 300 ----- 8 units    3 days prior to coming back please have a food log     Approve for  Insulin pump and or Continuous Glucose Sensor     #1  Patient has diabetes mellitus, insulin-dependent.    #2 She performs blood glucose testing at least 4 times daily and blood glucose log was brought to office with variability from .    #3  She is requiring  Basal insulin  and Prandial Insulin for a total of at least  4 injections or boluses per day and has been doing this for at least 6 months     #4 Patient tests blood sugars at least 4 times daily and makes frequent self-adjustments based on information provided by fingerstick and patient is injecting insulin at least 4 times daily. She has been doing this for more than 6 months . She tests frequently due to hypoglycemia and hyperglycemia.   She has frequent variability with activity     #5 I have personally seen patient within the past 6 months    #6 We plan on seeing her every 2-3 months for continuous adjustment of her diabetes regimen     #7 patient has hypoglycemia with episodes of " unawareness.    #8 patient has day-to-day variation in her mealtime which confounds the degree of insulin dosing with multiple daily injections.    #9 patient has completed diabetes education program with us.    #10 she has demonstrated the ability to self monitor her glucose.        #11 Patient is motivated in improving  diabetes control       ===========     Hypertension     Basic Metabolic Panel    Sodium No results found for: NA   Potassium No results found for: K   Chloride No results found for: CL   Bicarbonate No results found for: PLASMABICARB   BUN No results found for: BUN   Creatinine No results found for: CREATININE   Calcium No results found for: CALCIUM   Glucose      No components found for: GLUCOSE.*         On losartan 100 mg daily   Heart Rate is elevated     ===========    Dyslipidemia    On atorvastatin 80 mg in the morning -- change to the night     Lab Results   Component Value Date    CHOL 157 07/07/2020    CHLPL 147 09/30/2016    TRIG 133 07/07/2020    HDL 49 07/07/2020    LDL 81 07/07/2020       ==========    History of osteoporosis    On alendronate for at least since 2013    I reviewed her bone density from 2013 and it was osteopenia    I may stop     Need new bone density    ============    In her history there is goiter    No proof of that     Not felt on physical exam           Follow Up   No follow-ups on file.  Patient was given instructions and counseling regarding her condition or for health maintenance advice. Please see specific information pulled into the AVS if appropriate.

## 2021-01-06 ENCOUNTER — OFFICE VISIT (OUTPATIENT)
Dept: FAMILY MEDICINE CLINIC | Facility: CLINIC | Age: 66
End: 2021-01-06

## 2021-01-06 VITALS
WEIGHT: 200.8 LBS | BODY MASS INDEX: 36.95 KG/M2 | HEART RATE: 110 BPM | DIASTOLIC BLOOD PRESSURE: 64 MMHG | SYSTOLIC BLOOD PRESSURE: 124 MMHG | HEIGHT: 62 IN | OXYGEN SATURATION: 98 %

## 2021-01-06 DIAGNOSIS — Z00.00 INITIAL MEDICARE ANNUAL WELLNESS VISIT: Primary | ICD-10-CM

## 2021-01-06 PROCEDURE — G0438 PPPS, INITIAL VISIT: HCPCS | Performed by: NURSE PRACTITIONER

## 2021-01-06 NOTE — PROGRESS NOTES
The ABCs of the Annual Wellness Visit  Initial Medicare Wellness Visit    Chief Complaint   Patient presents with   • Medicare Wellness-Initial Visit       Subjective   History of Present Illness:  Meeta Marmolejo is a 65 y.o. female who presents for an Initial Medicare Wellness Visit.    HEALTH RISK ASSESSMENT    Recent Hospitalizations:  Recently treated at the following:  Other: [facility] for COVID    Current Medical Providers:  Patient Care Team:  Angelina Lyles APRN as PCP - General (Family Medicine)  Elias Su MD as Consulting Physician (Orthopedic Surgery)  Oscar Braga MD as Consulting Physician (Internal Medicine)  Otf Nagy MD as Consulting Physician (Hematology and Oncology)  Estephania Felix APRN as Nurse Practitioner (Oncology)    Smoking Status:  Social History     Tobacco Use   Smoking Status Former Smoker   • Packs/day: 1.00   • Years: 6.00   • Pack years: 6.00   • Quit date: 2006   • Years since quitting: 15.0   Smokeless Tobacco Never Used       Alcohol Consumption:  Social History     Substance and Sexual Activity   Alcohol Use No       Depression Screen:   PHQ-2/PHQ-9 Depression Screening 1/6/2021   Little interest or pleasure in doing things 0   Feeling down, depressed, or hopeless 0   Trouble falling or staying asleep, or sleeping too much -   Feeling tired or having little energy -   Poor appetite or overeating -   Feeling bad about yourself - or that you are a failure or have let yourself or your family down -   Trouble concentrating on things, such as reading the newspaper or watching television -   Moving or speaking so slowly that other people could have noticed. Or the opposite - being so fidgety or restless that you have been moving around a lot more than usual -   Thoughts that you would be better off dead, or of hurting yourself in some way -   Total Score 0   If you checked off any problems, how difficult have these problems made it for  you to do your work, take care of things at home, or get along with other people? -       Fall Risk Screen:  ANN Fall Risk Assessment has not been completed.    Health Habits and Functional and Cognitive Screening:  Functional & Cognitive Status 1/6/2021   Do you have difficulty preparing food and eating? No   Do you have difficulty bathing yourself, getting dressed or grooming yourself? No   Do you have difficulty using the toilet? No   Do you have difficulty moving around from place to place? No   Do you have trouble with steps or getting out of a bed or a chair? Yes   Current Diet Well Balanced Diet   Dental Exam Up to date   Eye Exam Up to date   Exercise (times per week) 7 times per week   Current Exercise Activities Include Housecleaning   Do you need help using the phone?  No   Are you deaf or do you have serious difficulty hearing?  No   Do you need help with transportation? No   Do you need help shopping? No   Do you need help preparing meals?  No   Do you need help with housework?  No   Do you need help with laundry? No   Do you need help taking your medications? No   Do you need help managing money? No   Do you ever drive or ride in a car without wearing a seat belt? No   Have you felt unusual stress, anger or loneliness in the last month? Yes   Who do you live with? Spouse   If you need help, do you have trouble finding someone available to you? No   Have you been bothered in the last four weeks by sexual problems? No   Do you have difficulty concentrating, remembering or making decisions? No         Does the patient have evidence of cognitive impairment? Yes    Asprin use counseling:Does not need ASA (and currently is not on it)    Age-appropriate Screening Schedule:  Refer to the list below for future screening recommendations based on patient's age, sex and/or medical conditions. Orders for these recommended tests are listed in the plan section. The patient has been provided with a written  plan.    Health Maintenance   Topic Date Due   • ZOSTER VACCINE (1 of 2) 10/22/2006   • DIABETIC FOOT EXAM  03/29/2017   • DXA SCAN  07/07/2020   • MAMMOGRAM  07/24/2020   • DIABETIC EYE EXAM  10/01/2020   • HEMOGLOBIN A1C  04/05/2021   • LIPID PANEL  07/07/2021   • URINE MICROALBUMIN  07/07/2021   • PAP SMEAR  07/21/2023   • TDAP/TD VACCINES (3 - Td) 02/05/2028   • COLONOSCOPY  05/18/2028   • INFLUENZA VACCINE  Completed          The following portions of the patient's history were reviewed and updated as appropriate: allergies, current medications, past family history, past medical history, past social history, past surgical history and problem list.    Outpatient Medications Prior to Visit   Medication Sig Dispense Refill   • atorvastatin (LIPITOR) 80 MG tablet Take 1 tablet by mouth Every Night. 90 tablet 3   • Clotrimazole (Clotrimazole 3) 2 % vaginal cream Insert 1 application into the vagina Daily. 21 g 1   • docusate sodium (COLACE) 100 MG capsule Take 100 mg by mouth Daily.     • ferrous sulfate 325 (65 FE) MG tablet TAKE 1 TABLET BY MOUTH EVERY DAY WITH BREAKFAST 90 tablet 1   • Glucagon, rDNA, (Glucagon Emergency) 1 MG kit Inject 1 mg under the skin into the appropriate area as directed 1 (One) Time As Needed (hypoglcyemia) for up to 1 dose. 1 each 1   • insulin detemir (Levemir FlexTouch) 100 UNIT/ML injection Inject 30 Units under the skin into the appropriate area as directed Every Night. 5 pen 0   • Insulin Lispro, 1 Unit Dial, (HumaLOG KwikPen) 100 UNIT/ML solution pen-injector 2 to 8 units before meals 3 pen 11   • Insulin Pen Needle (Pen Needles) 32G X 4 MM misc 1 each 4 (Four) Times a Day. Use 4 x daily, Dx code E11.65 120 each 11   • losartan (COZAAR) 100 MG tablet      • metFORMIN (GLUCOPHAGE) 1000 MG tablet Take 1 tablet by mouth 2 (Two) Times a Day With Meals. 60 tablet 0   • montelukast (SINGULAIR) 10 MG tablet Take 10 mg by mouth Daily.     • nystatin (MYCOSTATIN) 174449 UNIT/ML suspension  Swish and swallow 5 mL 4 (Four) Times a Day. 473 mL 0   • omeprazole (priLOSEC) 20 MG capsule Take 1 capsule by mouth Daily. 90 capsule 1   • rivaroxaban (XARELTO) 20 MG tablet Take 1 tablet by mouth Daily. 30 tablet 2   • Semaglutide,0.25 or 0.5MG/DOS, (Ozempic, 0.25 or 0.5 MG/DOSE,) 2 MG/1.5ML solution pen-injector 0.5 mg weekly 1 pen 11   • triamcinolone (KENALOG) 0.1 % ointment Apply  topically to the appropriate area as directed 2 (Two) Times a Day. 80 g 0   • vitamin B-12 (CYANOCOBALAMIN) 1000 MCG tablet Take 1 tablet by mouth Daily. 90 tablet 1     No facility-administered medications prior to visit.        Patient Active Problem List   Diagnosis   • Morbidly obese (CMS/HCC)   • Chronic osteoarthritis   • Plantar fasciitis   • Lateral epicondylitis of right elbow   • Repetitive strain injury of lower back   • History of total knee replacement   • Bilateral hip pain   • Mechanical loosening of prosthetic knee (CMS/HCC)   • S/P revision of total knee, left   • Anemia   • Lupus anticoagulant syndrome (CMS/HCC)   • Aorto-iliac atherosclerosis (CMS/HCC)   • Generalized osteoarthritis   • History of shoulder surgery   • Microalbuminuric diabetic nephropathy (CMS/HCC)   • Multiple joint pain   • Osteoporosis   • Perennial allergic rhinitis   • Subclinical hyperthyroidism   • Thyroid nodule   • Benign essential hypertension   • Gastroesophageal reflux disease       Advanced Care Planning:  ACP discussion was held with the patient during this visit. Patient does not have an advance directive, information provided.    Review of Systems    Compared to one year ago, the patient feels her physical health is the same.  Compared to one year ago, the patient feels her mental health is the same.    Reviewed chart for potential of high risk medication in the elderly: yes  Reviewed chart for potential of harmful drug interactions in the elderly:yes    Objective         Vitals:    01/06/21 0858   BP: 124/64   Pulse: 110   SpO2:  "98%   Weight: 91.1 kg (200 lb 12.8 oz)   Height: 157.5 cm (62\")   PainSc: 0-No pain       Body mass index is 36.73 kg/m².  Discussed the patient's BMI with her. The BMI is above average; BMI management plan is completed.    Physical Exam          Assessment/Plan   Medicare Risks and Personalized Health Plan  CMS Preventative Services Quick Reference  Advance Directive Discussion  Breast Cancer/Mammogram Screening  Cardiovascular risk  Chronic Pain   Colon Cancer Screening  Dementia/Memory   Depression/Dysphoria  Diabetic Lab Screening   Fall Risk  Glaucoma Risk  Hearing Problem  Immunizations Discussed/Encouraged (specific immunizations; Shingrix )  Inadequate Social Support, Isolation, Loneliness, Lack of Transportation, Financial Difficulties, or Caregiver Stress   Inactivity/Sedentary  Lung Cancer Risk  Obesity/Overweight   Osteoprorosis Risk  Polypharmacy    The above risks/problems have been discussed with the patient.  Pertinent information has been shared with the patient in the After Visit Summary.  Follow up plans and orders are seen below in the Assessment/Plan Section.    Diagnoses and all orders for this visit:    1. Initial Medicare annual wellness visit (Primary)    Memory deficit noted on mini-cog and clock test - will cont to monitor and refer to neuro if needed.    Follow Up:  No follow-ups on file.     An After Visit Summary and PPPS were given to the patient.           "

## 2021-01-06 NOTE — PATIENT INSTRUCTIONS
Medicare Wellness  Personal Prevention Plan of Service     Date of Office Visit:  2021  Encounter Provider:  ZELDA Solares  Place of Service:  [facility]  Patient Name: Meeta Marmolejo  :  1955    As part of the Medicare Wellness portion of your visit today, we are providing you with this personalized preventive plan of services (PPPS). This plan is based upon recommendations of the [place] Preventive Services Task Force (USPSTF) and the Advisory Committee on Immunization Practices (ACIP).    This lists the preventive care services that should be considered, and provides dates of when you are due. Items listed as completed are up-to-date and do not require any further intervention.    Health Maintenance   Topic Date Due   • ZOSTER VACCINE (1 of 2) 10/22/2006   • HEPATITIS C SCREENING  2016   • ANNUAL WELLNESS VISIT  2016   • DIABETIC FOOT EXAM  2017   • DXA SCAN  2020   • MAMMOGRAM  2020   • DIABETIC EYE EXAM  10/01/2020   • HEMOGLOBIN A1C  2021   • LIPID PANEL  2021   • URINE MICROALBUMIN  2021   • PAP SMEAR  2023   • TDAP/TD VACCINES (3 - Td) 2028   • COLONOSCOPY  2028   • Pneumococcal Vaccine 0-64  Completed   • INFLUENZA VACCINE  Completed   • MENINGOCOCCAL VACCINE  Aged Out       No orders of the defined types were placed in this encounter.      No follow-ups on file.

## 2021-01-07 ENCOUNTER — DOCUMENTATION (OUTPATIENT)
Dept: ENDOCRINOLOGY | Facility: CLINIC | Age: 66
End: 2021-01-07

## 2021-01-19 ENCOUNTER — TELEPHONE (OUTPATIENT)
Dept: ENDOCRINOLOGY | Facility: CLINIC | Age: 66
End: 2021-01-19

## 2021-01-19 NOTE — TELEPHONE ENCOUNTER
Pt left a vm wanting to know if our office knows anything about her insurance covering her claude.

## 2021-01-21 NOTE — TELEPHONE ENCOUNTER
Called pt and explained that she could call her insurance company about her claude and she hung up on me.

## 2021-01-25 ENCOUNTER — TELEPHONE (OUTPATIENT)
Dept: ONCOLOGY | Facility: CLINIC | Age: 66
End: 2021-01-25

## 2021-01-25 NOTE — TELEPHONE ENCOUNTER
Patient has appt on 5/18.  She needs to change this to an appointment before 10:00 AM.    When searched, no schedule found for this date.    [phone]

## 2021-01-26 DIAGNOSIS — E78.5 HYPERLIPIDEMIA, UNSPECIFIED HYPERLIPIDEMIA TYPE: ICD-10-CM

## 2021-01-26 RX ORDER — ATORVASTATIN CALCIUM 80 MG/1
80 TABLET, FILM COATED ORAL NIGHTLY
Qty: 90 TABLET | Refills: 3 | Status: CANCELLED | OUTPATIENT
Start: 2021-01-26

## 2021-01-26 RX ORDER — MONTELUKAST SODIUM 10 MG/1
10 TABLET ORAL DAILY
Status: CANCELLED | OUTPATIENT
Start: 2021-01-26

## 2021-01-29 DIAGNOSIS — E11.69 TYPE 2 DIABETES MELLITUS WITH OTHER SPECIFIED COMPLICATION, WITH LONG-TERM CURRENT USE OF INSULIN (HCC): ICD-10-CM

## 2021-01-29 DIAGNOSIS — Z79.4 TYPE 2 DIABETES MELLITUS WITH OTHER SPECIFIED COMPLICATION, WITH LONG-TERM CURRENT USE OF INSULIN (HCC): ICD-10-CM

## 2021-02-02 ENCOUNTER — DOCUMENTATION (OUTPATIENT)
Dept: ENDOCRINOLOGY | Facility: CLINIC | Age: 66
End: 2021-02-02

## 2021-02-02 ENCOUNTER — TELEPHONE (OUTPATIENT)
Dept: ENDOCRINOLOGY | Facility: CLINIC | Age: 66
End: 2021-02-02

## 2021-02-02 NOTE — TELEPHONE ENCOUNTER
[facility] wanted to let you know that they are faxing a forkristian and it just needs Dr Munoz's initials on it and refaxed.

## 2021-02-03 ENCOUNTER — OFFICE VISIT (OUTPATIENT)
Dept: ENDOCRINOLOGY | Facility: CLINIC | Age: 66
End: 2021-02-03

## 2021-02-03 ENCOUNTER — TELEPHONE (OUTPATIENT)
Dept: ENDOCRINOLOGY | Facility: CLINIC | Age: 66
End: 2021-02-03

## 2021-02-03 VITALS
WEIGHT: 199.2 LBS | DIASTOLIC BLOOD PRESSURE: 78 MMHG | BODY MASS INDEX: 36.66 KG/M2 | SYSTOLIC BLOOD PRESSURE: 146 MMHG | HEART RATE: 95 BPM | HEIGHT: 62 IN | OXYGEN SATURATION: 100 %

## 2021-02-03 DIAGNOSIS — E78.2 MIXED HYPERLIPIDEMIA: ICD-10-CM

## 2021-02-03 DIAGNOSIS — E11.69 TYPE 2 DIABETES MELLITUS WITH OTHER SPECIFIED COMPLICATION, WITH LONG-TERM CURRENT USE OF INSULIN (HCC): ICD-10-CM

## 2021-02-03 DIAGNOSIS — I10 BENIGN ESSENTIAL HYPERTENSION: Primary | ICD-10-CM

## 2021-02-03 DIAGNOSIS — Z79.4 TYPE 2 DIABETES MELLITUS WITH OTHER SPECIFIED COMPLICATION, WITH LONG-TERM CURRENT USE OF INSULIN (HCC): ICD-10-CM

## 2021-02-03 PROCEDURE — 99214 OFFICE O/P EST MOD 30 MIN: CPT | Performed by: NURSE PRACTITIONER

## 2021-02-03 RX ORDER — INSULIN DETEMIR 100 [IU]/ML
30 INJECTION, SOLUTION SUBCUTANEOUS DAILY
Qty: 60 ML | Refills: 12 | Status: SHIPPED | OUTPATIENT
Start: 2021-02-03 | End: 2021-11-29 | Stop reason: SDUPTHER

## 2021-02-03 RX ORDER — MONTELUKAST SODIUM 10 MG/1
10 TABLET ORAL DAILY
Qty: 30 TABLET | Refills: 11 | Status: SHIPPED | OUTPATIENT
Start: 2021-02-03 | End: 2021-11-29 | Stop reason: SDUPTHER

## 2021-02-03 NOTE — PROGRESS NOTES
"Chief Complaint  Diabetes    Subjective          Meeta Marmolejo presents to [facility] for   History of Present Illness     IN OFFICE VISIT     65-year-old female presents for follow-up    Reason diabetes mellitus type 2    Duration diagnosed at the age of 31    Timing is constant    Quality is not controlled    Microvascular complications none    Microvascular complications microalbuminuria    Diet regular     Blood glucose monitoring    Takes her glucose levels 4 times a day    States morning sugars are running 200    Daytime sugars have actually improved she states 100 up to 180    No low blood glucose levels              Recent COVID pneumonia in Dec 2020         Objective   Vital Signs:   /78   Pulse 95   Ht 157.5 cm (62\")   Wt 90.4 kg (199 lb 3.2 oz)   SpO2 100%   BMI 36.43 kg/m²     Physical Exam  Constitutional:       Appearance: Normal appearance.   HENT:      Head: Normocephalic and atraumatic.      Right Ear: External ear normal.      Left Ear: External ear normal.      Nose: Nose normal.   Eyes:      Conjunctiva/sclera: Conjunctivae normal.      Pupils: Pupils are equal, round, and reactive to light.   Neck:      Musculoskeletal: Normal range of motion and neck supple.   Cardiovascular:      Rate and Rhythm: Regular rhythm.      Heart sounds: Normal heart sounds.   Pulmonary:      Breath sounds: Normal breath sounds.   Musculoskeletal: Normal range of motion.   Skin:     Coloration: Skin is not pale.   Neurological:      General: No focal deficit present.      Mental Status: She is alert.   Psychiatric:         Mood and Affect: Mood normal.         Thought Content: Thought content normal.         Judgment: Judgment normal.        Result Review :                 Assessment and Plan    Problem List Items Addressed This Visit        Other    Diabetes mellitus (CMS/[facility])    Relevant Medications    metFORMIN (GLUCOPHAGE) 1000 MG tablet    insulin detemir " (Levemir) 100 UNIT/ML injection    Mixed hyperlipidemia    Benign essential hypertension - Primary          Glycemic management    Diabetes mellitus type 2    Taking metformin 1000 mg twice a day    Levemir 30 units at night-- she stopped taking she misunderstood the directions    I resent the Levemir today she will restarted at the 30 units    If you ever wake up with a blood glucose level less than 80 decreased to 25    Ozempic 0.5 mg weekly continue taking      Humalog  before meals as a scale--3 times daily    Less than 180 --- nothing  181-200 --- 2units  201-250  ---- 4 units  251-300  ----  6units  Above 300 ----- 8 units                Stop Glipizide           No Jardiance since associated with [facility]       Approve for Unique Personal Use        #1  Patient has diabetes mellitus, insulin-dependent.     #2 She performs blood glucose testing 4 times daily and blood glucose log was brought to office with variability from .     #3  She is requiring  Basal insulin  and Prandial Insulin 3 times daily for a total of 4 injections per day.     #4 Based on blood glucose readings we are making adjustments.      #5 I have personally seen patient within the past 6 months     #6 We plan on seeing her every 2-3 months for continuous adjustment of her diabetes regimen      #7 patient has hypoglycemia with unawareness.     #8 patient has day-to-day variation in her mealtime which confounds the degree of insulin dosing with multiple daily injections.     #9 patient has completed diabetes education program with us.     #10 she has demonstrated the ability to self monitor her glucose.         #11 Patient is motivated in improving  diabetes control         ===========         Hypertension         Taking losartan 100 mg once daily          ===========     Dyslipidemia      Taking Lipitor 80 mg 1 at night     ----------------------        Microvascular monitoring    Last eye exam was 2020 no DR    No neuropathy    Positive for  microalbuminuria   ==========     History of osteoporosis     On alendronate for at least since 2013     I reviewed her bone density from 2013 and it was osteopenia     Stopped the alendronate    She is scheduled for her bone density today at 945          ============              Follow Up   Return in about 6 weeks (around 3/17/2021) for Recheck.  Patient was given instructions and counseling regarding her condition or for health maintenance advice. Please see specific information pulled into the AVS if appropriate.

## 2021-02-03 NOTE — TELEPHONE ENCOUNTER
[id number] - [place], [place] - [address] AT [address] - [phone]  - [phone] FX    Needs clarification Levimier pens before and script today was for vials needs to know if we can switch back to the pens     Pt prefers pens     Thank You

## 2021-02-03 NOTE — TELEPHONE ENCOUNTER
I talked to this pt a few days ago and she states she is completely out of metformin I advised her to call dr. Lugo office and she states she could not get ahold of them and I tried and no answer as well. I let pt know that she would have to have refills from him on metformin along with other meds he rx.

## 2021-03-02 ENCOUNTER — TELEPHONE (OUTPATIENT)
Dept: ONCOLOGY | Facility: CLINIC | Age: 66
End: 2021-03-02

## 2021-03-17 ENCOUNTER — OFFICE VISIT (OUTPATIENT)
Dept: ENDOCRINOLOGY | Facility: CLINIC | Age: 66
End: 2021-03-17

## 2021-03-17 VITALS
SYSTOLIC BLOOD PRESSURE: 154 MMHG | DIASTOLIC BLOOD PRESSURE: 76 MMHG | BODY MASS INDEX: 38.22 KG/M2 | OXYGEN SATURATION: 100 % | WEIGHT: 207.7 LBS | HEIGHT: 62 IN | HEART RATE: 110 BPM

## 2021-03-17 DIAGNOSIS — M79.672 CHRONIC PAIN OF BOTH FEET: Primary | ICD-10-CM

## 2021-03-17 DIAGNOSIS — E05.90 SUBCLINICAL HYPERTHYROIDISM: ICD-10-CM

## 2021-03-17 DIAGNOSIS — M79.671 CHRONIC PAIN OF BOTH FEET: Primary | ICD-10-CM

## 2021-03-17 DIAGNOSIS — E78.2 MIXED HYPERLIPIDEMIA: ICD-10-CM

## 2021-03-17 DIAGNOSIS — Z79.4 TYPE 2 DIABETES MELLITUS WITH HYPERGLYCEMIA, WITH LONG-TERM CURRENT USE OF INSULIN (HCC): ICD-10-CM

## 2021-03-17 DIAGNOSIS — G89.29 CHRONIC PAIN OF BOTH FEET: Primary | ICD-10-CM

## 2021-03-17 DIAGNOSIS — M85.80 OSTEOPENIA, UNSPECIFIED LOCATION: ICD-10-CM

## 2021-03-17 DIAGNOSIS — E11.65 TYPE 2 DIABETES MELLITUS WITH HYPERGLYCEMIA, WITH LONG-TERM CURRENT USE OF INSULIN (HCC): ICD-10-CM

## 2021-03-17 PROCEDURE — 99214 OFFICE O/P EST MOD 30 MIN: CPT | Performed by: NURSE PRACTITIONER

## 2021-03-17 NOTE — PROGRESS NOTES
"Chief Complaint  Diabetes    Subjective          Meeta Marmolejo presents to [facility] ENDOCRINOLOG[facility]  History of Present Illness     In office visit    65-year-old female presents for follow-up     Reason diabetes mellitus type 2     Duration diagnosed at the age of 31     Timing is constant     Quality is not controlled     Microvascular complications none     Microvascular complications microalbuminuria     Diet regular     Blood glucose monitoring     Takes her glucose levels 4 times a day         Am now under 130                      Recent COVID pneumonia in Dec 2020                  Objective   Vital Signs:   /76   Pulse 110   Ht 157.5 cm (62\")   Wt 94.2 kg (207 lb 11.2 oz)   SpO2 100%   BMI 37.99 kg/m²     Physical Exam  Constitutional:       Appearance: Normal appearance.   HENT:      Head: Normocephalic.      Right Ear: External ear normal.      Left Ear: External ear normal.      Nose: Nose normal.   Eyes:      Conjunctiva/sclera: Conjunctivae normal.      Pupils: Pupils are equal, round, and reactive to light.   Cardiovascular:      Rate and Rhythm: Regular rhythm.      Heart sounds: Normal heart sounds.   Pulmonary:      Breath sounds: Normal breath sounds.   Musculoskeletal:         General: Normal range of motion.      Cervical back: Normal range of motion and neck supple.   Skin:     Coloration: Skin is not pale.   Neurological:      General: No focal deficit present.      Mental Status: She is alert.   Psychiatric:         Mood and Affect: Mood normal.         Thought Content: Thought content normal.        Result Review :   The following data was reviewed by: ZELDA Freire on 03/17/2021:  Common labs    Common Labsle 7/7/20 7/7/20 7/7/20 7/7/20 7/7/20 10/5/20 11/3/20    0956 0956 0956 0956 0956     Glucose  278 (A)        BUN  17        Creatinine  0.54 (A)        eGFR African Am  138        Sodium  135 (A)        Potassium  4.0        Chloride  99     "    Calcium  9.3        Albumin  4.70        Total Bilirubin  0.2        Alkaline Phosphatase  79        AST (SGOT)  16        ALT (SGPT)  14        WBC 5.00      6.67   Hemoglobin 12.9      12.5   Hematocrit 40.1      38.7   Platelets 228      282   Total Cholesterol   157       Triglycerides   133       HDL Cholesterol   49       LDL Cholesterol    81       Hemoglobin A1C    13.20 (A)   (C)    Microalbumin, Urine     4.8     (A) Abnormal value (C) Corrected value       Comments are available for some flowsheets but are not being displayed.                     Assessment and Plan    Diagnoses and all orders for this visit:    1. Chronic pain of both feet (Primary)  -     Ambulatory Referral to Podiatry    2. Type 2 diabetes mellitus with hyperglycemia, with long-term current use of insulin (CMS/[facility])  -     CBC & Differential  -     Comprehensive Metabolic Panel  -     Hemoglobin A1c  -     Lipid Panel  -     Microalbumin / Creatinine Urine Ratio - Urine, Clean Catch  -     Protein / Creatinine Ratio, Urine - Urine, Clean Catch  -     TSH  -     Vitamin B12    3. Mixed hyperlipidemia  -     CBC & Differential  -     Comprehensive Metabolic Panel  -     Hemoglobin A1c  -     Lipid Panel  -     Microalbumin / Creatinine Urine Ratio - Urine, Clean Catch  -     Protein / Creatinine Ratio, Urine - Urine, Clean Catch  -     TSH  -     Vitamin B12    4. Subclinical hyperthyroidism  -     CBC & Differential  -     Comprehensive Metabolic Panel  -     Hemoglobin A1c  -     Lipid Panel  -     Microalbumin / Creatinine Urine Ratio - Urine, Clean Catch  -     Protein / Creatinine Ratio, Urine - Urine, Clean Catch  -     TSH  -     Vitamin B12    5. Osteopenia, unspecified location              Glycemic management     Diabetes mellitus type 2           Taking metformin 1000 mg twice a day     Taking Levemir 30 units at night--     If you ever wake up with a blood glucose level less than 80 decreased to 25     Ozempic 0.5 mg  weekly continue taking        Humalog  before meals as a scale--3 times daily     Less than 180 --- nothing  181-200 --- 2units  201-250  ---- 4 units  251-300  ----  6units  Above 300 ----- 8 units         no changes               Stop Glipizide            No Jardiance since associated with [facility]                      ===========           Hypertension          Taking losartan 100 mg once daily           ===========     Dyslipidemia        Taking Lipitor 80 mg 1 at night     ----------------------           Microvascular monitoring     Last eye exam was 2020 no DR     No neuropathy     Positive for microalbuminuria     Refer to podiatry for orthotics for foot pain         ==========     History of osteoporosis     On alendronate for at least since 2013     I reviewed her bone density from 2013 and it was osteopenia     Stopped the alendronate     Jan. 2021    DXA oteopenia     Take calcium plus vitamin d            ============      Follow Up   Return in about 3 months (around 6/17/2021) for Recheck.  Patient was given instructions and counseling regarding her condition or for health maintenance advice. Please see specific information pulled into the AVS if appropriate.

## 2021-03-22 ENCOUNTER — TELEPHONE (OUTPATIENT)
Dept: ONCOLOGY | Facility: CLINIC | Age: 66
End: 2021-03-22

## 2021-03-31 ENCOUNTER — OFFICE VISIT (OUTPATIENT)
Dept: FAMILY MEDICINE CLINIC | Facility: CLINIC | Age: 66
End: 2021-03-31

## 2021-03-31 ENCOUNTER — LAB (OUTPATIENT)
Dept: LAB | Facility: HOSPITAL | Age: 66
End: 2021-03-31

## 2021-03-31 VITALS
RESPIRATION RATE: 18 BRPM | DIASTOLIC BLOOD PRESSURE: 68 MMHG | WEIGHT: 205 LBS | SYSTOLIC BLOOD PRESSURE: 118 MMHG | OXYGEN SATURATION: 99 % | HEIGHT: 62 IN | BODY MASS INDEX: 37.73 KG/M2 | HEART RATE: 88 BPM | TEMPERATURE: 98.6 F

## 2021-03-31 DIAGNOSIS — Z12.31 ENCOUNTER FOR SCREENING MAMMOGRAM FOR MALIGNANT NEOPLASM OF BREAST: ICD-10-CM

## 2021-03-31 DIAGNOSIS — I10 BENIGN ESSENTIAL HYPERTENSION: Primary | ICD-10-CM

## 2021-03-31 DIAGNOSIS — Z11.59 ENCOUNTER FOR HEPATITIS C SCREENING TEST FOR LOW RISK PATIENT: ICD-10-CM

## 2021-03-31 DIAGNOSIS — J30.2 SEASONAL ALLERGIES: ICD-10-CM

## 2021-03-31 LAB
ALBUMIN UR-MCNC: 5.1 MG/DL
CREAT UR-MCNC: 75.2 MG/DL
CREAT UR-MCNC: 75.2 MG/DL
MICROALBUMIN/CREAT UR: 67.8 MG/G
PROT UR-MCNC: 16 MG/DL
PROT/CREAT UR: 212.8 MG/G CREA (ref 0–200)

## 2021-03-31 PROCEDURE — 99213 OFFICE O/P EST LOW 20 MIN: CPT | Performed by: NURSE PRACTITIONER

## 2021-03-31 PROCEDURE — 84156 ASSAY OF PROTEIN URINE: CPT | Performed by: NURSE PRACTITIONER

## 2021-03-31 PROCEDURE — 82570 ASSAY OF URINE CREATININE: CPT | Performed by: NURSE PRACTITIONER

## 2021-03-31 PROCEDURE — 82043 UR ALBUMIN QUANTITATIVE: CPT | Performed by: NURSE PRACTITIONER

## 2021-03-31 RX ORDER — LOSARTAN POTASSIUM 100 MG/1
100 TABLET ORAL DAILY
Qty: 90 TABLET | Refills: 3 | Status: SHIPPED | OUTPATIENT
Start: 2021-03-31 | End: 2021-11-29 | Stop reason: SDUPTHER

## 2021-03-31 RX ORDER — DOCUSATE SODIUM 100 MG/1
100 CAPSULE, LIQUID FILLED ORAL 2 TIMES DAILY
COMMUNITY
End: 2021-05-18 | Stop reason: SDUPTHER

## 2021-03-31 RX ORDER — RIVAROXABAN 20 MG/1
TABLET, FILM COATED ORAL
COMMUNITY
Start: 2021-03-18 | End: 2021-05-18 | Stop reason: SDUPTHER

## 2021-03-31 RX ORDER — CETIRIZINE HYDROCHLORIDE 10 MG/1
10 TABLET ORAL DAILY
Qty: 30 TABLET | Refills: 11 | Status: SHIPPED | OUTPATIENT
Start: 2021-03-31 | End: 2021-11-29 | Stop reason: SDUPTHER

## 2021-04-07 PROBLEM — J30.2 SEASONAL ALLERGIES: Status: ACTIVE | Noted: 2021-04-07

## 2021-04-13 ENCOUNTER — LAB (OUTPATIENT)
Dept: LAB | Facility: HOSPITAL | Age: 66
End: 2021-04-13

## 2021-04-13 DIAGNOSIS — D68.62 LUPUS ANTICOAGULANT SYNDROME (HCC): ICD-10-CM

## 2021-04-13 LAB
ALBUMIN SERPL-MCNC: 4.3 G/DL (ref 3.5–5.2)
ALBUMIN/GLOB SERPL: 1.6 G/DL
ALP SERPL-CCNC: 74 U/L (ref 39–117)
ALT SERPL W P-5'-P-CCNC: 13 U/L (ref 1–33)
ANION GAP SERPL CALCULATED.3IONS-SCNC: 8 MMOL/L (ref 5–15)
AST SERPL-CCNC: 13 U/L (ref 1–32)
BASOPHILS # BLD AUTO: 0.04 10*3/MM3 (ref 0–0.2)
BASOPHILS NFR BLD AUTO: 0.7 % (ref 0–1.5)
BILIRUB SERPL-MCNC: 0.3 MG/DL (ref 0–1.2)
BUN SERPL-MCNC: 12 MG/DL (ref 8–23)
BUN/CREAT SERPL: 18.5 (ref 7–25)
CALCIUM SPEC-SCNC: 9.4 MG/DL (ref 8.6–10.5)
CHLORIDE SERPL-SCNC: 99 MMOL/L (ref 98–107)
CHOLEST SERPL-MCNC: 169 MG/DL (ref 0–200)
CO2 SERPL-SCNC: 27 MMOL/L (ref 22–29)
CREAT SERPL-MCNC: 0.65 MG/DL (ref 0.57–1)
DEPRECATED RDW RBC AUTO: 46.4 FL (ref 37–54)
EOSINOPHIL # BLD AUTO: 0.09 10*3/MM3 (ref 0–0.4)
EOSINOPHIL NFR BLD AUTO: 1.5 % (ref 0.3–6.2)
ERYTHROCYTE [DISTWIDTH] IN BLOOD BY AUTOMATED COUNT: 14.5 % (ref 12.3–15.4)
GFR SERPL CREATININE-BSD FRML MDRD: 111 ML/MIN/1.73
GLOBULIN UR ELPH-MCNC: 2.7 GM/DL
GLUCOSE SERPL-MCNC: 229 MG/DL (ref 65–99)
HBA1C MFR BLD: 10.7 % (ref 4.8–5.6)
HCT VFR BLD AUTO: 39.5 % (ref 34–46.6)
HDLC SERPL-MCNC: 50 MG/DL (ref 40–60)
HGB BLD-MCNC: 12.2 G/DL (ref 12–15.9)
IMM GRANULOCYTES # BLD AUTO: 0.02 10*3/MM3 (ref 0–0.05)
IMM GRANULOCYTES NFR BLD AUTO: 0.3 % (ref 0–0.5)
LDLC SERPL CALC-MCNC: 89 MG/DL (ref 0–100)
LDLC/HDLC SERPL: 1.67 {RATIO}
LYMPHOCYTES # BLD AUTO: 1.78 10*3/MM3 (ref 0.7–3.1)
LYMPHOCYTES NFR BLD AUTO: 29.4 % (ref 19.6–45.3)
MCH RBC QN AUTO: 27.2 PG (ref 26.6–33)
MCHC RBC AUTO-ENTMCNC: 30.9 G/DL (ref 31.5–35.7)
MCV RBC AUTO: 88 FL (ref 79–97)
MONOCYTES # BLD AUTO: 0.44 10*3/MM3 (ref 0.1–0.9)
MONOCYTES NFR BLD AUTO: 7.3 % (ref 5–12)
NEUTROPHILS NFR BLD AUTO: 3.68 10*3/MM3 (ref 1.7–7)
NEUTROPHILS NFR BLD AUTO: 60.8 % (ref 42.7–76)
NRBC BLD AUTO-RTO: 0 /100 WBC (ref 0–0.2)
PLATELET # BLD AUTO: 251 10*3/MM3 (ref 140–450)
PMV BLD AUTO: 11.3 FL (ref 6–12)
POTASSIUM SERPL-SCNC: 4.1 MMOL/L (ref 3.5–5.2)
PROT SERPL-MCNC: 7 G/DL (ref 6–8.5)
RBC # BLD AUTO: 4.49 10*6/MM3 (ref 3.77–5.28)
SODIUM SERPL-SCNC: 134 MMOL/L (ref 136–145)
TRIGL SERPL-MCNC: 178 MG/DL (ref 0–150)
TSH SERPL DL<=0.05 MIU/L-ACNC: 0.51 UIU/ML (ref 0.27–4.2)
VIT B12 BLD-MCNC: >2000 PG/ML (ref 211–946)
VLDLC SERPL-MCNC: 30 MG/DL (ref 5–40)
WBC # BLD AUTO: 6.05 10*3/MM3 (ref 3.4–10.8)

## 2021-04-13 PROCEDURE — 82607 VITAMIN B-12: CPT | Performed by: NURSE PRACTITIONER

## 2021-04-13 PROCEDURE — 80061 LIPID PANEL: CPT | Performed by: NURSE PRACTITIONER

## 2021-04-13 PROCEDURE — 85025 COMPLETE CBC W/AUTO DIFF WBC: CPT

## 2021-04-13 PROCEDURE — 84443 ASSAY THYROID STIM HORMONE: CPT | Performed by: NURSE PRACTITIONER

## 2021-04-13 PROCEDURE — 83036 HEMOGLOBIN GLYCOSYLATED A1C: CPT | Performed by: NURSE PRACTITIONER

## 2021-04-13 PROCEDURE — 80053 COMPREHEN METABOLIC PANEL: CPT

## 2021-04-22 ENCOUNTER — TELEPHONE (OUTPATIENT)
Dept: FAMILY MEDICINE CLINIC | Facility: CLINIC | Age: 66
End: 2021-04-22

## 2021-04-22 NOTE — TELEPHONE ENCOUNTER
I called the pt and let her know that she is scheduled for a mammogram on 4/29/2021 at 1:00 at the [facility] but that she needs to be there at 12:30

## 2021-05-08 NOTE — THERAPY TREATMENT NOTE
"    Outpatient Physical Therapy Ortho Treatment Note  [facility]  Jane Torres PTA       Patient Name: Meeta Marmolejo  : 1955  MRN: [id number]  Today's Date: 10/24/2018      Visit Date: 10/24/2018     Visits: 3/3  Insurance Visits Approved: 60 visits primary; medicare guidelines  Recert Due: 2018  MD Appt: 2018  Pain: pretreatment 4/10; post treatment 7/10  Improvement: pt is subjectively reporting \"a little\"% improvement since initial evaluation    Visit Dx:    ICD-10-CM ICD-9-CM   1. S/P revision of total knee, left Z96.652 V43.65   2. Staphylococcal arthritis of left knee (CMS/[facility]) M00.062 711.06     041.10       Patient Active Problem List   Diagnosis   • Obesity   • Type 2 diabetes mellitus (CMS/[facility])   • Chronic osteoarthritis   • Microalbuminuria   • Encounter for screening mammogram for malignant neoplasm of breast   • Plantar fasciitis   • URI (upper respiratory infection)   • Lateral epicondylitis of right elbow   • Essential hypertension   • Repetitive strain injury of lower back   • History of total knee replacement   • Painful total knee replacement, initial encounter (CMS/[facility])   • Bilateral hip pain   • Mechanical loosening of prosthetic knee (CMS/[facility])   • S/P revision of total knee, left   • Septic joint of left knee joint (CMS/[facility])   • Staphylococcal arthritis of left knee (CMS/[facility])   • Anemia   • Left knee pain   • Other pulmonary embolism without acute cor pulmonale (CMS/[facility])   • Encounter for anticoagulation discussion and counseling   • Long term (current) use of anticoagulants [Z79.01]        Past Medical History:   Diagnosis Date   • Acute vaginitis     resolved   • Carpal tunnel syndrome    • Chronic osteoarthritis     Nino TKA   • Chronic pain    • Chronic urticaria    • Dietary counseling and surveillance    • Dysuria    • External hordeolum    • Hyperlipidemia    • Hypertension    • Low back pain     strain   • Microalbuminuria     on ARB   • " Physical Therapy  Visit Type: initial evaluation  Co-treat with: Occupational therapist  Precautions:  Medical precautions:  fall risk; standard precautions.  Pt admitted 5/6 after 7 day hospitalization at [place] (recently discharged 5/5).  Pt presents with abd pain with n/v.  Does not remember what workup or dx was at other hospital.  S/p EGD 5/7, showing severe esophagitis.  Also has a large hiatal hernia.  Gen sx following, rec med management for now until esophagitis heals and then will explore possible elective repair.     Hx: GERD, ESRD on HD MWF, DM, HTN, anemia  Lines:       Lines in chart and on patient reviewed, cautions maintained throughout session.  Vision:     Patient visual report: light sensitivity, nausea or dizziness and difficulty maintaining concentration with focus  Safety Measures: bed alarm, bed rails and chair alarm    SUBJECTIVE  Patient agreed to participate in therapy this date.  Okay to be seen by NACHO Bai.  Pt received in bed at beginning of session.  Patient / Family Goal: return to previous functional status, maximize function and return home    Pain     At onset of session (out of 10): 0  RN informed on pain level     OBJECTIVE     HR: 81bpm  HR during activity: 123bpm    BP sitting EOB: 99/62  BP post ambulation: 92/60      Oriented to person, place, time and situation     Arousal alertness: delayed responses to stimuli    Affect/Behavior: alert, appropriate, cooperative and sleepy  Functional Communication/Cognition    Overall status:  Impaired    Form of communication:  Verbal     Attention span:  Difficulty attending to directions    Attention Span Impairment: fatigue    Commands: follows one step commands with repetition and follows multistep commands with repetition.    Transition between tasks: transition with cues.    Safety judgement: decreased awareness of need for assistance and decreased awareness of need for safety.    Awareness of deficits: decreased awareness of  "Noncompliance with treatment    • Obesity      Dieting , exercise, appetite suppressant      • Osteoarthritis of left knee    • Plantar fasciitis     resolved   • Seasonal rhinitis    • Type 2 diabetes mellitus (CMS/HCC)    • Urinary tract infectious disease    • Vaginal discharge    • Vitamin deficiency         Past Surgical History:   Procedure Laterality Date   • CARPAL TUNNEL RELEASE      Bilateral endoscopic release 01/20/2003    • ENDOSCOPY      normal 04/25/2008      • KNEE ARTHROSCOPY     • KNEE INCISION AND DRAINAGE Left 9/22/2018    Procedure: KNEE INCISION AND DRAINAGE;  Surgeon: Elias Su MD;  Location: [facility];  Service: Orthopedics   • KNEE SURGERY      Removal of existing left total knee arthroplasty and revision left total knee arthroplasty. 12/15/2014       • REPLACEMENT TOTAL KNEE     • SHOULDER SURGERY     • TOTAL KNEE ARTHROPLASTY REVISION Left 8/28/2018    Procedure: REVISION LEFT TOTAL KNEE;  Surgeon: Elias Su MD;  Location: [facility];  Service: Orthopedics   • TUBAL ABDOMINAL LIGATION               PT Ortho     Row Name 10/24/18 0900       Subjective Comments    Subjective Comments states that she understands that therapy is supposed to be aggressive with ROM of her knee but then states \"but we aren't going to be too agressive\"  -       Subjective Pain    Able to rate subjective pain? yes  -    Row Name 10/23/18 0800       Subjective Comments    Subjective Comments Patient brings in note from MD for aggressive ROM per mD.  -       Subjective Pain    Able to rate subjective pain? yes  -    Pre-Treatment Pain Level 7  -    Post-Treatment Pain Level 2  -       General ROM    GENERAL ROM COMMENTS AROm R knee 6°-67°  -      User Key  (r) = Recorded By, (t) = Taken By, (c) = Cosigned By    Initials Name Provider Type     Jane Torres PTA Physical Therapy Assistant    Radha Angeles, PT Physical Therapist                            PT " deficits and assistance required to compensate for deficits.  Strength (out of 5 unless otherwise indicated)   Hip:  Hip Flexion: Left: 4 Right: 4  Knee:   - Extension:      • Left: 4      • Right: 4  Ankle:    - Dorsiflexion:      • Left: 4      • Right: 4  Balance    Sitting: Static: double lower extremity support and back unsupported (SBA)    Standing - Firm Surface - Eyes Open: Static: minimal assist double upper extremity support (RW)  Dynamic: moderate assist double upper extremity support (RW)  Sensation - Lower Extremity  L1 (back, over trochanter and groin):     Light Touch: Left: intact Right: intact  L2 (back, front of thigh to knee):     Light Touch: Left: intact Right: intact  L3 (back, upper buttock, anterior thigh, knee, medial lower leg):     Light Touch: Left: intact Right: intact  L4 (medial buttock, lateral thigh, medial leg, dorsum of foot, great toe):     Light Touch: Left: intact Right: intact  L5 (buttock, posterior and lateral thigh, lateral aspect of leg, dorsum of foot, medial half of sole, 1-3rd toes):     Light Touch: Left: intact Right: intact   S1 (buttock, thigh, posterior leg):     Light Touch: Left: intact Right: intact  Perception     Inattention/Neglect: appears intact    Initiation: appears intact      Perseveration:  Not present  Motor Planning: hand over hand to sequence tasks  Coordination    Lower Extremity: Left: intact Right: intact    Bed Mobility:        Supine to sit: with verbal cues (SBA)  Training completed:    Tasks: supine to sit    Education details: body mechanics, patient safety, patient demonstrates understanding and patient requires additional training    Cues for sequencing.  Transfers:    Assistive devices: 2-wheeled walker    Sit to stand: minimal assist, with tactile cues and with verbal cues    Stand to sit: minimal assist, with tactile cues and with verbal cues  Training completed:    Tasks: sit to stand and stand to sit    Education details: body  "Assessment/Plan     Row Name 10/24/18 0900          PT Assessment    Assessment Comments patient resistive to patella mobes initially citing concern that it will affect her open area on her incision. instructed to move the patella vs just the skin around the knee. patient has increased pain post treatment  -        PT Plan    PT Frequency 3x/week  -     PT Plan Comments continue with focus on aggressive ROM and reassess ROM next visit  -       User Key  (r) = Recorded By, (t) = Taken By, (c) = Cosigned By    Initials Name Provider Type     Jane Torres PTA Physical Therapy Assistant                Modalities     Row Name 10/24/18 0900             Ice    Ice Applied Yes  -      Location L knee with elevation in pillowcase  -      Rx Minutes Other:   20 mins  -      Ice S/P Rx Yes  -         ELECTRICAL STIMULATION    Attended/Unattended Unattended  -      Stimulation Type IFC  -      Location/Electrode Placement/Other L knee  -       PT Electrical Stimulation Unattended (Manual) Minutes 15  -        User Key  (r) = Recorded By, (t) = Taken By, (c) = Cosigned By    Initials Name Provider Type     Jane Torres PTA Physical Therapy Assistant                Exercises     Row Name 10/24/18 0900             Subjective Comments    Subjective Comments states that she understands that therapy is supposed to be aggressive with ROM of her knee but then states \"but we aren't going to be too agressive\"  -         Subjective Pain    Able to rate subjective pain? yes  -         Exercise 1    Exercise Name 1 Pro II LE rocking  -      Time 1 12 minutes  -      Additional Comments L 1.0  -         Exercise 2    Exercise Name 2 B St. HS S  -      Reps 2 2  -      Time 2 30 sec hold  -         Exercise 3    Exercise Name 3 L St. Lunge S  -MH      Reps 3 10  -      Time 3 10 sec hold  -         Exercise 4    Exercise Name 4 Wall SLides  -      Time 4 5 sec hold for 5 minutes  -  " mechanics, patient safety, patient demonstrates understanding and patient requires additional training    Cues for hand placement and forward weightshift.    Gait/Ambulation:     Assistance: moderate assist   Assistive device: 2-wheeled walker    Distance (ft): 10    Pattern: step through    Type: decreased peng, difficulty advancing assistive device, shuffling, step through and unsteady    Deviation in foot placement: narrow base of support    Swing phase: Left: decreased step length; Right: decreased step length    Surface: even  Training Completed:    Tasks: gait training on level surfaces and assistive device use    Education details: patient safety, patient demonstrates understanding and patient requires additional training    Pt noted to be squinting, c/o of light sensitivity.  Also limited by dizziness.  BP low at 99/62 when sitting EOB, did decr slightly to 92/60.  Pt req max vc and mod tc during ambulation for RW negotiation and impaired motor planning.      Interventions     Training provided: activity tolerance, balance retraining, bed mobility training, body mechanics, breathing/relaxation, energy conservation, gait training, positioning, safety training and transfer training    Skilled input: Verbal instruction/cues, tactile instruction/cues, posture correction and facilitation  Verbal Consent: Writer verbally educated and received verbal consent for hand placement, positioning of patient, and techniques to be performed today from patient for therapist position for techniques, clothing adjustments for techniques and hand placement and palpation for techniques as described above and how they are pertinent to the patient's plan of care.       ASSESSMENT    Impairments: strength, balance deficits, safety awareness, coordination, activity tolerance, endurance and motivation  Functional Limitations: all functional mobility     Discharge Recommendations   Recommendation for Discharge: PT IL: Patient      Additional Comments OP of contralateral leg after AROM for increased stretch  -         Exercise 5    Exercise Name 5 Shuttle 2L press  -      Time 5 5 minutes with 5 sec hold in flexion  -      Additional Comments 7 cords; feet at bottom of plate  -         Exercise 6    Exercise Name 6 Heel Slides with strap  -      Time 6 5 minutes with 5 sec hold each repetition  -         Exercise 7    Exercise Name 7 Heel Prop with quad sets  -      Reps 7 20  -      Time 7 5 sec hold  -         Exercise 8    Exercise Name 8 patella mobes inf/sup; lat  -      Time 8 5 minutes  -        User Key  (r) = Recorded By, (t) = Taken By, (c) = Cosigned By    Initials Name Provider Type     Jane Torres, PTA Physical Therapy Assistant                               PT OP Goals     Row Name 10/24/18 0900          PT Short Term Goals    STG Date to Achieve 11/02/18  -     STG 1 Pt indep with HEP  -     STG 1 Progress [facility]     STG 2 Improve left knee AROM to 5-80°  -     STG 2 Progress [facility]     STG 3 Improve left knee MMT (flex/ext) to 4+/5  -     STG 3 Progress [facility]     STG 4 Ambulate with RW with step through non antalgic gait (NAG)   -     STG 4 Progress [facility]     STG 5 Reduce L knee pain by 25% with standing and walking  -     STG 5 Progress [facility]        Long Term Goals    LTG Date to Achieve 11/16/18  -     LTG 1 Improve left knee AROM to 0-90°  -     LTG 1 Progress [facility]     LTG 2 Improve left knee MMT (flex/ext) to 5/5  -     LTG 2 Progress [facility]     LTG 3 Ambulate with or without straight cane with step through non antalgic gait (NAG) x 1 lap in the gym   -     LTG 3 Progress [facility]     LTG 4 Reduce L knee pain by 75% with standing and walking  -     LTG 4 Progress [facility]        Time Calculation    PT Goal Re-Cert Due Date 11/09/18  -       User Key  (r) = Recorded By, (t) = Taken By, (c) = Cosigned By    Initials Name Provider Type     Jane Torres  requires 24 hour nonskilled assistance to perform mobility and/or ADLs safely, Patient needs nondaily skilled therapy after discharge, Patient needs daily, skilled therapy for 1-3 hours a day by at least two disciplines, Other (comment)(Pt limited by low BP, dizziness, and support.  If participation improves and can at least initial 24hrA, likely d/c home.  If not then will req [facility].)        PT/OT Mobility Equipment for Discharge: Possible RW      PT Identified Barriers to Discharge: dizziness, low BP, light sensitivity     Skilled therapy is required to address these limitations in attempt to maximize the patient's independence.  Progress: improving as expected and progressing toward goals  Predicted patient presentation: Low (stable) - Patient comorbidities and complexities, as defined above, will have little effect on progress for prescribed plan of care.    End of Session:   Location: in chair  Safety measures: alarm system in place/re-engaged, call light within reach, equipment intact and lines intact  Handoff to: nurse    PLAN    Suggestions for next session as indicated: PT Frequency: 5 days/week  Frequency Comments: 05/08, 8W, Home vs. LIBERT[facility], IPOC 4, watch BP      Interventions: balance, bed mobility, body mechanics, endurance training, energy conservation, functional transfer training, gait training, HEP train/position, patient/family training, safety education, stairs retraining and strengthening  Agreement to plan and goals: patient agrees with goals and treatment plan        GOALS  Long Term Goals: (to be met by time of discharge from hospital)  Sit to supine: Patient will complete sit to supine modified independent.  Supine to sit: Patient will complete supine to sit modified independent.  Sit to stand: Patient will complete sit to stand transfer with 2-wheeled walker, modified independent.   Stand to sit: Patient will complete stand to sit transfer with 2-wheeled walker, modified independent.   Ambulation  (even): Patient will ambulate on even surface for 150 feet with 2-wheeled walker, modified independent.     Documented in the chart in the following areas: Prior Level of Function. Assessment.      Therapy procedure time and total treatment time can be found documented on the Time Entry flowsheet   QUINN WALLS Physical Therapy Assistant          Therapy Education  Education Details: patella mobes inf/sup; lat  Given: HEP, Symptoms/condition management, Pain management  Program: Reinforced  How Provided: Verbal, Demonstration, Written  Provided to: Patient  Level of Understanding: Verbalized, Demonstrated, Teach back education performed              Time Calculation:   Start Time: 0848  Stop Time: 1010  Time Calculation (min): 82 min  Total Timed Code Minutes- PT: 62 minute(s)  Therapy Suggested Charges     Code   Minutes Charges    94869 (CPT®) Hc Pt Neuromusc Re Education Ea 15 Min      15911 (CPT®) Hc Pt Ther Proc Ea 15 Min      09668 (CPT®) Hc Gait Training Ea 15 Min      10945 (CPT®) Hc Pt Therapeutic Act Ea 15 Min      79166 (CPT®) Hc Pt Manual Therapy Ea 15 Min      43551 (CPT®) Hc Pt Ther Massage- Per 15 Min      12104 (CPT®) Hc Pt Iontophoresis Ea 15 Min      95943 (CPT®) Hc Pt Elec Stim Ea-Per 15 Min      27001 (CPT®) Hc Pt Ultrasound Ea 15 Min      85672 (CPT®) Hc Pt Self Care/Mgmt/Train Ea 15 Min      30809 (CPT®) Hc Pt Prosthetic (S) Train Initial Encounter, Each 15 Min      87682 (CPT®) Hc Orthotic(S) Mgmt/Train Initial Encounter, Each 15min      83446 (CPT®) Hc Pt Aquatic Therapy Ea 15 Min      96302 (CPT®) Hc Pt Orthotic(S)/Prosthetic(S) Encounter, Each 15 Min       (CPT®) Hc Pt Electrical Stim Unattended 15 1    Total  15 1        Therapy Charges for Today     Code Description Service Date Service Provider Modifiers Qty    30222551374 HC PT ELECTRICAL STIM UNATTENDED 10/24/2018 Jane Torres PTA  1    15538161714 HC PT THER PROC EA 15 MIN 10/24/2018 Jane Torres PTA GP 4    19661616723 HC PT THER SUPP EA 15 MIN 10/24/2018 Jane Torres PTA GP 1                    Jane Torres PTA  10/24/2018

## 2021-05-10 DIAGNOSIS — Z12.31 ENCOUNTER FOR SCREENING MAMMOGRAM FOR MALIGNANT NEOPLASM OF BREAST: ICD-10-CM

## 2021-05-11 ENCOUNTER — TELEPHONE (OUTPATIENT)
Dept: FAMILY MEDICINE CLINIC | Facility: CLINIC | Age: 66
End: 2021-05-11

## 2021-05-11 NOTE — TELEPHONE ENCOUNTER
----- Message from ZELDA Solares sent at 5/4/2021  2:04 PM CDT -----  Regarding: Mammogram  Please notify pt that her mammogram showed no findings suspicious for malignancy. Recommend mammogram every 2 years.

## 2021-05-18 ENCOUNTER — OFFICE VISIT (OUTPATIENT)
Dept: ONCOLOGY | Facility: CLINIC | Age: 66
End: 2021-05-18

## 2021-05-18 DIAGNOSIS — D50.9 IRON DEFICIENCY ANEMIA, UNSPECIFIED IRON DEFICIENCY ANEMIA TYPE: Primary | ICD-10-CM

## 2021-05-18 PROCEDURE — 99441 PR PHYS/QHP TELEPHONE EVALUATION 5-10 MIN: CPT | Performed by: NURSE PRACTITIONER

## 2021-05-18 RX ORDER — INSULIN DETEMIR 100 [IU]/ML
INJECTION, SOLUTION SUBCUTANEOUS
COMMUNITY
Start: 2021-04-11 | End: 2021-11-29

## 2021-05-18 RX ORDER — RIVAROXABAN 20 MG/1
20 TABLET, FILM COATED ORAL
Qty: 30 TABLET | Refills: 3 | Status: SHIPPED | OUTPATIENT
Start: 2021-05-18 | End: 2021-11-29 | Stop reason: SDUPTHER

## 2021-05-18 RX ORDER — FERROUS SULFATE 325(65) MG
1 TABLET ORAL
Qty: 90 TABLET | Refills: 1 | Status: SHIPPED | OUTPATIENT
Start: 2021-05-18 | End: 2021-05-18 | Stop reason: SDUPTHER

## 2021-05-18 RX ORDER — DOCUSATE SODIUM 100 MG/1
100 CAPSULE, LIQUID FILLED ORAL 2 TIMES DAILY
Qty: 60 CAPSULE | Refills: 2 | Status: SHIPPED | OUTPATIENT
Start: 2021-05-18 | End: 2021-08-23

## 2021-05-18 RX ORDER — FERROUS SULFATE 325(65) MG
1 TABLET ORAL
Qty: 90 TABLET | Refills: 1 | Status: SHIPPED | OUTPATIENT
Start: 2021-05-18 | End: 2021-11-29 | Stop reason: SDUPTHER

## 2021-05-18 NOTE — PROGRESS NOTES
5/18/2021    This visit has been rescheduled as a phone visit to comply with patient safety concerns in accordance with CDC recommendations. Total time of discussion was 10 minutes.    You have chosen to receive care through a telephone visit. Do you consent to use a telephone visit for your medical care today? Yes      REASON FOR VISIT: Pulmonary embolism on Xarelto, anemia,            HISTORY OF PRESENT ILLNESS:    65-year-old female with past medical problems consisting of hypertension, dyslipidemia, poorly controlled diabetes mellitus, history of left knee replacement with revision ×3.  Most recent revision was done on August 28, 2018.  And was diagnosed with subacute pulmonary embolism on August 30, 2018 after revision of left knee and was started on xarelto.  Patient was initially seen in consultation when she was  admitted to hospital on September 21, 2018 with bleeding into her left knee.  Xarelto was discontinued and patient was started on heparin drip with coumadin.  Patient has started back on Xarelto in December 2018.    Patient was last seen by Dr. Nagy in December 2019; B-12 and folic acid was decreased to TIW; she is still taking iron daily. Denies any bleeding.      Review of Systems   Constitutional: Negative.    HENT: Negative.    Eyes: Negative.    Respiratory: Negative.    Cardiovascular: Negative.    Gastrointestinal: Negative.    Endocrine: Negative.    Genitourinary: Negative.    Musculoskeletal: Negative.    Skin: Negative.    Allergic/Immunologic: Negative.    Neurological: Negative.    Hematological: Negative.    Psychiatric/Behavioral: Negative.        ASSESSMENT AND PLAN:    1.  Subacute pulmonary embolism on right side:  -Patient was diagnosed with subacute pulmonary embolism on right side after most recent revision of left knee done on August 28, 2018.  -Patient was started on xarelto 15 mg twice daily.  -She was admitted to [facility], 2018 with  bleeding into her left knee.  -Patient was started on heparin drip.  -Patient had incision and Drainage done by Dr. Su on September 22, 2018.  -Patient was on Coumadin.  Patient was started back on Xarelto in December 2018.  -CT angiogram of chest with contrast done on February 20, 2019 shows resolution of pulmonary embolism.  -Hypercoagulable workup done consisting of factor V Leyden, prothrombin gene mutation, anticardiolipin antibody, beta-2 glycoprotein antibody were negative on February 20, 2019.  -Lupus anticoagulant was positive on February 20, 2019 as well as Peggy 3, 2019.  In view of lupus anticoagulant being positive, recommend continuing with Xarelto for now.  -Patient was instructed to come to the emergency room if she starts having any bleeding on Xarelto; new prescription sent to pharmacy        2.  Anemia:  -Patient required 1 unit of PRBC on September 22, 2018 with hemoglobin of 6.9.  -Anemia work-up  shows hemoglobin is 12.2  -Recommend continuing with ferrous sulfate 1 tablet p.o. daily .  recommend decreasing B12 and folic acid to 1 tablet 3 times a week.  -We will repeat anemia work-up upon next clinic visit in 5 months.

## 2021-06-15 ENCOUNTER — OFFICE VISIT (OUTPATIENT)
Dept: FAMILY MEDICINE CLINIC | Facility: CLINIC | Age: 66
End: 2021-06-15

## 2021-06-15 VITALS
SYSTOLIC BLOOD PRESSURE: 130 MMHG | OXYGEN SATURATION: 99 % | WEIGHT: 210.8 LBS | HEART RATE: 99 BPM | RESPIRATION RATE: 20 BRPM | TEMPERATURE: 97.3 F | HEIGHT: 62 IN | BODY MASS INDEX: 38.79 KG/M2 | DIASTOLIC BLOOD PRESSURE: 82 MMHG

## 2021-06-15 DIAGNOSIS — N89.8 VAGINAL ITCHING: Primary | ICD-10-CM

## 2021-06-15 DIAGNOSIS — R82.90 ABNORMAL URINALYSIS: ICD-10-CM

## 2021-06-15 LAB
BILIRUB BLD-MCNC: NEGATIVE MG/DL
CLARITY, POC: CLEAR
COLOR UR: YELLOW
GLUCOSE UR STRIP-MCNC: ABNORMAL MG/DL
KETONES UR QL: NEGATIVE
LEUKOCYTE EST, POC: NEGATIVE
NITRITE UR-MCNC: NEGATIVE MG/ML
PH UR: 6 [PH] (ref 5–8)
PROT UR STRIP-MCNC: NEGATIVE MG/DL
RBC # UR STRIP: ABNORMAL /UL
SP GR UR: 1.03 (ref 1–1.03)
UROBILINOGEN UR QL: NORMAL

## 2021-06-15 PROCEDURE — 87480 CANDIDA DNA DIR PROBE: CPT | Performed by: NURSE PRACTITIONER

## 2021-06-15 PROCEDURE — 87077 CULTURE AEROBIC IDENTIFY: CPT | Performed by: NURSE PRACTITIONER

## 2021-06-15 PROCEDURE — 87660 TRICHOMONAS VAGIN DIR PROBE: CPT | Performed by: NURSE PRACTITIONER

## 2021-06-15 PROCEDURE — 87510 GARDNER VAG DNA DIR PROBE: CPT | Performed by: NURSE PRACTITIONER

## 2021-06-15 PROCEDURE — 99214 OFFICE O/P EST MOD 30 MIN: CPT | Performed by: NURSE PRACTITIONER

## 2021-06-15 PROCEDURE — 87086 URINE CULTURE/COLONY COUNT: CPT | Performed by: NURSE PRACTITIONER

## 2021-06-15 PROCEDURE — 87186 SC STD MICRODIL/AGAR DIL: CPT | Performed by: NURSE PRACTITIONER

## 2021-06-15 PROCEDURE — 81003 URINALYSIS AUTO W/O SCOPE: CPT | Performed by: NURSE PRACTITIONER

## 2021-06-15 RX ORDER — NITROFURANTOIN 25; 75 MG/1; MG/1
100 CAPSULE ORAL 2 TIMES DAILY
Qty: 10 CAPSULE | Refills: 0 | Status: SHIPPED | OUTPATIENT
Start: 2021-06-15 | End: 2021-06-20

## 2021-06-15 RX ORDER — CLOTRIMAZOLE 1 G/ML
SOLUTION TOPICAL 2 TIMES DAILY
Qty: 60 ML | Refills: 0 | Status: CANCELLED | OUTPATIENT
Start: 2021-06-15

## 2021-06-15 RX ORDER — FLUCONAZOLE 150 MG/1
150 TABLET ORAL TAKE AS DIRECTED
Qty: 2 TABLET | Refills: 0 | Status: SHIPPED | OUTPATIENT
Start: 2021-06-15 | End: 2021-07-22

## 2021-06-15 NOTE — PROGRESS NOTES
Chief Complaint  Vaginal Itching and Difficulty Urinating (burning)    Subjective    History of Present Illness {CC  Problem List  Visit  Diagnosis   Encounters  Notes  Medications  Labs  Result Review Imaging  Media :23}     Meeta Marmolejo presents to [facility] for   C/o vaginal itching that started yesterday and burning. Reports FSBS 180-200s - reports 125 this AM.    Vaginal Itching  The patient's primary symptoms include genital itching. The patient's pertinent negatives include no genital lesions, genital odor, vaginal bleeding or vaginal discharge. This is a new problem. The current episode started yesterday. The problem occurs constantly. The problem has been gradually worsening. The pain is mild. Associated symptoms include dysuria. Pertinent negatives include no abdominal pain, back pain, discolored urine, fever, flank pain, frequency, hematuria, rash or urgency. The symptoms are aggravated by urinating. She has tried nothing for the symptoms. She uses tubal ligation for contraception. She is postmenopausal.   Difficulty Urinating  This is a new (painful urination) problem. The current episode started yesterday. The problem has been unchanged. Associated symptoms include urinary symptoms. Pertinent negatives include no abdominal pain, fever or rash. She has tried nothing for the symptoms.        Objective     Physical Exam  Vitals and nursing note reviewed.   Constitutional:       General: She is not in acute distress.     Appearance: Normal appearance. She is obese. She is not ill-appearing.   HENT:      Head: Normocephalic and atraumatic.   Eyes:      Conjunctiva/sclera: Conjunctivae normal.      Pupils: Pupils are equal, round, and reactive to light.   Cardiovascular:      Rate and Rhythm: Normal rate and regular rhythm.      Heart sounds: Normal heart sounds.   Pulmonary:      Effort: Pulmonary effort is normal.      Breath sounds: Normal breath sounds.  "  Abdominal:      Palpations: Abdomen is soft.      Tenderness: There is no abdominal tenderness. There is no right CVA tenderness or left CVA tenderness.   Musculoskeletal:         General: Normal range of motion.      Cervical back: Normal range of motion.   Skin:     General: Skin is warm and dry.   Neurological:      General: No focal deficit present.      Mental Status: She is alert and oriented to person, place, and time.   Psychiatric:         Mood and Affect: Mood normal.         Behavior: Behavior normal.        Result Review  Data Reviewed:{ Labs  Result Review  Imaging  Med Tab  Media :23}   The following data was reviewed by (Optional):[id number]}       Brief Urine Lab Results  (Last result in the past 365 days)      Color   Clarity   Blood   Leuk Est   Nitrite   Protein   CREAT   Urine HCG        06/15/21 1717 Yellow Clear Trace Negative Negative Negative               Vital Signs:   /82 (BP Location: Left arm, Patient Position: Sitting, Cuff Size: Adult)   Pulse 99   Temp 97.3 °F (36.3 °C) (Temporal)   Resp 20   Ht 157.5 cm (62\")   Wt 95.6 kg (210 lb 12.8 oz)   SpO2 99%   BMI 38.56 kg/m²          Assessment and Plan {CC Problem List  Visit Diagnosis  ROS  Review (Popup)  Health Maintenance  Quality  BestPractice  Medications  SmartSets  SnapShot Encounters  Media :23}   Problem List Items Addressed This Visit     None      Visit Diagnoses     Vaginal itching    -  Primary    Relevant Medications    fluconazole (DIFLUCAN) 150 MG tablet    miconazole (Micatin) 2 % cream    Other Relevant Orders    POCT urinalysis dipstick, automated (Completed)    Gardnerella vaginalis, Trichomonas vaginalis, Candida albicans, DNA - Swab, Vagina (Completed)    Abnormal urinalysis        Relevant Orders    Urine Culture - Urine, Urine, Clean Catch (Completed)         Diagnosis Plan   1. Vaginal itching  POCT urinalysis dipstick, automated    Gardnerella vaginalis, Trichomonas vaginalis, Candida " albicans, DNA - Swab, Vagina    fluconazole (DIFLUCAN) 150 MG tablet    miconazole (Micatin) 2 % cream    Gardnerella vaginalis, Trichomonas vaginalis, Candida albicans, DNA - Swab, Vagina   2. Abnormal urinalysis  Urine Culture - Urine, Urine, Clean Catch    nitrofurantoin, macrocrystal-monohydrate, (Macrobid) 100 MG capsule    Urine Culture - Urine, Urine, Clean Catch       -  Discussed importance of BS control as continued elevated BS levels can cause recurrent UTIs and recurrent vaginal yeast infections.   - POCT UA with hematuria - will send for urine cx  - RX for nitrofurantoin prescribed for UTI based on presenting sx  - RX for miconazole to apply topically for vaginal irritation and PO fluconazole prescribed for yeast  - Vag swab obtained for BV and yeast - will notify of results when avail.  - RTC PRN or as scheduled.    Follow Up {Instructions Charge Capture  Follow-up Communications :23}   Return if symptoms worsen or fail to improve, for Next scheduled follow up.  Patient was given instructions and counseling regarding her condition or for health maintenance advice. Please see specific information pulled into the AVS if appropriate            .  This document has been electronically signed by ZELDA Solares on June 29, 2021 22:21 CDT

## 2021-06-17 ENCOUNTER — TELEMEDICINE (OUTPATIENT)
Dept: ENDOCRINOLOGY | Facility: CLINIC | Age: 66
End: 2021-06-17

## 2021-06-17 DIAGNOSIS — E55.9 VITAMIN D DEFICIENCY: ICD-10-CM

## 2021-06-17 DIAGNOSIS — E11.65 TYPE 2 DIABETES MELLITUS WITH HYPERGLYCEMIA, WITH LONG-TERM CURRENT USE OF INSULIN (HCC): Primary | ICD-10-CM

## 2021-06-17 DIAGNOSIS — Z79.4 TYPE 2 DIABETES MELLITUS WITH HYPERGLYCEMIA, WITH LONG-TERM CURRENT USE OF INSULIN (HCC): Primary | ICD-10-CM

## 2021-06-17 DIAGNOSIS — I10 BENIGN ESSENTIAL HYPERTENSION: ICD-10-CM

## 2021-06-17 DIAGNOSIS — E78.2 MIXED HYPERLIPIDEMIA: ICD-10-CM

## 2021-06-17 LAB
CANDIDA ALBICANS: NEGATIVE
GARDNERELLA VAGINALIS: NEGATIVE
T VAGINALIS DNA VAG QL PROBE+SIG AMP: NEGATIVE

## 2021-06-17 PROCEDURE — 99214 OFFICE O/P EST MOD 30 MIN: CPT | Performed by: NURSE PRACTITIONER

## 2021-06-19 DIAGNOSIS — N39.0 URINARY TRACT INFECTION WITHOUT HEMATURIA, SITE UNSPECIFIED: Primary | ICD-10-CM

## 2021-06-19 LAB — BACTERIA SPEC AEROBE CULT: ABNORMAL

## 2021-06-19 RX ORDER — CIPROFLOXACIN 250 MG/1
250 TABLET, FILM COATED ORAL 2 TIMES DAILY
Qty: 6 TABLET | Refills: 0 | Status: SHIPPED | OUTPATIENT
Start: 2021-06-19 | End: 2021-06-22

## 2021-06-29 PROBLEM — Z96.652 S/P REVISION OF TOTAL KNEE, LEFT: Status: RESOLVED | Noted: 2018-09-12 | Resolved: 2021-06-29

## 2021-07-04 DIAGNOSIS — D50.8 OTHER IRON DEFICIENCY ANEMIA: Chronic | ICD-10-CM

## 2021-07-06 RX ORDER — LANOLIN ALCOHOL/MO/W.PET/CERES
CREAM (GRAM) TOPICAL
Qty: 100 TABLET | Refills: 0 | OUTPATIENT
Start: 2021-07-06

## 2021-07-12 DIAGNOSIS — D50.8 OTHER IRON DEFICIENCY ANEMIA: Chronic | ICD-10-CM

## 2021-07-13 RX ORDER — LANOLIN ALCOHOL/MO/W.PET/CERES
CREAM (GRAM) TOPICAL
Qty: 100 TABLET | Refills: 0 | Status: SHIPPED | OUTPATIENT
Start: 2021-07-13 | End: 2021-11-29 | Stop reason: SDUPTHER

## 2021-07-22 ENCOUNTER — OFFICE VISIT (OUTPATIENT)
Dept: FAMILY MEDICINE CLINIC | Facility: CLINIC | Age: 66
End: 2021-07-22

## 2021-07-22 VITALS
HEART RATE: 89 BPM | TEMPERATURE: 97.7 F | HEIGHT: 62 IN | WEIGHT: 211.8 LBS | BODY MASS INDEX: 38.98 KG/M2 | OXYGEN SATURATION: 99 % | RESPIRATION RATE: 20 BRPM | SYSTOLIC BLOOD PRESSURE: 150 MMHG | DIASTOLIC BLOOD PRESSURE: 72 MMHG

## 2021-07-22 DIAGNOSIS — M79.671 RIGHT FOOT PAIN: Primary | ICD-10-CM

## 2021-07-22 PROCEDURE — 99214 OFFICE O/P EST MOD 30 MIN: CPT | Performed by: NURSE PRACTITIONER

## 2021-08-06 ENCOUNTER — OFFICE VISIT (OUTPATIENT)
Dept: FAMILY MEDICINE CLINIC | Facility: CLINIC | Age: 66
End: 2021-08-06

## 2021-08-06 VITALS
WEIGHT: 212 LBS | HEIGHT: 62 IN | HEART RATE: 88 BPM | DIASTOLIC BLOOD PRESSURE: 80 MMHG | SYSTOLIC BLOOD PRESSURE: 150 MMHG | OXYGEN SATURATION: 98 % | BODY MASS INDEX: 39.01 KG/M2

## 2021-08-06 DIAGNOSIS — J30.2 SEASONAL ALLERGIES: ICD-10-CM

## 2021-08-06 DIAGNOSIS — E78.5 HYPERLIPIDEMIA, UNSPECIFIED HYPERLIPIDEMIA TYPE: ICD-10-CM

## 2021-08-06 DIAGNOSIS — Z79.4 TYPE 2 DIABETES MELLITUS WITH OTHER SPECIFIED COMPLICATION, WITH LONG-TERM CURRENT USE OF INSULIN (HCC): ICD-10-CM

## 2021-08-06 DIAGNOSIS — I10 BENIGN ESSENTIAL HYPERTENSION: Primary | ICD-10-CM

## 2021-08-06 DIAGNOSIS — K21.9 GASTROESOPHAGEAL REFLUX DISEASE, UNSPECIFIED WHETHER ESOPHAGITIS PRESENT: ICD-10-CM

## 2021-08-06 DIAGNOSIS — Z11.59 NEED FOR HEPATITIS C SCREENING TEST: ICD-10-CM

## 2021-08-06 DIAGNOSIS — Z76.89 ENCOUNTER TO ESTABLISH CARE: ICD-10-CM

## 2021-08-06 DIAGNOSIS — E11.69 TYPE 2 DIABETES MELLITUS WITH OTHER SPECIFIED COMPLICATION, WITH LONG-TERM CURRENT USE OF INSULIN (HCC): ICD-10-CM

## 2021-08-06 PROCEDURE — 99214 OFFICE O/P EST MOD 30 MIN: CPT | Performed by: FAMILY MEDICINE

## 2021-08-06 RX ORDER — TRIAMCINOLONE ACETONIDE 55 UG/1
2 SPRAY, METERED NASAL DAILY
Qty: 16.5 G | Refills: 11 | Status: SHIPPED | OUTPATIENT
Start: 2021-08-06 | End: 2021-11-29 | Stop reason: SDUPTHER

## 2021-08-06 NOTE — PROGRESS NOTES
"Chief Complaint  Establish Care and Diabetes    Subjective          Meeta Marmolejo presents to [facility]  History of Present Illness    Patient presents today to establish care.  Has chronic medical problems including type 2 diabetes mellitus, hypertension, GERD, hyperlipidemia, iron deficiency anemia, history of PE and seasonal allergies.  Diabetes well controlled.  Patient takes Levemir 20 units at night and Humalog 4 units with meals.  Has sliding scale if needed.  Takes Metformin at 1000 mg twice daily.  Also on Ozempic 0.5 mg weekly.  Patient follows with endocrinology.  For hypertension, patient takes losartan 100 mg daily.  Takes omeprazole 20 mg daily for GERD.  Had pulmonary embolism after knee surgery, and is on Xarelto 20 mg daily at this time.  On Zyrtec for seasonal allergies.    Objective   Vital Signs:   /80   Pulse 88   Ht 157.5 cm (62\")   Wt 96.2 kg (212 lb)   SpO2 98%   BMI 38.78 kg/m²     Physical Exam  Vitals reviewed.   Constitutional:       General: She is not in acute distress.     Appearance: She is well-developed.   HENT:      Head: Normocephalic and atraumatic.   Cardiovascular:      Rate and Rhythm: Normal rate and regular rhythm.      Heart sounds: Normal heart sounds. No murmur heard.     Pulmonary:      Effort: Pulmonary effort is normal. No respiratory distress.      Breath sounds: Normal breath sounds. No wheezing or rales.   Abdominal:      Palpations: Abdomen is soft.      Tenderness: There is no abdominal tenderness.   Skin:     General: Skin is warm and dry.   Neurological:      Mental Status: She is alert and oriented to person, place, and time.        Result Review :   The following data was reviewed by: Anupama Arnett MD on 08/06/2021:  Common labs    Common Labsle 11/3/20 3/31/21 4/13/21 4/13/21 4/13/21 4/13/21      0906 0906 0906 0906   Glucose    229 (A)     BUN    12     Creatinine    0.65     eGFR  Am    111   "   Sodium    134 (A)     Potassium    4.1     Chloride    99     Calcium    9.4     Albumin    4.30     Total Bilirubin    0.3     Alkaline Phosphatase    74     AST (SGOT)    13     ALT (SGPT)    13     WBC 6.67  6.05      Hemoglobin 12.5  12.2      Hematocrit 38.7  39.5      Platelets 282  251      Total Cholesterol      169   Triglycerides      178 (A)   HDL Cholesterol      50   LDL Cholesterol       89   Hemoglobin A1C     10.70 (A)    Microalbumin, Urine  5.1       (A) Abnormal value       Comments are available for some flowsheets but are not being displayed.                     Assessment and Plan    Diagnoses and all orders for this visit:    1. Benign essential hypertension (Primary)    2. Type 2 diabetes mellitus with other specified complication, with long-term current use of insulin (CMS/[facility])    3. Hyperlipidemia, unspecified hyperlipidemia type    4. Gastroesophageal reflux disease, unspecified whether esophagitis present    5. Seasonal allergies  -     Triamcinolone Acetonide (NASACORT) 55 MCG/ACT nasal inhaler; 2 sprays into the nostril(s) as directed by provider Daily.  Dispense: 16.5 g; Refill: 11    6. Need for hepatitis C screening test  -     Hepatitis C antibody; Future    7. Encounter to establish care      Patient seen today to establish care.  Chronic medical problems not well controlled.  Blood pressure elevated today, will continue to monitor.  Patient reports normal readings at home.  Continue current medication for now.  Seasonal allergies not well controlled with Zyrtec alone.  Will add Nasacort.  Type 2 diabetes not well controlled.  Last A1c greater than 10%.  Patient is following with endocrinology and making adjustments to her medication regimen with the specialist.  GERD symptoms controlled with Prilosec.  We will add hep C screening to next set of labs.      Follow Up   Return in about 3 months (around 11/6/2021) for Recheck.  Patient was given instructions and counseling regarding  her condition or for health maintenance advice. Please see specific information pulled into the AVS if appropriate.         This document has been electronically signed by Anupama Arnett MD

## 2021-08-23 RX ORDER — DOCUSATE SODIUM 100 MG/1
CAPSULE, LIQUID FILLED ORAL
Qty: 60 CAPSULE | Refills: 2 | Status: SHIPPED | OUTPATIENT
Start: 2021-08-23 | End: 2021-11-29 | Stop reason: SDUPTHER

## 2021-08-23 NOTE — TELEPHONE ENCOUNTER
Rx Refill Note    Requested Prescriptions     Pending Prescriptions Disp Refills   • docusate sodium (COLACE) 100 MG capsule [Pharmacy Med Name: DOCUSATE SODIUM 100 MG SOFTGEL] 60 capsule 2     Sig: TAKE ONE CAPSULE BY MOUTH TWICE A DAY      Last office visit with prescribing clinician: 5/18/2021      Next office visit with prescribing clinician: 11/16/2021   }  Derick Christiansen RN  08/23/21, 09:48 CDT

## 2021-09-17 ENCOUNTER — TELEMEDICINE (OUTPATIENT)
Dept: ENDOCRINOLOGY | Facility: CLINIC | Age: 66
End: 2021-09-17

## 2021-09-17 DIAGNOSIS — I10 ESSENTIAL HYPERTENSION: ICD-10-CM

## 2021-09-17 DIAGNOSIS — Z79.4 TYPE 2 DIABETES MELLITUS WITH HYPERGLYCEMIA, WITH LONG-TERM CURRENT USE OF INSULIN (HCC): Primary | ICD-10-CM

## 2021-09-17 DIAGNOSIS — E11.65 TYPE 2 DIABETES MELLITUS WITH HYPERGLYCEMIA, WITH LONG-TERM CURRENT USE OF INSULIN (HCC): Primary | ICD-10-CM

## 2021-09-17 DIAGNOSIS — E78.2 MIXED HYPERLIPIDEMIA: ICD-10-CM

## 2021-09-17 PROCEDURE — 99214 OFFICE O/P EST MOD 30 MIN: CPT | Performed by: NURSE PRACTITIONER

## 2021-09-17 NOTE — PROGRESS NOTES
Chief Complaint  Diabetes    Subjective          Meeta Marmolejo presents to [facility]  History of Present Illness       You have chosen to receive care through a telehealth visit.  Do you consent to use a video/audio connection for your medical care today? Yes              TELEHEALTH VIDEO VISIT     This a video visit due to [facility] current guidelines for social distancing due to the COVID 19 pandemic    Primary provider ANTOINE Ashley       65 year-old female presents for follow-up    Reason diabetes mellitus type 2    Timing constant    Duration diagnosed at the age of 31    Severity is moderate    Quality improved control                     Microvascular complications none     Microvascular complications microalbuminuria          Blood glucose monitoring     Takes her glucose levels 4 times a day          States now running 100 up to 180        Review of Systems - General ROS: negative        Objective   Vital Signs:   There were no vitals taken for this visit.    Physical Exam  Neurological:      General: No focal deficit present.      Mental Status: She is alert.   Psychiatric:         Mood and Affect: Mood normal.         Thought Content: Thought content normal.        Result Review :   The following data was reviewed by: ZELDA Freire on 09/17/2021:  Common labs    Common Labsle 11/3/20 3/31/21 4/13/21 4/13/21 4/13/21 4/13/21      0906 0906 0906 0906   Glucose    229 (A)     BUN    12     Creatinine    0.65     eGFR African Am    111     Sodium    134 (A)     Potassium    4.1     Chloride    99     Calcium    9.4     Albumin    4.30     Total Bilirubin    0.3     Alkaline Phosphatase    74     AST (SGOT)    13     ALT (SGPT)    13     WBC 6.67  6.05      Hemoglobin 12.5  12.2      Hematocrit 38.7  39.5      Platelets 282  251      Total Cholesterol      169   Triglycerides      178 (A)   HDL Cholesterol      50   LDL Cholesterol       89    Hemoglobin A1C     10.70 (A)    Microalbumin, Urine  5.1       (A) Abnormal value       Comments are available for some flowsheets but are not being displayed.                     Assessment and Plan    Diagnoses and all orders for this visit:    1. Type 2 diabetes mellitus with hyperglycemia, with long-term current use of insulin (CMS/[facility]) (Primary)    2. Mixed hyperlipidemia    3. Essential hypertension            Glycemic management     Diabetes mellitus type 2        Lab Results   Component Value Date    HGBA1C 10.70 (H) 04/13/2021          Taking metformin 1000 mg twice a day     Taking Levemir 20  units at night--     If you ever wake up with a blood glucose level less than 80 decreased to 25     Ozempic  1 mg weekly --taking         Humalog  before meals as a scale--3 times daily     Generally gives  4 to 8 Units for each meal      Less than 180 --- nothing  181-200 --- 2units  201-250  ---- 4 units  251-300  ----  6units  Above 300 ----- 8 units                       Stop Glipizide            No Jardiance since associated with GMI                       ===========      Blood pressure management:      Hypertension          Taking losartan 100 mg once daily           ===========     Lipid Management         Dyslipidemia        Taking Lipitor 80 mg 1 at night        Total Cholesterol   Date Value Ref Range Status   04/13/2021 169 0 - 200 mg/dL Final     Triglycerides   Date Value Ref Range Status   04/13/2021 178 (H) 0 - 150 mg/dL Final     HDL Cholesterol   Date Value Ref Range Status   04/13/2021 50 40 - 60 mg/dL Final     LDL Cholesterol    Date Value Ref Range Status   04/13/2021 89 0 - 100 mg/dL Final     LDLCALC ELECT   Date Value Ref Range Status   01/12/2015 136 (H) 0.00 - 129.00 mg/dl Final     Comment:     LDL AVERAGE RISK: 130 - 159 MG/DL           ----------------------           Microvascular monitoring     Last eye exam was 2020 no DR     No neuropathy     Positive for  microalbuminuria            Component      Latest Ref Rng & Units 3/31/2021 3/31/2021           9:47 AM  9:47 AM   Microalbumin/Creatinine Ratio      mg/g 67.8     Creatinine, Urine      mg/dL 75.2 75.2   Microalbumin, Urine      mg/dL 5.1     Protein/Creatinine Ratio      0.0 - 200.0 mg/G Crea   212.8 (H)   Total Protein, Urine      mg/dL   16.0         ==========     History of osteoporosis     On alendronate for at least since 2013     I reviewed her bone density from 2013 and it was osteopenia     Stopped the alendronate     Jan. 2021     DXA oteopenia      Take calcium plus vitamin d            Other related aspects      Lab Results   Component Value Date    TSH 0.511 04/13/2021     Lab Results   Component Value Date    KCBXENKO61 >2,000 (H) 04/13/2021            Labs next week and I will call with results            Follow Up   Return in about 3 months (around 12/17/2021) for Recheck.  Patient was given instructions and counseling regarding her condition or for health maintenance advice. Please see specific information pulled into the AVS if appropriate.         This document has been electronically signed by ZELDA Freire on September 17, 2021 09:15 CDT.

## 2021-09-28 DIAGNOSIS — K21.9 GASTROESOPHAGEAL REFLUX DISEASE, UNSPECIFIED WHETHER ESOPHAGITIS PRESENT: ICD-10-CM

## 2021-09-28 RX ORDER — OMEPRAZOLE 20 MG/1
20 CAPSULE, DELAYED RELEASE ORAL DAILY
Qty: 90 CAPSULE | Refills: 1 | Status: SHIPPED | OUTPATIENT
Start: 2021-09-28 | End: 2021-11-29 | Stop reason: SDUPTHER

## 2021-09-28 NOTE — TELEPHONE ENCOUNTER
Incoming Refill Request      Medication requested (name and dose): Omeprazole    Ph[facility] where [facility] sent[facility]    Additional details provided by patient: none    Best call back number: [phone]    Does the patient have less than a 3 day supply:  [x] Yes  [] No    Alfreda Mcdaniels  09/28/21, 10:43 CDT

## 2021-11-12 ENCOUNTER — LAB (OUTPATIENT)
Dept: LAB | Facility: HOSPITAL | Age: 66
End: 2021-11-12

## 2021-11-12 DIAGNOSIS — D50.9 IRON DEFICIENCY ANEMIA, UNSPECIFIED IRON DEFICIENCY ANEMIA TYPE: ICD-10-CM

## 2021-11-12 DIAGNOSIS — I10 BENIGN ESSENTIAL HYPERTENSION: ICD-10-CM

## 2021-11-12 DIAGNOSIS — E11.65 TYPE 2 DIABETES MELLITUS WITH HYPERGLYCEMIA, WITH LONG-TERM CURRENT USE OF INSULIN (HCC): ICD-10-CM

## 2021-11-12 DIAGNOSIS — Z79.4 TYPE 2 DIABETES MELLITUS WITH HYPERGLYCEMIA, WITH LONG-TERM CURRENT USE OF INSULIN (HCC): ICD-10-CM

## 2021-11-12 DIAGNOSIS — E55.9 VITAMIN D DEFICIENCY: ICD-10-CM

## 2021-11-12 DIAGNOSIS — E78.2 MIXED HYPERLIPIDEMIA: ICD-10-CM

## 2021-11-12 LAB
25(OH)D3 SERPL-MCNC: 14.2 NG/ML
ALBUMIN SERPL-MCNC: 4.6 G/DL (ref 3.5–5.2)
ALBUMIN UR-MCNC: 18.2 MG/DL
ALBUMIN/GLOB SERPL: 1.5 G/DL
ALP SERPL-CCNC: 80 U/L (ref 39–117)
ALT SERPL W P-5'-P-CCNC: 14 U/L (ref 1–33)
ANION GAP SERPL CALCULATED.3IONS-SCNC: 10.8 MMOL/L (ref 5–15)
AST SERPL-CCNC: 10 U/L (ref 1–32)
BASOPHILS # BLD AUTO: 0.04 10*3/MM3 (ref 0–0.2)
BASOPHILS NFR BLD AUTO: 0.6 % (ref 0–1.5)
BILIRUB SERPL-MCNC: 0.3 MG/DL (ref 0–1.2)
BUN SERPL-MCNC: 9 MG/DL (ref 8–23)
BUN/CREAT SERPL: 15.3 (ref 7–25)
CALCIUM SPEC-SCNC: 10 MG/DL (ref 8.6–10.5)
CHLORIDE SERPL-SCNC: 102 MMOL/L (ref 98–107)
CHOLEST SERPL-MCNC: 256 MG/DL (ref 0–200)
CO2 SERPL-SCNC: 28.2 MMOL/L (ref 22–29)
CREAT SERPL-MCNC: 0.59 MG/DL (ref 0.57–1)
CREAT UR-MCNC: 92.2 MG/DL
CREAT UR-MCNC: 92.2 MG/DL
DEPRECATED RDW RBC AUTO: 47 FL (ref 37–54)
EOSINOPHIL # BLD AUTO: 0.07 10*3/MM3 (ref 0–0.4)
EOSINOPHIL NFR BLD AUTO: 1.1 % (ref 0.3–6.2)
ERYTHROCYTE [DISTWIDTH] IN BLOOD BY AUTOMATED COUNT: 14.6 % (ref 12.3–15.4)
FERRITIN SERPL-MCNC: 218.2 NG/ML (ref 13–150)
GFR SERPL CREATININE-BSD FRML MDRD: 124 ML/MIN/1.73
GLOBULIN UR ELPH-MCNC: 3 GM/DL
GLUCOSE SERPL-MCNC: 151 MG/DL (ref 65–99)
HBA1C MFR BLD: 10.56 % (ref 4.8–5.6)
HCT VFR BLD AUTO: 41.1 % (ref 34–46.6)
HDLC SERPL-MCNC: 61 MG/DL (ref 40–60)
HGB BLD-MCNC: 13 G/DL (ref 12–15.9)
IMM GRANULOCYTES # BLD AUTO: 0.03 10*3/MM3 (ref 0–0.05)
IMM GRANULOCYTES NFR BLD AUTO: 0.5 % (ref 0–0.5)
IRON 24H UR-MRATE: 93 MCG/DL (ref 37–145)
IRON SATN MFR SERPL: 24 % (ref 20–50)
LDLC SERPL CALC-MCNC: 157 MG/DL (ref 0–100)
LDLC/HDLC SERPL: 2.52 {RATIO}
LYMPHOCYTES # BLD AUTO: 2.24 10*3/MM3 (ref 0.7–3.1)
LYMPHOCYTES NFR BLD AUTO: 35.9 % (ref 19.6–45.3)
MCH RBC QN AUTO: 27.4 PG (ref 26.6–33)
MCHC RBC AUTO-ENTMCNC: 31.6 G/DL (ref 31.5–35.7)
MCV RBC AUTO: 86.7 FL (ref 79–97)
MICROALBUMIN/CREAT UR: 197.4 MG/G
MONOCYTES # BLD AUTO: 0.44 10*3/MM3 (ref 0.1–0.9)
MONOCYTES NFR BLD AUTO: 7.1 % (ref 5–12)
NEUTROPHILS NFR BLD AUTO: 3.42 10*3/MM3 (ref 1.7–7)
NEUTROPHILS NFR BLD AUTO: 54.8 % (ref 42.7–76)
NRBC BLD AUTO-RTO: 0 /100 WBC (ref 0–0.2)
PLATELET # BLD AUTO: 262 10*3/MM3 (ref 140–450)
PMV BLD AUTO: 11.7 FL (ref 6–12)
POTASSIUM SERPL-SCNC: 4.3 MMOL/L (ref 3.5–5.2)
PROT SERPL-MCNC: 7.6 G/DL (ref 6–8.5)
PROT UR-MCNC: 33 MG/DL
PROT/CREAT UR: 357.9 MG/G CREA (ref 0–200)
RBC # BLD AUTO: 4.74 10*6/MM3 (ref 3.77–5.28)
SODIUM SERPL-SCNC: 141 MMOL/L (ref 136–145)
TIBC SERPL-MCNC: 390 MCG/DL (ref 298–536)
TRANSFERRIN SERPL-MCNC: 262 MG/DL (ref 200–360)
TRIGL SERPL-MCNC: 207 MG/DL (ref 0–150)
TSH SERPL DL<=0.05 MIU/L-ACNC: 0.39 UIU/ML (ref 0.27–4.2)
VIT B12 BLD-MCNC: >2000 PG/ML (ref 211–946)
VLDLC SERPL-MCNC: 38 MG/DL (ref 5–40)
WBC # BLD AUTO: 6.24 10*3/MM3 (ref 3.4–10.8)

## 2021-11-12 PROCEDURE — 83540 ASSAY OF IRON: CPT

## 2021-11-12 PROCEDURE — 80053 COMPREHEN METABOLIC PANEL: CPT

## 2021-11-12 PROCEDURE — 82607 VITAMIN B-12: CPT

## 2021-11-12 PROCEDURE — 82306 VITAMIN D 25 HYDROXY: CPT

## 2021-11-12 PROCEDURE — 85025 COMPLETE CBC W/AUTO DIFF WBC: CPT

## 2021-11-12 PROCEDURE — 84156 ASSAY OF PROTEIN URINE: CPT

## 2021-11-12 PROCEDURE — 82728 ASSAY OF FERRITIN: CPT

## 2021-11-12 PROCEDURE — 83036 HEMOGLOBIN GLYCOSYLATED A1C: CPT

## 2021-11-12 PROCEDURE — 84443 ASSAY THYROID STIM HORMONE: CPT

## 2021-11-12 PROCEDURE — 82043 UR ALBUMIN QUANTITATIVE: CPT

## 2021-11-12 PROCEDURE — 82570 ASSAY OF URINE CREATININE: CPT

## 2021-11-12 PROCEDURE — 80061 LIPID PANEL: CPT

## 2021-11-12 PROCEDURE — 84466 ASSAY OF TRANSFERRIN: CPT

## 2021-11-16 ENCOUNTER — APPOINTMENT (OUTPATIENT)
Dept: ONCOLOGY | Facility: CLINIC | Age: 66
End: 2021-11-16

## 2021-11-16 ENCOUNTER — APPOINTMENT (OUTPATIENT)
Dept: ONCOLOGY | Facility: HOSPITAL | Age: 66
End: 2021-11-16

## 2021-11-17 ENCOUNTER — APPOINTMENT (OUTPATIENT)
Dept: ONCOLOGY | Facility: CLINIC | Age: 66
End: 2021-11-17

## 2021-11-29 ENCOUNTER — OFFICE VISIT (OUTPATIENT)
Dept: FAMILY MEDICINE CLINIC | Facility: CLINIC | Age: 66
End: 2021-11-29

## 2021-11-29 VITALS
WEIGHT: 214 LBS | DIASTOLIC BLOOD PRESSURE: 78 MMHG | HEART RATE: 105 BPM | SYSTOLIC BLOOD PRESSURE: 150 MMHG | HEIGHT: 62 IN | OXYGEN SATURATION: 96 % | BODY MASS INDEX: 39.38 KG/M2

## 2021-11-29 DIAGNOSIS — K21.9 GASTROESOPHAGEAL REFLUX DISEASE, UNSPECIFIED WHETHER ESOPHAGITIS PRESENT: ICD-10-CM

## 2021-11-29 DIAGNOSIS — J30.2 SEASONAL ALLERGIES: ICD-10-CM

## 2021-11-29 DIAGNOSIS — D68.62 LUPUS ANTICOAGULANT SYNDROME (HCC): ICD-10-CM

## 2021-11-29 DIAGNOSIS — I10 BENIGN ESSENTIAL HYPERTENSION: Primary | ICD-10-CM

## 2021-11-29 DIAGNOSIS — D50.8 OTHER IRON DEFICIENCY ANEMIA: Chronic | ICD-10-CM

## 2021-11-29 DIAGNOSIS — E11.69 TYPE 2 DIABETES MELLITUS WITH OTHER SPECIFIED COMPLICATION, WITH LONG-TERM CURRENT USE OF INSULIN (HCC): ICD-10-CM

## 2021-11-29 DIAGNOSIS — Z79.4 TYPE 2 DIABETES MELLITUS WITH OTHER SPECIFIED COMPLICATION, WITH LONG-TERM CURRENT USE OF INSULIN (HCC): ICD-10-CM

## 2021-11-29 DIAGNOSIS — E66.01 CLASS 2 SEVERE OBESITY WITH SERIOUS COMORBIDITY AND BODY MASS INDEX (BMI) OF 39.0 TO 39.9 IN ADULT, UNSPECIFIED OBESITY TYPE (HCC): ICD-10-CM

## 2021-11-29 PROCEDURE — 99214 OFFICE O/P EST MOD 30 MIN: CPT | Performed by: FAMILY MEDICINE

## 2021-11-29 RX ORDER — DOCUSATE SODIUM 100 MG/1
100 CAPSULE, LIQUID FILLED ORAL 2 TIMES DAILY
Qty: 60 CAPSULE | Refills: 2 | Status: SHIPPED | OUTPATIENT
Start: 2021-11-29 | End: 2022-02-02 | Stop reason: SDUPTHER

## 2021-11-29 RX ORDER — CETIRIZINE HYDROCHLORIDE 10 MG/1
10 TABLET ORAL DAILY
Qty: 30 TABLET | Refills: 11 | Status: SHIPPED | OUTPATIENT
Start: 2021-11-29 | End: 2022-02-02 | Stop reason: SDUPTHER

## 2021-11-29 RX ORDER — PEN NEEDLE, DIABETIC 30 GX3/16"
1 NEEDLE, DISPOSABLE MISCELLANEOUS 4 TIMES DAILY
Qty: 120 EACH | Refills: 11 | Status: SHIPPED | OUTPATIENT
Start: 2021-11-29 | End: 2022-02-02 | Stop reason: SDUPTHER

## 2021-11-29 RX ORDER — INSULIN DETEMIR 100 [IU]/ML
30 INJECTION, SOLUTION SUBCUTANEOUS DAILY
Qty: 60 ML | Refills: 12 | Status: SHIPPED | OUTPATIENT
Start: 2021-11-29 | End: 2021-12-27

## 2021-11-29 RX ORDER — TRIAMCINOLONE ACETONIDE 55 UG/1
2 SPRAY, METERED NASAL DAILY
Qty: 16.5 G | Refills: 11 | Status: SHIPPED | OUTPATIENT
Start: 2021-11-29 | End: 2022-02-02 | Stop reason: SDUPTHER

## 2021-11-29 RX ORDER — OMEPRAZOLE 20 MG/1
20 CAPSULE, DELAYED RELEASE ORAL DAILY
Qty: 90 CAPSULE | Refills: 1 | Status: SHIPPED | OUTPATIENT
Start: 2021-11-29 | End: 2022-02-02 | Stop reason: SDUPTHER

## 2021-11-29 RX ORDER — LOSARTAN POTASSIUM 100 MG/1
100 TABLET ORAL DAILY
Qty: 90 TABLET | Refills: 3 | Status: SHIPPED | OUTPATIENT
Start: 2021-11-29 | End: 2022-02-02 | Stop reason: SDUPTHER

## 2021-11-29 RX ORDER — INSULIN LISPRO 100 [IU]/ML
INJECTION, SOLUTION INTRAVENOUS; SUBCUTANEOUS
Qty: 3 PEN | Refills: 11 | Status: SHIPPED | OUTPATIENT
Start: 2021-11-29 | End: 2022-01-20 | Stop reason: SDUPTHER

## 2021-11-29 RX ORDER — SEMAGLUTIDE 1.34 MG/ML
INJECTION, SOLUTION SUBCUTANEOUS
Qty: 1 PEN | Refills: 11 | Status: SHIPPED | OUTPATIENT
Start: 2021-11-29 | End: 2022-02-02 | Stop reason: SDUPTHER

## 2021-11-29 RX ORDER — RIVAROXABAN 20 MG/1
20 TABLET, FILM COATED ORAL
Qty: 30 TABLET | Refills: 3 | Status: SHIPPED | OUTPATIENT
Start: 2021-11-29 | End: 2021-12-02 | Stop reason: SDUPTHER

## 2021-11-29 RX ORDER — FERROUS SULFATE 325(65) MG
1 TABLET ORAL
Qty: 90 TABLET | Refills: 1 | Status: SHIPPED | OUTPATIENT
Start: 2021-11-29 | End: 2022-02-02 | Stop reason: SDUPTHER

## 2021-11-29 RX ORDER — LANOLIN ALCOHOL/MO/W.PET/CERES
1000 CREAM (GRAM) TOPICAL DAILY
Qty: 100 TABLET | Refills: 3 | Status: SHIPPED | OUTPATIENT
Start: 2021-11-29 | End: 2022-02-02 | Stop reason: SDUPTHER

## 2021-11-29 RX ORDER — MONTELUKAST SODIUM 10 MG/1
10 TABLET ORAL DAILY
Qty: 30 TABLET | Refills: 11 | Status: SHIPPED | OUTPATIENT
Start: 2021-11-29 | End: 2022-02-02 | Stop reason: SDUPTHER

## 2021-11-29 NOTE — PROGRESS NOTES
"Chief Complaint  Hypertension    Subjective          Meeta Marmolejo presents to [facility] [place]  History of Present Illness    Patient presents today for follow up hypertension.   check BP at home.  Systolic generally in the 120s.  Diastolic usually lower than today.  She is taking losartan 100 mg daily.  Says that she has been eating ham (ih sodium food) since Thanksgiving and thinks that is why her blood pressure is elevated some today.  Patient has been trying to lose weight.  She has made dietary modifications including cut back on junk food and restricting sugar.  Exercises 1-2 times a day.  Generally walks at least a mile.  Also on Ozempic 0.5 mg weekly for diabetes, but notes it is not helping with weight.  Blood sugar has been better controlled on home checks, blood glucose is usually 120 fasting and about 180 2 hours postprandial.  Patient notes that last HgbA1c was more elevated due to issues with medication coverage during insurance change.  Issues have been resolved and patient is not having any trouble obtaining her medications at this time.     Objective   Vital Signs:   /78   Pulse 105   Ht 157.5 cm (62\")   Wt 97.1 kg (214 lb)   SpO2 96%   BMI 39.14 kg/m²     Physical Exam  Vitals reviewed.   Constitutional:       General: She is not in acute distress.     Appearance: She is well-developed.   Cardiovascular:      Rate and Rhythm: Normal rate and regular rhythm.      Heart sounds: Normal heart sounds. No murmur heard.      Pulmonary:      Effort: Pulmonary effort is normal. No respiratory distress.      Breath sounds: Normal breath sounds. No wheezing or rales.   Abdominal:      Palpations: Abdomen is soft.      Tenderness: There is no abdominal tenderness.   Skin:     General: Skin is warm and dry.   Neurological:      Mental Status: She is alert and oriented to person, place, and time.        Result Review :   The following " data was reviewed by: Anupama Arnett MD on 11/29/2021:  Common labs    Common Labsle 11/12/21 11/12/21 11/12/21 11/12/21 11/12/21    0805 0805 0805 0805 0805   Glucose    151 (A)    BUN    9    Creatinine    0.59    eGFR African Am    124    Sodium    141    Potassium    4.3    Chloride    102    Calcium    10.0    Albumin    4.60    Total Bilirubin    0.3    Alkaline Phosphatase    80    AST (SGOT)    10    ALT (SGPT)    14    WBC 6.24       Hemoglobin 13.0       Hematocrit 41.1       Platelets 262       Total Cholesterol     256 (A)   Triglycerides     207 (A)   HDL Cholesterol     61 (A)   LDL Cholesterol      157 (A)   Hemoglobin A1C  10.56 (A)      Microalbumin, Urine   18.2     (A) Abnormal value       Comments are available for some flowsheets but are not being displayed.                     Assessment and Plan    Diagnoses and all orders for this visit:    1. Benign essential hypertension (Primary)  -     losartan (COZAAR) 100 MG tablet; Take 1 tablet by mouth Daily.  Dispense: 90 tablet; Refill: 3    2. Type 2 diabetes mellitus with other specified complication, with long-term current use of insulin (HCC)  -     insulin detemir (Levemir) 100 UNIT/ML injection; Inject 30 Units under the skin into the appropriate area as directed Daily.  Dispense: 60 mL; Refill: 12  -     Insulin Lispro, 1 Unit Dial, (HumaLOG KwikPen) 100 UNIT/ML solution pen-injector; 2 to 8 units before meals  Dispense: 3 pen; Refill: 11  -     Semaglutide,0.25 or 0.5MG/DOS, (Ozempic, 0.25 or 0.5 MG/DOSE,) 2 MG/1.5ML solution pen-injector; 0.5 mg weekly  Dispense: 1 pen; Refill: 11  -     metFORMIN (GLUCOPHAGE) 1000 MG tablet; Take 1 tablet by mouth 2 (Two) Times a Day With Meals.  Dispense: 60 tablet; Refill: 5  -     Insulin Pen Needle (Pen Needles) 32G X 4 MM misc; 1 each 4 (Four) Times a Day. Use 4 x daily, Dx code E11.65  Dispense: 120 each; Refill: 11    3. Gastroesophageal reflux disease, unspecified whether esophagitis  present  -     omeprazole (priLOSEC) 20 MG capsule; Take 1 capsule by mouth Daily.  Dispense: 90 capsule; Refill: 1    4. Seasonal allergies  -     cetirizine (zyrTEC) 10 MG tablet; Take 1 tablet by mouth Daily.  Dispense: 30 tablet; Refill: 11  -     montelukast (SINGULAIR) 10 MG tablet; Take 1 tablet by mouth Daily.  Dispense: 30 tablet; Refill: 11  -     Triamcinolone Acetonide (NASACORT) 55 MCG/ACT nasal inhaler; 2 sprays into the nostril(s) as directed by provider Daily.  Dispense: 16.5 g; Refill: 11    5. Other iron deficiency anemia  -     vitamin B-12 (CYANOCOBALAMIN) 1000 MCG tablet; Take 1 tablet by mouth Daily.  Dispense: 100 tablet; Refill: 3    6. Lupus anticoagulant syndrome (HCC)  -     Xarelto 20 MG tablet; Take 1 tablet by mouth Daily With Dinner for 30 days.  Dispense: 30 tablet; Refill: 3    Other orders  -     docusate sodium (COLACE) 100 MG capsule; Take 1 capsule by mouth 2 (Two) Times a Day.  Dispense: 60 capsule; Refill: 2  -     ferrous sulfate 325 (65 FE) MG tablet; Take 1 tablet by mouth Daily With Breakfast.  Dispense: 90 tablet; Refill: 1      Patient seen today for follow up  Blood pressure not well controlled, however patient reports normal readings at home  Sent home with blood pressure log, patient will check for 2 weeks and record  Return log with next visit    Diabetes not well controlled  Patient following with endocrinology  Reports that she was unable to obtain medication for a while, but no issues now  Home readings are ok if accurate    Patient's Body mass index is 39.14 kg/m². indicating that she is morbidly obese (BMI > 40 or > 35 with obesity - related health condition). Obesity-related health conditions include the following: hypertension, diabetes mellitus, dyslipidemias and GERD. Obesity is unchanged. BMI is is above average; BMI management plan is completed. We discussed low calorie, low carb based diet program, portion control and increasing exercise.  Discussed  possibility of using short term phentermine.  Patient will keep 2 week diet and exercise log.  Will plan to discuss phentermine at next visit as long as blood pressure is controlled.  Ozempic should help with weight loss as well.    Continue current medications for other chronic medical problems      Follow Up   Return in about 5 weeks (around 1/3/2022).  Patient was given instructions and counseling regarding her condition or for health maintenance advice. Please see specific information pulled into the AVS if appropriate.         This document has been electronically signed by Anupama Arnett MD

## 2021-12-02 ENCOUNTER — OFFICE VISIT (OUTPATIENT)
Dept: ONCOLOGY | Facility: CLINIC | Age: 66
End: 2021-12-02

## 2021-12-02 VITALS
SYSTOLIC BLOOD PRESSURE: 176 MMHG | BODY MASS INDEX: 39.91 KG/M2 | OXYGEN SATURATION: 95 % | HEART RATE: 106 BPM | RESPIRATION RATE: 18 BRPM | DIASTOLIC BLOOD PRESSURE: 78 MMHG | WEIGHT: 218.2 LBS | TEMPERATURE: 96.1 F

## 2021-12-02 DIAGNOSIS — D68.62 LUPUS ANTICOAGULANT SYNDROME (HCC): ICD-10-CM

## 2021-12-02 DIAGNOSIS — D50.8 OTHER IRON DEFICIENCY ANEMIA: Chronic | ICD-10-CM

## 2021-12-02 PROCEDURE — 99212 OFFICE O/P EST SF 10 MIN: CPT | Performed by: NURSE PRACTITIONER

## 2021-12-02 PROCEDURE — G0463 HOSPITAL OUTPT CLINIC VISIT: HCPCS | Performed by: NURSE PRACTITIONER

## 2021-12-02 RX ORDER — RIVAROXABAN 20 MG/1
20 TABLET, FILM COATED ORAL
Qty: 30 TABLET | Refills: 3 | Status: SHIPPED | OUTPATIENT
Start: 2021-12-02 | End: 2022-02-02 | Stop reason: SDUPTHER

## 2021-12-02 NOTE — PROGRESS NOTES
Addended by: Andrea Leon on: 3/22/2019 10:57 AM     Modules accepted: Aries DATE OF VISIT: 12/2/2021      REASON FOR VISIT:  Pt here for management of Xarelto related to pulmonary embolism; iron deficiency anemia      HISTORY OF PRESENT ILLNESS:   66 yr old female with past medical problems consisting of hypertension, dyslipidemia, poorly controlled diabetes mellitus, history of left knee replacement with revision ×3.  Most recent revision was done on August 28, 2018.  And was diagnosed with subacute pulmonary embolism on August 30, 2018 after revision of left knee and was started on xarelto.  Patient was initially seen in consultation when she was  admitted to hospital on September 21, 2018 with bleeding into her left knee.  Xarelto was discontinued and patient was started on heparin drip with coumadin.  Patient has started back on Xarelto in December 2018 due to positive lupus anticoagulant.  She is tolerating well and denies any bleeding. She continues on B-12 and folic acid TIW and is taking iron tablet daily.        Past Medical History, Past Surgical History, Social History, Family History have been reviewed and are without significant changes except as mentioned.    Review of Systems   A comprehensive 14 point review of systems was performed and was negative except as mentioned.    Medications:  The current medication list was reviewed in the EMR    ALLERGIES:    Allergies   Allergen Reactions   • Shrimp Rash   • Oxycodone Rash   • Penicillins Rash   • Percocet [Oxycodone-Acetaminophen] Rash   • Rocephin [Ceftriaxone] Rash       Objective      Vitals:    12/02/21 0945   BP: 176/78   Pulse: 106   Resp: 18   Temp: 96.1 °F (35.6 °C)   SpO2: 95%   Weight: 99 kg (218 lb 3.2 oz)   PainSc: 0-No pain     Current Status 5/18/2020   ECOG score 0       Physical Exam  General: alert and oriented no acute distress  Lungs: CTAB no crackles or wheezing  Card: RRR no murmur  GI: soft non tender non distended no organomegaly  Ext : no edema    RECENT LABS:  Glucose   Date Value Ref Range Status    11/12/2021 151 (H) 65 - 99 mg/dL Final     Sodium   Date Value Ref Range Status   11/12/2021 141 136 - 145 mmol/L Final     Potassium   Date Value Ref Range Status   11/12/2021 4.3 3.5 - 5.2 mmol/L Final     CO2   Date Value Ref Range Status   11/12/2021 28.2 22.0 - 29.0 mmol/L Final     Chloride   Date Value Ref Range Status   11/12/2021 102 98 - 107 mmol/L Final     Anion Gap   Date Value Ref Range Status   11/12/2021 10.8 5.0 - 15.0 mmol/L Final     Creatinine   Date Value Ref Range Status   11/12/2021 0.59 0.57 - 1.00 mg/dL Final     BUN   Date Value Ref Range Status   11/12/2021 9 8 - 23 mg/dL Final     BUN/Creatinine Ratio   Date Value Ref Range Status   11/12/2021 15.3 7.0 - 25.0 Final     Calcium   Date Value Ref Range Status   11/12/2021 10.0 8.6 - 10.5 mg/dL Final     Alkaline Phosphatase   Date Value Ref Range Status   11/12/2021 80 39 - 117 U/L Final     Total Protein   Date Value Ref Range Status   11/12/2021 7.6 6.0 - 8.5 g/dL Final     ALT (SGPT)   Date Value Ref Range Status   11/12/2021 14 1 - 33 U/L Final     AST (SGOT)   Date Value Ref Range Status   11/12/2021 10 1 - 32 U/L Final     Total Bilirubin   Date Value Ref Range Status   11/12/2021 0.3 0.0 - 1.2 mg/dL Final     Albumin   Date Value Ref Range Status   11/12/2021 4.60 3.50 - 5.20 g/dL Final     Globulin   Date Value Ref Range Status   11/12/2021 3.0 gm/dL Final     Lab Results   Component Value Date    WBC 6.24 11/12/2021    HGB 13.0 11/12/2021    HCT 41.1 11/12/2021    MCV 86.7 11/12/2021     11/12/2021     Lab Results   Component Value Date    NEUTROABS 3.42 11/12/2021    IRON 93 11/12/2021    IRON 78 11/03/2020    IRON 79 07/07/2020    TIBC 390 11/12/2021    TIBC 365 11/03/2020    TIBC 420 07/07/2020    LABIRON 24 11/12/2021    LABIRON 21 11/03/2020    LABIRON 19 (L) 07/07/2020    FERRITIN 218.20 (H) 11/12/2021    FERRITIN 223.70 (H) 11/03/2020    FERRITIN 155.40 (H) 07/07/2020    DDFDYKMQ33 >2,000 (H) 11/12/2021    DSBSBHVK08  >2,000 (H) 04/13/2021    XYXTNCZA24 >2,000 (H) 07/07/2020    FOLATE >20.00 07/07/2020    FOLATE 17.30 05/15/2020    FOLATE 15.10 12/05/2019     Lab Results   Component Value Date    REFLABREPO SEE NOTE: 05/28/2015               RADIOLOGY DATA :  No radiology results for the last 7 days        Assessment/Plan       1.  Subacute pulmonary embolism on right side:  -Patient was diagnosed with subacute pulmonary embolism on right side after most recent revision of left knee done on August 28, 2018.  -Patient was started on xarelto 15 mg twice daily.  -She was admitted to [facility] on September 20, 2018 with bleeding into her left knee.  -Patient was started on heparin drip.  -Patient had incision and Drainage done by Dr. Su on September 22, 2018.  -Patient was on Coumadin.  Patient was started back on Xarelto in December 2018.  -CT angiogram of chest with contrast done on February 20, 2019 shows resolution of pulmonary embolism.  -Hypercoagulable workup done consisting of factor V Leyden, prothrombin gene mutation, anticardiolipin antibody, beta-2 glycoprotein antibody were negative on February 20, 2019.  -Lupus anticoagulant was positive on February 20, 2019 as well as Peggy 3, 2019.  In view of lupus anticoagulant being positive, recommend continuing with Xarelto for now.  -Patient denies any bleeding.      2.  Anemia:  -Patient required 1 unit of PRBC on September 22, 2018 with hemoglobin of 6.9.  -She is currently on B-12 and folic acid TIW along with iron tablet daily.  -labs reviewed today and show Hgb 13 with  Normal and adequate iron studies; continue same regimen and recheck again in 6 mos.             PHQ-9 Total Score: 0     Rishikilo Marmolejo reports a pain score of 0.  Given her pain assessment as noted, treatment options were discussed and the following options were decided upon as a follow-up plan to address the patient's pain: no pain today.         Estephania Felix,  APRN  12/2/2021  13:54 CST        Part of this note may be an electronic transcription/translation of spoken language to printed text using the Dragon Dictation System.          CC:

## 2021-12-27 ENCOUNTER — OFFICE VISIT (OUTPATIENT)
Dept: ENDOCRINOLOGY | Facility: CLINIC | Age: 66
End: 2021-12-27

## 2021-12-27 VITALS
WEIGHT: 217.3 LBS | SYSTOLIC BLOOD PRESSURE: 148 MMHG | HEIGHT: 62 IN | DIASTOLIC BLOOD PRESSURE: 58 MMHG | OXYGEN SATURATION: 97 % | BODY MASS INDEX: 39.99 KG/M2 | HEART RATE: 119 BPM

## 2021-12-27 DIAGNOSIS — E11.65 TYPE 2 DIABETES MELLITUS WITH HYPERGLYCEMIA, WITH LONG-TERM CURRENT USE OF INSULIN (HCC): Primary | ICD-10-CM

## 2021-12-27 DIAGNOSIS — E55.9 VITAMIN D DEFICIENCY: ICD-10-CM

## 2021-12-27 DIAGNOSIS — M81.0 AGE-RELATED OSTEOPOROSIS WITHOUT CURRENT PATHOLOGICAL FRACTURE: ICD-10-CM

## 2021-12-27 DIAGNOSIS — I10 ESSENTIAL HYPERTENSION: ICD-10-CM

## 2021-12-27 DIAGNOSIS — Z79.4 TYPE 2 DIABETES MELLITUS WITH HYPERGLYCEMIA, WITH LONG-TERM CURRENT USE OF INSULIN (HCC): Primary | ICD-10-CM

## 2021-12-27 PROCEDURE — 99214 OFFICE O/P EST MOD 30 MIN: CPT | Performed by: NURSE PRACTITIONER

## 2021-12-27 RX ORDER — INSULIN DETEMIR 100 [IU]/ML
35 INJECTION, SOLUTION SUBCUTANEOUS DAILY
Qty: 4 PEN | Refills: 11 | Status: SHIPPED | OUTPATIENT
Start: 2021-12-27 | End: 2022-01-10 | Stop reason: SDUPTHER

## 2021-12-27 NOTE — PROGRESS NOTES
"Chief Complaint  Diabetes    Subjective          Meeta Marmolejo presents to [facility]  History of Present Illness     In office visit     66-year-old female presents for follow-up    Reason diabetes mellitus type 2    Diagnosed at age 31    Timing constant    Quality not controlled    Severity is moderate          Lab Results   Component Value Date    HGBA1C 10.56 (H) 11/12/2021             Microvascular complications none     Microvascular complications microalbuminuria           Blood glucose monitoring     Takes her glucose levels 4 times a day      Ranging 100 up to 250                Review of Systems - General ROS: positive for  - fatigue           Objective   Vital Signs:   /58   Pulse 119   Ht 157.5 cm (62\")   Wt 98.6 kg (217 lb 4.8 oz)   SpO2 97%   BMI 39.74 kg/m²     Physical Exam  Constitutional:       Appearance: Normal appearance.   Cardiovascular:      Rate and Rhythm: Regular rhythm.      Heart sounds: Normal heart sounds.   Pulmonary:      Breath sounds: Normal breath sounds.   Musculoskeletal:      Cervical back: Normal range of motion.   Neurological:      General: No focal deficit present.      Mental Status: She is alert.   Psychiatric:         Mood and Affect: Mood normal.         Thought Content: Thought content normal.         Judgment: Judgment normal.        Result Review :   The following data was reviewed by: ZELDA Freire on 12/27/2021:  Common labs    Common Labsle 3/31/21 4/13/21 4/13/21 4/13/21 4/13/21 11/12/21 11/12/21 11/12/21 11/12/21 11/12/21     0906 0906 0906 0906 0805 0805 0805 0805 0805   Glucose   229 (A)      151 (A)    BUN   12      9    Creatinine   0.65      0.59    eGFR  Am   111      124    Sodium   134 (A)      141    Potassium   4.1      4.3    Chloride   99      102    Calcium   9.4      10.0    Albumin   4.30      4.60    Total Bilirubin   0.3      0.3    Alkaline Phosphatase   74      80 "    AST (SGOT)   13      10    ALT (SGPT)   13      14    WBC  6.05    6.24       Hemoglobin  12.2    13.0       Hematocrit  39.5    41.1       Platelets  251    262       Total Cholesterol     169     256 (A)   Triglycerides     178 (A)     207 (A)   HDL Cholesterol     50     61 (A)   LDL Cholesterol      89     157 (A)   Hemoglobin A1C    10.70 (A)   10.56 (A)      Microalbumin, Urine 5.1       18.2     (A) Abnormal value       Comments are available for some flowsheets but are not being displayed.                     Assessment and Plan    Diagnoses and all orders for this visit:    1. Type 2 diabetes mellitus with hyperglycemia, with long-term current use of insulin (HCC) (Primary)  -     CBC & Differential; Future  -     Comprehensive Metabolic Panel; Future  -     Hemoglobin A1c; Future  -     Lipid Panel; Future  -     Microalbumin / Creatinine Urine Ratio - Urine, Clean Catch; Future  -     Protein / Creatinine Ratio, Urine - Urine, Clean Catch; Future  -     TSH; Future  -     Vitamin B12; Future  -     Vitamin D 25 Hydroxy; Future    2. Essential hypertension  -     CBC & Differential; Future  -     Comprehensive Metabolic Panel; Future  -     Hemoglobin A1c; Future  -     Lipid Panel; Future  -     Microalbumin / Creatinine Urine Ratio - Urine, Clean Catch; Future  -     Protein / Creatinine Ratio, Urine - Urine, Clean Catch; Future  -     TSH; Future  -     Vitamin B12; Future  -     Vitamin D 25 Hydroxy; Future    3. Vitamin D deficiency  -     CBC & Differential; Future  -     Comprehensive Metabolic Panel; Future  -     Hemoglobin A1c; Future  -     Lipid Panel; Future  -     Microalbumin / Creatinine Urine Ratio - Urine, Clean Catch; Future  -     Protein / Creatinine Ratio, Urine - Urine, Clean Catch; Future  -     TSH; Future  -     Vitamin B12; Future  -     Vitamin D 25 Hydroxy; Future    4. Age-related osteoporosis without current pathological fracture    Other orders  -     insulin detemir  (Levemir FlexTouch) 100 UNIT/ML injection; Inject 35 Units under the skin into the appropriate area as directed Daily.  Dispense: 4 pen; Refill: 11            Glycemic management     Diabetes mellitus type 2        Lab Results   Component Value Date    HGBA1C 10.56 (H) 11/12/2021             Taking metformin 1000 mg twice a day     Taking Levemir 25  units at night--increase to 30 units      If you ever wake up with a blood glucose level less than 80 decreased to 25     Ozempic  1 mg weekly --taking         Humalog  before meals as a scale--3 times daily     Generally gives  4 to 8 Units for each meal for 3 times ---increase up to 12 units     + sliding scale      Less than 180 --- nothing  181-200 --- 2units  201-250  ---- 4 units  251-300  ----  6units  Above 300 ----- 8 units                       Stop Glipizide            No Jardiance since associated with [facility]      Approve claude 2            The patient has diabetes mellitus, insulin-dependent.     Our Diabetes Department has evaluated the patient in the last six months and will continue counseling on insulin adjustment.      The patient performs blood glucose testing at least four times daily with proven glucose variability from 50 to 300 mg per dl.     The patient is administering basal insulin and prandial insulin four times per day for more than six months.     The patient uses a home blood glucose monitor to assess blood glucose at least four times daily for more than six months.     The patient requires frequent adjustment of insulin treatment regimen based on blood glucose readings.     The patient has frequent variability in blood glucose readings due to activity and variability in meal content and time.      The patient has completed a diabetes education program with us.     The patient has demonstrated the ability to self-monitor her glucose.      The patient is motivated in improving diabetes control      The patient has hypoglycemia unawareness              ===========      Blood pressure management:      Hypertension          Taking losartan 100 mg once daily           ===========     Lipid Management         Dyslipidemia        Taking Lipitor 80 mg 1 at night        Total Cholesterol   Date Value Ref Range Status   11/12/2021 256 (H) 0 - 200 mg/dL Final     Triglycerides   Date Value Ref Range Status   11/12/2021 207 (H) 0 - 150 mg/dL Final     HDL Cholesterol   Date Value Ref Range Status   11/12/2021 61 (H) 40 - 60 mg/dL Final     LDL Cholesterol    Date Value Ref Range Status   11/12/2021 157 (H) 0 - 100 mg/dL Final     LDLCALC ELECT   Date Value Ref Range Status   01/12/2015 136 (H) 0 - 129 mg/dl Final     Comment:     LDL AVERAGE RISK: 130 - 159 MG/DL              ----------------------           Microvascular monitoring     Last eye exam -----Nov. 2021, no  DR     Cataract surgery 2021      No neuropathy     Positive for microalbuminuria         Component      Latest Ref Rng & Units 11/12/2021 11/12/2021           8:05 AM  8:05 AM   Microalbumin/Creatinine Ratio      mg/g 197.4    Creatinine, Urine      mg/dL 92.2 92.2   Microalbumin, Urine      mg/dL 18.2    Protein/Creatinine Ratio      0.0 - 200.0 mg/G Crea  357.9 (H)   Total Protein, Urine      mg/dL  33.0             ==========     History of osteoporosis     On alendronate for at least since 2013     I reviewed her bone density from 2013 and it was osteopenia     Stopped the alendronate     Jan. 2021     DXA oteopenia      Take calcium plus vitamin d            Other related aspects              Lab Results   Component Value Date    TSH 0.391 11/12/2021     Lab Results   Component Value Date    RYPOMTIR29 >2,000 (H) 11/12/2021                         Follow Up   No follow-ups on file.  Patient was given instructions and counseling regarding her condition or for health maintenance advice. Please see specific information pulled into the AVS if appropriate.         This document has been  electronically signed by ZELDA Freire on December 27, 2021 08:24 CST.

## 2022-01-04 ENCOUNTER — DOCUMENTATION (OUTPATIENT)
Dept: ENDOCRINOLOGY | Facility: CLINIC | Age: 67
End: 2022-01-04

## 2022-01-04 DIAGNOSIS — Z79.4 TYPE 2 DIABETES MELLITUS WITH HYPERGLYCEMIA, WITH LONG-TERM CURRENT USE OF INSULIN: Primary | ICD-10-CM

## 2022-01-04 DIAGNOSIS — E11.65 TYPE 2 DIABETES MELLITUS WITH HYPERGLYCEMIA, WITH LONG-TERM CURRENT USE OF INSULIN: Primary | ICD-10-CM

## 2022-01-10 ENCOUNTER — TELEPHONE (OUTPATIENT)
Dept: ENDOCRINOLOGY | Facility: CLINIC | Age: 67
End: 2022-01-10

## 2022-01-10 DIAGNOSIS — E11.65 TYPE 2 DIABETES MELLITUS WITH HYPERGLYCEMIA, WITH LONG-TERM CURRENT USE OF INSULIN: ICD-10-CM

## 2022-01-10 DIAGNOSIS — Z79.4 TYPE 2 DIABETES MELLITUS WITH HYPERGLYCEMIA, WITH LONG-TERM CURRENT USE OF INSULIN: ICD-10-CM

## 2022-01-10 RX ORDER — INSULIN DETEMIR 100 [IU]/ML
35 INJECTION, SOLUTION SUBCUTANEOUS DAILY
Qty: 4 PEN | Refills: 11 | Status: SHIPPED | OUTPATIENT
Start: 2022-01-10 | End: 2022-02-02 | Stop reason: SDUPTHER

## 2022-01-10 RX ORDER — IBUPROFEN 600 MG/1
1 TABLET ORAL ONCE AS NEEDED
Qty: 1 EACH | Refills: 1 | Status: SHIPPED | OUTPATIENT
Start: 2022-01-10

## 2022-01-10 NOTE — TELEPHONE ENCOUNTER
Pt called and would like her pharmacy changed to [facility] on [facility] in [place]. Thank you

## 2022-01-20 ENCOUNTER — TELEPHONE (OUTPATIENT)
Dept: FAMILY MEDICINE CLINIC | Facility: CLINIC | Age: 67
End: 2022-01-20

## 2022-01-20 DIAGNOSIS — Z79.4 TYPE 2 DIABETES MELLITUS WITH OTHER SPECIFIED COMPLICATION, WITH LONG-TERM CURRENT USE OF INSULIN: ICD-10-CM

## 2022-01-20 DIAGNOSIS — E11.69 TYPE 2 DIABETES MELLITUS WITH OTHER SPECIFIED COMPLICATION, WITH LONG-TERM CURRENT USE OF INSULIN: ICD-10-CM

## 2022-01-20 NOTE — TELEPHONE ENCOUNTER
[facility] CALLED TO ASK CLINICAL QUESTIONS FOR THE APPEAL ON PT'S LISPRO PEN. SHE STATES THE CASE WILL CLOSE AT 4:00 EST TIME ON 1/21/22

## 2022-01-24 ENCOUNTER — TELEPHONE (OUTPATIENT)
Dept: ENDOCRINOLOGY | Facility: CLINIC | Age: 67
End: 2022-01-24

## 2022-01-24 RX ORDER — INSULIN LISPRO 100 [IU]/ML
INJECTION, SOLUTION INTRAVENOUS; SUBCUTANEOUS
Qty: 6 PEN | Refills: 11 | Status: SHIPPED | OUTPATIENT
Start: 2022-01-24 | End: 2022-02-02 | Stop reason: SDUPTHER

## 2022-01-24 NOTE — TELEPHONE ENCOUNTER
Pt needs humalog pens but the vials were sent to the pharmacy. [facility] on [facility]. Thank  You

## 2022-01-26 RX ORDER — INSULIN LISPRO 100 [IU]/ML
INJECTION, SOLUTION INTRAVENOUS; SUBCUTANEOUS
Qty: 6 PEN | Refills: 11 | Status: CANCELLED | OUTPATIENT
Start: 2022-01-26

## 2022-02-02 ENCOUNTER — OFFICE VISIT (OUTPATIENT)
Dept: FAMILY MEDICINE CLINIC | Facility: CLINIC | Age: 67
End: 2022-02-02

## 2022-02-02 VITALS
HEIGHT: 62 IN | BODY MASS INDEX: 39.93 KG/M2 | WEIGHT: 217 LBS | OXYGEN SATURATION: 95 % | DIASTOLIC BLOOD PRESSURE: 84 MMHG | SYSTOLIC BLOOD PRESSURE: 150 MMHG | HEART RATE: 102 BPM

## 2022-02-02 DIAGNOSIS — E11.65 TYPE 2 DIABETES MELLITUS WITH HYPERGLYCEMIA, WITH LONG-TERM CURRENT USE OF INSULIN: ICD-10-CM

## 2022-02-02 DIAGNOSIS — J30.2 SEASONAL ALLERGIES: ICD-10-CM

## 2022-02-02 DIAGNOSIS — D50.8 OTHER IRON DEFICIENCY ANEMIA: Chronic | ICD-10-CM

## 2022-02-02 DIAGNOSIS — Z79.4 TYPE 2 DIABETES MELLITUS WITH HYPERGLYCEMIA, WITH LONG-TERM CURRENT USE OF INSULIN: ICD-10-CM

## 2022-02-02 DIAGNOSIS — I10 BENIGN ESSENTIAL HYPERTENSION: ICD-10-CM

## 2022-02-02 DIAGNOSIS — K21.9 GASTROESOPHAGEAL REFLUX DISEASE, UNSPECIFIED WHETHER ESOPHAGITIS PRESENT: ICD-10-CM

## 2022-02-02 DIAGNOSIS — D68.62 LUPUS ANTICOAGULANT SYNDROME: ICD-10-CM

## 2022-02-02 PROCEDURE — 99214 OFFICE O/P EST MOD 30 MIN: CPT | Performed by: FAMILY MEDICINE

## 2022-02-02 RX ORDER — TRIAMCINOLONE ACETONIDE 55 UG/1
2 SPRAY, METERED NASAL DAILY
Qty: 16.5 G | Refills: 11 | Status: SHIPPED | OUTPATIENT
Start: 2022-02-02 | End: 2023-02-02

## 2022-02-02 RX ORDER — CETIRIZINE HYDROCHLORIDE 10 MG/1
10 TABLET ORAL DAILY
Qty: 30 TABLET | Refills: 11 | Status: SHIPPED | OUTPATIENT
Start: 2022-02-02 | End: 2022-10-10

## 2022-02-02 RX ORDER — LOSARTAN POTASSIUM 100 MG/1
100 TABLET ORAL DAILY
Qty: 90 TABLET | Refills: 3 | Status: SHIPPED | OUTPATIENT
Start: 2022-02-02 | End: 2023-03-01

## 2022-02-02 RX ORDER — SEMAGLUTIDE 1.34 MG/ML
INJECTION, SOLUTION SUBCUTANEOUS
Qty: 1 PEN | Refills: 11 | Status: SHIPPED | OUTPATIENT
Start: 2022-02-02 | End: 2022-03-28 | Stop reason: SDUPTHER

## 2022-02-02 RX ORDER — PEN NEEDLE, DIABETIC 30 GX3/16"
1 NEEDLE, DISPOSABLE MISCELLANEOUS 4 TIMES DAILY
Qty: 120 EACH | Refills: 11 | Status: SHIPPED | OUTPATIENT
Start: 2022-02-02 | End: 2022-03-28 | Stop reason: SDUPTHER

## 2022-02-02 RX ORDER — FERROUS SULFATE 325(65) MG
1 TABLET ORAL
Qty: 90 TABLET | Refills: 1 | Status: SHIPPED | OUTPATIENT
Start: 2022-02-02 | End: 2022-12-19 | Stop reason: SDUPTHER

## 2022-02-02 RX ORDER — MONTELUKAST SODIUM 10 MG/1
10 TABLET ORAL DAILY
Qty: 30 TABLET | Refills: 11 | Status: SHIPPED | OUTPATIENT
Start: 2022-02-02 | End: 2022-12-13

## 2022-02-02 RX ORDER — RIVAROXABAN 20 MG/1
20 TABLET, FILM COATED ORAL
Qty: 30 TABLET | Refills: 3 | Status: SHIPPED | OUTPATIENT
Start: 2022-02-02 | End: 2022-11-21

## 2022-02-02 RX ORDER — INSULIN DETEMIR 100 [IU]/ML
35 INJECTION, SOLUTION SUBCUTANEOUS DAILY
Qty: 4 PEN | Refills: 11 | Status: SHIPPED | OUTPATIENT
Start: 2022-02-02 | End: 2022-03-28 | Stop reason: SDUPTHER

## 2022-02-02 RX ORDER — DOCUSATE SODIUM 100 MG/1
100 CAPSULE, LIQUID FILLED ORAL 2 TIMES DAILY
Qty: 60 CAPSULE | Refills: 2 | Status: SHIPPED | OUTPATIENT
Start: 2022-02-02 | End: 2022-05-27 | Stop reason: SDUPTHER

## 2022-02-02 RX ORDER — OMEPRAZOLE 20 MG/1
20 CAPSULE, DELAYED RELEASE ORAL DAILY
Qty: 90 CAPSULE | Refills: 1 | Status: SHIPPED | OUTPATIENT
Start: 2022-02-02 | End: 2022-09-16

## 2022-02-02 RX ORDER — INSULIN LISPRO 100 [IU]/ML
INJECTION, SOLUTION INTRAVENOUS; SUBCUTANEOUS
Qty: 6 PEN | Refills: 11 | Status: SHIPPED | OUTPATIENT
Start: 2022-02-02 | End: 2022-03-21 | Stop reason: SDUPTHER

## 2022-02-02 RX ORDER — LANOLIN ALCOHOL/MO/W.PET/CERES
1000 CREAM (GRAM) TOPICAL DAILY
Qty: 100 TABLET | Refills: 3 | Status: SHIPPED | OUTPATIENT
Start: 2022-02-02 | End: 2022-12-19 | Stop reason: SDUPTHER

## 2022-02-02 NOTE — PROGRESS NOTES
"Chief Complaint  Hypertension    Subjective          Meeta Marmolejo presents to [facility] [place]  History of Present Illness    Patient presents today for follow up hypertension and diabetes.  She has been doing well.  Reports checking her blood pressure at home is controlled.  Monitoring regularly, notes that systolic is in the 130s and diastolic in the 70s.  She does not usually have systolic elevations.  She is taking losartan 100 mg daily at this time.  Says that she has been trying to exercise, walking 2 miles most days of the week.    Patient reports glucose control, although last A1c was elevated.  She is taking metformin Levemir (35 units daily) and Humalog (2-8 units).  She is requesting refills of her test strips today.  She follows with endocrinology.     Objective   Vital Signs:   /84   Pulse 102   Ht 157.5 cm (62\")   Wt 98.4 kg (217 lb)   SpO2 95%   BMI 39.69 kg/m²     Physical Exam  Vitals reviewed.   Constitutional:       General: She is not in acute distress.     Appearance: She is well-developed.   Cardiovascular:      Rate and Rhythm: Normal rate and regular rhythm.      Heart sounds: Normal heart sounds. No murmur heard.      Pulmonary:      Effort: Pulmonary effort is normal. No respiratory distress.      Breath sounds: Normal breath sounds. No wheezing or rales.   Abdominal:      Palpations: Abdomen is soft.      Tenderness: There is no abdominal tenderness.   Skin:     General: Skin is warm and dry.      Findings: No rash.   Neurological:      Mental Status: She is alert and oriented to person, place, and time.        Result Review :   The following data was reviewed by: Anupama Arnett MD on 02/02/2022:  Common labs    Common Labsle 11/12/21 11/12/21 11/12/21 11/12/21 11/12/21    0805 0805 0805 0805 0805   Glucose    151 (A)    BUN    9    Creatinine    0.59    eGFR African Am    124    Sodium    141    Potassium    4.3  "   Chloride    102    Calcium    10.0    Albumin    4.60    Total Bilirubin    0.3    Alkaline Phosphatase    80    AST (SGOT)    10    ALT (SGPT)    14    WBC 6.24       Hemoglobin 13.0       Hematocrit 41.1       Platelets 262       Total Cholesterol     256 (A)   Triglycerides     207 (A)   HDL Cholesterol     61 (A)   LDL Cholesterol      157 (A)   Hemoglobin A1C  10.56 (A)      Microalbumin, Urine   18.2     (A) Abnormal value       Comments are available for some flowsheets but are not being displayed.                     Assessment and Plan    Diagnoses and all orders for this visit:    1. Benign essential hypertension  -     losartan (COZAAR) 100 MG tablet; Take 1 tablet by mouth Daily.  Dispense: 90 tablet; Refill: 3    2. Gastroesophageal reflux disease, unspecified whether esophagitis present  -     omeprazole (priLOSEC) 20 MG capsule; Take 1 capsule by mouth Daily.  Dispense: 90 capsule; Refill: 1    3. Lupus anticoagulant syndrome (HCC)  -     Xarelto 20 MG tablet; Take 1 tablet by mouth Daily With Dinner for 30 days.  Dispense: 30 tablet; Refill: 3    4. Other iron deficiency anemia  -     vitamin B-12 (CYANOCOBALAMIN) 1000 MCG tablet; Take 1 tablet by mouth Daily.  Dispense: 100 tablet; Refill: 3  -     docusate sodium (COLACE) 100 MG capsule; Take 1 capsule by mouth 2 (Two) Times a Day.  Dispense: 60 capsule; Refill: 2  -     ferrous sulfate 325 (65 FE) MG tablet; Take 1 tablet by mouth Daily With Breakfast.  Dispense: 90 tablet; Refill: 1    5. Seasonal allergies  -     montelukast (SINGULAIR) 10 MG tablet; Take 1 tablet by mouth Daily.  Dispense: 30 tablet; Refill: 11  -     cetirizine (zyrTEC) 10 MG tablet; Take 1 tablet by mouth Daily.  Dispense: 30 tablet; Refill: 11  -     Triamcinolone Acetonide (NASACORT) 55 MCG/ACT nasal inhaler; 2 sprays into the nostril(s) as directed by provider Daily.  Dispense: 16.5 g; Refill: 11    6. Type 2 diabetes mellitus with hyperglycemia, with long-term current  use of insulin (HCC)  -     insulin detemir (Levemir FlexTouch) 100 UNIT/ML injection; Inject 35 Units under the skin into the appropriate area as directed Daily.  Dispense: 4 pen; Refill: 11  -     Semaglutide,0.25 or 0.5MG/DOS, (Ozempic, 0.25 or 0.5 MG/DOSE,) 2 MG/1.5ML solution pen-injector; 0.5 mg weekly  Dispense: 1 pen; Refill: 11  -     glucose blood test strip; Use as instructed to test blood glucose 3-4 times a day  Dispense: 100 each; Refill: 12  -     Insulin Lispro, 1 Unit Dial, (HumaLOG KwikPen) 100 UNIT/ML solution pen-injector; Up to 10 units with meals TID  Dispense: 6 pen; Refill: 11  -     metFORMIN (GLUCOPHAGE) 1000 MG tablet; Take 1 tablet by mouth 2 (Two) Times a Day With Meals.  Dispense: 60 tablet; Refill: 5  -     Insulin Pen Needle (Pen Needles) 32G X 4 MM misc; 1 each 4 (Four) Times a Day. Use 4 x daily, Dx code E11.65  Dispense: 120 each; Refill: 11      Blood pressure not well controlled in office, at home reported ok  Given handout blood pressure log for monitoring  Patient will bring home cuff and compare to manual in the office  May need to add additional antihypertensive medication if high  Continue Prilosec for GERD, controlled  History of iron deficiency, continue supplement with stool softener for associated constipation  Refills for seasonal allergies  Diabetes not historically well controlled, patient reports good readings at [facility]  Continue current medications and follow with endocrinology as scheduled next month  Needed medication refills provided today        Follow Up   Return in about 3 months (around 5/2/2022) for Recheck.  Patient was given instructions and counseling regarding her condition or for health maintenance advice. Please see specific information pulled into the AVS if appropriate.           This document has been electronically signed by Anupama Arnett MD

## 2022-02-14 ENCOUNTER — OFFICE VISIT (OUTPATIENT)
Dept: FAMILY MEDICINE CLINIC | Facility: CLINIC | Age: 67
End: 2022-02-14

## 2022-02-14 VITALS
SYSTOLIC BLOOD PRESSURE: 124 MMHG | HEIGHT: 62 IN | DIASTOLIC BLOOD PRESSURE: 86 MMHG | BODY MASS INDEX: 40.01 KG/M2 | OXYGEN SATURATION: 98 % | HEART RATE: 106 BPM | WEIGHT: 217.4 LBS | RESPIRATION RATE: 18 BRPM

## 2022-02-14 DIAGNOSIS — S69.91XA INJURY OF RIGHT HAND, INITIAL ENCOUNTER: ICD-10-CM

## 2022-02-14 DIAGNOSIS — S63.650A SPRAIN OF METACARPOPHALANGEAL (MCP) JOINT OF RIGHT INDEX FINGER, INITIAL ENCOUNTER: Primary | ICD-10-CM

## 2022-02-14 PROBLEM — M79.644 PAIN OF FINGER OF RIGHT HAND: Status: ACTIVE | Noted: 2022-02-14

## 2022-02-14 PROBLEM — M79.644 PAIN OF FINGER OF RIGHT HAND: Status: RESOLVED | Noted: 2022-02-14 | Resolved: 2022-02-14

## 2022-02-14 PROCEDURE — 99213 OFFICE O/P EST LOW 20 MIN: CPT | Performed by: NURSE PRACTITIONER

## 2022-02-14 NOTE — PROGRESS NOTES
"Chief Complaint  Hand Pain (injury )    Subjective          Meeta Marmolejo presents to [facility] PR[facility] - [facility]    PCP: Dr. Arnett    CC: \"hand pain\"    Reports she got upset last night, slung her right hand down and hit bed post.  Pain really didn't start until this AM, having more swelling and unable to bend 2nd finger.  Right hand dominant.  Hx of long term anticoagulant use.      Hand Pain   The incident occurred 6 to 12 hours ago. The incident occurred at home. Injury mechanism: hit hand on wooden bed post. The pain is present in the right hand. The quality of the pain is described as aching. The pain radiates to the right hand. The pain is at a severity of 8/10. The pain has been fluctuating since the incident. Pertinent negatives include no chest pain, muscle weakness, numbness or tingling. The symptoms are aggravated by movement. She has tried nothing for the symptoms. The treatment provided no relief.       Review of Systems   Constitutional: Negative.    Respiratory: Negative.    Cardiovascular: Negative.  Negative for chest pain.   Genitourinary: Negative.    Musculoskeletal: Positive for arthralgias (right hand, 2nd finger) and joint swelling (right hand).   Skin: Negative.    Neurological: Negative for dizziness, tingling and numbness.        Objective   Vital Signs:   /86   Pulse 106   Resp 18   Ht 157.5 cm (62\")   Wt 98.6 kg (217 lb 6.4 oz)   SpO2 98%   BMI 39.76 kg/m²       Physical Exam  Vitals and nursing note reviewed.   Constitutional:       Appearance: Normal appearance. She is obese.   Musculoskeletal:         General: Swelling and tenderness present.      Right hand: Swelling (right 2nd finger and dorsum of distal hand), tenderness (right 2nd finger) and bony tenderness (mip joint, 2nd finger) present. Decreased range of motion (right 2nd finger). Decreased strength. Normal capillary refill. Normal pulse. "      Cervical back: Neck supple.   Skin:     General: Skin is warm and dry.      Findings: No bruising, erythema or rash.   Neurological:      General: No focal deficit present.      Mental Status: She is alert and oriented to person, place, and time.   Psychiatric:         Mood and Affect: Mood normal.         Thought Content: Thought content normal.          Result Review :     Common labs    Common Labsle 3/31/21 4/13/21 4/13/21 4/13/21 4/13/21 11/12/21 11/12/21 11/12/21 11/12/21 11/12/21     0906 0906 0906 0906 0805 0805 0805 0805 0805   Glucose   229 (A)      151 (A)    BUN   12      9    Creatinine   0.65      0.59    eGFR  Am   111      124    Sodium   134 (A)      141    Potassium   4.1      4.3    Chloride   99      102    Calcium   9.4      10.0    Albumin   4.30      4.60    Total Bilirubin   0.3      0.3    Alkaline Phosphatase   74      80    AST (SGOT)   13      10    ALT (SGPT)   13      14    WBC  6.05    6.24       Hemoglobin  12.2    13.0       Hematocrit  39.5    41.1       Platelets  251    262       Total Cholesterol     169     256 (A)   Triglycerides     178 (A)     207 (A)   HDL Cholesterol     50     61 (A)   LDL Cholesterol      89     157 (A)   Hemoglobin A1C    10.70 (A)   10.56 (A)      Microalbumin, Urine 5.1       18.2     (A) Abnormal value       Comments are available for some flowsheets but are not being displayed.                    XR Hand 3+ View Right    Result Date: 2/14/2022  No evidence for acute bony abnormality involving the right hand. Electronically signed by:  Hoang Jeff MD  2/14/2022 11:21 AM CST Workstation: [id number]    Assessment and Plan    Diagnoses and all orders for this visit:    1. Pain of finger of right hand (Primary)    2. Pain of right hand  -     XR Hand 3+ View Right    3. Injury of right hand, initial encounter  -     XR Hand 3+ View Right      No acute findings on xrays, degenerative changes only.   Finger cot placed for comfort.   Recommend  repeat xrays in pain persist after 7-10 days  Ice as needed  Tylenol ES or arthritis OTC PRN.  Avoid OTC NSAIDS.     See PCP or RTC if symptoms persist/worsen  See PCP for routine f/u visit and management of chronic medical conditions      This document has been electronically signed by ZELDA Santiago on February 14, 2022 11:45 CST,.

## 2022-02-14 NOTE — PATIENT INSTRUCTIONS
Finger Sprain, Adult  A finger sprain is a tear or stretch in a ligament in your finger. Ligaments are tissues that connect bones to each other.  Follow these instructions at home:  If you have a splint:    · Do not put pressure on any part of the splint until it is fully hardened. This may take many hours.  · Wear the splint as told by your doctor. Take it off only as told by your doctor.  · Loosen the splint if your fingers tingle, lose feeling (get numb), or turn cold and blue.  · Keep the splint clean.  · If the splint is not waterproof:  ? Do not let it get wet.  ? Cover it with a watertight covering when you take a bath or a shower.    If you have a cast:  · Do not put pressure on any part of the cast until it is fully hardened. This may take many hours.  · Do not stick anything inside the cast to scratch your skin.  · Check the skin around the cast every day. Tell your doctor about any concerns.  · You may put lotion on dry skin around the edges of the cast. Do not put lotion on the skin under the cast.  · Keep the cast clean.  · If the cast is not waterproof:  ? Do not let it get wet.  ? Cover it with a watertight covering when you take a bath or a shower.  Managing pain, stiffness, and swelling  · If directed, put ice on the injured area:  ? If you have a removable splint, take it off as told by your doctor.  ? Put ice in a plastic bag.  ? Place a towel between your skin and the bag or between your cast and the bag.  ? Leave the ice on for 20 minutes, 2-3 times a day.  · Gently move your fingers often to avoid stiffness and to lessen swelling.  · Raise (elevate) the injured area above the level of your heart while you are sitting or lying down.  Medicines  · Take over-the-counter and prescription medicines only as told by your doctor.  · Do not drive or use heavy machinery while taking prescription pain medicine.  General instructions  · Keep any bandages (dressings) dry until your doctor says they can be  taken off.  · Do exercises as told by your doctor or physical therapist.  · Do not wear rings on your injured finger.  · Keep all follow-up visits as told by your doctor. This is important.  Get help right away if:  · Your pain is not helped by medicine.  · Your bruising or swelling gets worse.  · Your splint or cast is damaged.  · You lose feeling in your finger.  · Your finger turns blue.  · Your finger feels colder than normal when you touch it.  · You have a fever.  Summary  · A finger sprain is a tear or stretch in a ligament in your finger. Ligaments are tissues that connect bones to each other.  · If you have a splint, loosen the splint if your fingers tingle, lose feeling (get numb), or turn cold and blue.  · Gently move your fingers often to avoid stiffness and to lessen swelling.  · If directed, put ice on the injured area.  This information is not intended to replace advice given to you by your health care provider. Make sure you discuss any questions you have with your health care provider.  Document Revised: 11/30/2018 Document Reviewed: 03/09/2018  [employer] Patient Education © 2021 [employer]

## 2022-03-09 ENCOUNTER — TRANSCRIBE ORDERS (OUTPATIENT)
Dept: CT IMAGING | Facility: HOSPITAL | Age: 67
End: 2022-03-09

## 2022-03-09 ENCOUNTER — LAB (OUTPATIENT)
Dept: LAB | Facility: HOSPITAL | Age: 67
End: 2022-03-09

## 2022-03-09 ENCOUNTER — TELEPHONE (OUTPATIENT)
Dept: FAMILY MEDICINE CLINIC | Facility: CLINIC | Age: 67
End: 2022-03-09

## 2022-03-09 ENCOUNTER — OFFICE VISIT (OUTPATIENT)
Dept: FAMILY MEDICINE CLINIC | Facility: CLINIC | Age: 67
End: 2022-03-09

## 2022-03-09 ENCOUNTER — APPOINTMENT (OUTPATIENT)
Dept: CT IMAGING | Facility: HOSPITAL | Age: 67
End: 2022-03-09

## 2022-03-09 VITALS
HEIGHT: 62 IN | DIASTOLIC BLOOD PRESSURE: 74 MMHG | BODY MASS INDEX: 40.25 KG/M2 | SYSTOLIC BLOOD PRESSURE: 162 MMHG | HEART RATE: 112 BPM | OXYGEN SATURATION: 95 % | WEIGHT: 218.7 LBS

## 2022-03-09 DIAGNOSIS — R06.02 SHORTNESS OF BREATH: ICD-10-CM

## 2022-03-09 DIAGNOSIS — E55.9 VITAMIN D DEFICIENCY: ICD-10-CM

## 2022-03-09 DIAGNOSIS — I10 BENIGN ESSENTIAL HYPERTENSION: ICD-10-CM

## 2022-03-09 DIAGNOSIS — E11.65 TYPE 2 DIABETES MELLITUS WITH HYPERGLYCEMIA, WITH LONG-TERM CURRENT USE OF INSULIN: ICD-10-CM

## 2022-03-09 DIAGNOSIS — I10 ESSENTIAL HYPERTENSION: ICD-10-CM

## 2022-03-09 DIAGNOSIS — R06.02 SHORTNESS OF BREATH: Primary | ICD-10-CM

## 2022-03-09 DIAGNOSIS — Z86.711 PERSONAL HISTORY OF PULMONARY EMBOLISM: ICD-10-CM

## 2022-03-09 DIAGNOSIS — Z79.4 TYPE 2 DIABETES MELLITUS WITH HYPERGLYCEMIA, WITH LONG-TERM CURRENT USE OF INSULIN: ICD-10-CM

## 2022-03-09 DIAGNOSIS — R06.09 DYSPNEA ON EXERTION: ICD-10-CM

## 2022-03-09 LAB
25(OH)D3 SERPL-MCNC: 11.9 NG/ML
ALBUMIN SERPL-MCNC: 4.2 G/DL (ref 3.5–5.2)
ALBUMIN UR-MCNC: 48.6 MG/DL
ALBUMIN/GLOB SERPL: 1.5 G/DL
ALP SERPL-CCNC: 80 U/L (ref 39–117)
ALT SERPL W P-5'-P-CCNC: 11 U/L (ref 1–33)
ANION GAP SERPL CALCULATED.3IONS-SCNC: 15 MMOL/L (ref 5–15)
AST SERPL-CCNC: 13 U/L (ref 1–32)
BASOPHILS # BLD AUTO: 0.04 10*3/MM3 (ref 0–0.2)
BASOPHILS NFR BLD AUTO: 0.5 % (ref 0–1.5)
BILIRUB SERPL-MCNC: 0.2 MG/DL (ref 0–1.2)
BUN SERPL-MCNC: 9 MG/DL (ref 8–23)
BUN/CREAT SERPL: 12.9 (ref 7–25)
CALCIUM SPEC-SCNC: 8.9 MG/DL (ref 8.6–10.5)
CHLORIDE SERPL-SCNC: 105 MMOL/L (ref 98–107)
CHOLEST SERPL-MCNC: 252 MG/DL (ref 0–200)
CO2 SERPL-SCNC: 22 MMOL/L (ref 22–29)
CREAT SERPL-MCNC: 0.7 MG/DL (ref 0.57–1)
CREAT UR-MCNC: 128.3 MG/DL
CREAT UR-MCNC: 130.7 MG/DL
DEPRECATED RDW RBC AUTO: 44.9 FL (ref 37–54)
EGFRCR SERPLBLD CKD-EPI 2021: 96.1 ML/MIN/1.73
EOSINOPHIL # BLD AUTO: 0.14 10*3/MM3 (ref 0–0.4)
EOSINOPHIL NFR BLD AUTO: 1.9 % (ref 0.3–6.2)
ERYTHROCYTE [DISTWIDTH] IN BLOOD BY AUTOMATED COUNT: 14.7 % (ref 12.3–15.4)
GLOBULIN UR ELPH-MCNC: 2.8 GM/DL
GLUCOSE SERPL-MCNC: 166 MG/DL (ref 65–99)
HBA1C MFR BLD: 9.9 % (ref 4.8–5.6)
HCT VFR BLD AUTO: 37.1 % (ref 34–46.6)
HDLC SERPL-MCNC: 50 MG/DL (ref 40–60)
HGB BLD-MCNC: 12.1 G/DL (ref 12–15.9)
IMM GRANULOCYTES # BLD AUTO: 0.05 10*3/MM3 (ref 0–0.05)
IMM GRANULOCYTES NFR BLD AUTO: 0.7 % (ref 0–0.5)
LDLC SERPL CALC-MCNC: 155 MG/DL (ref 0–100)
LDLC/HDLC SERPL: 3.03 {RATIO}
LYMPHOCYTES # BLD AUTO: 2.04 10*3/MM3 (ref 0.7–3.1)
LYMPHOCYTES NFR BLD AUTO: 27.1 % (ref 19.6–45.3)
MCH RBC QN AUTO: 27.5 PG (ref 26.6–33)
MCHC RBC AUTO-ENTMCNC: 32.6 G/DL (ref 31.5–35.7)
MCV RBC AUTO: 84.3 FL (ref 79–97)
MICROALBUMIN/CREAT UR: 378.8 MG/G
MONOCYTES # BLD AUTO: 0.55 10*3/MM3 (ref 0.1–0.9)
MONOCYTES NFR BLD AUTO: 7.3 % (ref 5–12)
NEUTROPHILS NFR BLD AUTO: 4.7 10*3/MM3 (ref 1.7–7)
NEUTROPHILS NFR BLD AUTO: 62.5 % (ref 42.7–76)
NRBC BLD AUTO-RTO: 0 /100 WBC (ref 0–0.2)
PLATELET # BLD AUTO: 285 10*3/MM3 (ref 140–450)
PMV BLD AUTO: 11.1 FL (ref 6–12)
POTASSIUM SERPL-SCNC: 3.6 MMOL/L (ref 3.5–5.2)
PROT ?TM UR-MCNC: 77.2 MG/DL
PROT SERPL-MCNC: 7 G/DL (ref 6–8.5)
PROT/CREAT UR: 590.7 MG/G CREA (ref 0–200)
QT INTERVAL: 360 MS
QTC INTERVAL: 464 MS
RBC # BLD AUTO: 4.4 10*6/MM3 (ref 3.77–5.28)
SODIUM SERPL-SCNC: 142 MMOL/L (ref 136–145)
TRIGL SERPL-MCNC: 253 MG/DL (ref 0–150)
TSH SERPL DL<=0.05 MIU/L-ACNC: 0.37 UIU/ML (ref 0.27–4.2)
VIT B12 BLD-MCNC: >2000 PG/ML (ref 211–946)
VLDLC SERPL-MCNC: 47 MG/DL (ref 5–40)
WBC NRBC COR # BLD: 7.52 10*3/MM3 (ref 3.4–10.8)

## 2022-03-09 PROCEDURE — 83036 HEMOGLOBIN GLYCOSYLATED A1C: CPT

## 2022-03-09 PROCEDURE — 93010 ELECTROCARDIOGRAM REPORT: CPT | Performed by: INTERNAL MEDICINE

## 2022-03-09 PROCEDURE — 80061 LIPID PANEL: CPT

## 2022-03-09 PROCEDURE — 99214 OFFICE O/P EST MOD 30 MIN: CPT | Performed by: FAMILY MEDICINE

## 2022-03-09 PROCEDURE — 82570 ASSAY OF URINE CREATININE: CPT

## 2022-03-09 PROCEDURE — 93005 ELECTROCARDIOGRAM TRACING: CPT | Performed by: FAMILY MEDICINE

## 2022-03-09 PROCEDURE — 82306 VITAMIN D 25 HYDROXY: CPT

## 2022-03-09 PROCEDURE — 85025 COMPLETE CBC W/AUTO DIFF WBC: CPT

## 2022-03-09 PROCEDURE — 84443 ASSAY THYROID STIM HORMONE: CPT

## 2022-03-09 PROCEDURE — 82043 UR ALBUMIN QUANTITATIVE: CPT

## 2022-03-09 PROCEDURE — 84156 ASSAY OF PROTEIN URINE: CPT

## 2022-03-09 PROCEDURE — 80053 COMPREHEN METABOLIC PANEL: CPT

## 2022-03-09 PROCEDURE — 36415 COLL VENOUS BLD VENIPUNCTURE: CPT

## 2022-03-09 PROCEDURE — 82607 VITAMIN B-12: CPT

## 2022-03-09 RX ORDER — AMLODIPINE BESYLATE 5 MG/1
5 TABLET ORAL DAILY
Qty: 30 TABLET | Refills: 2 | Status: SHIPPED | OUTPATIENT
Start: 2022-03-09 | End: 2022-05-27 | Stop reason: SDUPTHER

## 2022-03-09 NOTE — PROGRESS NOTES
"Chief Complaint  Shortness of Breath    Subjective          Meeta Marmolejo presents to [facility]IMARY [facility]ARE - [place]  History of Present Illness    Patient seen today regarding new shortness of breath with exertion.  Patient noticed out of breath with activity or during conversation, which is new for her.  Started a few weeks ago.  Other family members have noticed.  Patient did not have any upper respiratory infection prior to this starting.  She did have COVID-19 infection over a year ago, without residual symptoms.  Patient denies any fever or chills.  She has history of pulmonary embolism, and is currently on anticoagulation with Xarelto.  Has not been missing any doses of her anticoagulant.  Patient does note that this feels similar to what she was experiencing prior to diagnosis of PE.  Patient has associated paroxysmal nocturnal dyspnea, and requires being propped up on pillows to sleep.  Patient's blood pressure has not been well controlled.  She does not have any underlying lung disease, no asthma as a child and very limited/remote history of tobacco use.  No associated cough or congestion.  Patient is not having any chest pain at this time, but has had some intermittent chest pains.    Objective   Vital Signs:   /74   Pulse 112   Ht 157.5 cm (62\")   Wt 99.2 kg (218 lb 11.2 oz)   SpO2 95%   BMI 40.00 kg/m²     Physical Exam  Vitals reviewed.   Constitutional:       General: She is not in acute distress.     Appearance: She is well-developed.   Cardiovascular:      Rate and Rhythm: Regular rhythm. Tachycardia present.      Heart sounds: Normal heart sounds. No murmur heard.  Pulmonary:      Effort: Pulmonary effort is normal. No respiratory distress.      Breath sounds: Normal breath sounds. No wheezing or rales.   Abdominal:      Palpations: Abdomen is soft.      Tenderness: There is no abdominal tenderness.   Skin:     General: Skin is warm and dry.      " Findings: No rash.   Neurological:      Mental Status: She is alert and oriented to person, place, and time.        Result Review :   The following data was reviewed by: Anupama Arnett MD on 03/09/2022:  Common labs    Common Labsle 11/12/21 11/12/21 11/12/21 11/12/21 11/12/21    0805 0805 0805 0805 0805   Glucose    151 (A)    BUN    9    Creatinine    0.59    eGFR African Am    124    Sodium    141    Potassium    4.3    Chloride    102    Calcium    10.0    Albumin    4.60    Total Bilirubin    0.3    Alkaline Phosphatase    80    AST (SGOT)    10    ALT (SGPT)    14    WBC 6.24       Hemoglobin 13.0       Hematocrit 41.1       Platelets 262       Total Cholesterol     256 (A)   Triglycerides     207 (A)   HDL Cholesterol     61 (A)   LDL Cholesterol      157 (A)   Hemoglobin A1C  10.56 (A)      Microalbumin, Urine   18.2     (A) Abnormal value       Comments are available for some flowsheets but are not being displayed.                     Assessment and Plan    Diagnoses and all orders for this visit:    1. Shortness of breath (Primary)  -     Cancel: CT Chest Pulmonary Embolism With Contrast; Future  -     CT Chest Pulmonary Embolism With Contrast; Future  -     ECG 12 Lead    2. Personal history of pulmonary embolism    3. Dyspnea on exertion  -     Adult Transthoracic Echo Complete W/ Cont if Necessary Per Protocol; Future    4. Benign essential hypertension  -     amLODIPine (NORVASC) 5 MG tablet; Take 1 tablet by mouth Daily.  Dispense: 30 tablet; Refill: 2      Patient seen today for new onset of shortness of breath  Denies any chest pain today  Lung exam clear today  Heart exam is normal, with exception of tachycardia  EKG done in the office, did not show any changes when compared to previous EKG done in 2018  Patient had echocardiogram done 8/30/2018, which showed grade 1 diastolic dysfunction and mild ventricular hypertrophy.  Also showed mild mitral valve regurgitation and moderate tricuspid valve  regurgitation  Recommended repeat echocardiogram, ordered today  Since symptoms are similar to previous pulmonary embolism, and patient is experiencing tachycardia with mild decrease in O2 sats at 95% - will get CT chest with contrast to rule out pulmonary embolism  Blood pressure not well controlled, add amlodipine 5 mg at night  Patient should continue current antihypertensive therapy            Follow Up   Return if symptoms worsen or fail to improve, for Next scheduled follow up.  Patient was given instructions and counseling regarding her condition or for health maintenance advice. Please see specific information pulled into the AVS if appropriate.         This document has been electronically signed by Anupama Arnett MD

## 2022-03-09 NOTE — TELEPHONE ENCOUNTER
Per Dr. Arnett, Ms. Marmolejo has been called and she was advised that if she has any chest pain or shortness of breath during the night she needs to go to the ER.

## 2022-03-09 NOTE — TELEPHONE ENCOUNTER
Pt can not have her scan today. Imaging needs labs performed first to make sure she can have contrast. Pt is coming back tomorrow

## 2022-03-09 NOTE — TELEPHONE ENCOUNTER
Sent lab orders for kidney function to be checked, please let her know.  Concerned about waiting for evaluation of pulmonary embolism, so please ask that if patient experiences any chest pain before tomorrow that she is seen in ED right away.  Thanks, MEENA Arnett

## 2022-03-10 ENCOUNTER — HOSPITAL ENCOUNTER (OUTPATIENT)
Dept: INTERVENTIONAL RADIOLOGY/VASCULAR | Facility: HOSPITAL | Age: 67
Discharge: HOME | End: 2022-03-10

## 2022-03-10 ENCOUNTER — HOSPITAL ENCOUNTER (OUTPATIENT)
Dept: CT IMAGING | Facility: HOSPITAL | Age: 67
Discharge: HOME | End: 2022-03-10

## 2022-03-10 ENCOUNTER — HOSPITAL ENCOUNTER (OUTPATIENT)
Dept: ULTRASOUND IMAGING | Facility: HOSPITAL | Age: 67
Discharge: HOME | End: 2022-03-10

## 2022-03-10 DIAGNOSIS — R06.02 SHORTNESS OF BREATH: ICD-10-CM

## 2022-03-10 PROCEDURE — C1751 CATH, INF, PER/CENT/MIDLINE: HCPCS

## 2022-03-10 PROCEDURE — 71275 CT ANGIOGRAPHY CHEST: CPT

## 2022-03-10 PROCEDURE — 36410 VNPNXR 3YR/> PHY/QHP DX/THER: CPT

## 2022-03-10 PROCEDURE — 76937 US GUIDE VASCULAR ACCESS: CPT

## 2022-03-10 PROCEDURE — 0 IOPAMIDOL PER 1 ML: Performed by: FAMILY MEDICINE

## 2022-03-10 RX ADMIN — IOPAMIDOL 72 ML: 755 INJECTION, SOLUTION INTRAVENOUS at 12:18

## 2022-03-10 NOTE — PROGRESS NOTES
TWO PATIENT IDENTIFIERS WERE USED. THE PATIENT WAS DRAPED WITH A FULL BODY DRAPE AND THE PATIENT'S LEFT ARM WAS PREPPED WITH CHLORA PREP. ULTRASOUND WAS USED TO LOCALIZE THELEFT BASILIC VEIN. SUBCUTANEOUS TISSUE AT THE CATHETER SITE WAS INFILTRATED WITH 2% LIDOCAINE. UNDER ULTRASOUND GUIDANCE, THE VEIN WAS ACCESSED WITH A 21 GAUGE  NEEDLE. AN 0.018 WIRE WAS THEN THREADED THROUGH THE NEEDLE. THE 21 GAUGE NEEDLE WAS REMOVED AND A 4 Georgian SHEATH WAS PLACED OVER THE WIRE INTO THE VEIN.THE MIDLINE CATHETER WAS TRIMMED TO 15 CM. THE MIDLINE CATHETER WAS THEN PLACED OVER THE WIRE INTO THE VEIN, THE SHEATH WAS PEELED AWAY, WIRE WAS REMOVED. CATHETER WAS FLUSHED WITH NORMAL SALINE AND CATHETER TIP APPLIED. BIOPATCH PLACED. CATHETER SECURED WITH STAT LOCK AND TEGADERM. PATIENT TOLERATED PROCEDURE WELL. THIS WAS DONE IN THE ANGIOSUITE      IMPRESSION:SUCCESSFUL PLACEMENT OF SINGLE LUMEN MIDLINE.           Lisseth Woo  3/10/2022  12:57 CST

## 2022-03-14 ENCOUNTER — TELEPHONE (OUTPATIENT)
Dept: FAMILY MEDICINE CLINIC | Facility: CLINIC | Age: 67
End: 2022-03-14

## 2022-03-14 DIAGNOSIS — R06.02 SHORTNESS OF BREATH: Primary | ICD-10-CM

## 2022-03-15 NOTE — TELEPHONE ENCOUNTER
Called on 3/10/22 about negative CT chest for PE.  Did show cardiomegaly, continue with plan for echocardiogram.  Joshua Arnett

## 2022-03-16 ENCOUNTER — TELEPHONE (OUTPATIENT)
Dept: FAMILY MEDICINE CLINIC | Facility: CLINIC | Age: 67
End: 2022-03-16

## 2022-03-21 DIAGNOSIS — E11.69 TYPE 2 DIABETES MELLITUS WITH OTHER SPECIFIED COMPLICATION, WITH LONG-TERM CURRENT USE OF INSULIN: ICD-10-CM

## 2022-03-21 DIAGNOSIS — Z79.4 TYPE 2 DIABETES MELLITUS WITH OTHER SPECIFIED COMPLICATION, WITH LONG-TERM CURRENT USE OF INSULIN: ICD-10-CM

## 2022-03-28 ENCOUNTER — DOCUMENTATION (OUTPATIENT)
Dept: ENDOCRINOLOGY | Facility: CLINIC | Age: 67
End: 2022-03-28

## 2022-03-28 ENCOUNTER — OFFICE VISIT (OUTPATIENT)
Dept: ENDOCRINOLOGY | Facility: CLINIC | Age: 67
End: 2022-03-28

## 2022-03-28 ENCOUNTER — TELEPHONE (OUTPATIENT)
Dept: ENDOCRINOLOGY | Facility: CLINIC | Age: 67
End: 2022-03-28

## 2022-03-28 ENCOUNTER — CLINICAL SUPPORT (OUTPATIENT)
Dept: FAMILY MEDICINE CLINIC | Facility: CLINIC | Age: 67
End: 2022-03-28

## 2022-03-28 VITALS — HEART RATE: 101 BPM | SYSTOLIC BLOOD PRESSURE: 164 MMHG | DIASTOLIC BLOOD PRESSURE: 76 MMHG

## 2022-03-28 VITALS
WEIGHT: 218.1 LBS | BODY MASS INDEX: 40.14 KG/M2 | HEIGHT: 62 IN | OXYGEN SATURATION: 95 % | HEART RATE: 112 BPM | SYSTOLIC BLOOD PRESSURE: 152 MMHG | DIASTOLIC BLOOD PRESSURE: 76 MMHG

## 2022-03-28 DIAGNOSIS — E11.65 TYPE 2 DIABETES MELLITUS WITH HYPERGLYCEMIA, WITH LONG-TERM CURRENT USE OF INSULIN: Primary | ICD-10-CM

## 2022-03-28 DIAGNOSIS — E55.9 VITAMIN D DEFICIENCY: ICD-10-CM

## 2022-03-28 DIAGNOSIS — E11.21 MICROALBUMINURIC DIABETIC NEPHROPATHY: ICD-10-CM

## 2022-03-28 DIAGNOSIS — Z79.4 TYPE 2 DIABETES MELLITUS WITH HYPERGLYCEMIA, WITH LONG-TERM CURRENT USE OF INSULIN: Primary | ICD-10-CM

## 2022-03-28 DIAGNOSIS — I10 ESSENTIAL HYPERTENSION: ICD-10-CM

## 2022-03-28 PROCEDURE — 99214 OFFICE O/P EST MOD 30 MIN: CPT | Performed by: NURSE PRACTITIONER

## 2022-03-28 RX ORDER — ICOSAPENT ETHYL 1000 MG/1
2 CAPSULE ORAL 2 TIMES DAILY WITH MEALS
Qty: 120 CAPSULE | Refills: 11 | Status: SHIPPED | OUTPATIENT
Start: 2022-03-28

## 2022-03-28 RX ORDER — LANCETS 30 GAUGE
EACH MISCELLANEOUS
Qty: 120 EACH | Refills: 11 | Status: SHIPPED | OUTPATIENT
Start: 2022-03-28

## 2022-03-28 RX ORDER — GLUCAGON INJECTION, SOLUTION 1 MG/.2ML
1 INJECTION, SOLUTION SUBCUTANEOUS AS NEEDED
Qty: 0.4 ML | Refills: 11 | Status: SHIPPED | OUTPATIENT
Start: 2022-03-28 | End: 2022-03-29

## 2022-03-28 RX ORDER — FINERENONE 10 MG/1
10 TABLET, FILM COATED ORAL DAILY
Qty: 30 TABLET | Refills: 11 | Status: SHIPPED | OUTPATIENT
Start: 2022-03-28 | End: 2022-03-29 | Stop reason: SDUPTHER

## 2022-03-28 RX ORDER — INSULIN LISPRO 100 [IU]/ML
INJECTION, SOLUTION INTRAVENOUS; SUBCUTANEOUS
Qty: 6 PEN | Refills: 11 | Status: SHIPPED | OUTPATIENT
Start: 2022-03-28 | End: 2022-04-01

## 2022-03-28 RX ORDER — PEN NEEDLE, DIABETIC 30 GX3/16"
1 NEEDLE, DISPOSABLE MISCELLANEOUS 4 TIMES DAILY
Qty: 120 EACH | Refills: 11 | Status: SHIPPED | OUTPATIENT
Start: 2022-03-28 | End: 2023-02-16

## 2022-03-28 RX ORDER — INSULIN DETEMIR 100 [IU]/ML
35 INJECTION, SOLUTION SUBCUTANEOUS DAILY
Qty: 4 PEN | Refills: 11 | Status: SHIPPED | OUTPATIENT
Start: 2022-03-28 | End: 2022-03-31 | Stop reason: SDUPTHER

## 2022-03-28 RX ORDER — SEMAGLUTIDE 1.34 MG/ML
INJECTION, SOLUTION SUBCUTANEOUS
Qty: 1 PEN | Refills: 11 | Status: SHIPPED | OUTPATIENT
Start: 2022-03-28 | End: 2022-08-10

## 2022-03-28 RX ORDER — GLUCOSAMINE HCL/CHONDROITIN SU 500-400 MG
CAPSULE ORAL
Qty: 120 EACH | Refills: 11 | Status: SHIPPED | OUTPATIENT
Start: 2022-03-28

## 2022-03-28 NOTE — PROGRESS NOTES
Patient brought her personal wrist cuff to compare readings. My manual reading was 164/76 and her cuff reading was 170/87

## 2022-03-28 NOTE — TELEPHONE ENCOUNTER
Pt states insurance will not cover humolog or the generic. Please call pt to discuss other options. Thank you

## 2022-03-28 NOTE — PROGRESS NOTES
Benefits Investigation Summary    Prescription: New Therapy    Dispensing pharmacy: [facility] in [place]    Copay amount:  9.85 for 90 days    Prior Auth and Med Assistance notes:  Specialty Pharmacy Refill Coordination Note     Meeta is a 66 y.o. female contacted today regarding refills of  Kerendia specialty medication(s).    Reviewed and verified with patient:         Specialty medication(s) and dose(s) confirmed: yes                   Follow-up: 1 month for labs     Beltran Esteban  Specialty Pharmacy Technician

## 2022-03-28 NOTE — PROGRESS NOTES
"Chief Complaint  Diabetes    Subjective          Meeta Marmolejo presents to [facility]  History of Present Illness     In office visit        66-year-old female presents for follow-up     Reason diabetes mellitus type 2     Diagnosed at age 31     Timing constant     Quality not controlled     Severity is moderate       Microvascular complications none     Microvascular complications microalbuminuria    Current glucose regimen     Insulin, glp-1                Blood glucose monitoring    Checks 4 times daily      States this am was 130     Daytime up to 200        Review of Systems - General ROS: negative                Objective   Vital Signs:   /76   Pulse 112   Ht 157.5 cm (62\")   Wt 98.9 kg (218 lb 1.6 oz)   SpO2 95%   BMI 39.89 kg/m²     Physical Exam  Constitutional:       Appearance: Normal appearance.   Cardiovascular:      Rate and Rhythm: Regular rhythm.      Heart sounds: Normal heart sounds.   Pulmonary:      Breath sounds: Normal breath sounds.   Musculoskeletal:         General: Normal range of motion.      Cervical back: Normal range of motion.   Neurological:      General: No focal deficit present.      Mental Status: She is alert.        Result Review :   The following data was reviewed by: ZELDA Freire on 03/28/2022:  Common labs    Common Labsle 4/13/21 4/13/21 4/13/21 4/13/21 11/12/21 11/12/21 11/12/21 11/12/21 11/12/21 3/9/22 3/9/22 3/9/22 3/9/22 3/9/22    0906 0906 0906 0906 0805 0805 0805 0805 0805 1420 1445 1445 1445 1445   Glucose  229 (A)      151 (A)    166 (A)     BUN  12      9    9     Creatinine  0.65      0.59    0.70     eGFR  Am  111      124         Sodium  134 (A)      141    142     Potassium  4.1      4.3    3.6     Chloride  99      102    105     Calcium  9.4      10.0    8.9     Albumin  4.30      4.60    4.20     Total Bilirubin  0.3      0.3    0.2     Alkaline Phosphatase  74      80    80   "   AST (SGOT)  13      10    13     ALT (SGPT)  13      14    11     WBC 6.05    6.24      7.52      Hemoglobin 12.2    13.0      12.1      Hematocrit 39.5    41.1      37.1      Platelets 251    262      285      Total Cholesterol    169     256 (A)    252 (A)    Triglycerides    178 (A)     207 (A)    253 (A)    HDL Cholesterol    50     61 (A)    50    LDL Cholesterol     89     157 (A)    155 (A)    Hemoglobin A1C   10.70 (A)   10.56 (A)        9.90 (A)   Microalbumin, Urine       18.2   48.6       (A) Abnormal value       Comments are available for some flowsheets but are not being displayed.                     Assessment and Plan    Diagnoses and all orders for this visit:    1. Type 2 diabetes mellitus with hyperglycemia, with long-term current use of insulin (HCC) (Primary)  -     insulin detemir (Levemir FlexTouch) 100 UNIT/ML injection; Inject 35 Units under the skin into the appropriate area as directed Daily.  Dispense: 4 pen; Refill: 11  -     Insulin Pen Needle (Pen Needles) 32G X 4 MM misc; 1 each 4 (Four) Times a Day. Use 4 x daily, Dx code E11.65  Dispense: 120 each; Refill: 11  -     Semaglutide,0.25 or 0.5MG/DOS, (Ozempic, 0.25 or 0.5 MG/DOSE,) 2 MG/1.5ML solution pen-injector; 0.5 mg weekly  Dispense: 1 pen; Refill: 11  -     metFORMIN (GLUCOPHAGE) 1000 MG tablet; Take 1 tablet by mouth 2 (Two) Times a Day With Meals.  Dispense: 60 tablet; Refill: 5    2. Microalbuminuric diabetic nephropathy (HCC)    3. Essential hypertension    4. Vitamin D deficiency    Other orders  -     Finerenone (Kerendia) 10 MG tablet; Take 10 mg by mouth Daily.  Dispense: 30 tablet; Refill: 11  -     Glucagon (Gvoke PFS) 1 MG/0.2ML solution prefilled syringe; Inject 1 mg under the skin into the appropriate area as directed As Needed (hypoglycemia) for up to 1 day.  Dispense: 0.4 mL; Refill: 11  -     Insulin Lispro, 1 Unit Dial, (HumaLOG KwikPen) 100 UNIT/ML solution pen-injector; Up to 20 units with meals  Dispense: 6  pen; Refill: 11  -     Lancets misc; Test 4 times daily , E11.65  Dispense: 120 each; Refill: 11  -     Glucose Blood (Blood Glucose Test) strip; Use 4 x daily use any brand covered by insurance or same brand as before ICD10 code is E11.9  Dispense: 120 each; Refill: 11  -     icosapent ethyl (Vascepa) 1 g capsule capsule; Take 2 g by mouth 2 (Two) Times a Day With Meals. Pharmacy copay card BIN# [id number] PCN# CN GRP# [id number] ID# [id number]  Dispense: 120 capsule; Refill: 11              Glycemic management     Diabetes mellitus type 2     Lab Results   Component Value Date    HGBA1C 9.90 (H) 03/09/2022             Taking metformin 1000 mg twice a day         Taking Levemir 35 units daily        If you ever wake up with a blood glucose level less than 80 decreased to 25         Ozempic  1 mg weekly --taking         Humalog  before meals as a scale--3 times daily       Takes 4 units - increase to 8 units tid      + sliding scale      Less than 180 --- nothing  181-200 --- 2units  201-250  ---- 4 units  251-300  ----  6units  Above 300 ----- 8 units                       Stop Glipizide            No Jardiance since associated with GMI            Approve claude 2        The patient has diabetes mellitus, insulin-dependent.     Our Diabetes Department has evaluated the patient in the last six months and will continue counseling on insulin adjustment.      The patient performs blood glucose testing at least four times daily with proven glucose variability from 50 to 300 mg per dl.     The patient is administering basal insulin and prandial insulin four times per day for more than six months.     The patient uses a home blood glucose monitor to assess blood glucose at least four times daily for more than six months.     The patient requires frequent adjustment of insulin treatment regimen based on blood glucose readings.     The patient has frequent variability in blood glucose readings due to activity and variability in  meal content and time.      The patient has completed a diabetes education program with us.     The patient has demonstrated the ability to self-monitor her glucose.      The patient is motivated in improving diabetes control      The patient has hypoglycemia unawareness      ===========      Blood pressure management:      Hypertension          Taking losartan 100 mg once daily           ===========     Lipid Management         Dyslipidemia        Taking Lipitor 80 mg 1 at night        Total Cholesterol   Date Value Ref Range Status   03/09/2022 252 (H) 0 - 200 mg/dL Final     Triglycerides   Date Value Ref Range Status   03/09/2022 253 (H) 0 - 150 mg/dL Final     HDL Cholesterol   Date Value Ref Range Status   03/09/2022 50 40 - 60 mg/dL Final     LDL Cholesterol    Date Value Ref Range Status   03/09/2022 155 (H) 0 - 100 mg/dL Final     LDLCALC ELECT   Date Value Ref Range Status   01/12/2015 136 (H) 0 - 129 mg/dl Final     Comment:     LDL AVERAGE RISK: 130 - 159 MG/DL       add vascepa   ----------------------           Microvascular monitoring     Last eye exam -----Nov. 2021, no  DR      Cataract surgery 2021      No neuropathy     Positive for microalbuminuria     Add Kerendia 10 mg     Repeat K+ in one month                    ==========     History of osteoporosis     On alendronate for at least since 2013     I reviewed her bone density from 2013 and it was osteopenia     Stopped the alendronate     Jan. 2021     DXA oteopenia      Take calcium plus vitamin d            Other related aspects      Lab Results   Component Value Date    TSH 0.370 03/09/2022     Lab Results   Component Value Date    SANRYUAM17 >2,000 (H) 03/09/2022                         Follow Up   Return in about 3 months (around 6/28/2022) for Recheck.  Patient was given instructions and counseling regarding her condition or for health maintenance advice. Please see specific information pulled into the AVS if appropriate.         This  document has been electronically signed by ZELDA Freire on March 28, 2022 09:33 CDT.

## 2022-03-29 ENCOUNTER — TELEPHONE (OUTPATIENT)
Dept: ENDOCRINOLOGY | Facility: CLINIC | Age: 67
End: 2022-03-29

## 2022-03-29 ENCOUNTER — SPECIALTY PHARMACY (OUTPATIENT)
Dept: ENDOCRINOLOGY | Facility: CLINIC | Age: 67
End: 2022-03-29

## 2022-03-29 RX ORDER — FINERENONE 10 MG/1
10 TABLET, FILM COATED ORAL DAILY
Qty: 30 TABLET | Refills: 11 | Status: SHIPPED | OUTPATIENT
Start: 2022-03-29 | End: 2022-12-13

## 2022-03-29 NOTE — TELEPHONE ENCOUNTER
Pt uses Levemir in PEN form and it was called into [facility] in [place] on [address] as vials. PLEASE RESEND IN PEN FORM. Also pt insurance did not cover Humalog.       Thanks

## 2022-03-31 ENCOUNTER — TELEPHONE (OUTPATIENT)
Dept: ENDOCRINOLOGY | Facility: CLINIC | Age: 67
End: 2022-03-31

## 2022-03-31 DIAGNOSIS — E11.65 TYPE 2 DIABETES MELLITUS WITH HYPERGLYCEMIA, WITH LONG-TERM CURRENT USE OF INSULIN: ICD-10-CM

## 2022-03-31 DIAGNOSIS — Z79.4 TYPE 2 DIABETES MELLITUS WITH HYPERGLYCEMIA, WITH LONG-TERM CURRENT USE OF INSULIN: ICD-10-CM

## 2022-03-31 RX ORDER — INSULIN ASPART 100 [IU]/ML
20 INJECTION, SOLUTION INTRAVENOUS; SUBCUTANEOUS
Qty: 5 PEN | Refills: 11 | Status: SHIPPED | OUTPATIENT
Start: 2022-03-31 | End: 2022-04-01

## 2022-03-31 RX ORDER — INSULIN DETEMIR 100 [IU]/ML
35 INJECTION, SOLUTION SUBCUTANEOUS DAILY
Qty: 4 PEN | Refills: 11 | Status: SHIPPED | OUTPATIENT
Start: 2022-03-31 | End: 2022-11-21 | Stop reason: SDUPTHER

## 2022-04-01 ENCOUNTER — TELEPHONE (OUTPATIENT)
Dept: ENDOCRINOLOGY | Facility: CLINIC | Age: 67
End: 2022-04-01

## 2022-04-01 RX ORDER — INSULIN LISPRO-AABC 100 [IU]/ML
INJECTION, SOLUTION SUBCUTANEOUS
Qty: 5 PEN | Refills: 11 | Status: SHIPPED | OUTPATIENT
Start: 2022-04-01 | End: 2023-02-20

## 2022-04-01 NOTE — TELEPHONE ENCOUNTER
Per [employer], they have provided pt with temp supply of Insulin Aspart 100 unit/ ML pen on 3/31/22. Drug is not covered on formulary, alternatives that are covered are Humalog and Lyumjev.  SENT TO [facility] ON 4/1/22      THANKS

## 2022-04-05 ENCOUNTER — OFFICE VISIT (OUTPATIENT)
Dept: FAMILY MEDICINE CLINIC | Facility: CLINIC | Age: 67
End: 2022-04-05

## 2022-04-05 VITALS
HEIGHT: 62 IN | WEIGHT: 219 LBS | DIASTOLIC BLOOD PRESSURE: 70 MMHG | HEART RATE: 110 BPM | OXYGEN SATURATION: 94 % | SYSTOLIC BLOOD PRESSURE: 160 MMHG | BODY MASS INDEX: 40.3 KG/M2

## 2022-04-05 DIAGNOSIS — J34.89 NASAL SORE: ICD-10-CM

## 2022-04-05 DIAGNOSIS — R09.81 CONGESTION OF NASAL SINUS: ICD-10-CM

## 2022-04-05 DIAGNOSIS — H60.91 OTITIS EXTERNA OF RIGHT EAR, UNSPECIFIED CHRONICITY, UNSPECIFIED TYPE: Primary | ICD-10-CM

## 2022-04-05 DIAGNOSIS — H61.22 IMPACTED CERUMEN OF LEFT EAR: ICD-10-CM

## 2022-04-05 DIAGNOSIS — H66.90 ACUTE OTITIS MEDIA, UNSPECIFIED OTITIS MEDIA TYPE: ICD-10-CM

## 2022-04-05 PROCEDURE — 99214 OFFICE O/P EST MOD 30 MIN: CPT | Performed by: NURSE PRACTITIONER

## 2022-04-05 RX ORDER — GINSENG 100 MG
1 CAPSULE ORAL DAILY
Qty: 14 G | Refills: 0 | Status: SHIPPED | OUTPATIENT
Start: 2022-04-05 | End: 2022-05-02

## 2022-04-05 RX ORDER — GUAIFENESIN 600 MG/1
1200 TABLET, EXTENDED RELEASE ORAL 2 TIMES DAILY
Qty: 20 TABLET | Refills: 0 | Status: SHIPPED | OUTPATIENT
Start: 2022-04-05 | End: 2022-05-02

## 2022-04-05 RX ORDER — AZITHROMYCIN 250 MG/1
TABLET, FILM COATED ORAL
Qty: 6 TABLET | Refills: 0 | Status: SHIPPED | OUTPATIENT
Start: 2022-04-05 | End: 2022-05-02

## 2022-04-05 NOTE — PROGRESS NOTES
Chief Complaint  Earache (Left ear) and Ear Fullness (Left ear)    Subjective          Rishikilo Kitty Marmolejo presents to [facility] - [place]  With ear pain, pain is in both ears, however left ear is worse. She has had some nasal congestion and has noticed a sore in her left nostril that bleeds at time.           Earache   There is pain in the left ear. This is a new problem. The current episode started in the past 7 days. The problem occurs constantly. The problem has been waxing and waning. There has been no fever. Associated symptoms include ear discharge. The treatment provided no relief.   Ear Fullness   There is pain in both ears. This is a new problem. The current episode started in the past 7 days. There has been no fever. Associated symptoms include ear discharge. The treatment provided mild relief.   Sinusitis  This is a new problem. The current episode started in the past 7 days. The problem has been gradually improving since onset. There has been no fever. The pain is mild. Associated symptoms include congestion and ear pain. Past treatments include saline sprays and spray decongestants. The treatment provided mild relief.     Outpatient Medications Prior to Visit   Medication Sig Dispense Refill   • amLODIPine (NORVASC) 5 MG tablet Take 1 tablet by mouth Daily. 30 tablet 2   • cetirizine (zyrTEC) 10 MG tablet Take 1 tablet by mouth Daily. 30 tablet 11   • Continuous Blood Gluc  (FreeStyle Unique 2 [place]) device 1 each Continuous. 1 each 0   • Continuous Blood Gluc Sensor (FreeStyle Unique 2 Sensor) misc 2 each Every 14 (Fourteen) Days. Use to get blood sugar readings 2 each 11   • docusate sodium (COLACE) 100 MG capsule Take 1 capsule by mouth 2 (Two) Times a Day. 60 capsule 2   • ferrous sulfate 325 (65 FE) MG tablet Take 1 tablet by mouth Daily With Breakfast. 90 tablet 1   • Finerenone (Kerendia) 10 MG tablet Take 1 tablet by mouth Daily. 30 tablet  11   • Glucagon, rDNA, (Glucagon Emergency) 1 MG kit Inject 1 mg under the skin into the appropriate area as directed 1 (One) Time As Needed (hypoglcyemia) for up to 1 dose. 1 each 1   • Glucose Blood (Blood Glucose Test) strip Use 4 x daily use any brand covered by insurance or same brand as before ICD10 code is E11.9 120 each 11   • HumaLOG 100 UNIT/ML injection 2 TO 8 UNITS TID BEFORE MEALS 20 mL 11   • icosapent ethyl (Vascepa) 1 g capsule capsule Take 2 g by mouth 2 (Two) Times a Day With Meals. Pharmacy copay card BIN# [id number] PCN# CN GRP# ECVASCEPA ID# [id number] 120 capsule 11   • insulin detemir (Levemir FlexTouch) 100 UNIT/ML injection Inject 35 Units under the skin into the appropriate area as directed Daily. 4 pen 11   • Insulin Lispro-aabc, 1 U Dial, (Lyumjev KwikPen) 100 UNIT/ML solution pen-injector Up to 20 units three times daily with meals 5 pen 11   • Insulin Pen Needle (Pen Needles) 32G X 4 MM misc 1 each 4 (Four) Times a Day. Use 4 x daily, Dx code E11.65 120 each 11   • Lancets misc Test 4 times daily , E11.65 120 each 11   • losartan (COZAAR) 100 MG tablet Take 1 tablet by mouth Daily. 90 tablet 3   • metFORMIN (GLUCOPHAGE) 1000 MG tablet Take 1 tablet by mouth 2 (Two) Times a Day With Meals. 60 tablet 5   • montelukast (SINGULAIR) 10 MG tablet Take 1 tablet by mouth Daily. 30 tablet 11   • omeprazole (priLOSEC) 20 MG capsule Take 1 capsule by mouth Daily. 90 capsule 1   • Potassium 99 MG tablet Take  by mouth.     • Semaglutide,0.25 or 0.5MG/DOS, (Ozempic, 0.25 or 0.5 MG/DOSE,) 2 MG/1.5ML solution pen-injector 0.5 mg weekly 1 pen 11   • Triamcinolone Acetonide (NASACORT) 55 MCG/ACT nasal inhaler 2 sprays into the nostril(s) as directed by provider Daily. 16.5 g 11   • vitamin B-12 (CYANOCOBALAMIN) 1000 MCG tablet Take 1 tablet by mouth Daily. 100 tablet 3   • Xarelto 20 MG tablet Take 1 tablet by mouth Daily With Dinner for 30 days. 30 tablet 3     No facility-administered medications prior  "to visit.       Review of Systems   HENT: Positive for congestion, ear discharge and ear pain.          Objective   Vital Signs:   Visit Vitals  /70   Pulse 110   Ht 157.5 cm (62\")   Wt 99.3 kg (219 lb)   SpO2 94%   BMI 40.06 kg/m²     Physical Exam  Vitals and nursing note reviewed.   Constitutional:       Appearance: She is well-developed.   HENT:      Head: Normocephalic and atraumatic.      Left Ear: Decreased hearing noted. Drainage, swelling and tenderness present. A middle ear effusion is present. There is impacted cerumen. Tympanic membrane is bulging.      Ears:      Comments: R ear canal red     Nose: Congestion present.   Eyes:      General: Lids are normal.      Conjunctiva/sclera: Conjunctivae normal.   Neck:      Thyroid: No thyroid mass or thyromegaly.      Trachea: Trachea normal. No tracheal tenderness.   Cardiovascular:      Rate and Rhythm: Normal rate.      Pulses: Normal pulses.      Heart sounds: Normal heart sounds.   Pulmonary:      Effort: Pulmonary effort is normal. No respiratory distress.      Breath sounds: Normal breath sounds. No wheezing.   Abdominal:      General: There is no distension.      Palpations: Abdomen is soft. There is no mass.   Musculoskeletal:         General: Normal range of motion.      Cervical back: Normal range of motion. No edema.   Lymphadenopathy:      Head:      Right side of head: No submental, submandibular or tonsillar adenopathy.      Left side of head: No submental, submandibular or tonsillar adenopathy.   Skin:     General: Skin is warm and dry.      Coloration: Skin is not pale.      Findings: No abrasion, erythema or lesion.   Neurological:      Mental Status: She is alert and oriented to person, place, and time.   Psychiatric:         Mood and Affect: Mood is not anxious. Affect is not inappropriate.         Speech: Speech normal.         Behavior: Behavior normal.         Thought Content: Thought content normal.         Judgment: Judgment " normal. Judgment is not impulsive.        Result Review :          Ear Cerumen Removal    Date/Time: 4/6/2022 10:11 AM  Performed by: Mariana Poe APRN  Authorized by: Mariana Poe APRN     Anesthesia:  Local Anesthetic: none  Location details: left ear  Patient tolerance: patient tolerated the procedure well with no immediate complications  Procedure type: instrumentation, irrigation   Sedation:  Patient sedated: no              Assessment and Plan    Diagnoses and all orders for this visit:    1. Otitis externa of right ear, unspecified chronicity, unspecified type (Primary)  -     azithromycin (Zithromax Z-Blake) 250 MG tablet; Take 2 tablets by mouth on day 1, then 1 tablet daily on days 2-5  Dispense: 6 tablet; Refill: 0  -     neomycin-polymyxin-hydrocortisone (CORTISPORIN) 3.5-09116-8 otic solution; Administer 3 drops into both ears 4 (Four) Times a Day.  Dispense: 10 mL; Refill: 0    2. Acute otitis media, unspecified otitis media type    3. Nasal sore  -     bacitracin 500 UNIT/GM ointment; Apply 1 application topically to the appropriate area as directed Daily.  Dispense: 14 g; Refill: 0    4. Impacted cerumen of left ear  -     Ear Cerumen Removal    5. Congestion of nasal sinus  -     guaiFENesin (Mucinex) 600 MG 12 hr tablet; Take 2 tablets by mouth 2 (Two) Times a Day.  Dispense: 20 tablet; Refill: 0        Please call if you have worsening symptoms     Increase fluid intake     Discussed using Debrox for ear wax removal instead of Q-tips    Use Bacitracin in nostril for irritation         Follow Up   Return if symptoms worsen or fail to improve, for Next scheduled follow up.  Patient was given instructions and counseling regarding her condition or for health maintenance advice. Please see specific information pulled into the AVS if appropriate.           This document has been electronically signed by ZELDA Delgado on April 6, 2022 10:29 CDT

## 2022-04-06 ENCOUNTER — TELEPHONE (OUTPATIENT)
Dept: FAMILY MEDICINE CLINIC | Facility: CLINIC | Age: 67
End: 2022-04-06

## 2022-04-06 DIAGNOSIS — R06.09 DYSPNEA ON EXERTION: Primary | ICD-10-CM

## 2022-04-06 DIAGNOSIS — Z79.4 TYPE 2 DIABETES MELLITUS WITH OTHER SPECIFIED COMPLICATION, WITH LONG-TERM CURRENT USE OF INSULIN: ICD-10-CM

## 2022-04-06 DIAGNOSIS — E11.69 TYPE 2 DIABETES MELLITUS WITH OTHER SPECIFIED COMPLICATION, WITH LONG-TERM CURRENT USE OF INSULIN: ICD-10-CM

## 2022-04-06 PROCEDURE — 69210 REMOVE IMPACTED EAR WAX UNI: CPT | Performed by: NURSE PRACTITIONER

## 2022-04-07 NOTE — TELEPHONE ENCOUNTER
Per Dr. Arnett, Ms Marmolejo has been called with recent Echo results and recommendations.   Continue current medications and follow-up as planned or sooner if any problems.

## 2022-04-07 NOTE — TELEPHONE ENCOUNTER
Please let patient know that echocardiogram showed that heart is pumping well, however slight abnormality with relaxation phase.  Would like for patient to see cardiology given this and her risk factors for CAD.  Referral placed.  Thanks, MEENA Arnett

## 2022-05-02 ENCOUNTER — OFFICE VISIT (OUTPATIENT)
Dept: FAMILY MEDICINE CLINIC | Facility: CLINIC | Age: 67
End: 2022-05-02

## 2022-05-02 VITALS
HEIGHT: 62 IN | DIASTOLIC BLOOD PRESSURE: 68 MMHG | BODY MASS INDEX: 40.3 KG/M2 | TEMPERATURE: 97.8 F | SYSTOLIC BLOOD PRESSURE: 158 MMHG | HEART RATE: 106 BPM | OXYGEN SATURATION: 97 % | WEIGHT: 219 LBS

## 2022-05-02 DIAGNOSIS — T14.8XXA HYPERFLEXION INJURY: ICD-10-CM

## 2022-05-02 DIAGNOSIS — M25.562 ACUTE PAIN OF LEFT KNEE: Primary | ICD-10-CM

## 2022-05-02 DIAGNOSIS — Z96.652 HISTORY OF TOTAL LEFT KNEE REPLACEMENT (TKR): ICD-10-CM

## 2022-05-02 PROCEDURE — 99214 OFFICE O/P EST MOD 30 MIN: CPT | Performed by: NURSE PRACTITIONER

## 2022-05-02 RX ORDER — SENNOSIDES 8.6 MG
650 CAPSULE ORAL EVERY 8 HOURS PRN
Qty: 60 TABLET | Refills: 1 | Status: SHIPPED | OUTPATIENT
Start: 2022-05-02 | End: 2022-08-09

## 2022-05-02 NOTE — PROGRESS NOTES
"Chief Complaint  Knee Pain (Left knee pain since Saturday.)    Subjective          Meeta Marmolejo presents to [facility] - [facility]    PCP: Dr. Arnett    CC: \"knee pain\"    Reports hx of TKR about 9-10 years ago by Dr. Darby with revision in 2018 due to an infection by Dr. Su.  Reports she always has baseline chronic pain/swelling, but was on a swing at the park with her granddaughter on 4-30 and toe got hung and jerked/pulled knee back and she has had increased pain/swelling since that time with some warmth.  No rash, erythema, bruising has been noted.  Increased pain with walking/standing.  Tried rubbing alcohol and muscle rub with no improvement in symptoms.  Reports hx of clots, on long term anticoagulant therapy (Xarelto).  Diabetic.  Hx of lupus.      Knee Pain   Incident onset: acute on chronic--acute beginning on 4-30. The incident occurred at the park. Injury mechanism: left knee pulled back while swinging. The pain is present in the left knee. The quality of the pain is described as aching (tightness/stiffness). The pain is at a severity of 10/10. The pain is severe. The pain has been constant since onset. Associated symptoms include an inability to bear weight and a loss of motion. Pertinent negatives include no loss of sensation, muscle weakness, numbness or tingling. She reports no foreign bodies present. The symptoms are aggravated by movement and weight bearing. Treatments tried: muscle rub, rubbing alcohol. The treatment provided no relief.       Review of Systems   Constitutional: Negative.    Respiratory: Negative.    Cardiovascular: Negative.    Gastrointestinal: Negative.    Genitourinary: Negative.    Musculoskeletal: Positive for arthralgias ( left knee) and joint swelling ( left knee).   Skin: Negative.    Neurological: Negative for dizziness, tingling, numbness and headaches.        Objective   Vital " "Signs:   /68 (BP Location: Right arm, Patient Position: Sitting, Cuff Size: Large Adult)   Pulse 106   Temp 97.8 °F (36.6 °C)   Ht 157.5 cm (62\")   Wt 99.3 kg (219 lb)   SpO2 97%   BMI 40.06 kg/m²       Physical Exam  Vitals and nursing note reviewed.   Constitutional:       General: She is in acute distress (in some noted discomfort with knee).      Appearance: Normal appearance. She is obese. She is not ill-appearing.   HENT:      Head: Normocephalic and atraumatic.   Cardiovascular:      Rate and Rhythm: Normal rate and regular rhythm.   Pulmonary:      Effort: Pulmonary effort is normal. No respiratory distress.      Breath sounds: Normal breath sounds. No wheezing, rhonchi or rales.   Musculoskeletal:      Cervical back: Neck supple.      Left knee: Swelling present. No erythema, ecchymosis or lacerations. Decreased range of motion. Tenderness present. Normal pulse.      Comments: Knee is warm to touch     Skin:     General: Skin is warm and dry.      Findings: No bruising, erythema or rash.   Neurological:      General: No focal deficit present.      Mental Status: She is alert and oriented to person, place, and time.   Psychiatric:         Mood and Affect: Mood normal.         Thought Content: Thought content normal.          Result Review :     Common labs    Common Labsle 11/12/21 11/12/21 11/12/21 11/12/21 11/12/21 3/9/22 3/9/22 3/9/22 3/9/22 3/9/22    0805 0805 0805 0805 0805 1420 1445 1445 1445 1445   Glucose    151 (A)    166 (A)     BUN    9    9     Creatinine    0.59    0.70     eGFR  Am    124         Sodium    141    142     Potassium    4.3    3.6     Chloride    102    105     Calcium    10.0    8.9     Albumin    4.60    4.20     Total Bilirubin    0.3    0.2     Alkaline Phosphatase    80    80     AST (SGOT)    10    13     ALT (SGPT)    14    11     WBC 6.24      7.52      Hemoglobin 13.0      12.1      Hematocrit 41.1      37.1      Platelets 262      285      Total " Cholesterol     256 (A)    252 (A)    Triglycerides     207 (A)    253 (A)    HDL Cholesterol     61 (A)    50    LDL Cholesterol      157 (A)    155 (A)    Hemoglobin A1C  10.56 (A)        9.90 (A)   Microalbumin, Urine   18.2   48.6       (A) Abnormal value                          Assessment and Plan    Diagnoses and all orders for this visit:    1. Acute pain of left knee (Primary)  -     XR knee 4+ vw left  -     acetaminophen (Tylenol 8 Hour Arthritis Pain) 650 MG 8 hr tablet; Take 1 tablet by mouth Every 8 (Eight) Hours As Needed for Mild Pain .  Dispense: 60 tablet; Refill: 1    2. History of total left knee replacement (TKR)  Comments:  with revision in 2018      Xrays today--will notify with results when available  Does not really wish to take pain meds, will have her try Tylenol Arthritis for now  Ace bandage, crutches (Regan) provided in office today  Ice as needed  Referral back to ortho for evaluation due to hx of TKR and revision in the past for further evaluation/treatment    Return to [facility]  See PCP for routine f/u visit and management of chronic medical conditions      This document has been electronically signed by ZELDA Santiago on May 2, 2022 10:11 CDT,.    I spent 30 minutes caring for Meeta on this date of service. This time includes time spent by me in the following activities:preparing for the visit, reviewing tests, performing a medically appropriate examination and/or evaluation , counseling and educating the patient/family/caregiver, ordering medications, tests, or procedures, referring and communicating with other health care professionals  and documenting information in the medical record

## 2022-05-03 ENCOUNTER — OFFICE VISIT (OUTPATIENT)
Dept: CARDIOLOGY | Facility: CLINIC | Age: 67
End: 2022-05-03

## 2022-05-03 VITALS
HEART RATE: 110 BPM | DIASTOLIC BLOOD PRESSURE: 66 MMHG | WEIGHT: 219 LBS | BODY MASS INDEX: 40.3 KG/M2 | OXYGEN SATURATION: 97 % | HEIGHT: 62 IN | SYSTOLIC BLOOD PRESSURE: 142 MMHG

## 2022-05-03 DIAGNOSIS — E66.01 MORBID OBESITY WITH BMI OF 40.0-44.9, ADULT: Primary | ICD-10-CM

## 2022-05-03 DIAGNOSIS — I10 HYPERTENSION, UNSPECIFIED TYPE: ICD-10-CM

## 2022-05-03 DIAGNOSIS — R06.09 DYSPNEA ON EXERTION: ICD-10-CM

## 2022-05-03 DIAGNOSIS — R07.9 CHEST PAIN, UNSPECIFIED TYPE: ICD-10-CM

## 2022-05-03 PROCEDURE — 99204 OFFICE O/P NEW MOD 45 MIN: CPT | Performed by: INTERNAL MEDICINE

## 2022-05-03 NOTE — PROGRESS NOTES
[facility] Cardiology  OFFICE NOTE    Cardiovascular Medicine  Esau Jhaveri M.D., [facility]         Anupama Arnett MD  [address]  [place] [facility] [address] [address]  [place] [address]    Thank you for asking me to see Meeta Marmolejo for dyspnea on exertion.    History of Present Illness    Meeta Marmolejo is a 66 y.o. female who presents for consultation today.  She has obesity, diabetes mellitus (insulin dependent), hypertension, dyslipidemia per prior documentation.  The patient reports being on Xarelto for a history of venous thromboembolism which occurred in the setting of knee surgery a few years ago.  She denies having any prior history of coronary artery disease, congestive heart failure, open heart surgeries, coronary stents, pacemaker/defibrillator placements, heart murmurs, heart valve problems or heart rhythm problems.    The patient had a transthoracic echocardiogram in April 2022.  According to the report, LV systolic function was normal, biatrial sizes were normal, RV cavity size/systolic function was normal, trace MR, trace AR, mild TR were noted.    She had COVID infection in 2020.  Since then, she has noted significant dyspnea on exertion.  This occurs predominantly when she takes a walk with her .  It has occurred at shorter distances, like walking from the parking lot to the building.  She has also had intermittent left sided chest discomfort for the past several months.  She cannot identify any clear precipitating or relieving factors.  The pain is described as sharp in character and lasts several minutes when it occurs.  She had a significant episode last Sunday, when the pain was more prominent and lasted for 30 minutes.  In addition, she has needed to prop the head end of the bed upward lately, she tends to feel short of breath when she lies flat.  A few days ago, she injured her left knee while she was helping her grandkids swing in a park.    Review  of Systems - ROS  Constitution: Negative for weakness, weight gain and weight loss.   HENT: Negative for congestion.    Eyes: Negative for blurred vision.   Cardiovascular: As mentioned above  Respiratory: Negative for cough and hemoptysis.    Endocrine: Negative for polydipsia and polyuria.   Hematologic/Lymphatic: Negative for bleeding problem. Does not bruise/bleed easily.   Skin: Negative for flushing.   Musculoskeletal: Negative for neck pain and stiffness.  Positive for left knee pain.  Gastrointestinal: Negative for abdominal pain, diarrhea, jaundice, melena, nausea and vomiting.   Genitourinary: Negative for dysuria and hematuria.   Neurological: Negative for dizziness, focal weakness and numbness.   Psychiatric/Behavioral: Negative for altered mental status and depression.      All other systems were reviewed and were negative.    Past Medical History:   Diagnosis Date   • Acute vaginitis     resolved   • Carpal tunnel syndrome    • Chronic osteoarthritis     Nino TKA   • Chronic pain    • Chronic urticaria    • Dysuria    • External hordeolum    • Low back pain     strain   • Microalbuminuria     on ARB   • Noncompliance with treatment    • Osteoarthritis of left knee    • Plantar fasciitis     resolved   • Type 2 diabetes mellitus (HCC)    • Urinary tract infectious disease    • Vaginal discharge    • Vitamin deficiency        Family History:  family history includes Breast cancer in her sister; Cancer in an other family member; Colon cancer in her father; Diabetes in her father, mother, and another family member; Hypertension in her father, mother, and another family member.    Social History: She quit smoking almost 30 years ago.  Denies current or past alcohol or illicit drug use.   reports that she quit smoking about 16 years ago. She has a 6.00 pack-year smoking history. She has never used smokeless tobacco. She reports that she does not drink alcohol and does not use drugs.    Allergies:  Allergies    Allergen Reactions   • Shrimp Rash   • Oxycodone Rash   • Penicillins Rash   • Percocet [Oxycodone-Acetaminophen] Rash   • Rocephin [Ceftriaxone] Rash         Current Outpatient Medications:   •  acetaminophen (Tylenol 8 Hour Arthritis Pain) 650 MG 8 hr tablet, Take 1 tablet by mouth Every 8 (Eight) Hours As Needed for Mild Pain ., Disp: 60 tablet, Rfl: 1  •  amLODIPine (NORVASC) 5 MG tablet, Take 1 tablet by mouth Daily., Disp: 30 tablet, Rfl: 2  •  cetirizine (zyrTEC) 10 MG tablet, Take 1 tablet by mouth Daily., Disp: 30 tablet, Rfl: 11  •  Continuous Blood Gluc  (FreeStyle Unique 2 [place]) device, 1 each Continuous., Disp: 1 each, Rfl: 0  •  Continuous Blood Gluc Sensor (FreeStyle Unique 2 Sensor) misc, 2 each Every 14 (Fourteen) Days. Use to get blood sugar readings, Disp: 2 each, Rfl: 11  •  docusate sodium (COLACE) 100 MG capsule, Take 1 capsule by mouth 2 (Two) Times a Day., Disp: 60 capsule, Rfl: 2  •  ferrous sulfate 325 (65 FE) MG tablet, Take 1 tablet by mouth Daily With Breakfast., Disp: 90 tablet, Rfl: 1  •  Finerenone (Kerendia) 10 MG tablet, Take 1 tablet by mouth Daily., Disp: 30 tablet, Rfl: 11  •  Glucagon, rDNA, (Glucagon Emergency) 1 MG kit, Inject 1 mg under the skin into the appropriate area as directed 1 (One) Time As Needed (hypoglcyemia) for up to 1 dose., Disp: 1 each, Rfl: 1  •  Glucose Blood (Blood Glucose Test) strip, Use 4 x daily use any brand covered by insurance or same brand as before ICD10 code is E11.9, Disp: 120 each, Rfl: 11  •  glucose blood test strip, Accu-Chek Guide test strips  TEST BLOOD SUGAR 3-4 TIMES DAILY, Disp: , Rfl:   •  icosapent ethyl (Vascepa) 1 g capsule capsule, Take 2 g by mouth 2 (Two) Times a Day With Meals. Pharmacy copay card BIN# [id number] PCN# CN GRP# [id number] ID# [id number], Disp: 120 capsule, Rfl: 11  •  insulin aspart (novoLOG FLEXPEN) 100 UNIT/ML solution pen-injector sc pen, insulin aspart (U-100) 100 unit/mL (3 mL) subcutaneous pen   ADMINISTER 20 UNITS UNDER THE SKIN THREE TIMES DAILY WITH MEALS AS DIRECTED, Disp: , Rfl:   •  insulin detemir (Levemir FlexTouch) 100 UNIT/ML injection, Inject 35 Units under the skin into the appropriate area as directed Daily., Disp: 4 pen, Rfl: 11  •  Insulin Lispro-aabc, 1 U Dial, (Lyumjev KwikPen) 100 UNIT/ML solution pen-injector, Up to 20 units three times daily with meals, Disp: 5 pen, Rfl: 11  •  Insulin Pen Needle (Pen Needles) 32G X 4 MM misc, 1 each 4 (Four) Times a Day. Use 4 x daily, Dx code E11.65, Disp: 120 each, Rfl: 11  •  Lancets misc, Test 4 times daily , E11.65, Disp: 120 each, Rfl: 11  •  losartan (COZAAR) 100 MG tablet, Take 1 tablet by mouth Daily., Disp: 90 tablet, Rfl: 3  •  metFORMIN (GLUCOPHAGE) 1000 MG tablet, Take 1 tablet by mouth 2 (Two) Times a Day With Meals., Disp: 60 tablet, Rfl: 5  •  montelukast (SINGULAIR) 10 MG tablet, Take 1 tablet by mouth Daily., Disp: 30 tablet, Rfl: 11  •  Potassium 99 MG tablet, Take  by mouth., Disp: , Rfl:   •  rivaroxaban (XARELTO) 20 MG tablet, Xarelto 20 mg tablet  TAKE 1 TABLET BY MOUTH DAILY WITH DINNER, Disp: , Rfl:   •  Semaglutide,0.25 or 0.5MG/DOS, (Ozempic, 0.25 or 0.5 MG/DOSE,) 2 MG/1.5ML solution pen-injector, 0.5 mg weekly, Disp: 1 pen, Rfl: 11  •  Triamcinolone Acetonide (NASACORT) 55 MCG/ACT nasal inhaler, 2 sprays into the nostril(s) as directed by provider Daily., Disp: 16.5 g, Rfl: 11  •  vitamin B-12 (CYANOCOBALAMIN) 1000 MCG tablet, Take 1 tablet by mouth Daily., Disp: 100 tablet, Rfl: 3  •  metoprolol tartrate (LOPRESSOR) 25 MG tablet, Take 1 tablet by mouth 2 (Two) Times a Day., Disp: 180 tablet, Rfl: 0  •  omeprazole (priLOSEC) 20 MG capsule, Take 1 capsule by mouth Daily., Disp: 90 capsule, Rfl: 1  •  Xarelto 20 MG tablet, Take 1 tablet by mouth Daily With Dinner for 30 days., Disp: 30 tablet, Rfl: 3    Physical Exam:  Vitals:    05/03/22 1318   BP: 142/66   BP Location: Left arm   Patient Position: Sitting   Cuff Size: Adult  "  Pulse: 110   SpO2: 97%   Weight: 99.3 kg (219 lb)   Height: 157.5 cm (62\")   PainSc: 0-No pain     Current Pain Level: Mild left knee pain  Pulse Ox: Normal  on room air  General: alert, appears stated age and cooperative, walks with a cane  Body Habitus: Obese  HEENT: Head: Normocephalic, no lesions, without obvious abnormality.     Neuro: alert, oriented x3  Pulses: 2+ and symmetric  JVP: Volume/Pulsation: Normal.  Normal waveforms.   Appropriate inspiratory decrease.    Carotid Exam: no bruit normal pulsation bilaterally   Carotid Volume: normal.     Respirations: no increased work of breathing   Chest:  Normal    Pulmonary:Normal   Precordium: Normal impulses.   Heart rate: Tachycardic  Heart Rhythm: regular     Heart Sounds: S1: normal  S2: normal  S3: absent   S4: absent    Abdomen:   Appearance: normal .  Palpation: Soft, non-tender to palpation, bowel sounds positive in all four quadrants; no guarding or rebound tenderness  Extremity: Trace bilateral pitting edema.  She has a wrap bandage around her left knee.    DATA REVIEWED:     EKG. I personally reviewed and interpreted the EKG.  sinus tachycardia, T wave inversions in inferior and anterolateral leads on 8/30/2018    ECG/EMG Results (all)     None        ---------------------------------------------------  TTE/ADRIANA:  Results for orders placed in visit on 04/01/22    Adult Transthoracic Echo Complete W/ Cont if Necessary Per Protocol    Interpretation Summary  · Left ventricular ejection fraction appears to be 61 - 65%. Left ventricular systolic function is normal.  · Left ventricular diastolic function is consistent with (grade I) impaired relaxation.  · Estimated right ventricular systolic pressure from tricuspid regurgitation is normal (<35 mmHg).      -----------------------------------------------------  CXR/Imaging:   Imaging Results (Most Recent)     None          -----------------------------------------------------  CT:   XR knee 4+ vw " left    Result Date: 5/2/2022  Impression: Longstem hinged left knee prosthesis appears anatomically aligned. The proximal femoral stem is incompletely imaged. Electronically signed by:  Nik Beverly MD  5/2/2022 11:12 AM CDT Workstation: [id number]      ----------------------------------------------------      --------------------------------------------------------------------------------------------------  LABS:     The 10-year CVD risk score (Chelsea et al., 2008) is: 43.9%    Values used to calculate the score:      Age: 66 years      Sex: Female      Diabetic: Yes      Tobacco smoker: No      Systolic Blood Pressure: 158 mmHg      Is BP treated: Yes      HDL Cholesterol: 50 mg/dL      Total Cholesterol: 252 mg/dL         Lab Results   Component Value Date    GLUCOSE 166 (H) 03/09/2022    BUN 9 03/09/2022    CREATININE 0.70 03/09/2022    EGFRIFAFRI 124 11/12/2021    BCR 12.9 03/09/2022    K 3.6 03/09/2022    CO2 22.0 03/09/2022    CALCIUM 8.9 03/09/2022    ALBUMIN 4.20 03/09/2022    AST 13 03/09/2022    ALT 11 03/09/2022     Lab Results   Component Value Date    WBC 7.52 03/09/2022    HGB 12.1 03/09/2022    HCT 37.1 03/09/2022    MCV 84.3 03/09/2022     03/09/2022     Lab Results   Component Value Date    CHOL 252 (H) 03/09/2022    CHLPL 147 09/30/2016    TRIG 253 (H) 03/09/2022    HDL 50 03/09/2022     (H) 03/09/2022     Lab Results   Component Value Date    TSH 0.370 03/09/2022     Lab Results   Component Value Date    TROPONINI 0.019 08/30/2018     Lab Results   Component Value Date    HGBA1C 9.90 (H) 03/09/2022     No results found for: DDIMER  Lab Results   Component Value Date    ALT 11 03/09/2022     Lab Results   Component Value Date    HGBA1C 9.90 (H) 03/09/2022    HGBA1C 10.56 (H) 11/12/2021    HGBA1C 10.70 (H) 04/13/2021     Lab Results   Component Value Date    MICROALBUR 48.6 03/09/2022    CREATININE 0.70 03/09/2022     Lab Results   Component Value Date    IRON 93 11/12/2021    TIBC  390 11/12/2021    FERRITIN 218.20 (H) 11/12/2021     Lab Results   Component Value Date    INR 4.80 (A) 11/21/2018    INR 6.10 (A) 11/19/2018    INR 3.40 (A) 11/15/2018    PROTIME 25.2 (H) 11/05/2018    PROTIME 18.3 (H) 10/03/2018    PROTIME 14.5 09/27/2018       Assessment/Plan     1. Dyspnea on exertion  - Stress Test With Myocardial Perfusion Two Day; Future    2. Chest pain, unspecified type  - metoprolol tartrate (LOPRESSOR) 25 MG tablet; Take 1 tablet by mouth 2 (Two) Times a Day.  Dispense: 180 tablet; Refill: 0  - Stress Test With Myocardial Perfusion Two Day; Future     The patient is a 66-year-old female with no significant past cardiac history who was referred to our office for further evaluation of dyspnea on exertion and left-sided chest discomfort.  She has noticed dyspnea on exertion since having a COVID infection in 2020.   The patient had a transthoracic echocardiogram in April 2022.  According to the report, LV systolic function was normal, biatrial sizes were normal, RV cavity size/systolic function was normal, trace MR, trace AL, mild TR were noted.  She has multiple coronary risk factors (diabetes, hypertension, dyslipidemia, obesity).  Work-up for CAD is warranted given the presence of chest discomfort and dyspnea on exertion.  Her heart rate is too fast to obtain a satisfactory coronary CTA.  We will proceed with a pharmacological nuclear stress test for further evaluation.  We will start her on metoprolol tartrate 25 mg orally twice daily to help with tachycardia and possibly with chest discomfort as well.  Serum TSH was normal in March 2022.  We may consider a trial of diuresis (? diastolic CHF) or pulmonary referral (? pulmonary etiology) if ischemic work-up is negative.    3. Hypertension, unspecified type  Clinic BP was 142/66 mmHg today.  Dietary sodium restriction was discussed.  Add metoprolol tartrate as detailed above.  She is also on amlodipine and losartan.    4. Morbid obesity  with BMI of 40.0-44.9, adult (HCC)  Dietary modification was discussed as detailed below.      Prevention:  Class 3 Severe Obesity (BMI >=40). Obesity-related health conditions include the following: hypertension, diabetes mellitus and dyslipidemias. We discussed low calorie, low carb based diet program and portion control.      Meeta Marmolejo  reports that she quit smoking about 16 years ago. She has a 6.00 pack-year smoking history. She has never used smokeless tobacco.. I have educated her       Return in about 2 months (around 7/3/2022).            Electronically signed by Esau Jhaveri MD on 05/03/22 at 11:06 CDT

## 2022-05-10 ENCOUNTER — APPOINTMENT (OUTPATIENT)
Dept: NUCLEAR MEDICINE | Facility: HOSPITAL | Age: 67
End: 2022-05-10

## 2022-05-12 ENCOUNTER — APPOINTMENT (OUTPATIENT)
Dept: CARDIOLOGY | Facility: HOSPITAL | Age: 67
End: 2022-05-12

## 2022-05-12 ENCOUNTER — APPOINTMENT (OUTPATIENT)
Dept: NUCLEAR MEDICINE | Facility: HOSPITAL | Age: 67
End: 2022-05-12

## 2022-05-19 ENCOUNTER — APPOINTMENT (OUTPATIENT)
Dept: NUCLEAR MEDICINE | Facility: HOSPITAL | Age: 67
End: 2022-05-19

## 2022-05-19 ENCOUNTER — TELEPHONE (OUTPATIENT)
Dept: GENERAL RADIOLOGY | Facility: HOSPITAL | Age: 67
End: 2022-05-19

## 2022-05-20 ENCOUNTER — APPOINTMENT (OUTPATIENT)
Dept: CARDIOLOGY | Facility: HOSPITAL | Age: 67
End: 2022-05-20

## 2022-05-20 ENCOUNTER — APPOINTMENT (OUTPATIENT)
Dept: NUCLEAR MEDICINE | Facility: HOSPITAL | Age: 67
End: 2022-05-20

## 2022-05-27 DIAGNOSIS — I10 BENIGN ESSENTIAL HYPERTENSION: ICD-10-CM

## 2022-05-27 DIAGNOSIS — D50.8 OTHER IRON DEFICIENCY ANEMIA: Chronic | ICD-10-CM

## 2022-05-27 RX ORDER — AMLODIPINE BESYLATE 5 MG/1
5 TABLET ORAL DAILY
Qty: 30 TABLET | Refills: 2 | Status: SHIPPED | OUTPATIENT
Start: 2022-05-27 | End: 2022-10-10

## 2022-05-27 RX ORDER — DOCUSATE SODIUM 100 MG/1
100 CAPSULE, LIQUID FILLED ORAL 2 TIMES DAILY
Qty: 60 CAPSULE | Refills: 2 | Status: SHIPPED | OUTPATIENT
Start: 2022-05-27 | End: 2022-09-12

## 2022-06-16 ENCOUNTER — OFFICE VISIT (OUTPATIENT)
Dept: ONCOLOGY | Facility: CLINIC | Age: 67
End: 2022-06-16

## 2022-06-16 ENCOUNTER — LAB (OUTPATIENT)
Dept: ONCOLOGY | Facility: HOSPITAL | Age: 67
End: 2022-06-16

## 2022-06-16 ENCOUNTER — TELEPHONE (OUTPATIENT)
Dept: ONCOLOGY | Facility: HOSPITAL | Age: 67
End: 2022-06-16

## 2022-06-16 VITALS
OXYGEN SATURATION: 97 % | DIASTOLIC BLOOD PRESSURE: 72 MMHG | HEART RATE: 111 BPM | SYSTOLIC BLOOD PRESSURE: 130 MMHG | TEMPERATURE: 97.1 F | BODY MASS INDEX: 40.31 KG/M2 | WEIGHT: 220.4 LBS

## 2022-06-16 DIAGNOSIS — R78.71 ABNORMAL LEAD LEVEL IN BLOOD: ICD-10-CM

## 2022-06-16 DIAGNOSIS — D68.62 LUPUS ANTICOAGULANT SYNDROME: Primary | ICD-10-CM

## 2022-06-16 DIAGNOSIS — D50.8 OTHER IRON DEFICIENCY ANEMIA: ICD-10-CM

## 2022-06-16 DIAGNOSIS — R79.0 ABNORMAL LEVEL OF BLOOD MINERAL: ICD-10-CM

## 2022-06-16 LAB
ALBUMIN SERPL-MCNC: 4.4 G/DL (ref 3.5–5.2)
ALBUMIN/GLOB SERPL: 1.5 G/DL
ALP SERPL-CCNC: 79 U/L (ref 39–117)
ALT SERPL W P-5'-P-CCNC: 14 U/L (ref 1–33)
ANION GAP SERPL CALCULATED.3IONS-SCNC: 15 MMOL/L (ref 5–15)
AST SERPL-CCNC: 16 U/L (ref 1–32)
BASOPHILS # BLD AUTO: 0.04 10*3/MM3 (ref 0–0.2)
BASOPHILS NFR BLD AUTO: 0.6 % (ref 0–1.5)
BILIRUB SERPL-MCNC: 0.2 MG/DL (ref 0–1.2)
BUN SERPL-MCNC: 12 MG/DL (ref 8–23)
BUN/CREAT SERPL: 19.4 (ref 7–25)
CALCIUM SPEC-SCNC: 9.3 MG/DL (ref 8.6–10.5)
CHLORIDE SERPL-SCNC: 100 MMOL/L (ref 98–107)
CO2 SERPL-SCNC: 23 MMOL/L (ref 22–29)
CREAT SERPL-MCNC: 0.62 MG/DL (ref 0.57–1)
DEPRECATED RDW RBC AUTO: 43.9 FL (ref 37–54)
EGFRCR SERPLBLD CKD-EPI 2021: 99 ML/MIN/1.73
EOSINOPHIL # BLD AUTO: 0.14 10*3/MM3 (ref 0–0.4)
EOSINOPHIL NFR BLD AUTO: 2.3 % (ref 0.3–6.2)
ERYTHROCYTE [DISTWIDTH] IN BLOOD BY AUTOMATED COUNT: 14.7 % (ref 12.3–15.4)
FERRITIN SERPL-MCNC: 234.8 NG/ML (ref 13–150)
FOLATE SERPL-MCNC: 12.9 NG/ML (ref 4.78–24.2)
GLOBULIN UR ELPH-MCNC: 2.9 GM/DL
GLUCOSE SERPL-MCNC: 224 MG/DL (ref 65–99)
HCT VFR BLD AUTO: 37.4 % (ref 34–46.6)
HGB BLD-MCNC: 12.5 G/DL (ref 12–15.9)
IMM GRANULOCYTES # BLD AUTO: 0.03 10*3/MM3 (ref 0–0.05)
IMM GRANULOCYTES NFR BLD AUTO: 0.5 % (ref 0–0.5)
IRON 24H UR-MRATE: 71 MCG/DL (ref 37–145)
IRON SATN MFR SERPL: 21 % (ref 20–50)
LYMPHOCYTES # BLD AUTO: 1.93 10*3/MM3 (ref 0.7–3.1)
LYMPHOCYTES NFR BLD AUTO: 31.3 % (ref 19.6–45.3)
MCH RBC QN AUTO: 27.4 PG (ref 26.6–33)
MCHC RBC AUTO-ENTMCNC: 33.4 G/DL (ref 31.5–35.7)
MCV RBC AUTO: 82 FL (ref 79–97)
MONOCYTES # BLD AUTO: 0.45 10*3/MM3 (ref 0.1–0.9)
MONOCYTES NFR BLD AUTO: 7.3 % (ref 5–12)
NEUTROPHILS NFR BLD AUTO: 3.57 10*3/MM3 (ref 1.7–7)
NEUTROPHILS NFR BLD AUTO: 58 % (ref 42.7–76)
NRBC BLD AUTO-RTO: 0 /100 WBC (ref 0–0.2)
PLATELET # BLD AUTO: 263 10*3/MM3 (ref 140–450)
PMV BLD AUTO: 11.1 FL (ref 6–12)
POTASSIUM SERPL-SCNC: 3.9 MMOL/L (ref 3.5–5.2)
PROT SERPL-MCNC: 7.3 G/DL (ref 6–8.5)
RBC # BLD AUTO: 4.56 10*6/MM3 (ref 3.77–5.28)
SODIUM SERPL-SCNC: 138 MMOL/L (ref 136–145)
TIBC SERPL-MCNC: 335 MCG/DL (ref 298–536)
TRANSFERRIN SERPL-MCNC: 225 MG/DL (ref 200–360)
VIT B12 BLD-MCNC: >2000 PG/ML (ref 211–946)
WBC NRBC COR # BLD: 6.16 10*3/MM3 (ref 3.4–10.8)

## 2022-06-16 PROCEDURE — 82607 VITAMIN B-12: CPT

## 2022-06-16 PROCEDURE — 82746 ASSAY OF FOLIC ACID SERUM: CPT

## 2022-06-16 PROCEDURE — 80053 COMPREHEN METABOLIC PANEL: CPT

## 2022-06-16 PROCEDURE — 83540 ASSAY OF IRON: CPT

## 2022-06-16 PROCEDURE — 84466 ASSAY OF TRANSFERRIN: CPT

## 2022-06-16 PROCEDURE — 85025 COMPLETE CBC W/AUTO DIFF WBC: CPT

## 2022-06-16 PROCEDURE — G0463 HOSPITAL OUTPT CLINIC VISIT: HCPCS | Performed by: NURSE PRACTITIONER

## 2022-06-16 PROCEDURE — 82728 ASSAY OF FERRITIN: CPT

## 2022-06-16 PROCEDURE — 99212 OFFICE O/P EST SF 10 MIN: CPT | Performed by: NURSE PRACTITIONER

## 2022-06-16 NOTE — TELEPHONE ENCOUNTER
----- Message from ZELDA Chilel sent at 6/16/2022 12:51 PM CDT -----  Let her know to continue with her iron B-12 and folic acid. No changes  ----- Message -----  From: Lab, Background User  Sent: 6/16/2022  10:16 AM CDT  To: ZELDA Chilel

## 2022-06-16 NOTE — PROGRESS NOTES
DATE OF VISIT: 6/16/2022      REASON FOR VISIT:  Pt here for management of Xarelto related to pulmonary embolism; iron deficiency anemia        HISTORY OF PRESENT ILLNESS:     As of 6/16/2022:    66 yr old female with past medical problems consisting of hypertension, dyslipidemia, poorly controlled diabetes mellitus, history of left knee replacement with revision ×3.  Most recent revision was done on August 28, 2018.  And was diagnosed with subacute pulmonary embolism on August 30, 2018 after revision of left knee and was started on xarelto.  Patient was initially seen in consultation when she was  admitted to hospital on September 21, 2018 with bleeding into her left knee.  Xarelto was discontinued and patient was started on heparin drip with coumadin.  Patient has started back on Xarelto in December 2018 due to positive lupus anticoagulant.  She is tolerating well and denies any bleeding. She continues on B-12 and folic acid TIW and is taking iron tablet daily.           Past Medical History, Past Surgical History, Social History, Family History have been reviewed and are without significant changes except as mentioned.    Review of Systems   A comprehensive 14 point review of systems was performed and was negative except as mentioned.    Medications:  The current medication list was reviewed in the EMR    ALLERGIES:    Allergies   Allergen Reactions   • Shrimp Rash   • Oxycodone Rash   • Penicillins Rash   • Percocet [Oxycodone-Acetaminophen] Rash   • Rocephin [Ceftriaxone] Rash       Objective      Vitals:    06/16/22 1020   BP: 130/72   Pulse: 111   Temp: 97.1 °F (36.2 °C)   SpO2: 97%   Weight: 100 kg (220 lb 6.4 oz)   PainSc: 0-No pain     Current Status 5/18/2020   ECOG score 0       Physical Exam  General: alert and oriented no acute distress  Lungs: CTAB no crackles or wheezing  Card: RRR no murmur  GI: soft non tender non distended no organomegaly  Ext : no edema    I have reexamined the patient and the  results are consistent with the previously documented exam. ZELDA Chilel     RECENT LABS:  Glucose   Date Value Ref Range Status   06/16/2022 224 (H) 65 - 99 mg/dL Final     Sodium   Date Value Ref Range Status   06/16/2022 138 136 - 145 mmol/L Final     Potassium   Date Value Ref Range Status   06/16/2022 3.9 3.5 - 5.2 mmol/L Final     CO2   Date Value Ref Range Status   06/16/2022 23.0 22.0 - 29.0 mmol/L Final     Chloride   Date Value Ref Range Status   06/16/2022 100 98 - 107 mmol/L Final     Anion Gap   Date Value Ref Range Status   06/16/2022 15.0 5.0 - 15.0 mmol/L Final     Creatinine   Date Value Ref Range Status   06/16/2022 0.62 0.57 - 1.00 mg/dL Final     BUN   Date Value Ref Range Status   06/16/2022 12 8 - 23 mg/dL Final     BUN/Creatinine Ratio   Date Value Ref Range Status   06/16/2022 19.4 7.0 - 25.0 Final     Calcium   Date Value Ref Range Status   06/16/2022 9.3 8.6 - 10.5 mg/dL Final     Alkaline Phosphatase   Date Value Ref Range Status   06/16/2022 79 39 - 117 U/L Final     Total Protein   Date Value Ref Range Status   06/16/2022 7.3 6.0 - 8.5 g/dL Final     ALT (SGPT)   Date Value Ref Range Status   06/16/2022 14 1 - 33 U/L Final     AST (SGOT)   Date Value Ref Range Status   06/16/2022 16 1 - 32 U/L Final     Total Bilirubin   Date Value Ref Range Status   06/16/2022 0.2 0.0 - 1.2 mg/dL Final     Albumin   Date Value Ref Range Status   06/16/2022 4.40 3.50 - 5.20 g/dL Final     Globulin   Date Value Ref Range Status   06/16/2022 2.9 gm/dL Final     Lab Results   Component Value Date    WBC 6.16 06/16/2022    HGB 12.5 06/16/2022    HCT 37.4 06/16/2022    MCV 82.0 06/16/2022     06/16/2022     Lab Results   Component Value Date    NEUTROABS 3.57 06/16/2022    IRON 71 06/16/2022    IRON 93 11/12/2021    IRON 78 11/03/2020    TIBC 335 06/16/2022    TIBC 390 11/12/2021    TIBC 365 11/03/2020    LABIRON 21 06/16/2022    LABIRON 24 11/12/2021    LABIRON 21 11/03/2020    FERRITIN 234.80  (H) 06/16/2022    FERRITIN 218.20 (H) 11/12/2021    FERRITIN 223.70 (H) 11/03/2020    NVXVMBHR40 >2,000 (H) 03/09/2022    NMAYUIOC25 >2,000 (H) 11/12/2021    XNOOLEXO77 >2,000 (H) 04/13/2021    FOLATE >20.00 07/07/2020    FOLATE 17.30 05/15/2020    FOLATE 15.10 12/05/2019     Lab Results   Component Value Date    REFLABREPO SEE NOTE: 05/28/2015         RADIOLOGY DATA :  No radiology results for the last 7 days        Assessment & Plan     1.  Subacute pulmonary embolism on right side:  -Patient was diagnosed with subacute pulmonary embolism on right side after most recent revision of left knee done on August 28, 2018.  -Patient was started on xarelto 15 mg twice daily.  -She was [facility] to [facility] on September 20, 2018 with bleeding into her left knee.  -Patient was started on heparin drip.  -Patient had incision and Drainage done by Dr. Su on September 22, 2018.  -Patient was on Coumadin.  Patient was started back on Xarelto in December 2018.  -CT angiogram of chest with contrast done on February 20, 2019 shows resolution of pulmonary embolism.  -Hypercoagulable workup done consisting of factor V Leyden, prothrombin gene mutation, anticardiolipin antibody, beta-2 glycoprotein antibody were negative on February 20, 2019.  -Lupus anticoagulant was positive on February 20, 2019 as well as Peggy 3, 2019.  In view of lupus anticoagulant being positive, recommend continuing with Xarelto for now.  -Patient denies any bleeding.      2.  Anemia: iron deficiency  -she is currently taking iron tablet daily along with B-12 and folic acid TI  -labs reviewed today and iron stores are stable and Hgb is 12.5  -recommend with current medications.  -RTC 6 mos with repeat labs.                    PHQ-9 Total Score: 0     Rishikilo Kitty Marmolejo reports a pain score of 0.  Given her pain assessment as noted, treatment options were discussed and the following options were decided upon as a follow-up plan to  address the patient's pain: no pain today.         Estephania Felix, ZELDA  6/16/2022  12:31 CDT        Part of this note may be an electronic transcription/translation of spoken language to printed text using the Dragon Dictation System.          CC:

## 2022-06-28 ENCOUNTER — OFFICE VISIT (OUTPATIENT)
Dept: FAMILY MEDICINE CLINIC | Facility: CLINIC | Age: 67
End: 2022-06-28

## 2022-06-28 VITALS
HEART RATE: 103 BPM | BODY MASS INDEX: 40.12 KG/M2 | HEIGHT: 62 IN | WEIGHT: 218 LBS | OXYGEN SATURATION: 98 % | DIASTOLIC BLOOD PRESSURE: 84 MMHG | SYSTOLIC BLOOD PRESSURE: 134 MMHG

## 2022-06-28 DIAGNOSIS — I50.32 CHRONIC DIASTOLIC (CONGESTIVE) HEART FAILURE: ICD-10-CM

## 2022-06-28 DIAGNOSIS — R06.09 DYSPNEA ON EXERTION: ICD-10-CM

## 2022-06-28 DIAGNOSIS — I10 BENIGN ESSENTIAL HYPERTENSION: Primary | ICD-10-CM

## 2022-06-28 DIAGNOSIS — E11.69 TYPE 2 DIABETES MELLITUS WITH OTHER SPECIFIED COMPLICATION, WITH LONG-TERM CURRENT USE OF INSULIN: ICD-10-CM

## 2022-06-28 DIAGNOSIS — Z79.4 TYPE 2 DIABETES MELLITUS WITH OTHER SPECIFIED COMPLICATION, WITH LONG-TERM CURRENT USE OF INSULIN: ICD-10-CM

## 2022-06-28 PROCEDURE — 99214 OFFICE O/P EST MOD 30 MIN: CPT | Performed by: FAMILY MEDICINE

## 2022-06-28 RX ORDER — FUROSEMIDE 20 MG/1
20 TABLET ORAL DAILY PRN
Qty: 30 TABLET | Refills: 0 | Status: SHIPPED | OUTPATIENT
Start: 2022-06-28 | End: 2022-07-26 | Stop reason: SDUPTHER

## 2022-06-29 ENCOUNTER — TELEMEDICINE (OUTPATIENT)
Dept: ENDOCRINOLOGY | Facility: CLINIC | Age: 67
End: 2022-06-29

## 2022-06-29 DIAGNOSIS — E78.2 MIXED HYPERLIPIDEMIA: ICD-10-CM

## 2022-06-29 DIAGNOSIS — Z79.4 TYPE 2 DIABETES MELLITUS WITH HYPOGLYCEMIA WITHOUT COMA, WITH LONG-TERM CURRENT USE OF INSULIN: Primary | ICD-10-CM

## 2022-06-29 DIAGNOSIS — E11.29 MICROALBUMINURIA DUE TO TYPE 2 DIABETES MELLITUS: ICD-10-CM

## 2022-06-29 DIAGNOSIS — I10 PRIMARY HYPERTENSION: ICD-10-CM

## 2022-06-29 DIAGNOSIS — E11.649 TYPE 2 DIABETES MELLITUS WITH HYPOGLYCEMIA WITHOUT COMA, WITH LONG-TERM CURRENT USE OF INSULIN: Primary | ICD-10-CM

## 2022-06-29 DIAGNOSIS — R80.9 MICROALBUMINURIA DUE TO TYPE 2 DIABETES MELLITUS: ICD-10-CM

## 2022-06-29 PROCEDURE — 99214 OFFICE O/P EST MOD 30 MIN: CPT | Performed by: NURSE PRACTITIONER

## 2022-06-29 NOTE — PROGRESS NOTES
Chief Complaint  Diabetes    Subjective          Meeta Marmolejo presents to [facility] ENDOCRINOLOGY  History of Present Illness       You have chosen to receive care through a telehealth visit.  Do you consent to use a video/audio connection for your medical care today? Yes      This is a telephone visit due to pandemic and patient is following the CDC guidelines for social distancing     66-year-old female presents for follow-up     Reason diabetes mellitus type 2     Diagnosed at age 31     Timing constant     Quality not controlled     Severity is moderate          Microvascular complications microalbuminuria    Current glucose regimen     Insulin, glp-1                Blood glucose monitoring    Checks 4 times daily        Having low sugars when missing a meal        Review of Systems - General ROS: negative          Objective   Vital Signs:   There were no vitals taken for this visit.    Physical Exam  Constitutional:       Appearance: Normal appearance.   Cardiovascular:      Rate and Rhythm: Regular rhythm.      Heart sounds: Normal heart sounds.   Pulmonary:      Breath sounds: Normal breath sounds.   Musculoskeletal:         General: Normal range of motion.      Cervical back: Normal range of motion.   Neurological:      General: No focal deficit present.      Mental Status: She is alert.        Result Review :   The following data was reviewed by: ZELDA Freire on 03/28/2022:  Common labs    Common Labsle 11/12/21 11/12/21 11/12/21 11/12/21 11/12/21 3/9/22 3/9/22 3/9/22 3/9/22 3/9/22 6/16/22 6/16/22    0805 0805 0805 0805 0805 1420 1445 1445 1445 1445 1005 1005   Glucose    151 (A)    166 (A)    224 (A)   BUN    9    9    12   Creatinine    0.59    0.70    0.62   eGFR  Am    124           Sodium    141    142    138   Potassium    4.3    3.6    3.9   Chloride    102    105    100   Calcium    10.0    8.9    9.3   Albumin    4.60    4.20    4.40   Total  Bilirubin    0.3    0.2    0.2   Alkaline Phosphatase    80    80    79   AST (SGOT)    10    13    16   ALT (SGPT)    14    11    14   WBC 6.24      7.52    6.16    Hemoglobin 13.0      12.1    12.5    Hematocrit 41.1      37.1    37.4    Platelets 262      285    263    Total Cholesterol     256 (A)    252 (A)      Triglycerides     207 (A)    253 (A)      HDL Cholesterol     61 (A)    50      LDL Cholesterol      157 (A)    155 (A)      Hemoglobin A1C  10.56 (A)        9.90 (A)     Microalbumin, Urine   18.2   48.6         (A) Abnormal value                        Assessment and Plan    Diagnoses and all orders for this visit:    1. Type 2 diabetes mellitus with hypoglycemia without coma, with long-term current use of insulin (HCC) (Primary)    2. Primary hypertension    3. Mixed hyperlipidemia    4. Microalbuminuria due to type 2 diabetes mellitus (HCC)              Glycemic management     Diabetes mellitus type 2     Lab Results   Component Value Date    HGBA1C 9.90 (H) 03/09/2022          Taking metformin 1000 mg twice a day         Taking Levemir 35 units daily ----decrease to 30 units        If you ever wake up with a blood glucose level less than 80 decreased to 25         Ozempic  1 mg weekly -- increase to 2 mg weekly         Lymjev   before meals as a scale--3 times daily       Takes 4 units TID      + sliding scale      Less than 180 --- nothing  181-200 --- 2units  201-250  ---- 4 units  251-300  ----  6units  Above 300 ----- 8 units                       Stop Glipizide            No Jardiance since associated with GMI                 ===========      Blood pressure management:      Hypertension          Taking losartan 100 mg once daily           ===========     Lipid Management         Dyslipidemia        Taking Lipitor 80 mg 1 at night        Total Cholesterol   Date Value Ref Range Status   03/09/2022 252 (H) 0 - 200 mg/dL Final     Triglycerides   Date Value Ref Range Status   03/09/2022 253 (H) 0  - 150 mg/dL Final     HDL Cholesterol   Date Value Ref Range Status   03/09/2022 50 40 - 60 mg/dL Final     LDL Cholesterol    Date Value Ref Range Status   03/09/2022 155 (H) 0 - 100 mg/dL Final     LDLCALC ELECT   Date Value Ref Range Status   01/12/2015 136 (H) 0 - 129 mg/dl Final     Comment:     LDL AVERAGE RISK: 130 - 159 MG/DL       add vascepa   ----------------------           Microvascular monitoring     Last eye exam -----Nov. 2021, no  DR      Cataract surgery 2021      No neuropathy     Positive for microalbuminuria                       ==========     History of osteoporosis     On alendronate for at least since 2013     I reviewed her bone density from 2013 and it was osteopenia     Stopped the alendronate     Jan. 2021     DXA oteopenia      Take calcium plus vitamin d            Other related aspects      Lab Results   Component Value Date    TSH 0.370 03/09/2022     Lab Results   Component Value Date    XIZCTRSN08 >2,000 (H) 06/16/2022                         Follow Up   No follow-ups on file.  Patient was given instructions and counseling regarding her condition or for health maintenance advice. Please see specific information pulled into the AVS if appropriate.         This document has been electronically signed by ZELDA Freire on June 29, 2022 11:15 CDT.

## 2022-07-18 ENCOUNTER — LAB (OUTPATIENT)
Dept: LAB | Facility: HOSPITAL | Age: 67
End: 2022-07-18

## 2022-07-18 DIAGNOSIS — R79.0 ABNORMAL LEVEL OF BLOOD MINERAL: ICD-10-CM

## 2022-07-18 DIAGNOSIS — Z11.59 NEED FOR HEPATITIS C SCREENING TEST: ICD-10-CM

## 2022-07-18 DIAGNOSIS — Z79.4 TYPE 2 DIABETES MELLITUS WITH OTHER SPECIFIED COMPLICATION, WITH LONG-TERM CURRENT USE OF INSULIN: ICD-10-CM

## 2022-07-18 DIAGNOSIS — R06.02 SHORTNESS OF BREATH: ICD-10-CM

## 2022-07-18 DIAGNOSIS — E11.69 TYPE 2 DIABETES MELLITUS WITH OTHER SPECIFIED COMPLICATION, WITH LONG-TERM CURRENT USE OF INSULIN: ICD-10-CM

## 2022-07-18 DIAGNOSIS — R78.71 ABNORMAL LEAD LEVEL IN BLOOD: ICD-10-CM

## 2022-07-18 DIAGNOSIS — D68.62 LUPUS ANTICOAGULANT SYNDROME: ICD-10-CM

## 2022-07-18 LAB
ALBUMIN SERPL-MCNC: 4.1 G/DL (ref 3.5–5.2)
ALBUMIN/GLOB SERPL: 1.4 G/DL
ALP SERPL-CCNC: 78 U/L (ref 39–117)
ALT SERPL W P-5'-P-CCNC: 16 U/L (ref 1–33)
ANION GAP SERPL CALCULATED.3IONS-SCNC: 14 MMOL/L (ref 5–15)
AST SERPL-CCNC: 15 U/L (ref 1–32)
BASOPHILS # BLD AUTO: 0.05 10*3/MM3 (ref 0–0.2)
BASOPHILS NFR BLD AUTO: 0.9 % (ref 0–1.5)
BILIRUB SERPL-MCNC: 0.2 MG/DL (ref 0–1.2)
BUN SERPL-MCNC: 8 MG/DL (ref 8–23)
BUN/CREAT SERPL: 14 (ref 7–25)
CALCIUM SPEC-SCNC: 9.3 MG/DL (ref 8.6–10.5)
CHLORIDE SERPL-SCNC: 101 MMOL/L (ref 98–107)
CO2 SERPL-SCNC: 24 MMOL/L (ref 22–29)
CREAT SERPL-MCNC: 0.57 MG/DL (ref 0.57–1)
DEPRECATED RDW RBC AUTO: 45.4 FL (ref 37–54)
EGFRCR SERPLBLD CKD-EPI 2021: 101 ML/MIN/1.73
EOSINOPHIL # BLD AUTO: 0.14 10*3/MM3 (ref 0–0.4)
EOSINOPHIL NFR BLD AUTO: 2.5 % (ref 0.3–6.2)
ERYTHROCYTE [DISTWIDTH] IN BLOOD BY AUTOMATED COUNT: 15.1 % (ref 12.3–15.4)
FERRITIN SERPL-MCNC: 250.8 NG/ML (ref 13–150)
FOLATE SERPL-MCNC: 15.6 NG/ML (ref 4.78–24.2)
GLOBULIN UR ELPH-MCNC: 2.9 GM/DL
GLUCOSE SERPL-MCNC: 191 MG/DL (ref 65–99)
HBA1C MFR BLD: 10.3 % (ref 4.8–5.6)
HCT VFR BLD AUTO: 35.9 % (ref 34–46.6)
HCV AB SER DONR QL: NORMAL
HGB BLD-MCNC: 11.7 G/DL (ref 12–15.9)
IMM GRANULOCYTES # BLD AUTO: 0.06 10*3/MM3 (ref 0–0.05)
IMM GRANULOCYTES NFR BLD AUTO: 1.1 % (ref 0–0.5)
IRON 24H UR-MRATE: 75 MCG/DL (ref 37–145)
IRON SATN MFR SERPL: 20 % (ref 20–50)
LYMPHOCYTES # BLD AUTO: 2.14 10*3/MM3 (ref 0.7–3.1)
LYMPHOCYTES NFR BLD AUTO: 37.6 % (ref 19.6–45.3)
MCH RBC QN AUTO: 26.8 PG (ref 26.6–33)
MCHC RBC AUTO-ENTMCNC: 32.6 G/DL (ref 31.5–35.7)
MCV RBC AUTO: 82.2 FL (ref 79–97)
MONOCYTES # BLD AUTO: 0.48 10*3/MM3 (ref 0.1–0.9)
MONOCYTES NFR BLD AUTO: 8.4 % (ref 5–12)
NEUTROPHILS NFR BLD AUTO: 2.82 10*3/MM3 (ref 1.7–7)
NEUTROPHILS NFR BLD AUTO: 49.5 % (ref 42.7–76)
NRBC BLD AUTO-RTO: 0 /100 WBC (ref 0–0.2)
PLATELET # BLD AUTO: 161 10*3/MM3 (ref 140–450)
PMV BLD AUTO: 12.9 FL (ref 6–12)
POTASSIUM SERPL-SCNC: 3.8 MMOL/L (ref 3.5–5.2)
PROT SERPL-MCNC: 7 G/DL (ref 6–8.5)
RBC # BLD AUTO: 4.37 10*6/MM3 (ref 3.77–5.28)
SODIUM SERPL-SCNC: 139 MMOL/L (ref 136–145)
TIBC SERPL-MCNC: 370 MCG/DL (ref 298–536)
TRANSFERRIN SERPL-MCNC: 248 MG/DL (ref 200–360)
VIT B12 BLD-MCNC: >2000 PG/ML (ref 211–946)
WBC NRBC COR # BLD: 5.69 10*3/MM3 (ref 3.4–10.8)

## 2022-07-18 PROCEDURE — 82746 ASSAY OF FOLIC ACID SERUM: CPT

## 2022-07-18 PROCEDURE — 80053 COMPREHEN METABOLIC PANEL: CPT

## 2022-07-18 PROCEDURE — 84466 ASSAY OF TRANSFERRIN: CPT

## 2022-07-18 PROCEDURE — 82607 VITAMIN B-12: CPT

## 2022-07-18 PROCEDURE — 86803 HEPATITIS C AB TEST: CPT

## 2022-07-18 PROCEDURE — 82728 ASSAY OF FERRITIN: CPT

## 2022-07-18 PROCEDURE — 85025 COMPLETE CBC W/AUTO DIFF WBC: CPT

## 2022-07-18 PROCEDURE — 83036 HEMOGLOBIN GLYCOSYLATED A1C: CPT

## 2022-07-18 PROCEDURE — 83540 ASSAY OF IRON: CPT

## 2022-07-21 ENCOUNTER — TELEPHONE (OUTPATIENT)
Dept: FAMILY MEDICINE CLINIC | Facility: CLINIC | Age: 67
End: 2022-07-21

## 2022-07-21 NOTE — TELEPHONE ENCOUNTER
Per Dr. Arnett, Ms Marmolejo has been called with recent lab results & recommendations.  Continue current medications and follow-up as planned or sooner if any problems.   ----- Message from Anupama rAnett MD sent at 7/21/2022  9:16 AM CDT -----  Hep C screening negative.  HgbA1c not well controlled, up to 10.3%.  Keep working with endocrinology to bring levels down.  Encourage low carb/low sugar diet.   CMP shows elevated glucose, but otherwise ok.  ThanksMEENA

## 2022-07-21 NOTE — PROGRESS NOTES
Per Dr. Arnett, Ms Marmolejo has been called with recent lab results & recommendations.  Continue current medications and follow-up as planned or sooner if any problems.

## 2022-07-26 DIAGNOSIS — I50.32 CHRONIC DIASTOLIC (CONGESTIVE) HEART FAILURE: ICD-10-CM

## 2022-07-26 RX ORDER — FUROSEMIDE 20 MG/1
20 TABLET ORAL DAILY PRN
Qty: 30 TABLET | Refills: 0 | Status: SHIPPED | OUTPATIENT
Start: 2022-07-26 | End: 2022-08-29 | Stop reason: SDUPTHER

## 2022-07-30 DIAGNOSIS — R07.9 CHEST PAIN, UNSPECIFIED TYPE: ICD-10-CM

## 2022-08-09 ENCOUNTER — OFFICE VISIT (OUTPATIENT)
Dept: FAMILY MEDICINE CLINIC | Facility: CLINIC | Age: 67
End: 2022-08-09

## 2022-08-09 ENCOUNTER — TELEPHONE (OUTPATIENT)
Dept: ENDOCRINOLOGY | Facility: CLINIC | Age: 67
End: 2022-08-09

## 2022-08-09 VITALS
RESPIRATION RATE: 24 BRPM | HEIGHT: 62 IN | BODY MASS INDEX: 39.95 KG/M2 | SYSTOLIC BLOOD PRESSURE: 140 MMHG | WEIGHT: 217.1 LBS | DIASTOLIC BLOOD PRESSURE: 78 MMHG | OXYGEN SATURATION: 95 % | HEART RATE: 109 BPM

## 2022-08-09 DIAGNOSIS — M62.838 MUSCLE SPASM: Primary | ICD-10-CM

## 2022-08-09 DIAGNOSIS — I10 PRIMARY HYPERTENSION: ICD-10-CM

## 2022-08-09 PROCEDURE — 99213 OFFICE O/P EST LOW 20 MIN: CPT | Performed by: NURSE PRACTITIONER

## 2022-08-09 RX ORDER — BACLOFEN 10 MG/1
10 TABLET ORAL 3 TIMES DAILY
Qty: 90 TABLET | Refills: 0 | Status: SHIPPED | OUTPATIENT
Start: 2022-08-09 | End: 2022-11-21

## 2022-08-09 NOTE — PROGRESS NOTES
Chief Complaint  Back Pain    Subjective          Meeta Marmolejo presents to [facility] [place] with back pain that started yesterday. She was trying to get up out of the floor and thinks she pulled something. Has not been taking her metoprolol, and would like to know if she should be taking it.   Back Pain  This is a new problem. The current episode started yesterday. The problem occurs constantly. The problem is unchanged. The pain is present in the thoracic spine and lumbar spine. The quality of the pain is described as aching. The pain does not radiate. The pain is at a severity of 10/10. The pain is severe. The symptoms are aggravated by bending and position. Risk factors include poor posture. She has tried bed rest for the symptoms. The treatment provided no relief.     Outpatient Medications Prior to Visit   Medication Sig Dispense Refill   • docusate sodium (COLACE) 100 MG capsule Take 1 capsule by mouth 2 (Two) Times a Day. 60 capsule 2   • ferrous sulfate 325 (65 FE) MG tablet Take 1 tablet by mouth Daily With Breakfast. 90 tablet 1   • Glucagon, rDNA, (Glucagon Emergency) 1 MG kit Inject 1 mg under the skin into the appropriate area as directed 1 (One) Time As Needed (hypoglcyemia) for up to 1 dose. 1 each 1   • Glucose Blood (Blood Glucose Test) strip Use 4 x daily use any brand covered by insurance or same brand as before ICD10 code is E11.9 120 each 11   • glucose blood test strip Accu-Chek Guide test strips   TEST BLOOD SUGAR 3-4 TIMES DAILY     • icosapent ethyl (Vascepa) 1 g capsule capsule Take 2 g by mouth 2 (Two) Times a Day With Meals. Pharmacy copay card BIN# [id number] PCN# CN GRP# ECVASCEPA ID# [id number] 120 capsule 11   • insulin detemir (Levemir FlexTouch) 100 UNIT/ML injection Inject 35 Units under the skin into the appropriate area as directed Daily. 4 pen 11   • Insulin Lispro-aabc, 1 U Dial, (Lyumjev KwikPen) 100 UNIT/ML solution  pen-injector Up to 20 units three times daily with meals 5 pen 11   • Insulin Pen Needle (Pen Needles) 32G X 4 MM misc 1 each 4 (Four) Times a Day. Use 4 x daily, Dx code E11.65 120 each 11   • Lancets misc Test 4 times daily , E11.65 120 each 11   • losartan (COZAAR) 100 MG tablet Take 1 tablet by mouth Daily. 90 tablet 3   • metFORMIN (GLUCOPHAGE) 1000 MG tablet Take 1 tablet by mouth 2 (Two) Times a Day With Meals. 60 tablet 5   • omeprazole (priLOSEC) 20 MG capsule Take 1 capsule by mouth Daily. 90 capsule 1   • Potassium 99 MG tablet Take  by mouth.     • rivaroxaban (XARELTO) 20 MG tablet Xarelto 20 mg tablet   TAKE 1 TABLET BY MOUTH DAILY WITH DINNER     • Triamcinolone Acetonide (NASACORT) 55 MCG/ACT nasal inhaler 2 sprays into the nostril(s) as directed by provider Daily. 16.5 g 11   • vitamin B-12 (CYANOCOBALAMIN) 1000 MCG tablet Take 1 tablet by mouth Daily. 100 tablet 3   • acetaminophen (Tylenol 8 Hour Arthritis Pain) 650 MG 8 hr tablet Take 1 tablet by mouth Every 8 (Eight) Hours As Needed for Mild Pain . 60 tablet 1   • Semaglutide,0.25 or 0.5MG/DOS, (Ozempic, 0.25 or 0.5 MG/DOSE,) 2 MG/1.5ML solution pen-injector 0.5 mg weekly 1 pen 11   • amLODIPine (NORVASC) 5 MG tablet Take 1 tablet by mouth Daily. 30 tablet 2   • cetirizine (zyrTEC) 10 MG tablet Take 1 tablet by mouth Daily. 30 tablet 11   • Continuous Blood Gluc  (FreeStyle Unique 2 [place]) device 1 each Continuous. 1 each 0   • Continuous Blood Gluc Sensor (FreeStyle Unique 2 Sensor) misc 2 each Every 14 (Fourteen) Days. Use to get blood sugar readings 2 each 11   • cyclobenzaprine (FLEXERIL) 10 MG tablet Take 1 tablet by mouth 3 (Three) Times a Day As Needed for Muscle Spasms. 30 tablet 0   • Finerenone (Kerendia) 10 MG tablet Take 1 tablet by mouth Daily. 30 tablet 11   • furosemide (Lasix) 20 MG tablet Take 1 tablet by mouth Daily As Needed (leg swelling). 30 tablet 0   • insulin aspart (novoLOG FLEXPEN) 100 UNIT/ML solution  "pen-injector sc pen insulin aspart (U-100) 100 unit/mL (3 mL) subcutaneous pen   ADMINISTER 20 UNITS UNDER THE SKIN THREE TIMES DAILY WITH MEALS AS DIRECTED     • metoprolol tartrate (LOPRESSOR) 25 MG tablet TAKE 1 TABLET BY MOUTH TWICE DAILY 180 tablet 3   • montelukast (SINGULAIR) 10 MG tablet Take 1 tablet by mouth Daily. 30 tablet 11   • Xarelto 20 MG tablet Take 1 tablet by mouth Daily With Dinner for 30 days. 30 tablet 3     No facility-administered medications prior to visit.       Review of Systems   Musculoskeletal: Positive for back pain.         Objective   Vital Signs:   Visit Vitals  /78 (BP Location: Left arm, Patient Position: Sitting, Cuff Size: Large Adult)   Pulse 109   Resp 24   Ht 157.5 cm (62\")   Wt 98.5 kg (217 lb 1.6 oz)   SpO2 95%   BMI 39.71 kg/m²     Physical Exam  Vitals and nursing note reviewed.   Constitutional:       Appearance: She is well-developed.   HENT:      Head: Normocephalic and atraumatic.   Eyes:      General: Lids are normal.      Conjunctiva/sclera: Conjunctivae normal.   Neck:      Thyroid: No thyroid mass or thyromegaly.      Trachea: Trachea normal. No tracheal tenderness.   Cardiovascular:      Rate and Rhythm: Normal rate.      Pulses: Normal pulses.      Heart sounds: Normal heart sounds.   Pulmonary:      Effort: Pulmonary effort is normal. No respiratory distress.      Breath sounds: Normal breath sounds. No wheezing.   Abdominal:      General: There is no distension.      Palpations: Abdomen is soft. There is no mass.   Musculoskeletal:         General: Normal range of motion.      Cervical back: Normal range of motion. No edema.      Thoracic back: Spasms and tenderness present.      Lumbar back: Spasms and tenderness present.        Back:    Skin:     General: Skin is warm and dry.      Coloration: Skin is not pale.      Findings: No abrasion, erythema or lesion.   Neurological:      Mental Status: She is alert and oriented to person, place, and time. "   Psychiatric:         Mood and Affect: Mood is not anxious. Affect is not inappropriate.         Speech: Speech normal.         Behavior: Behavior normal.         Thought Content: Thought content normal.         Judgment: Judgment normal. Judgment is not impulsive.        Result Review :                 Assessment and Plan    Diagnoses and all orders for this visit:    1. Muscle spasm (Primary)  -     baclofen (LIORESAL) 10 MG tablet; Take 1 tablet by mouth 3 (Three) Times a Day.  Dispense: 90 tablet; Refill: 0    2. Primary hypertension      After looking back at previous notes from PCP, she is to take Metoprolol, based on her vital signs today, I advised her to continue to take them     Start muscle relaxer for back spasm, may use heat on back as well as topical cream for pain    Please call the office if you have issues, or worsening pain     Follow Up   Return if symptoms worsen or fail to improve, for Next scheduled follow up.  Patient was given instructions and counseling regarding her condition or for health maintenance advice. Please see specific information pulled into the AVS if appropriate.           This document has been electronically signed by ZELDA Delgado on August 10, 2022 16:27 CDT

## 2022-08-10 RX ORDER — SEMAGLUTIDE 1.34 MG/ML
1 INJECTION, SOLUTION SUBCUTANEOUS WEEKLY
Qty: 9 ML | Refills: 11 | Status: SHIPPED | OUTPATIENT
Start: 2022-08-10

## 2022-08-29 DIAGNOSIS — I50.32 CHRONIC DIASTOLIC (CONGESTIVE) HEART FAILURE: ICD-10-CM

## 2022-08-29 RX ORDER — FUROSEMIDE 20 MG/1
20 TABLET ORAL DAILY PRN
Qty: 30 TABLET | Refills: 0 | Status: SHIPPED | OUTPATIENT
Start: 2022-08-29 | End: 2022-09-21

## 2022-09-10 DIAGNOSIS — D50.8 OTHER IRON DEFICIENCY ANEMIA: Chronic | ICD-10-CM

## 2022-09-12 RX ORDER — DOCUSATE SODIUM 100 MG/1
CAPSULE, LIQUID FILLED ORAL
Qty: 60 CAPSULE | Refills: 2 | Status: SHIPPED | OUTPATIENT
Start: 2022-09-12 | End: 2022-09-14 | Stop reason: SDUPTHER

## 2022-09-14 DIAGNOSIS — E11.65 TYPE 2 DIABETES MELLITUS WITH HYPERGLYCEMIA, WITH LONG-TERM CURRENT USE OF INSULIN: ICD-10-CM

## 2022-09-14 DIAGNOSIS — D50.8 OTHER IRON DEFICIENCY ANEMIA: Chronic | ICD-10-CM

## 2022-09-14 DIAGNOSIS — Z79.4 TYPE 2 DIABETES MELLITUS WITH HYPERGLYCEMIA, WITH LONG-TERM CURRENT USE OF INSULIN: ICD-10-CM

## 2022-09-14 RX ORDER — DOCUSATE SODIUM 100 MG/1
100 CAPSULE, LIQUID FILLED ORAL 2 TIMES DAILY
Qty: 60 CAPSULE | Refills: 2 | Status: SHIPPED | OUTPATIENT
Start: 2022-09-14 | End: 2022-11-14

## 2022-09-14 NOTE — TELEPHONE ENCOUNTER
Incoming Refill Request      Medication requested (name and dose): metFORMIN (GLUCOPHAGE) 1000 MG tablet  docusate sodium (COLACE) 100 MG capsule    [facility]    Additional details provided by patient:PATIENT IS NEEDING A REFILL ON Xarelto 20 MG tablet     Best call back number:[phone]    Does the patient have less than a 3 day supply:  [x] Yes  [] No    Reuben Negron Rep  09/14/22, 08:10 CDT

## 2022-09-16 DIAGNOSIS — K21.9 GASTROESOPHAGEAL REFLUX DISEASE, UNSPECIFIED WHETHER ESOPHAGITIS PRESENT: ICD-10-CM

## 2022-09-16 RX ORDER — OMEPRAZOLE 20 MG/1
20 CAPSULE, DELAYED RELEASE ORAL DAILY
Qty: 90 CAPSULE | Refills: 1 | Status: SHIPPED | OUTPATIENT
Start: 2022-09-16 | End: 2022-09-29 | Stop reason: SDUPTHER

## 2022-09-16 NOTE — TELEPHONE ENCOUNTER
Incoming Refill Request      Medication requested (name and dose): omeprazole (priLOSEC) 20 MG capsule    [facility]     Additional details provided by patient: completely out     Best call back number: [phone]    Does the patient have less than a 3 day supply:  [x] Yes  [] No    Reuben Sandy Rep  09/16/22, 12:48 CDT

## 2022-09-21 DIAGNOSIS — I50.32 CHRONIC DIASTOLIC (CONGESTIVE) HEART FAILURE: ICD-10-CM

## 2022-09-21 RX ORDER — FUROSEMIDE 20 MG/1
TABLET ORAL
Qty: 30 TABLET | Refills: 0 | Status: SHIPPED | OUTPATIENT
Start: 2022-09-21 | End: 2022-09-21

## 2022-09-21 RX ORDER — FUROSEMIDE 20 MG/1
TABLET ORAL
Qty: 90 TABLET | Refills: 3 | Status: SHIPPED | OUTPATIENT
Start: 2022-09-21

## 2022-09-29 ENCOUNTER — OFFICE VISIT (OUTPATIENT)
Dept: FAMILY MEDICINE CLINIC | Facility: CLINIC | Age: 67
End: 2022-09-29

## 2022-09-29 VITALS
SYSTOLIC BLOOD PRESSURE: 128 MMHG | DIASTOLIC BLOOD PRESSURE: 72 MMHG | HEIGHT: 62 IN | WEIGHT: 214 LBS | HEART RATE: 100 BPM | BODY MASS INDEX: 39.38 KG/M2 | OXYGEN SATURATION: 96 %

## 2022-09-29 DIAGNOSIS — I10 BENIGN ESSENTIAL HYPERTENSION: Primary | ICD-10-CM

## 2022-09-29 DIAGNOSIS — E11.65 TYPE 2 DIABETES MELLITUS WITH HYPERGLYCEMIA, WITH LONG-TERM CURRENT USE OF INSULIN: ICD-10-CM

## 2022-09-29 DIAGNOSIS — I50.32 CHRONIC DIASTOLIC (CONGESTIVE) HEART FAILURE: ICD-10-CM

## 2022-09-29 DIAGNOSIS — K21.9 GASTROESOPHAGEAL REFLUX DISEASE, UNSPECIFIED WHETHER ESOPHAGITIS PRESENT: ICD-10-CM

## 2022-09-29 DIAGNOSIS — Z79.4 TYPE 2 DIABETES MELLITUS WITH HYPERGLYCEMIA, WITH LONG-TERM CURRENT USE OF INSULIN: ICD-10-CM

## 2022-09-29 PROCEDURE — 99214 OFFICE O/P EST MOD 30 MIN: CPT | Performed by: FAMILY MEDICINE

## 2022-09-29 RX ORDER — OMEPRAZOLE 40 MG/1
40 CAPSULE, DELAYED RELEASE ORAL DAILY
Qty: 30 CAPSULE | Refills: 2 | Status: SHIPPED | OUTPATIENT
Start: 2022-09-29 | End: 2022-12-22

## 2022-09-29 NOTE — PROGRESS NOTES
Chief Complaint  Hypertension    Subjective    History of Present Illness {CC  Problem List  Visit  Diagnosis   Encounters  Notes  Medications  Labs  Result Review Imaging  Media :23}     Meeta Marmolejo presents to [facility] [place] for     Chief Complaint   Patient presents with   • Hypertension      Patient seen today for follow-up.  Notes that she has been compliant with her medications.  She is not aware that she is taking metoprolol, but says that she does have this on her medication list.  Does not think she was taken the last few months, but restarted last month when she got a new refill.  Says that she has been feeling fatigue since starting this medication.  She is using metoprolol tartrate 25 mg daily.  Was seen for muscle spasms, and notes that baclofen is helping significantly with this.  Patient had cardiology consultation, was recommended to have stress test but reports that she did not have this done.  She is no longer having any shortness of breath, so does not want to complete testing at this time.  She did have echocardiogram.  She does not have follow-up with cardiology.  Working with endocrinology to to get her diabetes under control.    Current Outpatient Medications:   •  amLODIPine (NORVASC) 5 MG tablet, Take 1 tablet by mouth Daily., Disp: 30 tablet, Rfl: 2  •  baclofen (LIORESAL) 10 MG tablet, Take 1 tablet by mouth 3 (Three) Times a Day., Disp: 90 tablet, Rfl: 0  •  cetirizine (zyrTEC) 10 MG tablet, Take 1 tablet by mouth Daily., Disp: 30 tablet, Rfl: 11  •  Continuous Blood Gluc  (FreeStyle Unique 2 [place]) device, 1 each Continuous., Disp: 1 each, Rfl: 0  •  Continuous Blood Gluc Sensor (FreeStyle Unique 2 Sensor) misc, 2 each Every 14 (Fourteen) Days. Use to get blood sugar readings, Disp: 2 each, Rfl: 11  •  cyclobenzaprine (FLEXERIL) 10 MG tablet, Take 1 tablet by mouth 3 (Three) Times a Day As Needed for Muscle  Spasms., Disp: 30 tablet, Rfl: 0  •  docusate sodium (COLACE) 100 MG capsule, Take 1 capsule by mouth 2 (Two) Times a Day., Disp: 60 capsule, Rfl: 2  •  ferrous sulfate 325 (65 FE) MG tablet, Take 1 tablet by mouth Daily With Breakfast., Disp: 90 tablet, Rfl: 1  •  Finerenone (Kerendia) 10 MG tablet, Take 1 tablet by mouth Daily., Disp: 30 tablet, Rfl: 11  •  furosemide (LASIX) 20 MG tablet, TAKE 1 TABLET BY MOUTH DAILY AS NEEDED FOR LEG SWELLING, Disp: 90 tablet, Rfl: 3  •  Glucagon, rDNA, (Glucagon Emergency) 1 MG kit, Inject 1 mg under the skin into the appropriate area as directed 1 (One) Time As Needed (hypoglcyemia) for up to 1 dose., Disp: 1 each, Rfl: 1  •  Glucose Blood (Blood Glucose Test) strip, Use 4 x daily use any brand covered by insurance or same brand as before ICD10 code is E11.9, Disp: 120 each, Rfl: 11  •  glucose blood test strip, Accu-Chek Guide test strips  TEST BLOOD SUGAR 3-4 TIMES DAILY, Disp: , Rfl:   •  icosapent ethyl (Vascepa) 1 g capsule capsule, Take 2 g by mouth 2 (Two) Times a Day With Meals. Pharmacy copay card BIN# [id number] PCN# CN GRP# ECVASCEPA ID# [id number], Disp: 120 capsule, Rfl: 11  •  insulin aspart (novoLOG FLEXPEN) 100 UNIT/ML solution pen-injector sc pen, insulin aspart (U-100) 100 unit/mL (3 mL) subcutaneous pen  ADMINISTER 20 UNITS UNDER THE SKIN THREE TIMES DAILY WITH MEALS AS DIRECTED, Disp: , Rfl:   •  insulin detemir (Levemir FlexTouch) 100 UNIT/ML injection, Inject 35 Units under the skin into the appropriate area as directed Daily., Disp: 4 pen, Rfl: 11  •  Insulin Lispro-aabc, 1 U Dial, (Lyumjev KwikPen) 100 UNIT/ML solution pen-injector, Up to 20 units three times daily with meals, Disp: 5 pen, Rfl: 11  •  Insulin Pen Needle (Pen Needles) 32G X 4 MM misc, 1 each 4 (Four) Times a Day. Use 4 x daily, Dx code E11.65, Disp: 120 each, Rfl: 11  •  Lancets misc, Test 4 times daily , E11.65, Disp: 120 each, Rfl: 11  •  losartan (COZAAR) 100 MG tablet, Take 1 tablet by  "mouth Daily., Disp: 90 tablet, Rfl: 3  •  metFORMIN (GLUCOPHAGE) 1000 MG tablet, Take 1 tablet by mouth 2 (Two) Times a Day With Meals., Disp: 60 tablet, Rfl: 5  •  metoprolol tartrate (LOPRESSOR) 25 MG tablet, TAKE 1 TABLET BY MOUTH TWICE DAILY, Disp: 180 tablet, Rfl: 3  •  montelukast (SINGULAIR) 10 MG tablet, Take 1 tablet by mouth Daily., Disp: 30 tablet, Rfl: 11  •  omeprazole (priLOSEC) 20 MG capsule, Take 1 capsule by mouth Daily., Disp: 90 capsule, Rfl: 1  •  Potassium 99 MG tablet, Take  by mouth., Disp: , Rfl:   •  rivaroxaban (XARELTO) 20 MG tablet, Xarelto 20 mg tablet  TAKE 1 TABLET BY MOUTH DAILY WITH DINNER, Disp: , Rfl:   •  Semaglutide, 1 MG/DOSE, (Ozempic, 1 MG/DOSE,) 4 MG/3ML solution pen-injector, Inject 1 mg under the skin into the appropriate area as directed 1 (One) Time Per Week., Disp: 9 mL, Rfl: 11  •  Triamcinolone Acetonide (NASACORT) 55 MCG/ACT nasal inhaler, 2 sprays into the nostril(s) as directed by provider Daily., Disp: 16.5 g, Rfl: 11  •  vitamin B-12 (CYANOCOBALAMIN) 1000 MCG tablet, Take 1 tablet by mouth Daily., Disp: 100 tablet, Rfl: 3  •  Xarelto 20 MG tablet, Take 1 tablet by mouth Daily With Dinner for 30 days., Disp: 30 tablet, Rfl: 3     Objective       Vital Signs:   /72   Pulse 100   Ht 157.5 cm (62\")   Wt 97.1 kg (214 lb)   SpO2 96%   BMI 39.14 kg/m²     Physical Exam  Vitals reviewed.   Constitutional:       General: She is not in acute distress.     Appearance: She is well-developed.   Cardiovascular:      Rate and Rhythm: Normal rate and regular rhythm.      Heart sounds: Normal heart sounds. No murmur heard.  Pulmonary:      Effort: Pulmonary effort is normal. No respiratory distress.      Breath sounds: Normal breath sounds. No wheezing or rales.   Abdominal:      Palpations: Abdomen is soft.      Tenderness: There is no abdominal tenderness.   Skin:     General: Skin is warm and dry.   Neurological:      Mental Status: She is alert and oriented to " person, place, and time.        Result Review :{ Labs  Result Review  Imaging  Med Tab  Media :23}   The following data was reviewed by: Anupama Arnett MD on 09/29/2022    Common labs    Common Labs 7/18/22 7/18/22 7/18/22    1036 1036 1036   Glucose  191 (A)    BUN  8    Creatinine  0.57    Sodium  139    Potassium  3.8    Chloride  101    Calcium  9.3    Albumin  4.10    Total Bilirubin  0.2    Alkaline Phosphatase  78    AST (SGOT)  15    ALT (SGPT)  16    WBC 5.69     Hemoglobin 11.7 (A)     Hematocrit 35.9     Platelets 161     Total Cholesterol      Triglycerides      HDL Cholesterol      LDL Cholesterol       Hemoglobin A1C   10.30 (A)   Microalbumin, Urine      (A) Abnormal value                    Assessment and Plan {CC Problem List  Visit Diagnosis  ROS  Review (Popup)  YinYangMap Maintenance  Quality  BestPractice  Medications  SmartSets  SnapShot Encounters  Media :23}   Diagnoses and all orders for this visit:    1. Benign essential hypertension (Primary)    2. Gastroesophageal reflux disease, unspecified whether esophagitis present  -     omeprazole (priLOSEC) 40 MG capsule; Take 1 capsule by mouth Daily.  Dispense: 30 capsule; Refill: 2    3. Chronic diastolic (congestive) heart failure (HCC)    4. Type 2 diabetes mellitus with hyperglycemia, with long-term current use of insulin (HCC)      Hypertension controlled  Continue current medication  Patient will stay on metoprolol to help with blood pressure and heart rate  Discussed that metoprolol can make her feel fatigued initially, but this side effect generally resolves after a few months of use  Increase Prilosec to 40 mg daily for GERD  Suspect this is causing her increased belching  If no improvement with belching/bloating, will plan to evaluate abdomen with additional imaging  Echocardiogram from 4/1/2022 showed normal ejection fraction and with grade 1 diastolic dysfunction  Patient reports dyspnea with exertion symptoms  improved  She does not wish to follow with cardiology again at this time  Patient will monitor symptoms, and will plan to follow back up with cardiology with any chest pain or dyspnea with exertion  Continue following with endocrinology for type 2 diabetes, not well controlled  Last hemoglobin A1c 10.3%  Patient reports compliance with medication      Follow Up {Instructions Charge Capture  Follow-up Communications :23}   Return in about 3 months (around 12/29/2022) for Recheck.  Patient was given instructions and counseling regarding her condition or for health maintenance advice. Please see specific information pulled into the AVS if appropriate.            This document has been electronically signed by Anupama Arnett MD

## 2022-10-09 DIAGNOSIS — I10 BENIGN ESSENTIAL HYPERTENSION: ICD-10-CM

## 2022-10-09 DIAGNOSIS — J30.2 SEASONAL ALLERGIES: ICD-10-CM

## 2022-10-10 RX ORDER — CETIRIZINE HYDROCHLORIDE 10 MG/1
10 TABLET ORAL DAILY
Qty: 30 TABLET | Refills: 11 | Status: SHIPPED | OUTPATIENT
Start: 2022-10-10

## 2022-10-10 RX ORDER — AMLODIPINE BESYLATE 5 MG/1
5 TABLET ORAL DAILY
Qty: 30 TABLET | Refills: 2 | Status: SHIPPED | OUTPATIENT
Start: 2022-10-10 | End: 2022-11-21 | Stop reason: SDUPTHER

## 2022-10-14 NOTE — TELEPHONE ENCOUNTER
Rx Refill Note  Requested Prescriptions     Pending Prescriptions Disp Refills   • rivaroxaban (XARELTO) 20 MG tablet 30 tablet       Last office visit with prescribing clinician: Visit date not found      Next office visit with prescribing clinician: Visit date not found            Reuben Lovell Rep  10/14/22, 11:27 CDT

## 2022-10-14 NOTE — TELEPHONE ENCOUNTER
Incoming Refill Request      Medication requested (name and dose): [facility]    Pharmacy where request should be sent: [facility] in [place] on [address]    Additional details provided by patient: n/a    Best call back number: [phone]    Does the patient have less than a 3 day supply:  [] Yes  [x] No    Joanna Shrestha  10/14/22, 11:22 CDT

## 2022-10-28 ENCOUNTER — OFFICE VISIT (OUTPATIENT)
Dept: FAMILY MEDICINE CLINIC | Facility: CLINIC | Age: 67
End: 2022-10-28

## 2022-10-28 VITALS
WEIGHT: 210.8 LBS | HEIGHT: 62 IN | RESPIRATION RATE: 24 BRPM | DIASTOLIC BLOOD PRESSURE: 74 MMHG | HEART RATE: 97 BPM | BODY MASS INDEX: 38.79 KG/M2 | OXYGEN SATURATION: 99 % | SYSTOLIC BLOOD PRESSURE: 132 MMHG

## 2022-10-28 DIAGNOSIS — H91.92 HEARING LOSS OF LEFT EAR, UNSPECIFIED HEARING LOSS TYPE: Primary | ICD-10-CM

## 2022-10-28 DIAGNOSIS — H92.03 EAR PAIN, BILATERAL: ICD-10-CM

## 2022-10-28 DIAGNOSIS — H92.03 CHRONIC EAR PAIN, BILATERAL: ICD-10-CM

## 2022-10-28 DIAGNOSIS — G89.29 CHRONIC EAR PAIN, BILATERAL: ICD-10-CM

## 2022-10-28 PROCEDURE — 99213 OFFICE O/P EST LOW 20 MIN: CPT | Performed by: NURSE PRACTITIONER

## 2022-10-28 NOTE — PROGRESS NOTES
Chief Complaint  Ear Fullness (@3 months; needs referral to ENT)    Subjective          Meeta Marmolejo presents to [facility] [place]  With complaints of chronic ear pain.   Earache   There is pain in the left ear. This is a recurrent problem. The current episode started more than 1 month ago. The problem occurs constantly. The problem has been waxing and waning. There has been no fever. Associated symptoms include ear discharge and hearing loss. The treatment provided no relief.   Ear Fullness   There is pain in both ears. This is a recurrent problem. The current episode started more than 1 month ago. The problem occurs every few hours. There has been no fever. The pain is mild. Associated symptoms include ear discharge and hearing loss. The treatment provided mild relief.     Outpatient Medications Prior to Visit   Medication Sig Dispense Refill   • amLODIPine (NORVASC) 5 MG tablet TAKE 1 TABLET BY MOUTH DAILY 30 tablet 2   • baclofen (LIORESAL) 10 MG tablet Take 1 tablet by mouth 3 (Three) Times a Day. 90 tablet 0   • cetirizine (zyrTEC) 10 MG tablet TAKE 1 TABLET BY MOUTH DAILY 30 tablet 11   • Continuous Blood Gluc  (FreeStyle Unique 2 [place]) device 1 each Continuous. 1 each 0   • Continuous Blood Gluc Sensor (FreeStyle Unique 2 Sensor) misc 2 each Every 14 (Fourteen) Days. Use to get blood sugar readings 2 each 11   • cyclobenzaprine (FLEXERIL) 10 MG tablet Take 1 tablet by mouth 3 (Three) Times a Day As Needed for Muscle Spasms. 30 tablet 0   • docusate sodium (COLACE) 100 MG capsule Take 1 capsule by mouth 2 (Two) Times a Day. 60 capsule 2   • ferrous sulfate 325 (65 FE) MG tablet Take 1 tablet by mouth Daily With Breakfast. 90 tablet 1   • Finerenone (Kerendia) 10 MG tablet Take 1 tablet by mouth Daily. 30 tablet 11   • furosemide (LASIX) 20 MG tablet TAKE 1 TABLET BY MOUTH DAILY AS NEEDED FOR LEG SWELLING 90 tablet 3   • Glucagon, rDNA, (Glucagon  Emergency) 1 MG kit Inject 1 mg under the skin into the appropriate area as directed 1 (One) Time As Needed (hypoglcyemia) for up to 1 dose. 1 each 1   • Glucose Blood (Blood Glucose Test) strip Use 4 x daily use any brand covered by insurance or same brand as before ICD10 code is E11.9 120 each 11   • glucose blood test strip Accu-Chek Guide test strips   TEST BLOOD SUGAR 3-4 TIMES DAILY     • icosapent ethyl (Vascepa) 1 g capsule capsule Take 2 g by mouth 2 (Two) Times a Day With Meals. Pharmacy copay card BIN# [id number] PCN# CN GRP# ECVASCEPA ID# [id number] 120 capsule 11   • insulin aspart (novoLOG FLEXPEN) 100 UNIT/ML solution pen-injector sc pen insulin aspart (U-100) 100 unit/mL (3 mL) subcutaneous pen   ADMINISTER 20 UNITS UNDER THE SKIN THREE TIMES DAILY WITH MEALS AS DIRECTED     • insulin detemir (Levemir FlexTouch) 100 UNIT/ML injection Inject 35 Units under the skin into the appropriate area as directed Daily. 4 pen 11   • Insulin Lispro-aabc, 1 U Dial, (Lyumjev KwikPen) 100 UNIT/ML solution pen-injector Up to 20 units three times daily with meals 5 pen 11   • Insulin Pen Needle (Pen Needles) 32G X 4 MM misc 1 each 4 (Four) Times a Day. Use 4 x daily, Dx code E11.65 120 each 11   • Lancets misc Test 4 times daily , E11.65 120 each 11   • losartan (COZAAR) 100 MG tablet Take 1 tablet by mouth Daily. 90 tablet 3   • metFORMIN (GLUCOPHAGE) 1000 MG tablet Take 1 tablet by mouth 2 (Two) Times a Day With Meals. 60 tablet 5   • metoprolol tartrate (LOPRESSOR) 25 MG tablet TAKE 1 TABLET BY MOUTH TWICE DAILY 180 tablet 3   • montelukast (SINGULAIR) 10 MG tablet Take 1 tablet by mouth Daily. 30 tablet 11   • omeprazole (priLOSEC) 40 MG capsule Take 1 capsule by mouth Daily. 30 capsule 2   • Potassium 99 MG tablet Take  by mouth.     • rivaroxaban (XARELTO) 20 MG tablet One tablet daily 30 tablet 1   • Semaglutide, 1 MG/DOSE, (Ozempic, 1 MG/DOSE,) 4 MG/3ML solution pen-injector Inject 1 mg under the skin into the  "appropriate area as directed 1 (One) Time Per Week. 9 mL 11   • Triamcinolone Acetonide (NASACORT) 55 MCG/ACT nasal inhaler 2 sprays into the nostril(s) as directed by provider Daily. 16.5 g 11   • vitamin B-12 (CYANOCOBALAMIN) 1000 MCG tablet Take 1 tablet by mouth Daily. 100 tablet 3   • Xarelto 20 MG tablet Take 1 tablet by mouth Daily With Dinner for 30 days. 30 tablet 3     No facility-administered medications prior to visit.       Review of Systems   HENT: Positive for ear discharge, ear pain and hearing loss.          Objective   Vital Signs:   Visit Vitals  /74 (BP Location: Left arm, Patient Position: Sitting, Cuff Size: Large Adult)   Pulse 97   Resp 24   Ht 157.5 cm (62\")   Wt 95.6 kg (210 lb 12.8 oz)   SpO2 99%   BMI 38.56 kg/m²     Physical Exam  Vitals and nursing note reviewed.   Constitutional:       Appearance: She is well-developed.   HENT:      Head: Normocephalic and atraumatic.      Right Ear: Tympanic membrane, ear canal and external ear normal. Decreased hearing noted. Tenderness present. No swelling.      Left Ear: Tympanic membrane, ear canal and external ear normal. Decreased hearing noted. No swelling or tenderness.      Ears:      [place] exam findings: lateralizes right.  Eyes:      General: Lids are normal.      Conjunctiva/sclera: Conjunctivae normal.   Neck:      Thyroid: No thyroid mass or thyromegaly.      Trachea: Trachea normal. No tracheal tenderness.   Cardiovascular:      Rate and Rhythm: Normal rate.      Pulses: Normal pulses.      Heart sounds: Normal heart sounds.   Pulmonary:      Effort: Pulmonary effort is normal. No respiratory distress.      Breath sounds: Normal breath sounds. No wheezing.   Abdominal:      General: There is no distension.      Palpations: Abdomen is soft. There is no mass.   Musculoskeletal:         General: Normal range of motion.      Cervical back: Normal range of motion. No edema.   Skin:     General: Skin is warm and dry.      Coloration: " Skin is not pale.      Findings: No abrasion, erythema or lesion.   Neurological:      Mental Status: She is alert and oriented to person, place, and time.   Psychiatric:         Mood and Affect: Mood is not anxious. Affect is not inappropriate.         Speech: Speech normal.         Behavior: Behavior normal.         Thought Content: Thought content normal.         Judgment: Judgment normal. Judgment is not impulsive.        Result Review :                 Assessment and Plan    Diagnoses and all orders for this visit:    1. Hearing loss of left ear, unspecified hearing loss type (Primary)  -     Cancel: Ambulatory Referral to Audiology  -     Cancel: Ambulatory Referral to ENT (Otolaryngology)  -     Ambulatory Referral to Audiology  -     Ambulatory Referral to ENT (Otolaryngology)    2. Ear pain, bilateral    3. Chronic ear pain, bilateral  -     Cancel: Ambulatory Referral to Audiology  -     Cancel: Ambulatory Referral to ENT (Otolaryngology)  -     Ambulatory Referral to Audiology  -     Ambulatory Referral to ENT (Otolaryngology)      Continue over the counter medications     Referral placed to Audiology as well as ENT     Please call the office if you have any issues           Follow Up   Return if symptoms worsen or fail to improve.  Patient was given instructions and counseling regarding her condition or for health maintenance advice. Please see specific information pulled into the AVS if appropriate.           This document has been electronically signed by ZELDA Delgado on October 31, 2022 11:40 CDT

## 2022-11-04 ENCOUNTER — CLINICAL SUPPORT (OUTPATIENT)
Dept: FAMILY MEDICINE CLINIC | Facility: CLINIC | Age: 67
End: 2022-11-04

## 2022-11-04 DIAGNOSIS — Z23 NEED FOR INFLUENZA VACCINATION: Primary | ICD-10-CM

## 2022-11-04 PROCEDURE — G0008 ADMIN INFLUENZA VIRUS VAC: HCPCS | Performed by: FAMILY MEDICINE

## 2022-11-04 PROCEDURE — 90662 IIV NO PRSV INCREASED AG IM: CPT | Performed by: FAMILY MEDICINE

## 2022-11-13 DIAGNOSIS — D50.8 OTHER IRON DEFICIENCY ANEMIA: Chronic | ICD-10-CM

## 2022-11-14 RX ORDER — DOCUSATE SODIUM 100 MG/1
CAPSULE, LIQUID FILLED ORAL
Qty: 60 CAPSULE | Refills: 2 | Status: SHIPPED | OUTPATIENT
Start: 2022-11-14 | End: 2022-11-21

## 2022-11-21 DIAGNOSIS — D50.8 OTHER IRON DEFICIENCY ANEMIA: Chronic | ICD-10-CM

## 2022-11-21 DIAGNOSIS — I10 BENIGN ESSENTIAL HYPERTENSION: ICD-10-CM

## 2022-11-21 DIAGNOSIS — E11.65 TYPE 2 DIABETES MELLITUS WITH HYPERGLYCEMIA, WITH LONG-TERM CURRENT USE OF INSULIN: ICD-10-CM

## 2022-11-21 DIAGNOSIS — Z79.4 TYPE 2 DIABETES MELLITUS WITH HYPERGLYCEMIA, WITH LONG-TERM CURRENT USE OF INSULIN: ICD-10-CM

## 2022-11-21 DIAGNOSIS — D68.62 LUPUS ANTICOAGULANT SYNDROME: ICD-10-CM

## 2022-11-21 DIAGNOSIS — M62.838 MUSCLE SPASM: ICD-10-CM

## 2022-11-21 RX ORDER — RIVAROXABAN 20 MG/1
TABLET, FILM COATED ORAL
Qty: 30 TABLET | Refills: 3 | Status: SHIPPED | OUTPATIENT
Start: 2022-11-21 | End: 2023-01-06

## 2022-11-21 RX ORDER — INSULIN DETEMIR 100 [IU]/ML
INJECTION, SOLUTION SUBCUTANEOUS
Qty: 30 ML | Refills: 5 | Status: SHIPPED | OUTPATIENT
Start: 2022-11-21

## 2022-11-21 RX ORDER — BACLOFEN 10 MG/1
TABLET ORAL
Qty: 90 TABLET | Refills: 0 | Status: SHIPPED | OUTPATIENT
Start: 2022-11-21 | End: 2023-02-13

## 2022-11-21 RX ORDER — DOCUSATE SODIUM 100 MG/1
CAPSULE, LIQUID FILLED ORAL
Qty: 60 CAPSULE | Refills: 2 | Status: SHIPPED | OUTPATIENT
Start: 2022-11-21

## 2022-11-21 RX ORDER — AMLODIPINE BESYLATE 5 MG/1
5 TABLET ORAL DAILY
Qty: 30 TABLET | Refills: 2 | Status: SHIPPED | OUTPATIENT
Start: 2022-11-21 | End: 2023-02-06

## 2022-11-21 NOTE — TELEPHONE ENCOUNTER
Incoming Refill Request      Medication requested (name and dose): Amolodipine, Colace and Claritin     Pharmacy where request should be sent: [facility]    Additional details provided by patient: She thought the pharmacy had already requested these.    Best call back number: [phone]    Does the patient have less than a 3 day supply:  [x] Yes  [] No    Mellisa Lyles  11/21/22, 14:51 CST

## 2022-12-09 DIAGNOSIS — D50.8 OTHER IRON DEFICIENCY ANEMIA: Primary | Chronic | ICD-10-CM

## 2022-12-13 ENCOUNTER — LAB (OUTPATIENT)
Dept: ONCOLOGY | Facility: HOSPITAL | Age: 67
End: 2022-12-13

## 2022-12-13 ENCOUNTER — CLINICAL SUPPORT (OUTPATIENT)
Dept: AUDIOLOGY | Facility: CLINIC | Age: 67
End: 2022-12-13

## 2022-12-13 ENCOUNTER — OFFICE VISIT (OUTPATIENT)
Dept: ONCOLOGY | Facility: CLINIC | Age: 67
End: 2022-12-13

## 2022-12-13 ENCOUNTER — LAB (OUTPATIENT)
Dept: LAB | Facility: HOSPITAL | Age: 67
End: 2022-12-13

## 2022-12-13 ENCOUNTER — OFFICE VISIT (OUTPATIENT)
Dept: OTOLARYNGOLOGY | Facility: CLINIC | Age: 67
End: 2022-12-13

## 2022-12-13 VITALS — WEIGHT: 211.4 LBS | OXYGEN SATURATION: 94 % | HEIGHT: 62 IN | BODY MASS INDEX: 38.9 KG/M2

## 2022-12-13 VITALS
RESPIRATION RATE: 18 BRPM | DIASTOLIC BLOOD PRESSURE: 69 MMHG | HEART RATE: 93 BPM | OXYGEN SATURATION: 98 % | BODY MASS INDEX: 39.14 KG/M2 | SYSTOLIC BLOOD PRESSURE: 157 MMHG | WEIGHT: 214 LBS

## 2022-12-13 DIAGNOSIS — H90.3 SENSORINEURAL HEARING LOSS OF BOTH EARS: Primary | ICD-10-CM

## 2022-12-13 DIAGNOSIS — H90.3 SENSORINEURAL HEARING LOSS (SNHL) OF BOTH EARS: Primary | ICD-10-CM

## 2022-12-13 DIAGNOSIS — J31.0 CHRONIC RHINITIS: ICD-10-CM

## 2022-12-13 DIAGNOSIS — R79.9 ABNORMAL FINDING OF BLOOD CHEMISTRY, UNSPECIFIED: ICD-10-CM

## 2022-12-13 DIAGNOSIS — H92.02 LEFT EAR PAIN: ICD-10-CM

## 2022-12-13 DIAGNOSIS — D50.8 OTHER IRON DEFICIENCY ANEMIA: ICD-10-CM

## 2022-12-13 DIAGNOSIS — D68.62 LUPUS ANTICOAGULANT SYNDROME: Primary | ICD-10-CM

## 2022-12-13 DIAGNOSIS — D50.8 OTHER IRON DEFICIENCY ANEMIA: Chronic | ICD-10-CM

## 2022-12-13 DIAGNOSIS — R78.71 ABNORMAL LEAD LEVEL IN BLOOD: ICD-10-CM

## 2022-12-13 LAB
ALBUMIN SERPL-MCNC: 4.4 G/DL (ref 3.5–5.2)
ALBUMIN/GLOB SERPL: 1.5 G/DL
ALP SERPL-CCNC: 84 U/L (ref 39–117)
ALT SERPL W P-5'-P-CCNC: 11 U/L (ref 1–33)
ANION GAP SERPL CALCULATED.3IONS-SCNC: 13 MMOL/L (ref 5–15)
AST SERPL-CCNC: 11 U/L (ref 1–32)
BASOPHILS # BLD AUTO: 0.04 10*3/MM3 (ref 0–0.2)
BASOPHILS NFR BLD AUTO: 0.6 % (ref 0–1.5)
BILIRUB SERPL-MCNC: 0.2 MG/DL (ref 0–1.2)
BUN SERPL-MCNC: 9 MG/DL (ref 8–23)
BUN/CREAT SERPL: 14.8 (ref 7–25)
CALCIUM SPEC-SCNC: 9.3 MG/DL (ref 8.6–10.5)
CHLORIDE SERPL-SCNC: 99 MMOL/L (ref 98–107)
CO2 SERPL-SCNC: 23 MMOL/L (ref 22–29)
CREAT SERPL-MCNC: 0.61 MG/DL (ref 0.57–1)
DEPRECATED RDW RBC AUTO: 43.7 FL (ref 37–54)
EGFRCR SERPLBLD CKD-EPI 2021: 98.7 ML/MIN/1.73
EOSINOPHIL # BLD AUTO: 0.15 10*3/MM3 (ref 0–0.4)
EOSINOPHIL NFR BLD AUTO: 2.1 % (ref 0.3–6.2)
ERYTHROCYTE [DISTWIDTH] IN BLOOD BY AUTOMATED COUNT: 14.7 % (ref 12.3–15.4)
FERRITIN SERPL-MCNC: 261.8 NG/ML (ref 13–150)
FOLATE SERPL-MCNC: 14.1 NG/ML (ref 4.78–24.2)
GLOBULIN UR ELPH-MCNC: 2.9 GM/DL
GLUCOSE SERPL-MCNC: 222 MG/DL (ref 65–99)
HCT VFR BLD AUTO: 37.6 % (ref 34–46.6)
HGB BLD-MCNC: 12.3 G/DL (ref 12–15.9)
IMM GRANULOCYTES # BLD AUTO: 0.03 10*3/MM3 (ref 0–0.05)
IMM GRANULOCYTES NFR BLD AUTO: 0.4 % (ref 0–0.5)
IRON 24H UR-MRATE: 54 MCG/DL (ref 37–145)
IRON SATN MFR SERPL: 15 % (ref 20–50)
LYMPHOCYTES # BLD AUTO: 2.42 10*3/MM3 (ref 0.7–3.1)
LYMPHOCYTES NFR BLD AUTO: 34 % (ref 19.6–45.3)
MCH RBC QN AUTO: 26.9 PG (ref 26.6–33)
MCHC RBC AUTO-ENTMCNC: 32.7 G/DL (ref 31.5–35.7)
MCV RBC AUTO: 82.1 FL (ref 79–97)
MONOCYTES # BLD AUTO: 0.38 10*3/MM3 (ref 0.1–0.9)
MONOCYTES NFR BLD AUTO: 5.3 % (ref 5–12)
NEUTROPHILS NFR BLD AUTO: 4.1 10*3/MM3 (ref 1.7–7)
NEUTROPHILS NFR BLD AUTO: 57.6 % (ref 42.7–76)
NRBC BLD AUTO-RTO: 0 /100 WBC (ref 0–0.2)
PLATELET # BLD AUTO: 294 10*3/MM3 (ref 140–450)
PMV BLD AUTO: 10.8 FL (ref 6–12)
POTASSIUM SERPL-SCNC: 3.3 MMOL/L (ref 3.5–5.2)
PROT SERPL-MCNC: 7.3 G/DL (ref 6–8.5)
RBC # BLD AUTO: 4.58 10*6/MM3 (ref 3.77–5.28)
SODIUM SERPL-SCNC: 135 MMOL/L (ref 136–145)
TIBC SERPL-MCNC: 361 MCG/DL (ref 298–536)
TRANSFERRIN SERPL-MCNC: 242 MG/DL (ref 200–360)
VIT B12 BLD-MCNC: >2000 PG/ML (ref 211–946)
WBC NRBC COR # BLD: 7.12 10*3/MM3 (ref 3.4–10.8)

## 2022-12-13 PROCEDURE — 82607 VITAMIN B-12: CPT

## 2022-12-13 PROCEDURE — 92567 TYMPANOMETRY: CPT | Performed by: AUDIOLOGIST

## 2022-12-13 PROCEDURE — 99214 OFFICE O/P EST MOD 30 MIN: CPT | Performed by: NURSE PRACTITIONER

## 2022-12-13 PROCEDURE — 84466 ASSAY OF TRANSFERRIN: CPT

## 2022-12-13 PROCEDURE — G0463 HOSPITAL OUTPT CLINIC VISIT: HCPCS | Performed by: NURSE PRACTITIONER

## 2022-12-13 PROCEDURE — 85025 COMPLETE CBC W/AUTO DIFF WBC: CPT

## 2022-12-13 PROCEDURE — 82746 ASSAY OF FOLIC ACID SERUM: CPT

## 2022-12-13 PROCEDURE — 92557 COMPREHENSIVE HEARING TEST: CPT | Performed by: AUDIOLOGIST

## 2022-12-13 PROCEDURE — 80053 COMPREHEN METABOLIC PANEL: CPT

## 2022-12-13 PROCEDURE — 36415 COLL VENOUS BLD VENIPUNCTURE: CPT

## 2022-12-13 PROCEDURE — 82728 ASSAY OF FERRITIN: CPT

## 2022-12-13 PROCEDURE — 99203 OFFICE O/P NEW LOW 30 MIN: CPT | Performed by: OTOLARYNGOLOGY

## 2022-12-13 PROCEDURE — 83540 ASSAY OF IRON: CPT

## 2022-12-13 NOTE — PROGRESS NOTES
STANDARD AUDIOMETRIC EVALUATION      Name:  Meeta Marmolejo  :  1955  Age:  67 y.o.  Date of Evaluation:  2022      HISTORY    Reason for visit:  Meeta Marmolejo is seen today for a hearing test at the request of Dr. Moses Aguirre and Anupama Arnett M.D.  Patient reports she is having problems hearing in her left ear.  She states she has had ear pain and fluid in her ear.  She states this has been going on since the beginning of November.        EVALUATION    See Audiogram    RESULTS        Otoscopy and Tympanometry 226 Hz :  Right Ear:  Otoscopy:  Clear ear canal          Tympanometry:  Middle ear function within normal limits    Left Ear:   Otoscopy:  Clear ear canal        Tympanometry:  Middle ear function within normal limits    Test technique:  Standard Audiometry     Pure Tone Audiometry:   Patient responded to pure tones at 15-55 dB for 250-8000 Hz in right ear, and at 15-60 dB for 250-8000 Hz in left ear.       Speech Audiometry:        Right Ear:  Speech Reception Threshold (SRT) was obtained at 10 dBHL                 Speech Discrimination scores were 96% in quiet when words were presented at 50 dBHL       Left Ear:  Speech Reception Threshold (SRT) was obtained at 15 dBHL                 Speech Discrimination scores were 96% in quiet when words were presented at 55 dBHL    Reliability:   good    IMPRESSIONS:  1.  Tympanometry results are consistent with Middle ear function within normal limits in both ears.  2.  Pure tone results are consistent with within normal limits to moderate sloping sensorineural hearing loss for both ears.       RECOMMENDATIONS:  Patient is seeing the Ear Nose and Throat physician immediately following this examination.  It was a pleasure seeing Meeta Marmolejo in Audiology today.  We would be happy to do further testing or discuss these test as necessary.          This document has been electronically signed by Carrie Whitney MS CCC-A  on December 13, 2022 09:31 CST       Carrie Whitney MS [facility]  Licensed Audiologist

## 2022-12-13 NOTE — PROGRESS NOTES
Subjective   Meeta Marmolejo is a 67 y.o. female.       History of Present Illness   patient reports subjective decreased hearing more so on the left than the right.  Has been told she has fluid behind her eardrum.  Does have allergy symptoms.  Takes an oral antihistamine daily and Nasacort as needed.  Does have a history of occupational noise exposure.  Has occasional tinnitus.      The following portions of the patient's history were reviewed and updated as appropriate: allergies, current medications, past family history, past medical history, past social history, past surgical history and problem list.     reports that she quit smoking about 16 years ago. Her smoking use included cigarettes. She has a 6.00 pack-year smoking history. She has never used smokeless tobacco. She reports that she does not drink alcohol and does not use drugs.   Patient is not a tobacco user and has not been counseled for use of tobacco products      Review of Systems        Objective   Physical Exam  Ears: External ears no deformity, canals no discharge, tympanic membranes intact clear and mobile bilaterally.  Nares: Boggy mucosa no discharge or purulence  Oral cavity: No masses or lesions  Pharynx: No erythema exudate or mass  Neck no adenopathy or mass    Audiogram is obtained and reviewed and shows a bilateral normal sloping to moderate high-frequency sensorineural hearing loss.  Tympanograms are type a bilaterally.  Discrimination scores are 96% bilaterally.         Assessment and Plan   Diagnoses and all orders for this visit:    1. Sensorineural hearing loss of both ears (Primary)    2. Chronic rhinitis           Plan: Explained findings to the patient.  Reassured her that she had no evidence of infection or fluid behind her eardrum.  Her hearing loss is such that I do not think she would be a candidate for amplification at this point.  She may have some intermittent eustachian tube dysfunction causing the fullness from time  to time.  Advised her to use her Nasacort every day and her oral antihistamine as needed.  Follow-up with me as needed for worsening

## 2022-12-14 ENCOUNTER — APPOINTMENT (OUTPATIENT)
Dept: ONCOLOGY | Facility: CLINIC | Age: 67
End: 2022-12-14

## 2022-12-14 ENCOUNTER — APPOINTMENT (OUTPATIENT)
Dept: ONCOLOGY | Facility: HOSPITAL | Age: 67
End: 2022-12-14

## 2022-12-18 DIAGNOSIS — D50.8 OTHER IRON DEFICIENCY ANEMIA: Chronic | ICD-10-CM

## 2022-12-19 RX ORDER — FERROUS SULFATE 325(65) MG
1 TABLET ORAL
Qty: 90 TABLET | Refills: 1 | Status: SHIPPED | OUTPATIENT
Start: 2022-12-19 | End: 2023-03-20

## 2022-12-19 RX ORDER — FERROUS SULFATE 325(65) MG
TABLET ORAL
Qty: 90 TABLET | Refills: 1 | OUTPATIENT
Start: 2022-12-19

## 2022-12-19 RX ORDER — LANOLIN ALCOHOL/MO/W.PET/CERES
1000 CREAM (GRAM) TOPICAL DAILY
Qty: 100 TABLET | Refills: 3 | Status: SHIPPED | OUTPATIENT
Start: 2022-12-19

## 2022-12-19 NOTE — PROGRESS NOTES
DATE OF VISIT: 12/13/2022      REASON FOR VISIT:  Pt on Xarelto for pulmonary embolism; iron deficiency anemia        HISTORY OF PRESENT ILLNESS:      67 yr old female with past medical problems consisting of hypertension, dyslipidemia, poorly controlled diabetes mellitus, history of left knee replacement with revision ×3.  Most recent revision was done on August 28, 2018.  And was diagnosed with subacute pulmonary embolism on August 30, 2018 after revision of left knee and was started on xarelto.  Patient was initially seen in consultation when she was  admitted to hospital on September 21, 2018 with bleeding into her left knee.  Xarelto was discontinued and patient was started on heparin drip with coumadin.  Patient has started back on Xarelto in December 2018 due to positive lupus anticoagulant.  She is tolerating well and denies any bleeding.   She states she is having upcoming carpal tunnel surgery; she has been instructed to hold her Xarelto 3 days prior to surgery and then resume next day after surgery if no bleeding complications. She continues to take B-12 and folic acid TIW along with iron tablet daily.  She denies any bleeding.  Past Medical History, Past Surgical History, Social History, Family History have been reviewed and are without significant changes except as mentioned.    Review of Systems   A comprehensive 14 point review of systems was performed and was negative except as mentioned.    Medications:  The current medication list was reviewed in the EMR    ALLERGIES:    Allergies   Allergen Reactions   • Shrimp Rash   • Oxycodone Rash   • Penicillins Rash   • Percocet [Oxycodone-Acetaminophen] Rash   • Rocephin [Ceftriaxone] Rash       Objective      Vitals:    12/13/22 1002   BP: 157/69   Pulse: 93   Resp: 18   SpO2: 98%   Weight: 97.1 kg (214 lb)   PainSc: 0-No pain     Current Status 5/18/2020   ECOG score 0       Physical Exam  General; alert and oriented no acute distress  Lungs: normal  breath sounds  Card; RRR  Ext; no edema    RECENT LABS:  Glucose   Date Value Ref Range Status   12/13/2022 222 (H) 65 - 99 mg/dL Final     Sodium   Date Value Ref Range Status   12/13/2022 135 (L) 136 - 145 mmol/L Final     Potassium   Date Value Ref Range Status   12/13/2022 3.3 (L) 3.5 - 5.2 mmol/L Final     CO2   Date Value Ref Range Status   12/13/2022 23.0 22.0 - 29.0 mmol/L Final     Chloride   Date Value Ref Range Status   12/13/2022 99 98 - 107 mmol/L Final     Anion Gap   Date Value Ref Range Status   12/13/2022 13.0 5.0 - 15.0 mmol/L Final     Creatinine   Date Value Ref Range Status   12/13/2022 0.61 0.57 - 1.00 mg/dL Final     BUN   Date Value Ref Range Status   12/13/2022 9 8 - 23 mg/dL Final     BUN/Creatinine Ratio   Date Value Ref Range Status   12/13/2022 14.8 7.0 - 25.0 Final     Calcium   Date Value Ref Range Status   12/13/2022 9.3 8.6 - 10.5 mg/dL Final     Alkaline Phosphatase   Date Value Ref Range Status   12/13/2022 84 39 - 117 U/L Final     Total Protein   Date Value Ref Range Status   12/13/2022 7.3 6.0 - 8.5 g/dL Final     ALT (SGPT)   Date Value Ref Range Status   12/13/2022 11 1 - 33 U/L Final     AST (SGOT)   Date Value Ref Range Status   12/13/2022 11 1 - 32 U/L Final     Total Bilirubin   Date Value Ref Range Status   12/13/2022 0.2 0.0 - 1.2 mg/dL Final     Albumin   Date Value Ref Range Status   12/13/2022 4.40 3.50 - 5.20 g/dL Final     Globulin   Date Value Ref Range Status   12/13/2022 2.9 gm/dL Final     Lab Results   Component Value Date    WBC 7.12 12/13/2022    HGB 12.3 12/13/2022    HCT 37.6 12/13/2022    MCV 82.1 12/13/2022     12/13/2022     Lab Results   Component Value Date    NEUTROABS 4.10 12/13/2022    IRON 54 12/13/2022    IRON 75 07/18/2022    IRON 71 06/16/2022    TIBC 361 12/13/2022    TIBC 370 07/18/2022    TIBC 335 06/16/2022    LABIRON 15 (L) 12/13/2022    LABIRON 20 07/18/2022    LABIRON 21 06/16/2022    FERRITIN 261.80 (H) 12/13/2022    FERRITIN  250.80 (H) 07/18/2022    FERRITIN 234.80 (H) 06/16/2022    ADXICEXG29 >2,000 (H) 12/13/2022    WUKJJDVC74 >2,000 (H) 07/18/2022    NVDLNXAS35 >2,000 (H) 06/16/2022    FOLATE 14.10 12/13/2022    FOLATE 15.60 07/18/2022    FOLATE 12.90 06/16/2022     Lab Results   Component Value Date    REFLABREPO SEE NOTE: 05/28/2015           RADIOLOGY DATA :  No radiology results for the last 7 days        Assessment & Plan       1.  Subacute pulmonary embolism on right side:  -Patient was diagnosed with subacute pulmonary embolism on right side after most recent revision of left knee done on August 28, 2018.  -Patient was started on xarelto 15 mg twice daily.  -She was admitted to [facility] on September 20, 2018 with bleeding into her left knee.  -Patient was started on heparin drip.  -Patient had incision and Drainage done by Dr. Su on September 22, 2018.  -Patient was on Coumadin.  Patient was started back on Xarelto in December 2018.  -CT angiogram of chest with contrast done on February 20, 2019 shows resolution of pulmonary embolism.  -Hypercoagulable workup done consisting of factor V Leyden, prothrombin gene mutation, anticardiolipin antibody, beta-2 glycoprotein antibody were negative on February 20, 2019.  -Lupus anticoagulant was positive on February 20, 2019 as well as Peggy 3, 2019.  In view of lupus anticoagulant being positive, recommend continuing with Xarelto for now.  -pt has upcoming carpal tunnel surgery and has been instructed to hold Xarelto 3 days prior to surgery and resume day after surgery if no bleeding complications.  -RTC 4 mos with CBC and CMP.    2.  Anemia: iron deficiency  -she is currently taking iron tablet daily along with B-12 and folic acid TIW  -labs reviewed today Hgb is 12.3 with stable iron stores.  -recommend continue with  current medications.  -RTC 4 mos with repeat labs             PHQ-9 Total Score: 0     Meeta Marmolejo reports a pain score of 0.  Given  her pain assessment as noted, treatment options were discussed and the following options were decided upon as a follow-up plan to address the patient's pain: no pain today.         Estephania Felix, APRN  12/19/2022  08:34 CST        Part of this note may be an electronic transcription/translation of spoken language to printed text using the Dragon Dictation System.          CC:

## 2022-12-22 DIAGNOSIS — K21.9 GASTROESOPHAGEAL REFLUX DISEASE, UNSPECIFIED WHETHER ESOPHAGITIS PRESENT: ICD-10-CM

## 2022-12-22 RX ORDER — OMEPRAZOLE 20 MG/1
CAPSULE, DELAYED RELEASE ORAL
Qty: 90 CAPSULE | Refills: 1 | Status: SHIPPED | OUTPATIENT
Start: 2022-12-22 | End: 2022-12-24

## 2022-12-22 RX ORDER — OMEPRAZOLE 40 MG/1
40 CAPSULE, DELAYED RELEASE ORAL DAILY
Qty: 30 CAPSULE | Refills: 2 | Status: SHIPPED | OUTPATIENT
Start: 2022-12-22 | End: 2023-03-20

## 2023-01-04 ENCOUNTER — OFFICE VISIT (OUTPATIENT)
Dept: FAMILY MEDICINE CLINIC | Facility: CLINIC | Age: 68
End: 2023-01-04
Payer: MEDICARE

## 2023-01-04 VITALS
DIASTOLIC BLOOD PRESSURE: 88 MMHG | HEART RATE: 100 BPM | OXYGEN SATURATION: 97 % | WEIGHT: 212 LBS | BODY MASS INDEX: 39.01 KG/M2 | SYSTOLIC BLOOD PRESSURE: 120 MMHG | HEIGHT: 62 IN

## 2023-01-04 DIAGNOSIS — D68.62 LUPUS ANTICOAGULANT SYNDROME: ICD-10-CM

## 2023-01-04 DIAGNOSIS — K59.00 CONSTIPATION, UNSPECIFIED CONSTIPATION TYPE: ICD-10-CM

## 2023-01-04 DIAGNOSIS — E11.69 TYPE 2 DIABETES MELLITUS WITH OTHER SPECIFIED COMPLICATION, WITH LONG-TERM CURRENT USE OF INSULIN: ICD-10-CM

## 2023-01-04 DIAGNOSIS — Z12.31 ENCOUNTER FOR SCREENING MAMMOGRAM FOR MALIGNANT NEOPLASM OF BREAST: ICD-10-CM

## 2023-01-04 DIAGNOSIS — Z79.4 TYPE 2 DIABETES MELLITUS WITH OTHER SPECIFIED COMPLICATION, WITH LONG-TERM CURRENT USE OF INSULIN: ICD-10-CM

## 2023-01-04 DIAGNOSIS — I10 BENIGN ESSENTIAL HYPERTENSION: Primary | ICD-10-CM

## 2023-01-04 PROCEDURE — 99214 OFFICE O/P EST MOD 30 MIN: CPT | Performed by: FAMILY MEDICINE

## 2023-01-04 RX ORDER — POLYETHYLENE GLYCOL 3350 17 G/17G
17 POWDER, FOR SOLUTION ORAL DAILY PRN
Qty: 30 EACH | Refills: 2 | Status: SHIPPED | OUTPATIENT
Start: 2023-01-04

## 2023-01-04 NOTE — PROGRESS NOTES
Chief Complaint  Hypertension    Subjective    History of Present Illness {CC  Problem List  Visit  Diagnosis   Encounters  Notes  Medications  Labs  Result Review Imaging  Media :23}     Meeta Marmolejo presents to [facility] [place] for     Chief Complaint   Patient presents with   • Hypertension      Patient seen today for follow-up.  Has current medical problems including hypertension, lupus anticoagulant syndrome, type 2 diabetes, grade 1 diastolic dysfunction and environmental allergies.  Has been doing well since last visit.  Notes that blood glucose levels have been controlled, fasting is generally 115 or less.  She has been trying to follow a diabetic diet.  Eating smaller portions.  Walking on treadmill that she has in her home.  Monitoring blood pressure at home, no issues.  Patient is adherent to medication regimen.  Follows with hematology/oncology and endocrinology.  Is having upcoming carpal tunnel release.      Current Outpatient Medications:   •  amLODIPine (NORVASC) 5 MG tablet, Take 1 tablet by mouth Daily., Disp: 30 tablet, Rfl: 2  •  baclofen (LIORESAL) 10 MG tablet, TAKE 1 TABLET BY MOUTH THREE TIMES DAILY, Disp: 90 tablet, Rfl: 0  •  cetirizine (zyrTEC) 10 MG tablet, TAKE 1 TABLET BY MOUTH DAILY, Disp: 30 tablet, Rfl: 11  •  Continuous Blood Gluc  (FreeStyle Unique 2 Fiatt) device, 1 each Continuous., Disp: 1 each, Rfl: 0  •  Continuous Blood Gluc Sensor (FreeStyle Unique 2 Sensor) misc, 2 each Every 14 (Fourteen) Days. Use to get blood sugar readings, Disp: 2 each, Rfl: 11  •  cyclobenzaprine (FLEXERIL) 10 MG tablet, Take 1 tablet by mouth 3 (Three) Times a Day As Needed for Muscle Spasms., Disp: 30 tablet, Rfl: 0  •  docusate sodium (COLACE) 100 MG capsule, TAKE ONE CAPSULE BY MOUTH TWICE DAILY, Disp: 60 capsule, Rfl: 2  •  ferrous sulfate 325 (65 FE) MG tablet, Take 1 tablet by mouth Daily With Breakfast., Disp: 90 tablet,  Rfl: 1  •  furosemide (LASIX) 20 MG tablet, TAKE 1 TABLET BY MOUTH DAILY AS NEEDED FOR LEG SWELLING, Disp: 90 tablet, Rfl: 3  •  Glucagon, rDNA, (Glucagon Emergency) 1 MG kit, Inject 1 mg under the skin into the appropriate area as directed 1 (One) Time As Needed (hypoglcyemia) for up to 1 dose., Disp: 1 each, Rfl: 1  •  Glucose Blood (Blood Glucose Test) strip, Use 4 x daily use any brand covered by insurance or same brand as before ICD10 code is E11.9, Disp: 120 each, Rfl: 11  •  glucose blood test strip, Accu-Chek Guide test strips  TEST BLOOD SUGAR 3-4 TIMES DAILY, Disp: , Rfl:   •  icosapent ethyl (Vascepa) 1 g capsule capsule, Take 2 g by mouth 2 (Two) Times a Day With Meals. Pharmacy copay card BIN# [id number] PCN# CN GRP# ECVASCEPA ID# [id number], Disp: 120 capsule, Rfl: 11  •  insulin aspart (novoLOG FLEXPEN) 100 UNIT/ML solution pen-injector sc pen, insulin aspart (U-100) 100 unit/mL (3 mL) subcutaneous pen  ADMINISTER 20 UNITS UNDER THE SKIN THREE TIMES DAILY WITH MEALS AS DIRECTED, Disp: , Rfl:   •  insulin detemir (Levemir FlexTouch) 100 UNIT/ML injection, Inject 30 units into appropriate area as directed daily., Disp: 30 mL, Rfl: 5  •  Insulin Lispro-aabc, 1 U Dial, (Lyumjev KwikPen) 100 UNIT/ML solution pen-injector, Up to 20 units three times daily with meals, Disp: 5 pen, Rfl: 11  •  Insulin Pen Needle (Pen Needles) 32G X 4 MM misc, 1 each 4 (Four) Times a Day. Use 4 x daily, Dx code E11.65, Disp: 120 each, Rfl: 11  •  Lancets misc, Test 4 times daily , E11.65, Disp: 120 each, Rfl: 11  •  losartan (COZAAR) 100 MG tablet, Take 1 tablet by mouth Daily., Disp: 90 tablet, Rfl: 3  •  metFORMIN (GLUCOPHAGE) 1000 MG tablet, Take 1 tablet by mouth 2 (Two) Times a Day With Meals., Disp: 60 tablet, Rfl: 5  •  metoprolol tartrate (LOPRESSOR) 25 MG tablet, TAKE 1 TABLET BY MOUTH TWICE DAILY, Disp: 180 tablet, Rfl: 3  •  omeprazole (priLOSEC) 40 MG capsule, TAKE 1 CAPSULE BY MOUTH DAILY, Disp: 30 capsule, Rfl: 2  •  " Semaglutide, 1 MG/DOSE, (Ozempic, 1 MG/DOSE,) 4 MG/3ML solution pen-injector, Inject 1 mg under the skin into the appropriate area as directed 1 (One) Time Per Week., Disp: 9 mL, Rfl: 11  •  Triamcinolone Acetonide (NASACORT) 55 MCG/ACT nasal inhaler, 2 sprays into the nostril(s) as directed by provider Daily., Disp: 16.5 g, Rfl: 11  •  vitamin B-12 (CYANOCOBALAMIN) 1000 MCG tablet, Take 1 tablet by mouth Daily., Disp: 100 tablet, Rfl: 3  •  Xarelto 20 MG tablet, TAKE 1 TABLET BY MOUTH EVERY DAY WITH DINNER, Disp: 30 tablet, Rfl: 3     Objective       Vital Signs:   /88   Pulse 100   Ht 157.5 cm (62\")   Wt 96.2 kg (212 lb)   SpO2 97%   BMI 38.78 kg/m²     Physical Exam  Vitals reviewed.   Constitutional:       General: She is not in acute distress.     Appearance: She is well-developed.   Cardiovascular:      Rate and Rhythm: Normal rate and regular rhythm.      Heart sounds: Normal heart sounds. No murmur heard.  Pulmonary:      Effort: Pulmonary effort is normal. No respiratory distress.      Breath sounds: Normal breath sounds. No wheezing or rales.   Skin:     General: Skin is warm and dry.   Neurological:      Mental Status: She is alert and oriented to person, place, and time.        Result Review :{ Labs  Result Review  Imaging  Med Tab  Media :23}   The following data was reviewed by: Anupama Arnett MD on 01/04/2023    Common labs    Common Labs 7/18/22 12/13/22 12/13/22    1036 0930 0930   Glucose   222 (A)   BUN   9   Creatinine   0.61   Sodium   135 (A)   Potassium   3.3 (A)   Chloride   99   Calcium   9.3   Albumin   4.40   Total Bilirubin   0.2   Alkaline Phosphatase   84   AST (SGOT)   11   ALT (SGPT)   11   WBC  7.12    Hemoglobin  12.3    Hematocrit  37.6    Platelets  294    Hemoglobin A1C 10.30 (A)     (A) Abnormal value                    Assessment and Plan {CC Problem List  Visit Diagnosis  ROS  Review (Popup)  Health Maintenance  Quality  BestPractice  Medications  " SmartSets  SnapShot Encounters  Media :23}   Diagnoses and all orders for this visit:    1. Benign essential hypertension (Primary)    2. Lupus anticoagulant syndrome (HCC)    3. Type 2 diabetes mellitus with other specified complication, with long-term current use of insulin (HCC)    4. Constipation, unspecified constipation type  -     polyethylene glycol (MIRALAX) 17 g packet; Take 17 g by mouth Daily As Needed (constipation).  Dispense: 30 each; Refill: 2    5. Encounter for screening mammogram for malignant neoplasm of breast  -     Mammo Screening Digital Tomosynthesis Bilateral With CAD; Future      Patient seen for follow up  Hypertension controlled, continue current medications as above  Home glucose readings controlled  Continue current medication and following with endocrinology  Diabetic eye exam up to date  On anticoagulation for lupus anticoagulant, continue following with hematology/oncology  Continue colace for constipation, add miralax as needed       Follow Up {Instructions Charge Capture  Follow-up Communications :23}   Return in about 4 months (around 5/4/2023) for Medicare Wellness, Recheck.  Patient was given instructions and counseling regarding her condition or for health maintenance advice. Please see specific information pulled into the AVS if appropriate.          This document has been electronically signed by Anupama Arnett MD

## 2023-01-06 DIAGNOSIS — D68.62 LUPUS ANTICOAGULANT SYNDROME: ICD-10-CM

## 2023-01-06 RX ORDER — RIVAROXABAN 20 MG/1
TABLET, FILM COATED ORAL
Qty: 30 TABLET | Refills: 3 | Status: SHIPPED | OUTPATIENT
Start: 2023-01-06

## 2023-01-19 ENCOUNTER — OFFICE VISIT (OUTPATIENT)
Dept: ENDOCRINOLOGY | Facility: CLINIC | Age: 68
End: 2023-01-19
Payer: MEDICARE

## 2023-01-19 ENCOUNTER — DOCUMENTATION (OUTPATIENT)
Dept: ENDOCRINOLOGY | Facility: CLINIC | Age: 68
End: 2023-01-19
Payer: MEDICARE

## 2023-01-19 VITALS
HEART RATE: 114 BPM | BODY MASS INDEX: 40.3 KG/M2 | HEIGHT: 62 IN | OXYGEN SATURATION: 97 % | SYSTOLIC BLOOD PRESSURE: 164 MMHG | WEIGHT: 219 LBS | DIASTOLIC BLOOD PRESSURE: 84 MMHG

## 2023-01-19 DIAGNOSIS — E11.65 TYPE 2 DIABETES MELLITUS WITH HYPERGLYCEMIA, WITH LONG-TERM CURRENT USE OF INSULIN: Primary | ICD-10-CM

## 2023-01-19 DIAGNOSIS — Z79.4 TYPE 2 DIABETES MELLITUS WITH HYPERGLYCEMIA, WITH LONG-TERM CURRENT USE OF INSULIN: Primary | ICD-10-CM

## 2023-01-19 DIAGNOSIS — M81.0 AGE-RELATED OSTEOPOROSIS WITHOUT CURRENT PATHOLOGICAL FRACTURE: ICD-10-CM

## 2023-01-19 DIAGNOSIS — I10 PRIMARY HYPERTENSION: ICD-10-CM

## 2023-01-19 DIAGNOSIS — E78.2 MIXED HYPERLIPIDEMIA: ICD-10-CM

## 2023-01-19 DIAGNOSIS — R80.9 MICROALBUMINURIA DUE TO TYPE 2 DIABETES MELLITUS: ICD-10-CM

## 2023-01-19 DIAGNOSIS — E11.29 MICROALBUMINURIA DUE TO TYPE 2 DIABETES MELLITUS: ICD-10-CM

## 2023-01-19 PROCEDURE — 99214 OFFICE O/P EST MOD 30 MIN: CPT | Performed by: NURSE PRACTITIONER

## 2023-01-19 NOTE — PROGRESS NOTES
"Chief Complaint  Diabetes    Subjective          Meeta Marmolejo presents to [facility]  History of Present Illness       In office visit    67 year old female presents for follow up     Reason diabetes mellitus type 2     Diagnosed at age 31     Timing constant     Quality not controlled     Severity is moderate          Microvascular complications microalbuminuria    Current glucose regimen     Insulin, glp-1          Blood glucose monitoring    Checks 4 times daily        Am reading today 119     No lows sugars     She is only high with high carbs           Review of Systems - General ROS: negative          Objective   Vital Signs:   /84   Pulse 114   Ht 157.5 cm (62\")   Wt 99.3 kg (219 lb)   SpO2 97%   BMI 40.06 kg/m²     Physical Exam  Constitutional:       Appearance: Normal appearance.   Cardiovascular:      Rate and Rhythm: Regular rhythm.      Heart sounds: Normal heart sounds.   Pulmonary:      Breath sounds: Normal breath sounds.   Musculoskeletal:         General: Normal range of motion.      Cervical back: Normal range of motion.   Neurological:      General: No focal deficit present.      Mental Status: She is alert.        Result Review :   The following data was reviewed by: ZELDA Freire on 03/28/2022:  Common labs    Common Labs 6/16/22 6/16/22 7/18/22 7/18/22 7/18/22 12/13/22 12/13/22    1005 1005 1036 1036 1036 0930 0930   Glucose  224 (A)  191 (A)   222 (A)   BUN  12  8   9   Creatinine  0.62  0.57   0.61   Sodium  138  139   135 (A)   Potassium  3.9  3.8   3.3 (A)   Chloride  100  101   99   Calcium  9.3  9.3   9.3   Albumin  4.40  4.10   4.40   Total Bilirubin  0.2  0.2   0.2   Alkaline Phosphatase  79  78   84   AST (SGOT)  16  15   11   ALT (SGPT)  14  16   11   WBC 6.16  5.69   7.12    Hemoglobin 12.5  11.7 (A)   12.3    Hematocrit 37.4  35.9   37.6    Platelets 263  161   294    Hemoglobin A1C     10.30 (A)     (A) " Abnormal value                        Assessment and Plan    Diagnoses and all orders for this visit:    1. Type 2 diabetes mellitus with hyperglycemia, with long-term current use of insulin (HCC) (Primary)  -     Hemoglobin A1c  -     TSH  -     CBC & Differential  -     Comprehensive Metabolic Panel    2. Microalbuminuria due to type 2 diabetes mellitus (HCC)    3. Primary hypertension    4. Mixed hyperlipidemia    5. Age-related osteoporosis without current pathological fracture              Glycemic management     Diabetes mellitus type 2     Lab Results   Component Value Date    HGBA1C 10.30 (H) 07/18/2022          Taking metformin 1000 mg twice a day         Taking Levemir 35 units daily       If you ever wake up with a blood glucose level less than 80 decreased to 25         Ozempic  2 mg weekly         Lymjev   before meals as a scale--3 times daily before meals        Takes 4 units TID      + sliding scale      Less than 180 --- nothing  181-200 --- 2units  201-250  ---- 4 units  251-300  ----  6units  Above 300 ----- 8 units                       Stop Glipizide            No Jardiance since associated with [facility]      Send for approval claude 2      The patient has diabetes mellitus, insulin-dependent.     Our Diabetes Department has evaluated the patient in the last six months and will continue counseling on insulin adjustment.      The patient performs blood glucose testing at least four times daily with proven glucose variability from 50 to 300 mg per dl.     The patient is administering basal insulin and prandial insulin four times per day for more than six months.     The patient uses a home blood glucose monitor to assess blood glucose at least four times daily for more than six months.     The patient requires frequent adjustment of insulin treatment regimen based on blood glucose readings.     The patient has frequent variability in blood glucose readings due to activity and variability in meal content  and time.      The patient has completed a diabetes education program with us.     The patient has demonstrated the ability to self-monitor her glucose.      The patient is motivated in improving diabetes control      The patient has hypoglycemia unawareness     She does agree to wear the sensor     She does agree to share the data with provider                  ===========      Blood pressure management:      Hypertension          Taking losartan 100 mg once daily           ===========     Lipid Management         Dyslipidemia        Taking Lipitor 80 mg 1 at night        Total Cholesterol   Date Value Ref Range Status   03/09/2022 252 (H) 0 - 200 mg/dL Final     Triglycerides   Date Value Ref Range Status   03/09/2022 253 (H) 0 - 150 mg/dL Final     HDL Cholesterol   Date Value Ref Range Status   03/09/2022 50 40 - 60 mg/dL Final     LDL Cholesterol    Date Value Ref Range Status   03/09/2022 155 (H) 0 - 100 mg/dL Final     LDLCALC ELECT   Date Value Ref Range Status   01/12/2015 136 (H) 0 - 129 mg/dl Final     Comment:     LDL AVERAGE RISK: 130 - 159 MG/DL       add vascepa   ----------------------           Microvascular monitoring     Last eye exam -----Nov. 2021, no  DR      Cataract surgery 2021      No neuropathy     Positive for microalbuminuria                       ==========      osteoporosis     On alendronate for at least since 2013     I reviewed her bone density from 2013 and it was osteopenia     Stopped the alendronate     Jan. 2021     DXA oteopenia      Take calcium plus vitamin d            Other related aspects      Lab Results   Component Value Date    TSH 0.370 03/09/2022     Lab Results   Component Value Date    [id number] >2,000 (H) 12/13/2022                         Follow Up   No follow-ups on file.  Patient was given instructions and counseling regarding her condition or for health maintenance advice. Please see specific information pulled into the AVS if appropriate.         This  document has been electronically signed by ZELDA Freire on January 19, 2023 08:22 CST.

## 2023-01-20 ENCOUNTER — LAB (OUTPATIENT)
Dept: LAB | Facility: HOSPITAL | Age: 68
End: 2023-01-20
Payer: MEDICARE

## 2023-01-20 LAB
ALBUMIN SERPL-MCNC: 4.2 G/DL (ref 3.5–5.2)
ALBUMIN/GLOB SERPL: 1.4 G/DL
ALP SERPL-CCNC: 83 U/L (ref 39–117)
ALT SERPL W P-5'-P-CCNC: 17 U/L (ref 1–33)
ANION GAP SERPL CALCULATED.3IONS-SCNC: 14 MMOL/L (ref 5–15)
AST SERPL-CCNC: 22 U/L (ref 1–32)
BASOPHILS # BLD AUTO: 0.04 10*3/MM3 (ref 0–0.2)
BASOPHILS NFR BLD AUTO: 0.5 % (ref 0–1.5)
BILIRUB SERPL-MCNC: 0.2 MG/DL (ref 0–1.2)
BUN SERPL-MCNC: 14 MG/DL (ref 8–23)
BUN/CREAT SERPL: 20 (ref 7–25)
CALCIUM SPEC-SCNC: 9.6 MG/DL (ref 8.6–10.5)
CHLORIDE SERPL-SCNC: 101 MMOL/L (ref 98–107)
CO2 SERPL-SCNC: 25 MMOL/L (ref 22–29)
CREAT SERPL-MCNC: 0.7 MG/DL (ref 0.57–1)
DEPRECATED RDW RBC AUTO: 44.1 FL (ref 37–54)
EGFRCR SERPLBLD CKD-EPI 2021: 95.5 ML/MIN/1.73
EOSINOPHIL # BLD AUTO: 0.13 10*3/MM3 (ref 0–0.4)
EOSINOPHIL NFR BLD AUTO: 1.8 % (ref 0.3–6.2)
ERYTHROCYTE [DISTWIDTH] IN BLOOD BY AUTOMATED COUNT: 14.7 % (ref 12.3–15.4)
GLOBULIN UR ELPH-MCNC: 3 GM/DL
GLUCOSE SERPL-MCNC: 214 MG/DL (ref 65–99)
HBA1C MFR BLD: 9.6 % (ref 4.8–5.6)
HCT VFR BLD AUTO: 37 % (ref 34–46.6)
HGB BLD-MCNC: 12.4 G/DL (ref 12–15.9)
IMM GRANULOCYTES # BLD AUTO: 0.05 10*3/MM3 (ref 0–0.05)
IMM GRANULOCYTES NFR BLD AUTO: 0.7 % (ref 0–0.5)
LYMPHOCYTES # BLD AUTO: 2.26 10*3/MM3 (ref 0.7–3.1)
LYMPHOCYTES NFR BLD AUTO: 31 % (ref 19.6–45.3)
MCH RBC QN AUTO: 27.6 PG (ref 26.6–33)
MCHC RBC AUTO-ENTMCNC: 33.5 G/DL (ref 31.5–35.7)
MCV RBC AUTO: 82.4 FL (ref 79–97)
MONOCYTES # BLD AUTO: 0.49 10*3/MM3 (ref 0.1–0.9)
MONOCYTES NFR BLD AUTO: 6.7 % (ref 5–12)
NEUTROPHILS NFR BLD AUTO: 4.33 10*3/MM3 (ref 1.7–7)
NEUTROPHILS NFR BLD AUTO: 59.3 % (ref 42.7–76)
NRBC BLD AUTO-RTO: 0 /100 WBC (ref 0–0.2)
PLATELET # BLD AUTO: 227 10*3/MM3 (ref 140–450)
PMV BLD AUTO: 12.2 FL (ref 6–12)
POTASSIUM SERPL-SCNC: 3.9 MMOL/L (ref 3.5–5.2)
PROT SERPL-MCNC: 7.2 G/DL (ref 6–8.5)
RBC # BLD AUTO: 4.49 10*6/MM3 (ref 3.77–5.28)
SODIUM SERPL-SCNC: 140 MMOL/L (ref 136–145)
TSH SERPL DL<=0.05 MIU/L-ACNC: 0.8 UIU/ML (ref 0.27–4.2)
WBC NRBC COR # BLD: 7.3 10*3/MM3 (ref 3.4–10.8)

## 2023-01-20 PROCEDURE — 80053 COMPREHEN METABOLIC PANEL: CPT | Performed by: NURSE PRACTITIONER

## 2023-01-20 PROCEDURE — 83036 HEMOGLOBIN GLYCOSYLATED A1C: CPT | Performed by: NURSE PRACTITIONER

## 2023-01-20 PROCEDURE — 85025 COMPLETE CBC W/AUTO DIFF WBC: CPT | Performed by: NURSE PRACTITIONER

## 2023-01-20 PROCEDURE — 84443 ASSAY THYROID STIM HORMONE: CPT | Performed by: NURSE PRACTITIONER

## 2023-01-23 ENCOUNTER — TELEPHONE (OUTPATIENT)
Dept: ENDOCRINOLOGY | Facility: CLINIC | Age: 68
End: 2023-01-23
Payer: MEDICARE

## 2023-01-23 NOTE — TELEPHONE ENCOUNTER
----- Message from ZELDA Freire sent at 1/23/2023  9:47 AM CST -----  Please let her know her A1c was 9.6 this is down from 10.3, thyroid level is normal  Kidney and liver function normal

## 2023-01-24 ENCOUNTER — TELEPHONE (OUTPATIENT)
Dept: ENDOCRINOLOGY | Facility: CLINIC | Age: 68
End: 2023-01-24
Payer: MEDICARE

## 2023-01-25 ENCOUNTER — TELEPHONE (OUTPATIENT)
Dept: ENDOCRINOLOGY | Facility: CLINIC | Age: 68
End: 2023-01-25
Payer: MEDICARE

## 2023-01-30 ENCOUNTER — TELEPHONE (OUTPATIENT)
Dept: FAMILY MEDICINE CLINIC | Facility: CLINIC | Age: 68
End: 2023-01-30
Payer: MEDICARE

## 2023-01-30 NOTE — TELEPHONE ENCOUNTER
I have talked with Ms. Marmolejo, gave her the number to the [facility] to call and reschedule her Mammogram.  I did transfer her over to the [facility] before our conversation ended, she said she would reschedule.     ----- Message from Anupama Arnett MD sent at 1/27/2023 12:02 PM CST -----  Can you please follow up on this missed imaging study.  Thanks, MEENA Arnett  ----- Message -----  From: Kaila Ellison  Sent: 1/27/2023   9:53 AM CST  To: Anupama Arnett MD    Patient did not show for mammogram

## 2023-02-01 ENCOUNTER — DOCUMENTATION (OUTPATIENT)
Dept: ENDOCRINOLOGY | Facility: CLINIC | Age: 68
End: 2023-02-01
Payer: MEDICARE

## 2023-02-06 DIAGNOSIS — I10 BENIGN ESSENTIAL HYPERTENSION: ICD-10-CM

## 2023-02-06 RX ORDER — MONTELUKAST SODIUM 10 MG/1
TABLET ORAL
Qty: 30 TABLET | Refills: 3 | Status: SHIPPED | OUTPATIENT
Start: 2023-02-06

## 2023-02-06 RX ORDER — AMLODIPINE BESYLATE 5 MG/1
5 TABLET ORAL DAILY
Qty: 30 TABLET | Refills: 2 | Status: SHIPPED | OUTPATIENT
Start: 2023-02-06

## 2023-02-11 DIAGNOSIS — M62.838 MUSCLE SPASM: ICD-10-CM

## 2023-02-13 RX ORDER — BACLOFEN 10 MG/1
TABLET ORAL
Qty: 90 TABLET | Refills: 0 | Status: SHIPPED | OUTPATIENT
Start: 2023-02-13

## 2023-02-16 DIAGNOSIS — E11.65 TYPE 2 DIABETES MELLITUS WITH HYPERGLYCEMIA, WITH LONG-TERM CURRENT USE OF INSULIN: ICD-10-CM

## 2023-02-16 DIAGNOSIS — Z79.4 TYPE 2 DIABETES MELLITUS WITH HYPERGLYCEMIA, WITH LONG-TERM CURRENT USE OF INSULIN: ICD-10-CM

## 2023-02-16 RX ORDER — PEN NEEDLE, DIABETIC 32GX 5/32"
NEEDLE, DISPOSABLE MISCELLANEOUS
Qty: 100 EACH | Refills: 2 | Status: SHIPPED | OUTPATIENT
Start: 2023-02-16

## 2023-02-20 ENCOUNTER — OFFICE VISIT (OUTPATIENT)
Dept: FAMILY MEDICINE CLINIC | Facility: CLINIC | Age: 68
End: 2023-02-20
Payer: MEDICARE

## 2023-02-20 VITALS
DIASTOLIC BLOOD PRESSURE: 58 MMHG | HEART RATE: 108 BPM | TEMPERATURE: 98.6 F | SYSTOLIC BLOOD PRESSURE: 140 MMHG | HEIGHT: 62 IN | BODY MASS INDEX: 39.56 KG/M2 | OXYGEN SATURATION: 96 % | WEIGHT: 215 LBS

## 2023-02-20 DIAGNOSIS — J34.89 SINUS DRAINAGE: ICD-10-CM

## 2023-02-20 DIAGNOSIS — R68.83 CHILLS: ICD-10-CM

## 2023-02-20 DIAGNOSIS — U07.1 COVID: Primary | ICD-10-CM

## 2023-02-20 LAB
EXPIRATION DATE: ABNORMAL
FLUAV AG UPPER RESP QL IA.RAPID: NOT DETECTED
FLUBV AG UPPER RESP QL IA.RAPID: NOT DETECTED
INTERNAL CONTROL: ABNORMAL
Lab: ABNORMAL
SARS-COV-2 AG UPPER RESP QL IA.RAPID: DETECTED

## 2023-02-20 PROCEDURE — 87428 SARSCOV & INF VIR A&B AG IA: CPT | Performed by: NURSE PRACTITIONER

## 2023-02-20 PROCEDURE — 99213 OFFICE O/P EST LOW 20 MIN: CPT | Performed by: NURSE PRACTITIONER

## 2023-02-20 RX ORDER — HYDROCODONE BITARTRATE AND ACETAMINOPHEN 5; 325 MG/1; MG/1
1 TABLET ORAL 4 TIMES DAILY PRN
COMMUNITY
Start: 2023-01-26

## 2023-02-20 NOTE — PROGRESS NOTES
"Chief Complaint  Illness (Cold, chills, sinus drainage clear, ha X 1 day)    Subjective          Meeta Marmolejo presents to [facility] [place]    History of Present Illness  [facility]    PCP: Dr. Arnett    CC: \"cold, chills, sinus drainage, headache\"    Symptoms since last night.  No known exposures reported.  Anamika seltzer helps somewhat with symptoms. No hx of asthma/COPD.  Does have multiple co morbidities.  Last had Covid in 2020-hospitalized for a week at that time.  Home test was +, but unsure if it was done correctly.        Illness  This is a new problem. The current episode started yesterday. The problem occurs constantly. The problem has been unchanged. Associated symptoms include chills, congestion, coughing ( mild), fatigue and headaches. Pertinent negatives include no abdominal pain, anorexia, arthralgias, change in bowel habit, chest pain, diaphoresis, fever, joint swelling, myalgias, nausea, neck pain, numbness, rash, swollen glands, urinary symptoms, vertigo, visual change, vomiting or weakness. Sore throat: PND. Nothing aggravates the symptoms. Treatments tried: anamika carla. The treatment provided mild relief.       Review of Systems   Constitutional: Positive for chills and fatigue. Negative for appetite change, diaphoresis and fever.   HENT: Positive for congestion, postnasal drip, rhinorrhea and sinus pressure. Negative for ear discharge, ear pain and trouble swallowing. Sore throat: PND.    Eyes: Negative.    Respiratory: Positive for cough ( mild). Negative for chest tightness, shortness of breath and wheezing.    Cardiovascular: Negative.  Negative for chest pain.   Gastrointestinal: Negative.  Negative for abdominal pain, anorexia, change in bowel habit, nausea and vomiting.   Genitourinary: Negative.    Musculoskeletal: Negative for arthralgias, joint swelling, myalgias and neck pain.   Skin: Negative.  Negative for rash. " "  Neurological: Positive for headaches. Negative for dizziness, vertigo, weakness and numbness.        Objective   Vital Signs:   /58 (BP Location: Left arm, Patient Position: Sitting, Cuff Size: Large Adult)   Pulse 108   Temp 98.6 °F (37 °C) (Oral)   Ht 157.5 cm (62\")   Wt 97.5 kg (215 lb)   SpO2 96%   BMI 39.32 kg/m²       Physical Exam  Vitals and nursing note reviewed.   Constitutional:       General: She is not in acute distress.     Appearance: She is ill-appearing.   HENT:      Head: Normocephalic and atraumatic.      Right Ear: Tympanic membrane and ear canal normal.      Left Ear: Tympanic membrane and ear canal normal.      Nose: Congestion present.      Comments: Generalized sinus pressure, no localized pain     Mouth/Throat:      Mouth: Mucous membranes are moist.      Pharynx: Posterior oropharyngeal erythema (mild injection with PND) present. No oropharyngeal exudate.   Eyes:      General:         Right eye: No discharge.         Left eye: No discharge.      Conjunctiva/sclera: Conjunctivae normal.   Cardiovascular:      Rate and Rhythm: Normal rate and regular rhythm.   Pulmonary:      Effort: Pulmonary effort is normal. No respiratory distress.      Breath sounds: Normal breath sounds. No wheezing, rhonchi or rales.   Musculoskeletal:      Cervical back: Neck supple. No tenderness.   Lymphadenopathy:      Cervical: No cervical adenopathy.   Skin:     General: Skin is warm and dry.   Neurological:      General: No focal deficit present.      Mental Status: She is alert and oriented to person, place, and time.   Psychiatric:         Mood and Affect: Mood normal.         Thought Content: Thought content normal.          Result Review :     Common labs    Common Labs 7/18/22 7/18/22 7/18/22 12/13/22 12/13/22 1/20/23 1/20/23 1/20/23    1036 1036 1036 0930 0930 0824 0824 0824   Glucose  191 (A)   222 (A) 214 (A)     BUN  8   9 14     Creatinine  0.57   0.61 0.70     Sodium  139   135 (A) 140   "   Potassium  3.8   3.3 (A) 3.9     Chloride  101   99 101     Calcium  9.3   9.3 9.6     Albumin  4.10   4.40 4.2     Total Bilirubin  0.2   0.2 0.2     Alkaline Phosphatase  78   84 83     AST (SGOT)  15   11 22     ALT (SGPT)  16   11 17     WBC 5.69   7.12   7.30    Hemoglobin 11.7 (A)   12.3   12.4    Hematocrit 35.9   37.6   37.0    Platelets 161   294   227    Hemoglobin A1C   10.30 (A)     9.60 (A)   (A) Abnormal value                   Recent Results (from the past 24 hour(s))   POCT SARS-CoV-2 Antigen CYNTHIA + Flu    Collection Time: 02/20/23 11:28 AM    Specimen: Swab   Result Value Ref Range    SARS Antigen Detected (A) Not Detected, Presumptive Negative    Influenza A Antigen CYNTHIA Not Detected Not Detected    Influenza B Antigen CYNTHIA Not Detected Not Detected    Internal Control Passed Passed    Lot Number [id number]     Expiration Date 03/11/2023           Assessment and Plan    Diagnoses and all orders for this visit:    1. COVID (Primary)  -     Molnupiravir (LAGEVRIO) 200 MG capsule; Take 4 capsules by mouth Every 12 (Twelve) Hours for 5 days.  Dispense: 40 capsule; Refill: 0    2. Sinus drainage  -     POCT SARS-CoV-2 Antigen CYNTHIA + Flu    3. Chills  -     POCT SARS-CoV-2 Antigen CYNTHIA + Flu      Push fluids  Rest  May take coricidan OTC for symptom relief  Risk/benefits/emergency use of Molnupiravir discussed.  Meets high risk criteria.  Wishes to proceed with treatment.  Rx given.   Standard precautions to prevent spread of infection  Possible complications of Covid and when to seek further treatment discussed  Quarantine instructions given.   [facility]    See PCP or RTC if symptoms persist/worsen  See PCP for routine f/u visit and management of chronic medical conditions      This document has been electronically signed by ZELDA Santiago on February 20, 2023 11:46 CST,.

## 2023-02-23 ENCOUNTER — TELEPHONE (OUTPATIENT)
Dept: FAMILY MEDICINE CLINIC | Facility: CLINIC | Age: 68
End: 2023-02-23
Payer: MEDICARE

## 2023-02-23 NOTE — TELEPHONE ENCOUNTER
Patient was diagnosed with Covid Monday and put on Lagevol 200 mg. She is wondering if it is ok for her to keep taking this?

## 2023-02-24 NOTE — TELEPHONE ENCOUNTER
She went to Urgent Care in [place] and they gave her she saw ZELDA Hall    She said they gave her Lagevrio 200 mg take 4 capsules twice daily for 5 days, on Monday, her 5 days will be up tomorrow.  She said she started having symptoms on Monday and that is when she did a briseno test and it was positive and she went in to be seen.    She states her symptoms have resolved, she said all she had was sinus drainage/allregy type symptoms.   No headache, No fever, no cough, so soa    She wants to know if this is the same as the Plaxovid that she has been seeing on TV that is suppose to stop it?  She said the TV said hat people with diabetes, asthma and other health problems should take the medication    I did tell her that the Plaxovid would lessen the severity of Covid but it would not stop it.  I also told her that the Plavix has to be prescribed within so many days of the onset of her symptoms.

## 2023-02-24 NOTE — TELEPHONE ENCOUNTER
As long as she is feeling better, nothing more to do.  Too late for paxlovid.  As far as I know, this is still an investigational treatment.  Not exactly the same medication as Paxlovid.  Thanks, MEENA Arnett

## 2023-03-01 DIAGNOSIS — I10 BENIGN ESSENTIAL HYPERTENSION: ICD-10-CM

## 2023-03-01 RX ORDER — CALCIUM CARBONATE/VITAMIN D3 600 MG-10
TABLET ORAL
Qty: 100 TABLET | Refills: 2 | Status: SHIPPED | OUTPATIENT
Start: 2023-03-01

## 2023-03-01 RX ORDER — LOSARTAN POTASSIUM 100 MG/1
100 TABLET ORAL DAILY
Qty: 90 TABLET | Refills: 3 | Status: SHIPPED | OUTPATIENT
Start: 2023-03-01

## 2023-03-07 ENCOUNTER — TELEPHONE (OUTPATIENT)
Dept: FAMILY MEDICINE CLINIC | Facility: CLINIC | Age: 68
End: 2023-03-07
Payer: MEDICARE

## 2023-03-07 NOTE — TELEPHONE ENCOUNTER
Per , Ms. Marmolejo has been called with recent Screening Mammogram results & recommendations.  Continue current medications and follow-up as planned or sooner if any problems.       ----- Message from Anupama Arnett MD sent at 3/7/2023  2:47 PM CST -----  Please call and let patient know that mammogram is normal.  Plan to repeat in 1 year.  Thanks, MEENA Arnett

## 2023-03-08 ENCOUNTER — OFFICE VISIT (OUTPATIENT)
Dept: ENDOCRINOLOGY | Facility: CLINIC | Age: 68
End: 2023-03-08
Payer: MEDICARE

## 2023-03-08 DIAGNOSIS — Z79.4 TYPE 2 DIABETES MELLITUS WITH HYPOGLYCEMIA WITHOUT COMA, WITH LONG-TERM CURRENT USE OF INSULIN: ICD-10-CM

## 2023-03-08 DIAGNOSIS — E11.649 TYPE 2 DIABETES MELLITUS WITH HYPOGLYCEMIA WITHOUT COMA, WITH LONG-TERM CURRENT USE OF INSULIN: ICD-10-CM

## 2023-03-08 NOTE — PROGRESS NOTES
This will be a no charge.    Meeta Marmolejo is a 67 y.o. female seen by diabetes educator for 20 minutes 03/08/2023 for training of Unique continuous glucose monitor.     Alerts set up at 80 for low and 240 for high.    Instructed patient on use of reader, including how to start/stop sensor.    Demonstrated how to properly insert & start sensor. Instructed patient to wear sensor up to 14 days.     Instructed patient to remove sensor if a CT scan, MRI, or X-ray is done, or if skin irritation occurs.     Provided pt with [employer] customer service number to call for troubleshooting.       JERALD May, RD, LD

## 2023-03-18 DIAGNOSIS — K21.9 GASTROESOPHAGEAL REFLUX DISEASE, UNSPECIFIED WHETHER ESOPHAGITIS PRESENT: ICD-10-CM

## 2023-03-18 DIAGNOSIS — D50.8 OTHER IRON DEFICIENCY ANEMIA: Chronic | ICD-10-CM

## 2023-03-20 RX ORDER — FERROUS SULFATE 325(65) MG
TABLET ORAL
Qty: 90 TABLET | Refills: 0 | Status: SHIPPED | OUTPATIENT
Start: 2023-03-20

## 2023-03-20 RX ORDER — OMEPRAZOLE 40 MG/1
40 CAPSULE, DELAYED RELEASE ORAL DAILY
Qty: 30 CAPSULE | Refills: 2 | Status: SHIPPED | OUTPATIENT
Start: 2023-03-20

## 2023-03-20 NOTE — TELEPHONE ENCOUNTER
Rx Refill Note  Requested Prescriptions     Pending Prescriptions Disp Refills   • FeroSul 325 (65 Fe) MG tablet [Pharmacy Med Name: FERROUS SULFATE 325MG (5GR) TABS] 90 tablet 1     Sig: TAKE 1 TABLET BY MOUTH DAILY WITH BREAKFAST      Last office visit with prescribing clinician: 12/13/2022   Last telemedicine visit with prescribing clinician: 4/11/2023   Next office visit with prescribing clinician: 4/11/2023                         Would you like a call back once the refill request has been completed: [] Yes [] No    If the office needs to give you a call back, can they leave a voicemail: [] Yes [] No    Isamar Mathew  03/20/23, 07:53 CDT

## 2023-04-10 ENCOUNTER — LAB (OUTPATIENT)
Dept: LAB | Facility: HOSPITAL | Age: 68
End: 2023-04-10
Payer: MEDICARE

## 2023-04-10 DIAGNOSIS — D68.62 LUPUS ANTICOAGULANT SYNDROME: ICD-10-CM

## 2023-04-10 DIAGNOSIS — R79.9 ABNORMAL FINDING OF BLOOD CHEMISTRY, UNSPECIFIED: ICD-10-CM

## 2023-04-10 DIAGNOSIS — R78.71 ABNORMAL LEAD LEVEL IN BLOOD: ICD-10-CM

## 2023-04-10 LAB
ALBUMIN SERPL-MCNC: 4.5 G/DL (ref 3.5–5.2)
ALBUMIN/GLOB SERPL: 1.3 G/DL
ALP SERPL-CCNC: 90 U/L (ref 39–117)
ALT SERPL W P-5'-P-CCNC: 17 U/L (ref 1–33)
ANION GAP SERPL CALCULATED.3IONS-SCNC: 18 MMOL/L (ref 5–15)
AST SERPL-CCNC: 16 U/L (ref 1–32)
BASOPHILS # BLD AUTO: NORMAL 10*3/UL
BASOPHILS NFR BLD AUTO: NORMAL %
BILIRUB SERPL-MCNC: 0.2 MG/DL (ref 0–1.2)
BUN SERPL-MCNC: 14 MG/DL (ref 8–23)
BUN/CREAT SERPL: 19.7 (ref 7–25)
CALCIUM SPEC-SCNC: 9.7 MG/DL (ref 8.6–10.5)
CHLORIDE SERPL-SCNC: 98 MMOL/L (ref 98–107)
CO2 SERPL-SCNC: 25 MMOL/L (ref 22–29)
CREAT SERPL-MCNC: 0.71 MG/DL (ref 0.57–1)
EGFRCR SERPLBLD CKD-EPI 2021: 93.9 ML/MIN/1.73
EOSINOPHIL # BLD AUTO: NORMAL 10*3/UL
EOSINOPHIL NFR BLD AUTO: NORMAL %
ERYTHROCYTE [DISTWIDTH] IN BLOOD BY AUTOMATED COUNT: NORMAL %
FERRITIN SERPL-MCNC: 256.5 NG/ML (ref 13–150)
FOLATE SERPL-MCNC: >20 NG/ML (ref 4.78–24.2)
GLOBULIN UR ELPH-MCNC: 3.4 GM/DL
GLUCOSE SERPL-MCNC: 202 MG/DL (ref 65–99)
HCT VFR BLD AUTO: NORMAL %
HGB BLD-MCNC: NORMAL G/DL
IRON 24H UR-MRATE: 85 MCG/DL (ref 37–145)
IRON SATN MFR SERPL: 22 % (ref 20–50)
LYMPHOCYTES # BLD AUTO: NORMAL 10*3/UL
LYMPHOCYTES NFR BLD AUTO: NORMAL %
MCH RBC QN AUTO: NORMAL PG
MCHC RBC AUTO-ENTMCNC: NORMAL G/DL
MCV RBC AUTO: NORMAL FL
MONOCYTES # BLD AUTO: NORMAL 10*3/UL
MONOCYTES NFR BLD AUTO: NORMAL %
NEUTROPHILS NFR BLD AUTO: NORMAL %
NEUTROPHILS NFR BLD AUTO: NORMAL %
PLATELET # BLD AUTO: NORMAL 10*3/UL
POTASSIUM SERPL-SCNC: 3.2 MMOL/L (ref 3.5–5.2)
PROT SERPL-MCNC: 7.9 G/DL (ref 6–8.5)
RBC # BLD AUTO: NORMAL 10*6/UL
SODIUM SERPL-SCNC: 141 MMOL/L (ref 136–145)
TIBC SERPL-MCNC: 381 MCG/DL (ref 298–536)
TRANSFERRIN SERPL-MCNC: 256 MG/DL (ref 200–360)
VIT B12 BLD-MCNC: >2000 PG/ML (ref 211–946)
WBC NRBC COR # BLD: NORMAL 10*3/UL

## 2023-04-10 PROCEDURE — 83540 ASSAY OF IRON: CPT

## 2023-04-10 PROCEDURE — 84466 ASSAY OF TRANSFERRIN: CPT

## 2023-04-10 PROCEDURE — 80053 COMPREHEN METABOLIC PANEL: CPT

## 2023-04-10 PROCEDURE — 82728 ASSAY OF FERRITIN: CPT

## 2023-04-10 PROCEDURE — 82746 ASSAY OF FOLIC ACID SERUM: CPT

## 2023-04-10 PROCEDURE — 85025 COMPLETE CBC W/AUTO DIFF WBC: CPT

## 2023-04-10 PROCEDURE — 82607 VITAMIN B-12: CPT

## 2023-04-18 ENCOUNTER — OFFICE VISIT (OUTPATIENT)
Dept: ONCOLOGY | Facility: CLINIC | Age: 68
End: 2023-04-18
Payer: MEDICARE

## 2023-04-18 VITALS
RESPIRATION RATE: 16 BRPM | WEIGHT: 211.2 LBS | HEART RATE: 91 BPM | SYSTOLIC BLOOD PRESSURE: 146 MMHG | BODY MASS INDEX: 38.63 KG/M2 | DIASTOLIC BLOOD PRESSURE: 60 MMHG | OXYGEN SATURATION: 96 % | TEMPERATURE: 97.2 F

## 2023-04-18 DIAGNOSIS — D50.8 OTHER IRON DEFICIENCY ANEMIA: Chronic | ICD-10-CM

## 2023-04-18 DIAGNOSIS — N18.2 CHRONIC KIDNEY DISEASE, STAGE 2 (MILD): ICD-10-CM

## 2023-04-18 DIAGNOSIS — D68.62 LUPUS ANTICOAGULANT SYNDROME: ICD-10-CM

## 2023-04-18 DIAGNOSIS — R79.0 ABNORMAL LEVEL OF BLOOD MINERAL: ICD-10-CM

## 2023-04-18 DIAGNOSIS — D68.62 LUPUS ANTICOAGULANT SYNDROME: Primary | ICD-10-CM

## 2023-04-18 RX ORDER — FERROUS SULFATE 325(65) MG
1 TABLET ORAL
Qty: 90 TABLET | Refills: 0 | Status: SHIPPED | OUTPATIENT
Start: 2023-04-18

## 2023-04-19 NOTE — PROGRESS NOTES
DATE OF VISIT: 4/18/2023      REASON FOR VISIT:  Pt on Xarelto for pulmonary embolism; iron deficiency anemia        HISTORY OF PRESENT ILLNESS:      67 yr old female with past medical problems consisting of hypertension, dyslipidemia, poorly controlled diabetes mellitus, history of left knee replacement with revision ×3.  Most recent revision was done on August 28, 2018.  And was diagnosed with subacute pulmonary embolism on August 30, 2018 after revision of left knee and was started on xarelto.  Patient was initially seen in consultation when she was  admitted to hospital on September 21, 2018 with bleeding into her left knee.  Xarelto was discontinued and patient was started on heparin drip with coumadin.  Patient has started back on Xarelto in December 2018 due to positive lupus anticoagulant.  She is tolerating well and denies any bleeding.   She has her carpal tunnel surgery and states she did well; resumed her Xarelto day after surgery.  She continues to take B-12 and folic acid TIW along with iron tablet daily.        Past Medical History, Past Surgical History, Social History, Family History have been reviewed and are without significant changes except as mentioned.    Review of Systems   A comprehensive 14 point review of systems was performed and was negative except as mentioned.    Medications:  The current medication list was reviewed in the EMR    ALLERGIES:    Allergies   Allergen Reactions   • Shrimp Rash   • Oxycodone Rash   • Penicillins Rash   • Percocet [Oxycodone-Acetaminophen] Rash   • Rocephin [Ceftriaxone] Rash       Objective      Vitals:    04/18/23 0830   BP: 146/60   Pulse: 91   Resp: 16   Temp: 97.2 °F (36.2 °C)   SpO2: 96%   Weight: 95.8 kg (211 lb 3.2 oz)   PainSc:   7   PainLoc: Knee         5/18/2020     1:03 PM   Current Status   ECOG score 0       Physical Exam  General: alert and oriented no acute distress  Lungs;normal breath sounds  Card: RRR  Ext; no edema    RECENT  LABS:  Glucose   Date Value Ref Range Status   04/10/2023 202 (H) 65 - 99 mg/dL Final     Sodium   Date Value Ref Range Status   04/10/2023 141 136 - 145 mmol/L Final     Potassium   Date Value Ref Range Status   04/10/2023 3.2 (L) 3.5 - 5.2 mmol/L Final     CO2   Date Value Ref Range Status   04/10/2023 25.0 22.0 - 29.0 mmol/L Final     Chloride   Date Value Ref Range Status   04/10/2023 98 98 - 107 mmol/L Final     Anion Gap   Date Value Ref Range Status   04/10/2023 18.0 (H) 5.0 - 15.0 mmol/L Final     Creatinine   Date Value Ref Range Status   04/10/2023 0.71 0.57 - 1.00 mg/dL Final     BUN   Date Value Ref Range Status   04/10/2023 14 8 - 23 mg/dL Final     BUN/Creatinine Ratio   Date Value Ref Range Status   04/10/2023 19.7 7.0 - 25.0 Final     Calcium   Date Value Ref Range Status   04/10/2023 9.7 8.6 - 10.5 mg/dL Final     Alkaline Phosphatase   Date Value Ref Range Status   04/10/2023 90 39 - 117 U/L Final     Total Protein   Date Value Ref Range Status   04/10/2023 7.9 6.0 - 8.5 g/dL Final     ALT (SGPT)   Date Value Ref Range Status   04/10/2023 17 1 - 33 U/L Final     AST (SGOT)   Date Value Ref Range Status   04/10/2023 16 1 - 32 U/L Final     Total Bilirubin   Date Value Ref Range Status   04/10/2023 0.2 0.0 - 1.2 mg/dL Final     Albumin   Date Value Ref Range Status   04/10/2023 4.5 3.5 - 5.2 g/dL Final     Globulin   Date Value Ref Range Status   04/10/2023 3.4 gm/dL Final     Lab Results   Component Value Date    WBC  04/10/2023      Comment:      See scanned report    HGB  04/10/2023      Comment:      See scanned report    HCT  04/10/2023      Comment:      See scanned report    MCV  04/10/2023      Comment:      See scanned report    PLT  04/10/2023      Comment:      See scanned report     Lab Results   Component Value Date    NEUTROABS 4.33 01/20/2023    IRON 85 04/10/2023    IRON 54 12/13/2022    IRON 75 07/18/2022    TIBC 381 04/10/2023    TIBC 361 12/13/2022    TIBC 370 07/18/2022    RAMIREZ  22 04/10/2023    LABIRON 15 (L) 12/13/2022    LABIRON 20 07/18/2022    FERRITIN 256.50 (H) 04/10/2023    FERRITIN 261.80 (H) 12/13/2022    FERRITIN 250.80 (H) 07/18/2022    FUAIVMSE31 >2,000 (H) 04/10/2023    QHVYYTAZ96 >2,000 (H) 12/13/2022    CDRJLIAJ14 >2,000 (H) 07/18/2022    FOLATE >20.00 04/10/2023    FOLATE 14.10 12/13/2022    FOLATE 15.60 07/18/2022     Lab Results   Component Value Date    REFLABREPO SEE NOTE: 05/28/2015           RADIOLOGY DATA :  No radiology results for the last 7 days        Assessment & Plan     1.  Subacute pulmonary embolism on right side:  -Patient was diagnosed with subacute pulmonary embolism on right side after most recent revision of left knee done on August 28, 2018.  -Patient was started on xarelto 15 mg twice daily.  -She was admitted to [facility] on September 20, 2018 with bleeding into her left knee.  -Patient was started on heparin drip.  -Patient had incision and Drainage done by Dr. Su on September 22, 2018.  -Patient was on Coumadin.  Patient was started back on Xarelto in December 2018.  -CT angiogram of chest with contrast done on February 20, 2019 shows resolution of pulmonary embolism.  -Hypercoagulable workup done consisting of factor V Leyden, prothrombin gene mutation, anticardiolipin antibody, beta-2 glycoprotein antibody were negative on February 20, 2019.  -Lupus anticoagulant was positive on February 20, 2019 as well as Peggy 3, 2019.  In view of lupus anticoagulant being positive, recommend continuing with Xarelto for now.  -labs reviewed today and unremarkable.  -new prescription for Xarelto sent to pharmacy.  -RTC 4 mos with CBC and CMP.     2.  Anemia: iron deficiency  -she is currently taking iron tablet daily along with B-12 and folic acid TIW  -labs reviewed today Hgb is 13.1 with stable iron stores.  -recommend continue with  current medications.  -RTC 4 mos with repeat labs               PHQ-9 Total Score:       Meeta Tang  Jaleel reports a pain score of 7.  Given her pain assessment as noted, treatment options were discussed and the following options were decided upon as a follow-up plan to address the patient's pain: continuation of current treatment plan for pain.         Estephania Felix, ZELDA  4/19/2023  09:35 CDT        Part of this note may be an electronic transcription/translation of spoken language to printed text using the Dragon Dictation System.          CC:

## 2023-04-28 ENCOUNTER — OFFICE VISIT (OUTPATIENT)
Dept: FAMILY MEDICINE CLINIC | Facility: CLINIC | Age: 68
End: 2023-04-28
Payer: MEDICARE

## 2023-04-28 VITALS
HEIGHT: 62 IN | SYSTOLIC BLOOD PRESSURE: 150 MMHG | BODY MASS INDEX: 39.75 KG/M2 | HEART RATE: 109 BPM | DIASTOLIC BLOOD PRESSURE: 76 MMHG | WEIGHT: 216 LBS | OXYGEN SATURATION: 99 % | TEMPERATURE: 98.7 F

## 2023-04-28 DIAGNOSIS — J06.9 VIRAL URI: Primary | ICD-10-CM

## 2023-04-28 DIAGNOSIS — J02.9 SORE THROAT: ICD-10-CM

## 2023-04-28 LAB
EXPIRATION DATE: NORMAL
INTERNAL CONTROL: NORMAL
Lab: NORMAL
S PYO AG THROAT QL: NEGATIVE

## 2023-04-28 PROCEDURE — 87880 STREP A ASSAY W/OPTIC: CPT | Performed by: NURSE PRACTITIONER

## 2023-04-28 PROCEDURE — 3046F HEMOGLOBIN A1C LEVEL >9.0%: CPT | Performed by: NURSE PRACTITIONER

## 2023-04-28 PROCEDURE — 1159F MED LIST DOCD IN RCRD: CPT | Performed by: NURSE PRACTITIONER

## 2023-04-28 PROCEDURE — 3077F SYST BP >= 140 MM HG: CPT | Performed by: NURSE PRACTITIONER

## 2023-04-28 PROCEDURE — 99213 OFFICE O/P EST LOW 20 MIN: CPT | Performed by: NURSE PRACTITIONER

## 2023-04-28 PROCEDURE — 1160F RVW MEDS BY RX/DR IN RCRD: CPT | Performed by: NURSE PRACTITIONER

## 2023-04-28 PROCEDURE — 3078F DIAST BP <80 MM HG: CPT | Performed by: NURSE PRACTITIONER

## 2023-04-28 PROCEDURE — 87081 CULTURE SCREEN ONLY: CPT | Performed by: NURSE PRACTITIONER

## 2023-04-28 NOTE — PROGRESS NOTES
"Chief Complaint  Sore Throat (Yesterday)    Subjective          Meeta Marmolejo presents to [facility] [place]    History of Present Illness  [facility]    PCP: Dr. Arnett    CC: \"sore throat\"    Symptoms since yesterday.  Significant sore throat last night, better today.  Grandchildren have similar and tested negative for covid/flu/strep in office today.  Denies fever.  Using throat lozenges, queenie seltzer with some relief.      Sore Throat   This is a new problem. The current episode started yesterday. The problem has been gradually improving. There has been no fever. The patient is experiencing no pain. Associated symptoms include trouble swallowing (yesterday, better today). Pertinent negatives include no abdominal pain, congestion, coughing, diarrhea, drooling, ear discharge, ear pain, headaches, hoarse voice, plugged ear sensation, neck pain, shortness of breath, stridor, swollen glands or vomiting. She has had no exposure to strep or mono. Treatments tried: throat lozenges, queenie seltzer. The treatment provided mild relief.       Review of Systems   Constitutional: Negative.    HENT: Positive for sore throat and trouble swallowing (yesterday, better today). Negative for congestion, drooling, ear discharge, ear pain, hoarse voice, postnasal drip, rhinorrhea, sinus pressure, sinus pain and sneezing.    Eyes: Negative.    Respiratory: Negative.  Negative for cough, shortness of breath and stridor.    Cardiovascular: Negative.    Gastrointestinal: Negative.  Negative for abdominal pain, diarrhea and vomiting.   Genitourinary: Negative.    Musculoskeletal: Negative for myalgias and neck pain.   Skin: Negative.    Neurological: Negative for dizziness and headaches.        Objective   Vital Signs:   /76 (BP Location: Right arm, Patient Position: Sitting, Cuff Size: Large Adult)   Pulse 109   Temp 98.7 °F (37.1 °C) (Oral)   Ht 157.5 cm (62\")  "  Wt 98 kg (216 lb)   SpO2 99%   BMI 39.51 kg/m²       Physical Exam  Vitals and nursing note reviewed.   Constitutional:       General: She is not in acute distress.     Appearance: She is not ill-appearing.   HENT:      Head: Normocephalic and atraumatic.      Right Ear: Tympanic membrane and ear canal normal.      Left Ear: Tympanic membrane and ear canal normal.      Nose: Rhinorrhea (clear) present. No congestion.      Mouth/Throat:      Mouth: Mucous membranes are moist.      Pharynx: Posterior oropharyngeal erythema (mild injection) present. No oropharyngeal exudate.   Eyes:      General:         Right eye: No discharge.         Left eye: No discharge.      Conjunctiva/sclera: Conjunctivae normal.   Cardiovascular:      Rate and Rhythm: Normal rate and regular rhythm.   Pulmonary:      Effort: Pulmonary effort is normal. No respiratory distress.      Breath sounds: Normal breath sounds. No wheezing, rhonchi or rales.   Musculoskeletal:      Cervical back: Neck supple. No tenderness.   Lymphadenopathy:      Cervical: No cervical adenopathy.   Skin:     General: Skin is warm and dry.   Neurological:      General: No focal deficit present.      Mental Status: She is alert and oriented to person, place, and time.   Psychiatric:         Mood and Affect: Mood normal.         Thought Content: Thought content normal.          Result Review :     Common labs        12/13/2022    09:30 1/20/2023    08:24 4/10/2023    10:12   Common Labs   Glucose 222   214   202     BUN 9   14   14     Creatinine 0.61   0.70   0.71     Sodium 135   140   141     Potassium 3.3   3.9   3.2     Chloride 99   101   98     Calcium 9.3   9.6   9.7     Albumin 4.40   4.2   4.5     Total Bilirubin 0.2   0.2   0.2     Alkaline Phosphatase 84   83   90     AST (SGOT) 11   22   16     ALT (SGPT) 11   17   17     WBC 7.12   7.30      Hemoglobin 12.3   12.4      Hematocrit 37.6   37.0      Platelets 294   227      Hemoglobin A1C  9.60                Recent Results (from the past 24 hour(s))   POC Rapid Strep A    Collection Time: 04/28/23 11:16 AM    Specimen: Swab   Result Value Ref Range    Rapid Strep A Screen Negative Negative, VALID, INVALID, Not Performed    Internal Control Passed Passed    Lot Number [id number]     Expiration Date 12/16/25           Assessment and Plan    Diagnoses and all orders for this visit:    1. Viral URI (Primary)    2. Sore throat  -     POC Rapid Strep A  -     Beta Strep Culture, Throat - Swab, Throat    Push fluids  Rest  Tylenol PRN   Throat lozenges as needed  Strep culture pending.     See PCP or RTC if symptoms persist/worsen  See PCP for routine f/u visit and management of chronic medical conditions      This document has been electronically signed by ZELDA Santiago on April 28, 2023 11:52 CDT,.

## 2023-04-30 LAB — BACTERIA SPEC AEROBE CULT: NORMAL

## 2023-05-02 ENCOUNTER — TELEPHONE (OUTPATIENT)
Dept: FAMILY MEDICINE CLINIC | Facility: CLINIC | Age: 68
End: 2023-05-02
Payer: MEDICARE

## 2023-05-04 ENCOUNTER — DOCUMENTATION (OUTPATIENT)
Dept: ENDOCRINOLOGY | Facility: CLINIC | Age: 68
End: 2023-05-04
Payer: MEDICARE

## 2023-05-04 NOTE — PROGRESS NOTES
signed the progress note from radha Christiansen and it was faxed to [facility] along with chart note.     Confirmation received.

## 2023-05-05 ENCOUNTER — TELEPHONE (OUTPATIENT)
Dept: ENDOCRINOLOGY | Facility: CLINIC | Age: 68
End: 2023-05-05

## 2023-05-05 ENCOUNTER — OFFICE VISIT (OUTPATIENT)
Dept: FAMILY MEDICINE CLINIC | Facility: CLINIC | Age: 68
End: 2023-05-05
Payer: MEDICARE

## 2023-05-05 ENCOUNTER — TELEMEDICINE (OUTPATIENT)
Dept: ENDOCRINOLOGY | Facility: CLINIC | Age: 68
End: 2023-05-05
Payer: MEDICARE

## 2023-05-05 VITALS
OXYGEN SATURATION: 95 % | SYSTOLIC BLOOD PRESSURE: 124 MMHG | HEART RATE: 98 BPM | DIASTOLIC BLOOD PRESSURE: 80 MMHG | WEIGHT: 216 LBS | BODY MASS INDEX: 39.75 KG/M2 | HEIGHT: 62 IN

## 2023-05-05 DIAGNOSIS — E87.6 HYPOKALEMIA: ICD-10-CM

## 2023-05-05 DIAGNOSIS — E78.2 MIXED HYPERLIPIDEMIA: ICD-10-CM

## 2023-05-05 DIAGNOSIS — Z00.00 MEDICARE ANNUAL WELLNESS VISIT, SUBSEQUENT: Primary | ICD-10-CM

## 2023-05-05 DIAGNOSIS — Z79.4 TYPE 2 DIABETES MELLITUS WITH HYPERGLYCEMIA, WITH LONG-TERM CURRENT USE OF INSULIN: Primary | ICD-10-CM

## 2023-05-05 DIAGNOSIS — R80.9 MICROALBUMINURIA DUE TO TYPE 2 DIABETES MELLITUS: ICD-10-CM

## 2023-05-05 DIAGNOSIS — J01.00 ACUTE NON-RECURRENT MAXILLARY SINUSITIS: ICD-10-CM

## 2023-05-05 DIAGNOSIS — E11.29 MICROALBUMINURIA DUE TO TYPE 2 DIABETES MELLITUS: ICD-10-CM

## 2023-05-05 DIAGNOSIS — I10 PRIMARY HYPERTENSION: ICD-10-CM

## 2023-05-05 DIAGNOSIS — E11.65 TYPE 2 DIABETES MELLITUS WITH HYPERGLYCEMIA, WITH LONG-TERM CURRENT USE OF INSULIN: Primary | ICD-10-CM

## 2023-05-05 RX ORDER — DOXYCYCLINE HYCLATE 100 MG/1
100 CAPSULE ORAL 2 TIMES DAILY
Qty: 14 CAPSULE | Refills: 0 | Status: SHIPPED | OUTPATIENT
Start: 2023-05-05 | End: 2023-05-12

## 2023-05-05 RX ORDER — POTASSIUM CHLORIDE 20 MEQ/1
20 TABLET, EXTENDED RELEASE ORAL DAILY PRN
Qty: 90 TABLET | Refills: 1 | Status: SHIPPED | OUTPATIENT
Start: 2023-05-05

## 2023-05-05 RX ORDER — INSULIN LISPRO-AABC 100 [IU]/ML
6 INJECTION, SOLUTION SUBCUTANEOUS AS NEEDED
COMMUNITY

## 2023-05-05 NOTE — PROGRESS NOTES
Chief Complaint  Diabetes    Subjective          Meeta Marmolejo presents to [facility] END[facility]  Diabetes         You have chosen to receive care through a telehealth visit.  Do you consent to use a video/audio connection for your medical care today? Yes            TELEHEALTH VIDEO VISIT     This a video visit due to [facility] [place] current guidelines for social distancing due to the COVID 19 pandemic    Mode of Visit: Video  Location of patient: home  You have chosen to receive care through a telehealth visit.  Does the patient consent to use a video/audio connection for your medical care today? Yes  The visit included audio and video interaction. No technical issues occurred during this visit.             67 year old female presents for follow up     Reason diabetes mellitus type 2     Diagnosed at age 31     Timing constant     Quality not controlled     Severity is moderate          Microvascular complications microalbuminuria    Current glucose regimen     Insulin, glp-1          Blood glucose monitoring    Checks 4 times daily     Now has claude        This am was 120     Am is under 120     Daytime under 180         Review of Systems - General ROS: negative            Objective   Vital Signs:   There were no vitals taken for this visit.    Physical Exam  Musculoskeletal:         General: Normal range of motion.   Neurological:      General: No focal deficit present.      Mental Status: She is alert.        Result Review :   The following data was reviewed by: ZELDA Freire on 03/28/2022:  Common labs        12/13/2022    09:30 1/20/2023    08:24 4/10/2023    10:12   Common Labs   Glucose 222   214   202     BUN 9   14   14     Creatinine 0.61   0.70   0.71     Sodium 135   140   141     Potassium 3.3   3.9   3.2     Chloride 99   101   98     Calcium 9.3   9.6   9.7     Albumin 4.40   4.2   4.5     Total Bilirubin 0.2   0.2   0.2     Alkaline Phosphatase 84   83   90      AST (SGOT) 11   22   16     ALT (SGPT) 11   17   17     WBC 7.12   7.30      Hemoglobin 12.3   12.4      Hematocrit 37.6   37.0      Platelets 294   227      Hemoglobin A1C  9.60                    Assessment and Plan    Diagnoses and all orders for this visit:    1. Type 2 diabetes mellitus with hyperglycemia, with long-term current use of insulin (Primary)  -     Hemoglobin A1c; Future  -     TSH; Future    2. Primary hypertension    3. Mixed hyperlipidemia    4. Microalbuminuria due to type 2 diabetes mellitus              Glycemic management     Diabetes mellitus type 2     Lab Results   Component Value Date    HGBA1C 9.60 (H) 01/20/2023     Using claude 2 - could not download      Taking metformin 1000 mg twice a day         Taking Levemir 35 units daily       If you ever wake up with a blood glucose level less than 80 decreased to 25         Ozempic  2 mg weekly         Lymjev   before meals as a scale--3 times daily before meals        Takes 6 units TID      + sliding scale      Less than 180 --- nothing  181-200 --- 2units  201-250  ---- 4 units  251-300  ----  6units  Above 300 ----- 8 units                       Stop Glipizide            No Jardiance since associated with GMI           ===========      Blood pressure management:      Hypertension          Taking losartan 100 mg once daily           ===========     Lipid Management         Dyslipidemia        Taking Lipitor 80 mg 1 at night        Total Cholesterol   Date Value Ref Range Status   03/09/2022 252 (H) 0 - 200 mg/dL Final     Triglycerides   Date Value Ref Range Status   03/09/2022 253 (H) 0 - 150 mg/dL Final     HDL Cholesterol   Date Value Ref Range Status   03/09/2022 50 40 - 60 mg/dL Final     LDL Cholesterol    Date Value Ref Range Status   03/09/2022 155 (H) 0 - 100 mg/dL Final     LDLCALC ELECT   Date Value Ref Range Status   01/12/2015 136 (H) 0 - 129 mg/dl Final     Comment:     LDL AVERAGE RISK: 130 - 159 MG/DL       add vascepa    ----------------------           Microvascular monitoring     Last eye exam -----Nov. 2021, no  DR      Cataract surgery 2021      No neuropathy     Positive for microalbuminuria                       ==========      osteoporosis     On alendronate for at least since 2013     I reviewed her bone density from 2013 and it was osteopenia     Stopped the alendronate     Jan. 2021     DXA oteopenia      Take calcium plus vitamin d            Other related aspects      Lab Results   Component Value Date    TSH 0.798 01/20/2023     Lab Results   Component Value Date    [id number] >2,000 (H) 04/10/2023                         Follow Up   Return in about 3 months (around 8/5/2023).  Patient was given instructions and counseling regarding her condition or for health maintenance advice. Please see specific information pulled into the AVS if appropriate.         This document has been electronically signed by ZELDA Freire on May 5, 2023 15:16 CDT.

## 2023-05-05 NOTE — PROGRESS NOTES
The ABCs of the Annual Wellness Visit  Subsequent Medicare Wellness Visit    Subjective    Meeta Marmolejo is a 67 y.o. female who presents for a Subsequent Medicare Wellness Visit.    The following portions of the patient's history were reviewed and   updated as appropriate: allergies, current medications, past family history, past medical history, past social history, past surgical history and problem list.    Compared to one year ago, the patient feels her physical   health is better.    Compared to one year ago, the patient feels her mental   health is the same.    Recent Hospitalizations:  She was not admitted to the hospital during the last year.       Current Medical Providers:  Patient Care Team:  Anupama Arnett MD as PCP - General (Family Medicine)  Elias Su MD as Consulting Physician (Orthopedic Surgery)  Oscar Braga MD as Consulting Physician (Internal Medicine)  Otf Nagy MD as Consulting Physician (Hematology and Oncology)  Estephania Felix APRN as Nurse Practitioner (Oncology)    Outpatient Medications Prior to Visit   Medication Sig Dispense Refill   • amLODIPine (NORVASC) 5 MG tablet TAKE 1 TABLET BY MOUTH DAILY 30 tablet 2   • baclofen (LIORESAL) 10 MG tablet TAKE 1 TABLET BY MOUTH THREE TIMES DAILY 90 tablet 0   • BD Pen Needle Ember 2nd Gen 32G X 4 MM misc USE WITH INSULIN PENS FOUR TIMES DAILY 100 each 2   • cetirizine (zyrTEC) 10 MG tablet TAKE 1 TABLET BY MOUTH DAILY 30 tablet 11   • Continuous Blood Gluc  (FreeStyle Unique 2 [place]) device 1 each Continuous. 1 each 0   • Continuous Blood Gluc Sensor (FreeStyle Unique 2 Sensor) misc 2 each Every 14 (Fourteen) Days. Use to get blood sugar readings 2 each 11   • docusate sodium (COLACE) 100 MG capsule TAKE ONE CAPSULE BY MOUTH TWICE DAILY 60 capsule 2   • ferrous sulfate (FeroSul) 325 (65 FE) MG tablet Take 1 tablet by mouth Daily With Breakfast. 90 tablet 0   • furosemide (LASIX) 20 MG tablet TAKE 1 TABLET  BY MOUTH DAILY AS NEEDED FOR LEG SWELLING 90 tablet 3   • Glucagon, rDNA, (Glucagon Emergency) 1 MG kit Inject 1 mg under the skin into the appropriate area as directed 1 (One) Time As Needed (hypoglcyemia) for up to 1 dose. 1 each 1   • Glucose Blood (Blood Glucose Test) strip Use 4 x daily use any brand covered by insurance or same brand as before ICD10 code is E11.9 120 each 11   • glucose blood test strip Accu-Chek Guide test strips   TEST BLOOD SUGAR 3-4 TIMES DAILY     • insulin aspart (novoLOG FLEXPEN) 100 UNIT/ML solution pen-injector sc pen insulin aspart (U-100) 100 unit/mL (3 mL) subcutaneous pen   ADMINISTER 20 UNITS UNDER THE SKIN THREE TIMES DAILY WITH MEALS AS DIRECTED     • insulin detemir (Levemir FlexTouch) 100 UNIT/ML injection Inject 30 units into appropriate area as directed daily. 30 mL 5   • Insulin Lispro-aabc, 1 U Dial, (Lyumjev KwikPen) 100 UNIT/ML solution pen-injector Inject 6 Units under the skin into the appropriate area as directed As Needed.     • Lancets misc Test 4 times daily , E11.65 120 each 11   • losartan (COZAAR) 100 MG tablet TAKE 1 TABLET BY MOUTH DAILY 90 tablet 3   • metFORMIN (GLUCOPHAGE) 1000 MG tablet Take 1 tablet by mouth 2 (Two) Times a Day With Meals. 60 tablet 5   • metoprolol tartrate (LOPRESSOR) 25 MG tablet TAKE 1 TABLET BY MOUTH TWICE DAILY 180 tablet 3   • montelukast (SINGULAIR) 10 MG tablet TAKE 1 TABLET BY MOUTH EVERY DAY 30 tablet 3   • omeprazole (priLOSEC) 40 MG capsule TAKE 1 CAPSULE BY MOUTH DAILY 30 capsule 2   • polyethylene glycol (MIRALAX) 17 g packet Take 17 g by mouth Daily As Needed (constipation). 30 each 2   • rivaroxaban (Xarelto) 20 MG tablet Take 1 tablet by mouth Daily With Dinner. 30 tablet 3   • Semaglutide, 1 MG/DOSE, (Ozempic, 1 MG/DOSE,) 4 MG/3ML solution pen-injector Inject 1 mg under the skin into the appropriate area as directed 1 (One) Time Per Week. 9 mL 11   • vitamin B-12 (CYANOCOBALAMIN) 1000 MCG tablet Take 1 tablet by mouth  "Daily. 100 tablet 3     No facility-administered medications prior to visit.       No opioid medication identified on active medication list. I have reviewed chart for other potential  high risk medication/s and harmful drug interactions in the elderly.          Aspirin is not on active medication list.  Aspirin use is not indicated based on review of current medical condition/s. Risk of harm outweighs potential benefits.  .    Patient Active Problem List   Diagnosis   • Type 2 diabetes mellitus with hypoglycemia without coma, with long-term current use of insulin (HCC)   • Chronic osteoarthritis   • Plantar fasciitis   • Lateral epicondylitis of right elbow   • Primary hypertension   • Repetitive strain injury of lower back   • History of total knee replacement   • Bilateral hip pain   • Mechanical loosening of prosthetic knee   • Anemia   • Lupus anticoagulant syndrome   • Aorto-iliac atherosclerosis   • Generalized osteoarthritis   • History of shoulder surgery   • Mixed hyperlipidemia   • Microalbuminuria due to type 2 diabetes mellitus (HCC)   • Multiple joint pain   • Osteoporosis   • Perennial allergic rhinitis   • Subclinical hyperthyroidism   • Thyroid nodule   • Benign essential hypertension   • Gastroesophageal reflux disease   • Osteopenia   • Seasonal allergies   • Body mass index (BMI) of 37.0 to 37.9 in adult   • Vitamin D deficiency     Advance Care Planning   Advance Care Planning     Advance Directive is not on file.  ACP discussion was held with the patient during this visit. Patient does not have an advance directive, information provided.     Objective    Vitals:    05/05/23 0848   BP: 124/80   Pulse: 98   SpO2: 95%   Weight: 98 kg (216 lb)   Height: 157.5 cm (62\")   PainSc: 0-No pain     Estimated body mass index is 39.51 kg/m² as calculated from the following:    Height as of this encounter: 157.5 cm (62\").    Weight as of this encounter: 98 kg (216 lb).    Class 2 Severe Obesity (BMI >=35 and " <=39.9). Obesity-related health conditions include the following: hypertension and diabetes mellitus. Obesity is unchanged. BMI is is above average; BMI management plan is completed. We discussed low calorie, low carb based diet program, portion control and increasing exercise.      Does the patient have evidence of cognitive impairment? No          HEALTH RISK ASSESSMENT    Smoking Status:  Social History     Tobacco Use   Smoking Status Former   • Packs/day: 1.00   • Years: 6.00   • Pack years: 6.00   • Types: Cigarettes   • Quit date:    • Years since quittin.3   Smokeless Tobacco Never     Alcohol Consumption:  Social History     Substance and Sexual Activity   Alcohol Use No     Fall Risk Screen:    BARBARAADI Fall Risk Assessment has not been completed.    Depression Screenin/13/2022    10:03 AM   PHQ-2/PHQ-9 Depression Screening   Little Interest or Pleasure in Doing Things 0-->not at all   Feeling Down, Depressed or Hopeless 0-->not at all   PHQ-9: Brief Depression Severity Measure Score 0       Health Habits and Functional and Cognitive Screenin/6/2021     9:08 AM   Functional & Cognitive Status   Do you have difficulty preparing food and eating? No   Do you have difficulty bathing yourself, getting dressed or grooming yourself? No   Do you have difficulty using the toilet? No   Do you have difficulty moving around from place to place? No   Do you have trouble with steps or getting out of a bed or a chair? Yes   Current Diet Well Balanced Diet   Dental Exam Up to date   Eye Exam Up to date   Exercise (times per week) 7 times per week   Current Exercise Activities Include Housecleaning   Do you need help using the phone?  No   Are you deaf or do you have serious difficulty hearing?  No   Do you need help with transportation? No   Do you need help shopping? No   Do you need help preparing meals?  No   Do you need help with housework?  No   Do you need help with laundry? No   Do you  need help taking your medications? No   Do you need help managing money? No   Do you ever drive or ride in a car without wearing a seat belt? No   Have you felt unusual stress, anger or loneliness in the last month? Yes   Who do you live with? Spouse   If you need help, do you have trouble finding someone available to you? No   Have you been bothered in the last four weeks by sexual problems? No   Do you have difficulty concentrating, remembering or making decisions? No       Age-appropriate Screening Schedule:  Refer to the list below for future screening recommendations based on patient's age, sex and/or medical conditions. Orders for these recommended tests are listed in the plan section. The patient has been provided with a written plan.    Health Maintenance   Topic Date Due   • ANNUAL WELLNESS VISIT  01/06/2022   • DXA SCAN  02/03/2023   • LIPID PANEL  03/09/2023   • URINE MICROALBUMIN  03/09/2023   • DIABETIC EYE EXAM  05/24/2023   • HEMOGLOBIN A1C  07/20/2023   • PAP SMEAR  07/21/2023   • INFLUENZA VACCINE  08/01/2023   • DIABETIC FOOT EXAM  08/16/2023   • Pneumococcal Vaccine 65+ (3 - PPSV23 if available, else PCV20) 01/10/2025   • MAMMOGRAM  02/27/2025   • TDAP/TD VACCINES (3 - Td or Tdap) 02/05/2028   • COLORECTAL CANCER SCREENING  05/18/2028   • HEPATITIS C SCREENING  Completed   • COVID-19 Vaccine  Completed   • ZOSTER VACCINE  Discontinued                  CMS Preventative Services Quick Reference  Risk Factors Identified During Encounter  None Identified  The above risks/problems have been discussed with the patient.  Pertinent information has been shared with the patient in the After Visit Summary.  An After Visit Summary and PPPS were made available to the patient.    Follow Up:   Next Medicare Wellness visit to be scheduled in 1 year.         Additional E&M Note during same encounter follows:  Patient has multiple medical problems which are significant and separately identifiable that require  "additional work above and beyond the Medicare Wellness Visit.      Chief Complaint  Hypertension, Medicare Wellness-subsequent, and Sinusitis    Subjective        HPI  Meeta Marmolejo is also being seen today for sinus problem.  Seen at outside facility for 4/28/2023.  Diagnosed with viral URI.  Tested negative for COVID/Flu/Strep at that time.  Persistent symptoms despite conservative measures.  Has sinus pressure.         Objective   Vital Signs:  /80   Pulse 98   Ht 157.5 cm (62\")   Wt 98 kg (216 lb)   SpO2 95%   BMI 39.51 kg/m²     Physical Exam  Vitals reviewed.   Constitutional:       General: She is not in acute distress.     Appearance: She is well-developed.   Cardiovascular:      Rate and Rhythm: Normal rate and regular rhythm.      Heart sounds: Normal heart sounds. No murmur heard.  Pulmonary:      Effort: Pulmonary effort is normal. No respiratory distress.      Breath sounds: Normal breath sounds. No wheezing or rales.   Skin:     General: Skin is warm and dry.   Neurological:      Mental Status: She is alert and oriented to person, place, and time.        The following data was reviewed by: Anupama Arnett MD on 05/05/2023:  CMP        4/10/2023    10:12   CMP   Glucose 202     BUN 14     Creatinine 0.71     EGFR 93.9     Sodium 141     Potassium 3.2     Chloride 98     Calcium 9.7     Total Protein 7.9     Albumin 4.5     Globulin 3.4     Total Bilirubin 0.2     Alkaline Phosphatase 90     AST (SGOT) 16     ALT (SGPT) 17     Albumin/Globulin Ratio 1.3     BUN/Creatinine Ratio 19.7     Anion Gap 18.0                  Assessment and Plan   Diagnoses and all orders for this visit:    1. Medicare annual wellness visit, subsequent (Primary)    2. Acute non-recurrent maxillary sinusitis  -     doxycycline (VIBRAMYCIN) 100 MG capsule; Take 1 capsule by mouth 2 (Two) Times a Day for 7 days.  Dispense: 14 capsule; Refill: 0    3. Hypokalemia  -     potassium chloride (K-DUR,KLOR-CON) 20 MEQ " CR tablet; Take 1 tablet by mouth Daily As Needed (with lasix (water pill)).  Dispense: 90 tablet; Refill: 1      Acute sinusitis - treat with doxycycline  Call with worsening or persistent symptoms  Potassium supplement, hypokalemia on last labs  Take potassium on days that she uses lasix         Follow Up   Return in about 6 months (around 11/5/2023) for Recheck.  Patient was given instructions and counseling regarding her condition or for health maintenance advice. Please see specific information pulled into the AVS if appropriate.           This document has been electronically signed by Anupama Arnett MD

## 2023-05-09 DIAGNOSIS — D50.8 OTHER IRON DEFICIENCY ANEMIA: Chronic | ICD-10-CM

## 2023-05-09 RX ORDER — DOCUSATE SODIUM 100 MG/1
CAPSULE, LIQUID FILLED ORAL
Qty: 60 CAPSULE | Refills: 2 | Status: SHIPPED | OUTPATIENT
Start: 2023-05-09

## 2023-05-17 DIAGNOSIS — D68.62 LUPUS ANTICOAGULANT SYNDROME: ICD-10-CM

## 2023-05-17 RX ORDER — MONTELUKAST SODIUM 10 MG/1
TABLET ORAL
Qty: 30 TABLET | Refills: 3 | Status: SHIPPED | OUTPATIENT
Start: 2023-05-17

## 2023-05-17 RX ORDER — RIVAROXABAN 20 MG/1
TABLET, FILM COATED ORAL
Qty: 30 TABLET | Refills: 3 | Status: SHIPPED | OUTPATIENT
Start: 2023-05-17

## 2023-05-19 DIAGNOSIS — J30.2 SEASONAL ALLERGIES: ICD-10-CM

## 2023-05-19 RX ORDER — CETIRIZINE HYDROCHLORIDE 10 MG/1
10 TABLET ORAL DAILY
Qty: 30 TABLET | Refills: 11 | Status: SHIPPED | OUTPATIENT
Start: 2023-05-19

## 2023-05-19 NOTE — TELEPHONE ENCOUNTER
Incoming Refill Request      Medication requested (name and dose): cetirizine (zyrTEC) 10 MG tablet    Pharmacy where request should be sent: [facility] in [place] on [facility]    Additional details provided by patient:     Best call back number: [phone]    Does the patient have less than a 3 day supply:  [x] Yes  [] No    Reuben Balbuena Rep  05/19/23, 09:22 CDT

## 2023-06-03 DIAGNOSIS — E11.65 TYPE 2 DIABETES MELLITUS WITH HYPERGLYCEMIA, WITH LONG-TERM CURRENT USE OF INSULIN: ICD-10-CM

## 2023-06-03 DIAGNOSIS — Z79.4 TYPE 2 DIABETES MELLITUS WITH HYPERGLYCEMIA, WITH LONG-TERM CURRENT USE OF INSULIN: ICD-10-CM

## 2023-06-05 RX ORDER — INSULIN DETEMIR 100 [IU]/ML
INJECTION, SOLUTION SUBCUTANEOUS
Qty: 12 ML | Refills: 3 | Status: SHIPPED | OUTPATIENT
Start: 2023-06-05

## 2023-06-11 DIAGNOSIS — I10 BENIGN ESSENTIAL HYPERTENSION: ICD-10-CM

## 2023-06-12 DIAGNOSIS — K21.9 GASTROESOPHAGEAL REFLUX DISEASE, UNSPECIFIED WHETHER ESOPHAGITIS PRESENT: ICD-10-CM

## 2023-06-12 DIAGNOSIS — I50.32 CHRONIC DIASTOLIC (CONGESTIVE) HEART FAILURE: ICD-10-CM

## 2023-06-12 RX ORDER — OMEPRAZOLE 40 MG/1
40 CAPSULE, DELAYED RELEASE ORAL DAILY
Qty: 30 CAPSULE | Refills: 2 | Status: SHIPPED | OUTPATIENT
Start: 2023-06-12

## 2023-06-12 RX ORDER — LOSARTAN POTASSIUM 100 MG/1
100 TABLET ORAL DAILY
Qty: 90 TABLET | Refills: 3 | Status: SHIPPED | OUTPATIENT
Start: 2023-06-12

## 2023-06-12 RX ORDER — FUROSEMIDE 20 MG/1
TABLET ORAL
Qty: 90 TABLET | Refills: 3 | Status: SHIPPED | OUTPATIENT
Start: 2023-06-12

## 2023-06-13 ENCOUNTER — TELEPHONE (OUTPATIENT)
Dept: CARDIOLOGY | Facility: CLINIC | Age: 68
End: 2023-06-13
Payer: MEDICARE

## 2023-06-13 DIAGNOSIS — R07.9 CHEST PAIN, UNSPECIFIED TYPE: ICD-10-CM

## 2023-06-13 NOTE — TELEPHONE ENCOUNTER
Attempted to call pt over follow up ajay. Not available at this time.       ----- Message from ZELDA Romero sent at 6/13/2023  4:07 PM CDT -----  She does not have a follow-up appointment with Dr. Jhaveri.  Was supposed to see her again in 2 months.  Lost to follow-up     I am not for sure he would refill this unless there is a follow-up appointment.  Please call her and see what she wants to do.  Her PCP can also refill this.      Metoprolol tartrate

## 2023-07-25 ENCOUNTER — TELEPHONE (OUTPATIENT)
Dept: FAMILY MEDICINE CLINIC | Facility: CLINIC | Age: 68
End: 2023-07-25
Payer: MEDICARE

## 2023-07-25 NOTE — TELEPHONE ENCOUNTER
Called and let patient know that DR. Arnett didn't have any openings today-she said that was fine and she would just go to .

## 2023-07-25 NOTE — TELEPHONE ENCOUNTER
Pt is staying with  at the hospital and couldn't get to the appt yesterday and says a knot has come up on her left wrist and is easily seen and wanted to know if there was anyway she could see Dr Arnett . The knot is getting larger and moves when she moves her thumb.  [phone]  please let her know and she will go to urgent care

## 2023-08-02 ENCOUNTER — OFFICE VISIT (OUTPATIENT)
Dept: FAMILY MEDICINE CLINIC | Facility: CLINIC | Age: 68
End: 2023-08-02
Payer: MEDICARE

## 2023-08-02 VITALS
HEART RATE: 90 BPM | HEIGHT: 62 IN | WEIGHT: 218 LBS | TEMPERATURE: 98.3 F | BODY MASS INDEX: 40.12 KG/M2 | DIASTOLIC BLOOD PRESSURE: 74 MMHG | OXYGEN SATURATION: 98 % | SYSTOLIC BLOOD PRESSURE: 130 MMHG

## 2023-08-02 DIAGNOSIS — M79.89 PALPABLE MASS OF SOFT TISSUE OF WRIST: ICD-10-CM

## 2023-08-02 DIAGNOSIS — M25.532 LEFT WRIST PAIN: Primary | ICD-10-CM

## 2023-08-02 PROCEDURE — 3075F SYST BP GE 130 - 139MM HG: CPT | Performed by: NURSE PRACTITIONER

## 2023-08-02 PROCEDURE — 99213 OFFICE O/P EST LOW 20 MIN: CPT | Performed by: NURSE PRACTITIONER

## 2023-08-02 PROCEDURE — 3046F HEMOGLOBIN A1C LEVEL >9.0%: CPT | Performed by: NURSE PRACTITIONER

## 2023-08-02 PROCEDURE — 3078F DIAST BP <80 MM HG: CPT | Performed by: NURSE PRACTITIONER

## 2023-08-07 DIAGNOSIS — D68.62 LUPUS ANTICOAGULANT SYNDROME: ICD-10-CM

## 2023-08-07 RX ORDER — RIVAROXABAN 20 MG/1
TABLET, FILM COATED ORAL
Qty: 30 TABLET | Refills: 3 | Status: SHIPPED | OUTPATIENT
Start: 2023-08-07

## 2023-08-11 ENCOUNTER — TELEPHONE (OUTPATIENT)
Dept: FAMILY MEDICINE CLINIC | Facility: CLINIC | Age: 68
End: 2023-08-11
Payer: MEDICARE

## 2023-08-11 NOTE — TELEPHONE ENCOUNTER
For whatever reason, xray results did not get routed to me, so I found today, so please apologize for the delay.     NDICATION:  left wrist pain, knot.     COMPARISON:  None relevant.     FINDINGS:  No fracture, dislocation, or erosion.  Mild osteoarthritis involving the thumb  CMC and triscaphe joints.     Mild arthritis, but no acute findings noted.  Will proceed with ortho referral as discussed in office.

## 2023-08-11 NOTE — TELEPHONE ENCOUNTER
Patient notified of xray result and to expect a call from Ortho.  I did apologize for delay in getting her result called.

## 2023-08-14 ENCOUNTER — LAB (OUTPATIENT)
Dept: LAB | Facility: HOSPITAL | Age: 68
End: 2023-08-14
Payer: MEDICARE

## 2023-08-14 ENCOUNTER — TELEPHONE (OUTPATIENT)
Dept: ONCOLOGY | Facility: CLINIC | Age: 68
End: 2023-08-14
Payer: MEDICARE

## 2023-08-14 DIAGNOSIS — R79.0 ABNORMAL LEVEL OF BLOOD MINERAL: ICD-10-CM

## 2023-08-14 DIAGNOSIS — N18.2 CHRONIC KIDNEY DISEASE, STAGE 2 (MILD): ICD-10-CM

## 2023-08-14 DIAGNOSIS — D68.62 LUPUS ANTICOAGULANT SYNDROME: ICD-10-CM

## 2023-08-14 DIAGNOSIS — Z79.4 TYPE 2 DIABETES MELLITUS WITH HYPERGLYCEMIA, WITH LONG-TERM CURRENT USE OF INSULIN: ICD-10-CM

## 2023-08-14 DIAGNOSIS — E11.65 TYPE 2 DIABETES MELLITUS WITH HYPERGLYCEMIA, WITH LONG-TERM CURRENT USE OF INSULIN: ICD-10-CM

## 2023-08-14 PROCEDURE — 84466 ASSAY OF TRANSFERRIN: CPT

## 2023-08-14 PROCEDURE — 83036 HEMOGLOBIN GLYCOSYLATED A1C: CPT

## 2023-08-14 PROCEDURE — 83540 ASSAY OF IRON: CPT

## 2023-08-14 PROCEDURE — 80053 COMPREHEN METABOLIC PANEL: CPT

## 2023-08-14 PROCEDURE — 84443 ASSAY THYROID STIM HORMONE: CPT

## 2023-08-14 PROCEDURE — 85025 COMPLETE CBC W/AUTO DIFF WBC: CPT

## 2023-08-14 PROCEDURE — 82728 ASSAY OF FERRITIN: CPT

## 2023-08-14 NOTE — TELEPHONE ENCOUNTER
Pt called regarding lab results not received yet for tomorrow's appt with Estephania NDIAYE. Pt voiced will get drawn in [place] this afternoon.

## 2023-08-15 ENCOUNTER — OFFICE VISIT (OUTPATIENT)
Dept: ONCOLOGY | Facility: CLINIC | Age: 68
End: 2023-08-15
Payer: MEDICARE

## 2023-08-15 VITALS
HEART RATE: 94 BPM | WEIGHT: 216 LBS | RESPIRATION RATE: 18 BRPM | SYSTOLIC BLOOD PRESSURE: 155 MMHG | DIASTOLIC BLOOD PRESSURE: 73 MMHG | BODY MASS INDEX: 39.51 KG/M2 | OXYGEN SATURATION: 94 %

## 2023-08-15 DIAGNOSIS — D50.9 IRON DEFICIENCY ANEMIA, UNSPECIFIED IRON DEFICIENCY ANEMIA TYPE: Primary | Chronic | ICD-10-CM

## 2023-08-15 DIAGNOSIS — D68.62 LUPUS ANTICOAGULANT SYNDROME: ICD-10-CM

## 2023-08-15 LAB
ALBUMIN SERPL-MCNC: 4.3 G/DL (ref 3.5–5.2)
ALBUMIN/GLOB SERPL: 1.2 G/DL
ALP SERPL-CCNC: 76 U/L (ref 39–117)
ALT SERPL W P-5'-P-CCNC: 15 U/L (ref 1–33)
ANION GAP SERPL CALCULATED.3IONS-SCNC: 17 MMOL/L (ref 5–15)
AST SERPL-CCNC: 21 U/L (ref 1–32)
BASOPHILS # BLD AUTO: 0.07 10*3/MM3 (ref 0–0.2)
BASOPHILS NFR BLD AUTO: 1 % (ref 0–1.5)
BILIRUB SERPL-MCNC: <0.2 MG/DL (ref 0–1.2)
BUN SERPL-MCNC: 14 MG/DL (ref 8–23)
BUN/CREAT SERPL: 19.2 (ref 7–25)
CALCIUM SPEC-SCNC: 9.4 MG/DL (ref 8.6–10.5)
CHLORIDE SERPL-SCNC: 101 MMOL/L (ref 98–107)
CO2 SERPL-SCNC: 19 MMOL/L (ref 22–29)
CREAT SERPL-MCNC: 0.73 MG/DL (ref 0.57–1)
DEPRECATED RDW RBC AUTO: 43.5 FL (ref 37–54)
EGFRCR SERPLBLD CKD-EPI 2021: 90.8 ML/MIN/1.73
EOSINOPHIL # BLD AUTO: 0.08 10*3/MM3 (ref 0–0.4)
EOSINOPHIL NFR BLD AUTO: 1.2 % (ref 0.3–6.2)
ERYTHROCYTE [DISTWIDTH] IN BLOOD BY AUTOMATED COUNT: 14.2 % (ref 12.3–15.4)
FERRITIN SERPL-MCNC: 298.6 NG/ML (ref 13–150)
GLOBULIN UR ELPH-MCNC: 3.5 GM/DL
GLUCOSE SERPL-MCNC: 213 MG/DL (ref 65–99)
HBA1C MFR BLD: 9.8 % (ref 4.8–5.6)
HCT VFR BLD AUTO: 38.9 % (ref 34–46.6)
HGB BLD-MCNC: 12.5 G/DL (ref 12–15.9)
IMM GRANULOCYTES # BLD AUTO: 0.03 10*3/MM3 (ref 0–0.05)
IMM GRANULOCYTES NFR BLD AUTO: 0.4 % (ref 0–0.5)
IRON 24H UR-MRATE: 57 MCG/DL (ref 37–145)
IRON SATN MFR SERPL: 16 % (ref 20–50)
LYMPHOCYTES # BLD AUTO: 2.39 10*3/MM3 (ref 0.7–3.1)
LYMPHOCYTES NFR BLD AUTO: 34.5 % (ref 19.6–45.3)
MCH RBC QN AUTO: 27.2 PG (ref 26.6–33)
MCHC RBC AUTO-ENTMCNC: 32.1 G/DL (ref 31.5–35.7)
MCV RBC AUTO: 84.6 FL (ref 79–97)
MONOCYTES # BLD AUTO: 0.44 10*3/MM3 (ref 0.1–0.9)
MONOCYTES NFR BLD AUTO: 6.4 % (ref 5–12)
NEUTROPHILS NFR BLD AUTO: 3.91 10*3/MM3 (ref 1.7–7)
NEUTROPHILS NFR BLD AUTO: 56.5 % (ref 42.7–76)
NRBC BLD AUTO-RTO: 0 /100 WBC (ref 0–0.2)
PLATELET # BLD AUTO: 284 10*3/MM3 (ref 140–450)
PMV BLD AUTO: 12.3 FL (ref 6–12)
POTASSIUM SERPL-SCNC: 4.5 MMOL/L (ref 3.5–5.2)
PROT SERPL-MCNC: 7.8 G/DL (ref 6–8.5)
RBC # BLD AUTO: 4.6 10*6/MM3 (ref 3.77–5.28)
SODIUM SERPL-SCNC: 137 MMOL/L (ref 136–145)
TIBC SERPL-MCNC: 349 MCG/DL (ref 298–536)
TRANSFERRIN SERPL-MCNC: 234 MG/DL (ref 200–360)
TSH SERPL DL<=0.05 MIU/L-ACNC: 0.29 UIU/ML (ref 0.27–4.2)
WBC NRBC COR # BLD: 6.92 10*3/MM3 (ref 3.4–10.8)

## 2023-08-16 ENCOUNTER — TELEMEDICINE (OUTPATIENT)
Dept: ENDOCRINOLOGY | Facility: CLINIC | Age: 68
End: 2023-08-16
Payer: MEDICARE

## 2023-08-16 ENCOUNTER — TELEPHONE (OUTPATIENT)
Dept: ONCOLOGY | Facility: CLINIC | Age: 68
End: 2023-08-16
Payer: MEDICARE

## 2023-08-16 DIAGNOSIS — E11.29 MICROALBUMINURIA DUE TO TYPE 2 DIABETES MELLITUS: ICD-10-CM

## 2023-08-16 DIAGNOSIS — Z79.4 TYPE 2 DIABETES MELLITUS WITH HYPERGLYCEMIA, WITH LONG-TERM CURRENT USE OF INSULIN: ICD-10-CM

## 2023-08-16 DIAGNOSIS — E78.2 MIXED HYPERLIPIDEMIA: ICD-10-CM

## 2023-08-16 DIAGNOSIS — D50.8 OTHER IRON DEFICIENCY ANEMIA: Chronic | ICD-10-CM

## 2023-08-16 DIAGNOSIS — R80.9 MICROALBUMINURIA DUE TO TYPE 2 DIABETES MELLITUS: ICD-10-CM

## 2023-08-16 DIAGNOSIS — E55.9 VITAMIN D DEFICIENCY: ICD-10-CM

## 2023-08-16 DIAGNOSIS — I10 PRIMARY HYPERTENSION: Primary | ICD-10-CM

## 2023-08-16 DIAGNOSIS — E11.65 TYPE 2 DIABETES MELLITUS WITH HYPERGLYCEMIA, WITH LONG-TERM CURRENT USE OF INSULIN: ICD-10-CM

## 2023-08-16 RX ORDER — INSULIN DETEMIR 100 [IU]/ML
INJECTION, SOLUTION SUBCUTANEOUS
Qty: 12 ML | Refills: 3 | Status: SHIPPED | OUTPATIENT
Start: 2023-08-16

## 2023-08-16 RX ORDER — INSULIN LISPRO-AABC 100 [IU]/ML
20 INJECTION, SOLUTION SUBCUTANEOUS 3 TIMES DAILY
Qty: 15 ML | Refills: 6 | Status: SHIPPED | OUTPATIENT
Start: 2023-08-16

## 2023-08-16 RX ORDER — SEMAGLUTIDE 2.68 MG/ML
2 INJECTION, SOLUTION SUBCUTANEOUS WEEKLY
Qty: 3 ML | Refills: 5 | Status: SHIPPED | OUTPATIENT
Start: 2023-08-16

## 2023-08-16 RX ORDER — FERROUS SULFATE 325(65) MG
1 TABLET ORAL
Qty: 90 TABLET | Refills: 0 | Status: SHIPPED | OUTPATIENT
Start: 2023-08-16

## 2023-08-16 RX ORDER — LANOLIN ALCOHOL/MO/W.PET/CERES
1000 CREAM (GRAM) TOPICAL DAILY
Qty: 100 TABLET | Refills: 3 | Status: SHIPPED | OUTPATIENT
Start: 2023-08-16

## 2023-08-16 NOTE — PROGRESS NOTES
Please let her know that her labs area stable and to continue her medication; I will send new RX to pharmacy

## 2023-08-16 NOTE — PROGRESS NOTES
Chief Complaint  Diabetes    Subjective          Meeta Marmolejo presents to [facility]  Diabetes       You have chosen to receive care through a telehealth visit.  Do you consent to use a video/audio connection for your medical care today? Yes            TELEHEALTH VIDEO VISIT     This a video visit due to [facility] current guidelines for social distancing due to the COVID 19 pandemic    Mode of Visit: Video  Location of patient: home  You have chosen to receive care through a telehealth visit.  Does the patient consent to use a video/audio connection for your medical care today? Yes  The visit included audio and video interaction. No technical issues occurred during this visit.             67 year old female presents for follow up     Diabetes mellitus type 2     Duration diagnosed at age 31       Timing constant     Quality not controlled    Severity moderate     Complications -- microalbuminuria       Current glucose regimen     Insulin, glp-1          Blood glucose monitoring    Checks 4 times daily     Now has claude      She states at goal     Am under 118     States never above 200         Review of Systems - General ROS: negative            Objective   Vital Signs:   There were no vitals taken for this visit.    Physical Exam  Musculoskeletal:         General: Normal range of motion.   Neurological:      General: No focal deficit present.      Mental Status: She is alert.      Result Review :   The following data was reviewed by: ZELDA Freire on 03/28/2022:  Common labs          4/10/2023    10:12 6/23/2023    15:28 8/14/2023    12:07   Common Labs   Glucose 202  171  213    BUN 14  13  14    Creatinine 0.71  0.80  0.73    Sodium 141  139  137    Potassium 3.2  3.7  4.5    Chloride 98  102  101    Calcium 9.7  10.0  9.4    Albumin 4.5  4.4  4.3    Total Bilirubin 0.2  <0.2  <0.2    Alkaline Phosphatase 90  74  76    AST (SGOT) 16  18  21    ALT (SGPT) 17   18  15    WBC  7.69  6.92    Hemoglobin  12.9  12.5    Hematocrit  38.8  38.9    Platelets  285  284    Hemoglobin A1C   9.80                Assessment and Plan    Diagnoses and all orders for this visit:    1. Primary hypertension (Primary)  -     CBC & Differential; Future  -     Comprehensive Metabolic Panel; Future  -     Hemoglobin A1c; Future  -     Lipid Panel; Future  -     Microalbumin / Creatinine Urine Ratio - Urine, Clean Catch; Future  -     TSH; Future  -     Vitamin B12; Future  -     Vitamin D,25-Hydroxy; Future    2. Type 2 diabetes mellitus with hyperglycemia, with long-term current use of insulin  -     insulin detemir (Levemir FlexPen) 100 UNIT/ML injection; ADMINISTER 35 UNITS UNDER THE SKIN DAILY AS DIRECTED  Dispense: 12 mL; Refill: 3  -     metFORMIN (GLUCOPHAGE) 1000 MG tablet; Take 1 tablet by mouth 2 (Two) Times a Day With Meals.  Dispense: 60 tablet; Refill: 5  -     Continuous Blood Gluc Sensor (FreeStyle Unique 2 Sensor) misc; 2 each Every 14 (Fourteen) Days. Use to get blood sugar readings  Dispense: 2 each; Refill: 11  -     CBC & Differential; Future  -     Comprehensive Metabolic Panel; Future  -     Hemoglobin A1c; Future  -     Lipid Panel; Future  -     Microalbumin / Creatinine Urine Ratio - Urine, Clean Catch; Future  -     TSH; Future  -     Vitamin B12; Future  -     Vitamin D,25-Hydroxy; Future    3. Mixed hyperlipidemia  -     CBC & Differential; Future  -     Comprehensive Metabolic Panel; Future  -     Hemoglobin A1c; Future  -     Lipid Panel; Future  -     Microalbumin / Creatinine Urine Ratio - Urine, Clean Catch; Future  -     TSH; Future  -     Vitamin B12; Future  -     Vitamin D,25-Hydroxy; Future    4. Microalbuminuria due to type 2 diabetes mellitus  -     CBC & Differential; Future  -     Comprehensive Metabolic Panel; Future  -     Hemoglobin A1c; Future  -     Lipid Panel; Future  -     Microalbumin / Creatinine Urine Ratio - Urine, Clean Catch; Future  -      TSH; Future  -     Vitamin B12; Future  -     Vitamin D,25-Hydroxy; Future    5. Vitamin D deficiency  -     CBC & Differential; Future  -     Comprehensive Metabolic Panel; Future  -     Hemoglobin A1c; Future  -     Lipid Panel; Future  -     Microalbumin / Creatinine Urine Ratio - Urine, Clean Catch; Future  -     TSH; Future  -     Vitamin B12; Future  -     Vitamin D,25-Hydroxy; Future    Other orders  -     Insulin Lispro-aabc, 1 U Dial, (Lyumjev KwikPen) 100 UNIT/ML solution pen-injector; Inject 20 Units under the skin into the appropriate area as directed 3 (Three) Times a Day.  Dispense: 15 mL; Refill: 6  -     Semaglutide, 2 MG/DOSE, (Ozempic, 2 MG/DOSE,) 8 MG/3ML solution pen-injector; Inject 2 mg under the skin into the appropriate area as directed 1 (One) Time Per Week.  Dispense: 3 mL; Refill: 5                Glycemic management     Diabetes mellitus type 2     Lab Results   Component Value Date    HGBA1C 9.80 (H) 08/14/2023         Using claude 2 - could not download            Taking metformin 1000 mg twice a day         Taking Levemir 35 units daily       If you ever wake up with a blood glucose level less than 80 decreased to 25         Ozempic  1 mg weekly -increase to 2 mg         Lymjev   before meals as a scale--3 times daily before meals        Takes 6 units TID --- increase up to 10 units      + sliding scale      Less than 180 --- nothing  181-200 --- 2units  201-250  ---- 4 units  251-300  ----  6units  Above 300 ----- 8 units                       Stop Glipizide            No Jardiance since associated with GMI           ===========      Blood pressure management:      Hypertension          Taking losartan 100 mg once daily           ===========     Lipid Management         Dyslipidemia        Taking Lipitor 80 mg 1 at night        Total Cholesterol   Date Value Ref Range Status   03/09/2022 252 (H) 0 - 200 mg/dL Final     Triglycerides   Date Value Ref Range Status   03/09/2022 253 (H) 0  - 150 mg/dL Final     HDL Cholesterol   Date Value Ref Range Status   03/09/2022 50 40 - 60 mg/dL Final     LDL Cholesterol    Date Value Ref Range Status   03/09/2022 155 (H) 0 - 100 mg/dL Final     LDLCALC ELECT   Date Value Ref Range Status   01/12/2015 136 (H) 0 - 129 mg/dl Final     Comment:     LDL AVERAGE RISK: 130 - 159 MG/DL       add vascepa   ----------------------           Microvascular monitoring     Last eye exam -----Nov. 2021, no  DR      Cataract surgery 2021      No neuropathy     Positive for microalbuminuria                       ==========      osteoporosis     On alendronate for at least since 2013     I reviewed her bone density from 2013 and it was osteopenia     Stopped the alendronate     Jan. 2021     DXA oteopenia      Take calcium plus vitamin d            Other related aspects      Lab Results   Component Value Date    TSH 0.289 08/14/2023     Lab Results   Component Value Date    [id number] >2,000 (H) 04/10/2023                         Follow Up   No follow-ups on file.  Patient was given instructions and counseling regarding her condition or for health maintenance advice. Please see specific information pulled into the AVS if appropriate.         This document has been electronically signed by ZELDA Freire on August 16, 2023 14:05 CDT.

## 2023-08-16 NOTE — TELEPHONE ENCOUNTER
----- Message from ZELDA Chilel sent at 8/16/2023 10:11 AM CDT -----  Please let her know that her labs area stable and to continue her medication; I will send new RX to pharmacy

## 2023-08-16 NOTE — PROGRESS NOTES
DATE OF VISIT: 8/15/2023      REASON FOR VISIT:    Pt on Xarelto for pulmonary embolism; iron deficiency anemia        HISTORY OF PRESENT ILLNESS:      67 yr old female with past medical problems consisting of hypertension, dyslipidemia, poorly controlled diabetes mellitus, history of left knee replacement with revision x3.  Most recent revision was done on August 28, 2018.  And was diagnosed with subacute pulmonary embolism on August 30, 2018 after revision of left knee and was started on xarelto.  Patient was initially seen in consultation when she was  admitted to hospital on September 21, 2018 with bleeding into her left knee.  Xarelto was discontinued and patient was started on heparin drip with coumadin.  Patient has started back on Xarelto in December 2018 due to positive lupus anticoagulant.  She is here today for routine follow-up ;labs were done late yesterday afternoon in [place] so results are pending at this time.  She denies any bleeding from Xarelto.  She is taking her iron tablet daily and B-12 folic acid TIW.         Past Medical History, Past Surgical History, Social History, Family History have been reviewed and are without significant changes except as mentioned.    Review of Systems   A comprehensive 14 point review of systems was performed and was negative except as mentioned.    Medications:  The current medication list was reviewed in the EMR    ALLERGIES:    Allergies   Allergen Reactions    Shrimp Rash    Oxycodone Rash    Penicillins Rash    Percocet [Oxycodone-Acetaminophen] Rash    Rocephin [Ceftriaxone] Rash       Objective      Vitals:    08/15/23 0849   BP: 155/73   Pulse: 94   Resp: 18   SpO2: 94%   Weight: 98 kg (216 lb)   PainSc: 9  Comment: hip pain         5/18/2020     1:03 PM   Current Status   ECOG score 0       Physical Exam  General: alert and oriented no acute distress  Lungs;normal breath sounds  Card:RRR  Ext;no edema    RECENT LABS:  Glucose   Date Value Ref Range  Status   08/14/2023 213 (H) 65 - 99 mg/dL Final     Sodium   Date Value Ref Range Status   08/14/2023 137 136 - 145 mmol/L Final     Potassium   Date Value Ref Range Status   08/14/2023 4.5 3.5 - 5.2 mmol/L Final     Comment:     Slight hemolysis detected by analyzer. Results may be affected.     CO2   Date Value Ref Range Status   08/14/2023 19.0 (L) 22.0 - 29.0 mmol/L Final     Chloride   Date Value Ref Range Status   08/14/2023 101 98 - 107 mmol/L Final     Anion Gap   Date Value Ref Range Status   08/14/2023 17.0 (H) 5.0 - 15.0 mmol/L Final     Creatinine   Date Value Ref Range Status   08/14/2023 0.73 0.57 - 1.00 mg/dL Final     BUN   Date Value Ref Range Status   08/14/2023 14 8 - 23 mg/dL Final     BUN/Creatinine Ratio   Date Value Ref Range Status   08/14/2023 19.2 7.0 - 25.0 Final     Calcium   Date Value Ref Range Status   08/14/2023 9.4 8.6 - 10.5 mg/dL Final     Alkaline Phosphatase   Date Value Ref Range Status   08/14/2023 76 39 - 117 U/L Final     Total Protein   Date Value Ref Range Status   08/14/2023 7.8 6.0 - 8.5 g/dL Final     ALT (SGPT)   Date Value Ref Range Status   08/14/2023 15 1 - 33 U/L Final     AST (SGOT)   Date Value Ref Range Status   08/14/2023 21 1 - 32 U/L Final     Total Bilirubin   Date Value Ref Range Status   08/14/2023 <0.2 0.0 - 1.2 mg/dL Final     Albumin   Date Value Ref Range Status   08/14/2023 4.3 3.5 - 5.2 g/dL Final     Globulin   Date Value Ref Range Status   08/14/2023 3.5 gm/dL Final     Lab Results   Component Value Date    WBC 6.92 08/14/2023    HGB 12.5 08/14/2023    HCT 38.9 08/14/2023    MCV 84.6 08/14/2023     08/14/2023     Lab Results   Component Value Date    NEUTROABS 3.91 08/14/2023    IRON 57 08/14/2023    IRON 85 04/10/2023    IRON 54 12/13/2022    TIBC 349 08/14/2023    TIBC 381 04/10/2023    TIBC 361 12/13/2022    LABIRON 16 (L) 08/14/2023    LABIRON 22 04/10/2023    LABIRON 15 (L) 12/13/2022    FERRITIN 298.60 (H) 08/14/2023    FERRITIN 256.50  (H) 04/10/2023    FERRITIN 261.80 (H) 12/13/2022    ADKSTJFZ39 >2,000 (H) 04/10/2023    EKMIQEPJ81 >2,000 (H) 12/13/2022    HGNFMFGI16 >2,000 (H) 07/18/2022    FOLATE >20.00 04/10/2023    FOLATE 14.10 12/13/2022    FOLATE 15.60 07/18/2022     Lab Results   Component Value Date    REFLABREPO SEE NOTE: 05/28/2015             RADIOLOGY DATA :  No radiology results for the last 7 days        Assessment & Plan     1.  Subacute pulmonary embolism on right side:  -Patient was diagnosed with subacute pulmonary embolism on right side after most recent revision of left knee done on August 28, 2018.  -Patient was started on xarelto 15 mg twice daily.  -She was admitted to [facility] on September 20, 2018 with bleeding into her left knee.  -Patient was started on heparin drip.  -Patient had incision and Drainage done by Dr. Su on September 22, 2018.  -Patient was on Coumadin.  Patient was started back on Xarelto in December 2018.  -CT angiogram of chest with contrast done on February 20, 2019 shows resolution of pulmonary embolism.  -Hypercoagulable workup done consisting of factor V Leyden, prothrombin gene mutation, anticardiolipin antibody, beta-2 glycoprotein antibody were negative on February 20, 2019.  -Lupus anticoagulant was positive on February 20, 2019 as well as Peggy 3, 2019.  In view of lupus anticoagulant being positive, recommend continuing with Xarelto for now.  -labs for today visit are pending; will follow-up with pt once results have been received and reviewed.  -new RX for Xarelto sent to pharmacy today.  -RTC 4 mos with CBC and CMP.     2.  Anemia: iron deficiency  -she is currently taking iron tablet daily along with B-12 and folic acid TIW  -labs pending today; will follow-up with pt and send new RX if appropriate.  -RTC 4 mos with repeat labs               PHQ-9 Total Score: 0 pt is not suicidal or homicidal    Meeta Marmolejo reports a pain score of 9.  Given her pain  assessment as noted, treatment options were discussed and the following options were decided upon as a follow-up plan to address the patient's pain:  pt is making an appt with her PCP to discuss hip pain .         Estephania Felix, APRN  8/16/2023  10:30 CDT        Part of this note may be an electronic transcription/translation of spoken language to printed text using the Dragon Dictation System.          CC:

## 2023-08-27 DIAGNOSIS — D68.62 LUPUS ANTICOAGULANT SYNDROME: ICD-10-CM

## 2023-08-27 DIAGNOSIS — K21.9 GASTROESOPHAGEAL REFLUX DISEASE, UNSPECIFIED WHETHER ESOPHAGITIS PRESENT: ICD-10-CM

## 2023-08-27 DIAGNOSIS — D50.8 OTHER IRON DEFICIENCY ANEMIA: Chronic | ICD-10-CM

## 2023-08-28 DIAGNOSIS — M62.838 MUSCLE SPASM: ICD-10-CM

## 2023-08-28 DIAGNOSIS — I10 BENIGN ESSENTIAL HYPERTENSION: ICD-10-CM

## 2023-08-28 RX ORDER — BACLOFEN 10 MG/1
10 TABLET ORAL 3 TIMES DAILY
Qty: 90 TABLET | Refills: 0 | Status: SHIPPED | OUTPATIENT
Start: 2023-08-28

## 2023-08-28 RX ORDER — RIVAROXABAN 20 MG/1
TABLET, FILM COATED ORAL
Qty: 30 TABLET | Refills: 3 | Status: SHIPPED | OUTPATIENT
Start: 2023-08-28

## 2023-08-28 RX ORDER — MONTELUKAST SODIUM 10 MG/1
TABLET ORAL
Qty: 30 TABLET | Refills: 3 | Status: SHIPPED | OUTPATIENT
Start: 2023-08-28

## 2023-08-28 RX ORDER — DOCUSATE SODIUM 100 MG/1
CAPSULE, LIQUID FILLED ORAL
Qty: 60 CAPSULE | Refills: 2 | Status: SHIPPED | OUTPATIENT
Start: 2023-08-28

## 2023-08-28 RX ORDER — OMEPRAZOLE 20 MG/1
CAPSULE, DELAYED RELEASE ORAL
Qty: 90 CAPSULE | Refills: 1 | OUTPATIENT
Start: 2023-08-28

## 2023-08-28 RX ORDER — OMEPRAZOLE 40 MG/1
40 CAPSULE, DELAYED RELEASE ORAL DAILY
Qty: 30 CAPSULE | Refills: 2 | Status: SHIPPED | OUTPATIENT
Start: 2023-08-28

## 2023-08-28 RX ORDER — AMLODIPINE BESYLATE 5 MG/1
5 TABLET ORAL DAILY
Qty: 90 TABLET | Refills: 3 | Status: SHIPPED | OUTPATIENT
Start: 2023-08-28

## 2023-08-28 NOTE — TELEPHONE ENCOUNTER
Incoming Refill Request      Medication requested (name and dose):   amLODIPine (NORVASC) 5 MG tablet     baclofen (LIORESAL) 10 MG tablet     [facility] where [facility] sent[facility]     Additional details provided by patient: completely out for about a month of the amlodipine and has about 1 week of the baclofen     Best call back number: [phone]     Does the patient have less than a 3 day supply:  [x] Yes  [] No    Reuben Sandy Rep  08/28/23, 14:29 CDT

## 2023-09-19 DIAGNOSIS — D50.8 OTHER IRON DEFICIENCY ANEMIA: Chronic | ICD-10-CM

## 2023-09-19 RX ORDER — DOCUSATE SODIUM 100 MG/1
100 CAPSULE, LIQUID FILLED ORAL 2 TIMES DAILY
Qty: 60 CAPSULE | Refills: 2 | Status: SHIPPED | OUTPATIENT
Start: 2023-09-19

## 2023-09-19 NOTE — TELEPHONE ENCOUNTER
Incoming Refill Request      Medication requested (name and dose):   docusate sodium (COLACE) 100 MG capsule     [facility] where [facility] be sent: [facility]     Additional details provided by patient:     Best call back number: [phone]    Does the patient have less than a 3 day supply:  [x] Yes  [] No    Reuben Sandy Rep  09/19/23, 09:54 CDT

## 2023-09-19 NOTE — TELEPHONE ENCOUNTER
UPCOMING APPTS  With Family Medicine (Anupama Arnett MD)  11/06/2023 at 8:45 AM  LAST OFFICE VISIT - THIS DEPT  6/23/2023 Anupama Arnett MD

## 2023-09-26 DIAGNOSIS — E11.65 TYPE 2 DIABETES MELLITUS WITH HYPERGLYCEMIA, WITH LONG-TERM CURRENT USE OF INSULIN: ICD-10-CM

## 2023-09-26 DIAGNOSIS — Z79.4 TYPE 2 DIABETES MELLITUS WITH HYPERGLYCEMIA, WITH LONG-TERM CURRENT USE OF INSULIN: ICD-10-CM

## 2024-01-05 NOTE — TELEPHONE ENCOUNTER
Called in 1 mg   
PATIENT CAME IN WANTING TO KNOW IF [facility] WOULD INCREASE HER OZEMPIC. SHE IS CURRENTLY TAKING 0.50MG WEEKLY. SHE SAID IF IT IS INCREASED TO SEND A PRESCRIPTION TO [facility]    THANKS  
97

## (undated) DEVICE — STPLR SKIN VISISTAT WD 35CT

## (undated) DEVICE — PK EXTRM LF 60

## (undated) DEVICE — UNDRPD BREATH 23X36 BG/10

## (undated) DEVICE — SPNG DRN AMD EXCILON 6PLY 4X4IN PK/2

## (undated) DEVICE — KT DRN EVAC WND PVC PCH WTROC RND 19F400

## (undated) DEVICE — SOL IRR NACL 0.9PCT BT 1000ML

## (undated) DEVICE — DRSNG GZ CURAD XEROFORM NONADHS 5X9IN STRL

## (undated) DEVICE — GLV SURG SENSICARE PI PF LF 7 GRN STRL

## (undated) DEVICE — STERILE POLYISOPRENE POWDER-FREE SURGICAL GLOVES WITH EMOLLIENT COATING: Brand: PROTEXIS

## (undated) DEVICE — SYR LL TP 10ML STRL

## (undated) DEVICE — STERILE SLEEVE: Brand: CONVERTORS

## (undated) DEVICE — GLV SURG SENSICARE GREEN W/ALOE PF LF 6.5 STRL

## (undated) DEVICE — GLV SURG TRIUMPH LT PF LTX 6 STRL

## (undated) DEVICE — PEEL-AWAY TOGA, 2X LARGE: Brand: FLYTE

## (undated) DEVICE — SUT VIC 0 CT1 CR8 27IN JJ41G

## (undated) DEVICE — DRAPE,EXTREMITY,89X128,STERILE: Brand: MEDLINE

## (undated) DEVICE — SPNG LAP 18X18IN LF STRL PK/5

## (undated) DEVICE — GLV SURG SENSICARE POLYISPRN W/ALOE PF LF 6.5 GRN STRL

## (undated) DEVICE — GOWN,AURORA,NOREINF,RAGLAN,XL,STERILE: Brand: MEDLINE

## (undated) DEVICE — DISPOSABLE TOURNIQUET CUFF SINGLE BLADDER, DUAL PORT AND QUICK CONNECT CONNECTOR: Brand: COLOR CUFF

## (undated) DEVICE — DRAPE,U/ SHT,SPLIT,PLAS,STERIL: Brand: MEDLINE

## (undated) DEVICE — SHEET,DRAPE,53X77,STERILE: Brand: MEDLINE

## (undated) DEVICE — TRAY,CATHETER,URETHRAL,RED-RUBBER,15FR: Brand: MEDLINE

## (undated) DEVICE — GLV SURG ORTHO BIOGEL OPTIFIT PF SZ9

## (undated) DEVICE — HANDPIECE SET WITH COAXIAL HIGH FLOW TIP AND SUCTION TUBE: Brand: INTERPULSE

## (undated) DEVICE — PAD,ABDOMINAL,8"X10",ST,LF: Brand: MEDLINE

## (undated) DEVICE — BANDAGE,GAUZE,BULKEE II,4.5"X4.1YD,STRL: Brand: MEDLINE

## (undated) DEVICE — GLV SURG TRIUMPH PF LTX 6.5 STRL

## (undated) DEVICE — HOOD, PEEL-AWAY: Brand: FLYTE

## (undated) DEVICE — SUT PDS 2 0 CT1 27IN CLR Z259H

## (undated) DEVICE — 9165 UNIVERSAL PATIENT PLATE: Brand: 3M™

## (undated) DEVICE — CONTAINER,SPECIMEN,OR STERILE,4OZ: Brand: MEDLINE

## (undated) DEVICE — SUT VIC 2/0 CT1 CR8 18IN J839D

## (undated) DEVICE — 3 BONE CEMENT MIXER: Brand: MIXEVAC

## (undated) DEVICE — 3M™ DURAPORE™ SURGICAL TAPE 1538-3, 3 INCH X 10 YARD (7,5CM X 9,1M), 4 ROLLS/BOX: Brand: 3M™ DURAPORE™

## (undated) DEVICE — SUT PDS CLOSURE CT1 1/0 27IN Z341H

## (undated) DEVICE — 3M™ IOBAN™ 2 ANTIMICROBIAL INCISE DRAPE 6651EZ: Brand: IOBAN™ 2

## (undated) DEVICE — TB CULTURETT2 LIQ STUARTS RED

## (undated) DEVICE — SUT VIC 0 CT1 36IN J946H

## (undated) DEVICE — UNDYED BRAIDED (POLYGLACTIN 910), SYNTHETIC ABSORBABLE SUTURE: Brand: COATED VICRYL

## (undated) DEVICE — ANTIBACTERIAL UNDYED BRAIDED (POLYGLACTIN 910), SYNTHETIC ABSORBABLE SUTURE: Brand: COATED VICRYL

## (undated) DEVICE — TRAY,FOLEY INSERTION,PVP,10ML SYRINGE: Brand: MEDLINE

## (undated) DEVICE — STRYKER PERFORMANCE SERIES SAGITTAL BLADE: Brand: STRYKER PERFORMANCE SERIES

## (undated) DEVICE — SUT ETHIB 5 V37 30IN MB66G

## (undated) DEVICE — SUT ETHLN 2/0 FSLX 30IN 1674H

## (undated) DEVICE — SPNG GZ WOVN 4X4IN 12PLY 10/BX STRL

## (undated) DEVICE — BNDG ELAS CO-FLEX SLF ADHR 4IN5YD LF STRL

## (undated) DEVICE — SUT VICRYL 1 CT1 27IN J261H

## (undated) DEVICE — KT DRN EVAC WND PVC PCH WTROC RND 10F400

## (undated) DEVICE — GOWN,PREVENTION PLUS,XLONG/XLARGE,STRL: Brand: MEDLINE